# Patient Record
Sex: FEMALE | Race: WHITE | NOT HISPANIC OR LATINO | Employment: OTHER | URBAN - METROPOLITAN AREA
[De-identification: names, ages, dates, MRNs, and addresses within clinical notes are randomized per-mention and may not be internally consistent; named-entity substitution may affect disease eponyms.]

---

## 2017-02-13 ENCOUNTER — HOSPITAL ENCOUNTER (OUTPATIENT)
Dept: LAB | Facility: HOSPITAL | Age: 57
Setting detail: SPECIMEN
Discharge: HOME | End: 2017-02-13
Attending: INTERNAL MEDICINE | Admitting: INTERNAL MEDICINE

## 2017-02-20 ENCOUNTER — HOSPITAL ENCOUNTER (OUTPATIENT)
Dept: LAB | Facility: HOSPITAL | Age: 57
Setting detail: SPECIMEN
Discharge: HOME | End: 2017-02-20
Attending: INTERNAL MEDICINE | Admitting: INTERNAL MEDICINE

## 2017-02-20 LAB
ALBUMIN SERPL-MCNC: 3.6 G/DL (ref 3.5–4.8)
ALBUMIN/GLOB SERPL: 1.2 {RATIO} (ref 1–1.7)
ALP SERPL-CCNC: 126 IU/L (ref 32–91)
ALT SERPL-CCNC: 16 IU/L (ref 14–54)
ANION GAP SERPL CALC-SCNC: 15.2 MMOL/L (ref 10–20)
AST SERPL-CCNC: 21 IU/L (ref 15–41)
BASOPHILS # BLD AUTO: 0.1 10*3/UL (ref 0–0.2)
BASOPHILS NFR BLD AUTO: 1 % (ref 0–2)
BILIRUB SERPL-MCNC: 0.3 MG/DL (ref 0.3–1.2)
BUN SERPL-MCNC: 12 MG/DL (ref 8–20)
BUN/CREAT SERPL: 13.3 (ref 5.4–26.2)
CALCIUM SERPL-MCNC: 9.3 MG/DL (ref 8.9–10.3)
CHLORIDE SERPL-SCNC: 105 MMOL/L (ref 101–111)
CONV CO2: 24 MMOL/L (ref 22–32)
CONV TOTAL PROTEIN: 6.6 G/DL (ref 6.1–7.9)
CREAT UR-MCNC: 0.9 MG/DL (ref 0.4–1)
DIFFERENTIAL METHOD BLD: (no result)
EOSINOPHIL # BLD AUTO: 0.1 10*3/UL (ref 0–0.3)
EOSINOPHIL # BLD AUTO: 1 % (ref 0–3)
ERYTHROCYTE [DISTWIDTH] IN BLOOD BY AUTOMATED COUNT: 16.2 % (ref 11.5–14.5)
GLOBULIN UR ELPH-MCNC: 3 G/DL (ref 2.5–3.8)
GLUCOSE SERPL-MCNC: 78 MG/DL (ref 65–99)
HCT VFR BLD AUTO: 43.5 % (ref 35–49)
HGB BLD-MCNC: 14.6 G/DL (ref 12–15)
LYMPHOCYTES # BLD AUTO: 3.4 10*3/UL (ref 0.8–4.8)
LYMPHOCYTES NFR BLD AUTO: 31 % (ref 18–42)
MCH RBC QN AUTO: 28 PG (ref 26–32)
MCHC RBC AUTO-ENTMCNC: 33.6 G/DL (ref 32–36)
MCV RBC AUTO: 83.3 FL (ref 80–94)
MONOCYTES # BLD AUTO: 0.6 10*3/UL (ref 0.1–1.3)
MONOCYTES NFR BLD AUTO: 6 % (ref 2–11)
NEUTROPHILS # BLD AUTO: 6.9 10*3/UL (ref 2.3–8.6)
NEUTROPHILS NFR BLD AUTO: 61 % (ref 50–75)
NRBC BLD AUTO-RTO: 0 /100{WBCS}
NRBC/RBC NFR BLD MANUAL: 0 10*3/UL
PLATELET # BLD AUTO: 273 10*3/UL (ref 150–450)
PMV BLD AUTO: 7.7 FL (ref 7.4–10.4)
POTASSIUM SERPL-SCNC: 4.2 MMOL/L (ref 3.6–5.1)
RBC # BLD AUTO: 5.22 10*6/UL (ref 4–5.4)
SODIUM SERPL-SCNC: 140 MMOL/L (ref 136–144)
T4 FREE SERPL-MCNC: 0.78 NG/DL (ref 0.58–1.64)
TSH SERPL-ACNC: 0.05 UIU/ML (ref 0.34–5.6)
WBC # BLD AUTO: 11 10*3/UL (ref 4.5–11.5)

## 2017-04-18 ENCOUNTER — HOSPITAL ENCOUNTER (OUTPATIENT)
Dept: LAB | Facility: HOSPITAL | Age: 57
Discharge: HOME | End: 2017-04-18
Attending: INTERNAL MEDICINE | Admitting: INTERNAL MEDICINE

## 2017-04-18 LAB
ANION GAP SERPL CALC-SCNC: 15.5 MMOL/L (ref 10–20)
BUN SERPL-MCNC: 19 MG/DL (ref 8–20)
BUN/CREAT SERPL: 19 (ref 5.4–26.2)
CALCIUM SERPL-MCNC: 9.7 MG/DL (ref 8.9–10.3)
CHLORIDE SERPL-SCNC: 102 MMOL/L (ref 101–111)
CONV CO2: 25 MMOL/L (ref 22–32)
CREAT UR-MCNC: 1 MG/DL (ref 0.4–1)
GLUCOSE SERPL-MCNC: 219 MG/DL (ref 65–99)
MAGNESIUM SERPL-MCNC: 1.5 MG/DL (ref 1.8–2.5)
POTASSIUM SERPL-SCNC: 3.5 MMOL/L (ref 3.6–5.1)
SODIUM SERPL-SCNC: 139 MMOL/L (ref 136–144)

## 2017-10-24 ENCOUNTER — HOSPITAL ENCOUNTER (OUTPATIENT)
Dept: URGENT CARE | Facility: CLINIC | Age: 57
Setting detail: SPECIMEN
Discharge: HOME | End: 2017-10-24
Attending: FAMILY MEDICINE | Admitting: FAMILY MEDICINE

## 2017-10-24 LAB
AMPICILLIN SUSC ISLT: NORMAL
AZTREONAM SUSC ISLT: NORMAL
BACTERIA ISLT: NORMAL
BACTERIA SPEC AEROBE CULT: NORMAL
CEFAZOLIN SUSC ISLT: NORMAL
CEFEPIME SUSC ISLT: NORMAL
CEFTRIAXONE SUSC ISLT: NORMAL
CIPROFLOXACIN SUSC ISLT: NORMAL
COLONY COUNT: NORMAL
ERTAPENEM SUSC ISLT: NORMAL
LEVOFLOXACIN SUSC ISLT: NORMAL
Lab: NORMAL
MEROPENEM SUSC ISLT: NORMAL
MICRO REPORT STATUS: NORMAL
NITROFURANTOIN SUSC ISLT: NORMAL
PIP+TAZO SUSC ISLT: NORMAL
SPECIMEN SOURCE: NORMAL
SUSC METH SPEC: NORMAL
TETRACYCLINE SUSC ISLT: NORMAL
TOBRAMYCIN SUSC ISLT: NORMAL
TRIMETHOPRIM/SULFA: NORMAL

## 2019-01-10 ENCOUNTER — HOSPITAL ENCOUNTER (OUTPATIENT)
Dept: ORTHOPEDIC SURGERY | Facility: CLINIC | Age: 59
Discharge: HOME | End: 2019-01-10
Attending: PODIATRIST | Admitting: PODIATRIST

## 2019-01-22 ENCOUNTER — HOSPITAL ENCOUNTER (OUTPATIENT)
Dept: PAIN MEDICINE | Facility: HOSPITAL | Age: 59
Discharge: HOME | End: 2019-01-22
Attending: ANESTHESIOLOGY | Admitting: ANESTHESIOLOGY

## 2019-02-25 ENCOUNTER — HOSPITAL ENCOUNTER (OUTPATIENT)
Dept: PAIN MEDICINE | Facility: HOSPITAL | Age: 59
Discharge: HOME | End: 2019-02-25
Attending: ANESTHESIOLOGY | Admitting: ANESTHESIOLOGY

## 2019-03-25 ENCOUNTER — HOSPITAL ENCOUNTER (OUTPATIENT)
Dept: CARDIOLOGY | Facility: HOSPITAL | Age: 59
Discharge: HOME | End: 2019-03-25
Attending: INTERNAL MEDICINE | Admitting: INTERNAL MEDICINE

## 2019-04-24 ENCOUNTER — HOSPITAL ENCOUNTER (OUTPATIENT)
Dept: PREADMISSION TESTING | Facility: HOSPITAL | Age: 59
Discharge: HOME | End: 2019-04-24
Attending: ORTHOPAEDIC SURGERY | Admitting: ORTHOPAEDIC SURGERY

## 2019-04-24 LAB
ABO + RH BLD: NORMAL
ANION GAP SERPL CALC-SCNC: 15.1 MMOL/L (ref 10–20)
APTT BLD: 24.2 SEC (ref 24–31)
ARMBAND: NORMAL
BACTERIA SPEC AEROBE CULT: NORMAL
BASOPHILS # BLD AUTO: 0.1 10*3/UL (ref 0–0.2)
BASOPHILS NFR BLD AUTO: 1 % (ref 0–2)
BILIRUB UR QL STRIP: NEGATIVE MG/DL
BLD COMPONENT TYPE: NORMAL
BLD GP AB SCN SERPL QL: NEGATIVE
BUN SERPL-MCNC: 18 MG/DL (ref 8–20)
BUN/CREAT SERPL: 18 (ref 5.4–26.2)
CALCIUM SERPL-MCNC: 10.1 MG/DL (ref 8.9–10.3)
CASTS URNS QL MICRO: NORMAL /[LPF]
CHLORIDE SERPL-SCNC: 109 MMOL/L (ref 101–111)
COLOR UR: YELLOW
CONV BACTERIA IN URINE MICRO: NEGATIVE
CONV CLARITY OF URINE: CLEAR
CONV CO2: 19 MMOL/L (ref 22–32)
CONV HYALINE CASTS IN URINE MICRO: 0 /[LPF] (ref 0–5)
CONV PROTEIN IN URINE BY AUTOMATED TEST STRIP: NEGATIVE MG/DL
CONV SMALL ROUND CELLS: NORMAL /[HPF]
CONV UROBILINOGEN IN URINE BY AUTOMATED TEST STRIP: 0.2 MG/DL
CREAT UR-MCNC: 1 MG/DL (ref 0.4–1)
CROSSMATCH EXPIRATION: NORMAL
CULTURE INDICATED?: NORMAL
DIFFERENTIAL METHOD BLD: (no result)
EOSINOPHIL # BLD AUTO: 0.2 10*3/UL (ref 0–0.3)
EOSINOPHIL # BLD AUTO: 2 % (ref 0–3)
ERYTHROCYTE [DISTWIDTH] IN BLOOD BY AUTOMATED COUNT: 14.3 % (ref 11.5–14.5)
GLUCOSE SERPL-MCNC: 86 MG/DL (ref 65–99)
GLUCOSE UR QL: NEGATIVE MG/DL
HCT VFR BLD AUTO: 44.3 % (ref 35–49)
HGB BLD-MCNC: 14.4 G/DL (ref 12–15)
HGB UR QL STRIP: NEGATIVE
INR PPP: 1
KETONES UR QL STRIP: NEGATIVE MG/DL
LEUKOCYTE ESTERASE UR QL STRIP: NEGATIVE
LYMPHOCYTES # BLD AUTO: 3.7 10*3/UL (ref 0.8–4.8)
LYMPHOCYTES NFR BLD AUTO: 40 % (ref 18–42)
Lab: NORMAL
MCH RBC QN AUTO: 31.2 PG (ref 26–32)
MCHC RBC AUTO-ENTMCNC: 32.6 G/DL (ref 32–36)
MCV RBC AUTO: 95.5 FL (ref 80–94)
MICRO REPORT STATUS: NORMAL
MONOCYTES # BLD AUTO: 0.7 10*3/UL (ref 0.1–1.3)
MONOCYTES NFR BLD AUTO: 8 % (ref 2–11)
NEUTROPHILS # BLD AUTO: 4.6 10*3/UL (ref 2.3–8.6)
NEUTROPHILS NFR BLD AUTO: 49 % (ref 50–75)
NITRITE UR QL STRIP: NEGATIVE
NRBC BLD AUTO-RTO: 0 /100{WBCS}
NRBC/RBC NFR BLD MANUAL: 0 10*3/UL
PH UR STRIP.AUTO: 6 [PH] (ref 4.5–8)
PLATELET # BLD AUTO: 294 10*3/UL (ref 150–450)
PMV BLD AUTO: 7.7 FL (ref 7.4–10.4)
POTASSIUM SERPL-SCNC: 4.1 MMOL/L (ref 3.6–5.1)
PROTHROMBIN TIME: 10.1 SEC (ref 9.6–11.7)
RBC # BLD AUTO: 4.64 10*6/UL (ref 4–5.4)
RBC #/AREA URNS HPF: 1 /[HPF] (ref 0–3)
SODIUM SERPL-SCNC: 139 MMOL/L (ref 136–144)
SP GR UR: 1.01 (ref 1–1.03)
SPECIMEN SOURCE: NORMAL
SPERM URNS QL MICRO: NORMAL /[HPF]
SQUAMOUS SPT QL MICRO: 0 /[HPF] (ref 0–5)
UNIDENT CRYS URNS QL MICRO: NORMAL /[HPF]
WBC # BLD AUTO: 9.2 10*3/UL (ref 4.5–11.5)
WBC #/AREA URNS HPF: 0 /[HPF] (ref 0–5)
YEAST SPEC QL WET PREP: NORMAL /[HPF]

## 2019-09-19 ENCOUNTER — OFFICE VISIT (OUTPATIENT)
Dept: PODIATRY | Facility: CLINIC | Age: 59
End: 2019-09-19

## 2019-09-19 VITALS
HEART RATE: 64 BPM | WEIGHT: 157 LBS | DIASTOLIC BLOOD PRESSURE: 77 MMHG | BODY MASS INDEX: 31.65 KG/M2 | SYSTOLIC BLOOD PRESSURE: 161 MMHG | HEIGHT: 59 IN

## 2019-09-19 DIAGNOSIS — M19.071 ARTHRITIS OF RIGHT FOOT: ICD-10-CM

## 2019-09-19 DIAGNOSIS — S92.351A CLOSED DISPLACED FRACTURE OF FIFTH METATARSAL BONE OF RIGHT FOOT, INITIAL ENCOUNTER: Primary | ICD-10-CM

## 2019-09-19 PROBLEM — M87.00 AVASCULAR NECROSIS OF BONE (HCC): Status: ACTIVE | Noted: 2019-01-10

## 2019-09-19 PROBLEM — M19.079 ARTHRITIS OF FOOT: Status: ACTIVE | Noted: 2019-01-10

## 2019-09-19 PROCEDURE — 99213 OFFICE O/P EST LOW 20 MIN: CPT | Performed by: PODIATRIST

## 2019-09-19 RX ORDER — IBUPROFEN 800 MG/1
TABLET ORAL EVERY 6 HOURS
COMMUNITY
Start: 2019-01-22 | End: 2020-03-28 | Stop reason: HOSPADM

## 2019-09-19 RX ORDER — NAPROXEN SODIUM 220 MG
TABLET ORAL EVERY 12 HOURS
COMMUNITY
Start: 2019-01-22 | End: 2020-03-28 | Stop reason: HOSPADM

## 2019-09-19 RX ORDER — AMOXICILLIN 500 MG
1200 CAPSULE ORAL
COMMUNITY
Start: 2016-10-21

## 2019-09-19 RX ORDER — ASPIRIN 325 MG
TABLET ORAL
Status: ON HOLD | COMMUNITY
Start: 2017-03-24 | End: 2020-03-27

## 2019-09-19 NOTE — PROGRESS NOTES
"09/19/2019  Foot and Ankle Surgery - Established Patient/Follow-up  Provider: Dr. Alistair Fry, ZORAIDA  Location: [facility]    Subjective:  Abi Rivera is a 58 y.o. female.     Chief Complaint   Patient presents with   • Right Foot - Pain       HPI: Patient returns with recent increase in pain to the right foot.  She states that she was walking and felt a pop.  She complains of prominent discomfort affecting the lateral aspect of the foot.  She continues to have discomfort to the dorsal aspect of the midfoot which is unchanged from previous evaluation.  She denies any injury.    No Known Allergies    Current Outpatient Medications on File Prior to Visit   Medication Sig Dispense Refill   • aspirin 325 MG tablet ASPIRIN 325 MG TABS     • Calcium Citrate-Vitamin D 200-125 MG-UNIT tablet CALCIUM + D TABS     • ibuprofen (ADVIL,MOTRIN) 800 MG tablet Every 6 (Six) Hours.     • Magnesium Oxide 400 (240 Mg) MG tablet Daily.     • Multiple Vitamin (MULTI-VITAMIN) tablet MULTI-VITAMIN TABS     • naproxen sodium (ALEVE) 220 MG tablet Every 12 (Twelve) Hours.     • Omega-3 Fatty Acids (FISH OIL) 1000 MG capsule capsule FISH OIL 1000 MG CAPS       No current facility-administered medications on file prior to visit.        Objective   /77   Pulse 64   Ht 149.9 cm (59\")   Wt 71.2 kg (157 lb)   BMI 31.71 kg/m²     Podiatry Exam       General Appearance:  well developed, well nourished, no acute distress  Mental Status Exam        Judgement and Insight:  Intact       Orientation:  AAO x3       Mood and Affect:  depressed mood  Cardiovascular (Bilateral)       Dorsalis Pedis Pulse (BL):  2/4       Posterior Tibialis Pulse (BL):  2/4       Capillary Filling Time (BL):  1-3 Seconds       Edema (BL):   mild right foot edema  Dermatological Exam       Temperature:  warm to warm       Skin Elasticity:  normal skin elasticity  Neurological Exam (Bilateral)       Paresthesia (Bl):  negative       Achilles DTRs " (Bl):  symmetric       Tinel over Tarsal Tunnel (Bl):  negative        MusculoSkeletal Exam (Bilateral)       Gait and Stance (Bl):   antalgic.       ROM (Bl):   midfoot range of motion is limited and tender.  Mild crepitus.  Other joints are unremarkable       Muscle Strength (Bl):  symmetrical 5/5; mild guarding with passing       Subluxed Digits (Bl):  no subluxation or laxity of joints       Dislocated Joints (Bl):  no dislocation of joints       Medial Turbicle Calc (Bl):  no pain       Gastroc soleus equinus (Bl):  Inability to dorsiflex past neutral position both straight legged and bent knee       Post tibial tendon (Bl):  no soreness noted       Peroneal Tendon (Bl):  no soreness noted  Additional Musculoskelatal Findings    Continue discomfort affecting the right midfoot.  Prominent discomfort to the lateral column of the foot.  No gross deformity.    Assessment/Plan   Abi was seen today for pain.    Diagnoses and all orders for this visit:    Closed displaced fracture of fifth metatarsal bone of right foot, initial encounter    Arthritis of right foot  -     XR Foot 3+ View Right      Patient complains of increased pain to the lateral column of the foot.  Imaging was performed today showing a minimally displaced fifth metatarsal base fracture.  I did discuss the finding and treatment options with her in office.  Given that she has had persistent discomfort involving the dorsal aspect of the midfoot and we have previously discussed loose body removal, I do feel that the most appropriate option is to proceed with fifth metatarsal open reduction internal fixation and loose body excision from the talonavicular joint region.  We did discuss the procedures, risks, goals, and recovery at length.  She does understand that she will require nonweightbearing activity after surgery.  I have asked that she return to her cam boot at this time with decreased weightbearing.  We will plan for surgery in the near  future.    Orders Placed This Encounter   Procedures   • XR Foot 3+ View Right     Weight Bearing     Order Specific Question:   Reason for Exam:     Answer:   right foot pain sicne sunday when she heard a loud pop when walking pain is on the top of the foot down the side into the 5th metatarsal RM 15 WB          Note is dictated utilizing voice recognition software. Unfortunately this leads to occasional typographical errors. I apologize in advance if the situation occurs. If questions occur please do not hesitate to call our office.

## 2019-09-23 ENCOUNTER — HOSPITAL ENCOUNTER (OUTPATIENT)
Dept: GENERAL RADIOLOGY | Facility: HOSPITAL | Age: 59
Discharge: HOME | End: 2019-09-23
Admitting: PODIATRIST

## 2019-09-23 ENCOUNTER — APPOINTMENT (OUTPATIENT)
Dept: PREADMISSION TESTING | Facility: HOSPITAL | Age: 59
End: 2019-09-23

## 2019-09-23 VITALS
WEIGHT: 158 LBS | HEIGHT: 59 IN | BODY MASS INDEX: 31.85 KG/M2 | HEART RATE: 60 BPM | DIASTOLIC BLOOD PRESSURE: 87 MMHG | OXYGEN SATURATION: 98 % | SYSTOLIC BLOOD PRESSURE: 139 MMHG

## 2019-09-23 DIAGNOSIS — M19.071 ARTHRITIS OF RIGHT FOOT: ICD-10-CM

## 2019-09-23 DIAGNOSIS — S92.351A CLOSED DISPLACED FRACTURE OF FIFTH METATARSAL BONE OF RIGHT FOOT, INITIAL ENCOUNTER: ICD-10-CM

## 2019-09-23 LAB
ANION GAP SERPL CALCULATED.3IONS-SCNC: 11 MMOL/L (ref 5–15)
BUN BLD-MCNC: 17 MG/DL (ref 8–20)
BUN/CREAT SERPL: 18.9 (ref 5.4–26.2)
CALCIUM SPEC-SCNC: 9.3 MG/DL (ref 8.9–10.3)
CHLORIDE SERPL-SCNC: 111 MMOL/L (ref 101–111)
CO2 SERPL-SCNC: 23 MMOL/L (ref 22–32)
CREAT BLD-MCNC: 0.9 MG/DL (ref 0.4–1)
DEPRECATED RDW RBC AUTO: 45.5 FL (ref 37–54)
ERYTHROCYTE [DISTWIDTH] IN BLOOD BY AUTOMATED COUNT: 13.7 % (ref 12.3–15.4)
GFR SERPL CREATININE-BSD FRML MDRD: 65 ML/MIN/1.73
GLUCOSE BLD-MCNC: 80 MG/DL (ref 65–99)
HCT VFR BLD AUTO: 40.4 % (ref 34–46.6)
HGB BLD-MCNC: 13.7 G/DL (ref 12–15.9)
MCH RBC QN AUTO: 32.1 PG (ref 26.6–33)
MCHC RBC AUTO-ENTMCNC: 34 G/DL (ref 31.5–35.7)
MCV RBC AUTO: 94.4 FL (ref 79–97)
PLATELET # BLD AUTO: 296 10*3/MM3 (ref 140–450)
PMV BLD AUTO: 7 FL (ref 6–12)
POTASSIUM BLD-SCNC: 4 MMOL/L (ref 3.6–5.1)
RBC # BLD AUTO: 4.28 10*6/MM3 (ref 3.77–5.28)
SODIUM BLD-SCNC: 141 MMOL/L (ref 136–144)
WBC NRBC COR # BLD: 7.8 10*3/MM3 (ref 3.4–10.8)

## 2019-09-23 PROCEDURE — 71046 X-RAY EXAM CHEST 2 VIEWS: CPT

## 2019-09-23 PROCEDURE — 80048 BASIC METABOLIC PNL TOTAL CA: CPT | Performed by: PODIATRIST

## 2019-09-23 PROCEDURE — 36415 COLL VENOUS BLD VENIPUNCTURE: CPT

## 2019-09-23 PROCEDURE — 87081 CULTURE SCREEN ONLY: CPT | Performed by: PODIATRIST

## 2019-09-23 PROCEDURE — 93005 ELECTROCARDIOGRAM TRACING: CPT

## 2019-09-23 PROCEDURE — 93010 ELECTROCARDIOGRAM REPORT: CPT | Performed by: INTERNAL MEDICINE

## 2019-09-23 PROCEDURE — 85027 COMPLETE CBC AUTOMATED: CPT | Performed by: PODIATRIST

## 2019-09-23 RX ORDER — ACETAMINOPHEN 500 MG
500 TABLET ORAL EVERY 6 HOURS PRN
COMMUNITY

## 2019-09-23 RX ORDER — UBIDECARENONE 30 MG
1 CAPSULE ORAL DAILY
COMMUNITY

## 2019-09-24 LAB — MRSA SPEC QL CULT: NORMAL

## 2019-09-25 ENCOUNTER — TELEPHONE (OUTPATIENT)
Dept: PODIATRY | Facility: CLINIC | Age: 59
End: 2019-09-25

## 2019-09-25 NOTE — TELEPHONE ENCOUNTER
Pt called asking if she could or had to stop taking all of her NSAIDS and if she did could she have pain meds until surgery.  I went back and spoke to DR farmer and he was ok with her taking Ibuprofen or naproxen not both she needed to pick one. Pt was ok with that and would only take one of them.

## 2019-09-27 ENCOUNTER — ANESTHESIA EVENT (OUTPATIENT)
Dept: PERIOP | Facility: HOSPITAL | Age: 59
End: 2019-09-27

## 2019-09-30 ENCOUNTER — APPOINTMENT (OUTPATIENT)
Dept: GENERAL RADIOLOGY | Facility: HOSPITAL | Age: 59
End: 2019-09-30

## 2019-09-30 ENCOUNTER — HOSPITAL ENCOUNTER (OUTPATIENT)
Facility: HOSPITAL | Age: 59
Setting detail: HOSPITAL OUTPATIENT SURGERY
Discharge: HOME | End: 2019-09-30
Attending: PODIATRIST | Admitting: PODIATRIST

## 2019-09-30 ENCOUNTER — ANESTHESIA (OUTPATIENT)
Dept: PERIOP | Facility: HOSPITAL | Age: 59
End: 2019-09-30

## 2019-09-30 VITALS
SYSTOLIC BLOOD PRESSURE: 127 MMHG | TEMPERATURE: 97.5 F | OXYGEN SATURATION: 98 % | HEART RATE: 76 BPM | DIASTOLIC BLOOD PRESSURE: 81 MMHG | BODY MASS INDEX: 31.73 KG/M2 | RESPIRATION RATE: 15 BRPM | HEIGHT: 59 IN | WEIGHT: 157.41 LBS

## 2019-09-30 DIAGNOSIS — M19.071 ARTHRITIS OF RIGHT FOOT: ICD-10-CM

## 2019-09-30 DIAGNOSIS — S92.351A CLOSED DISPLACED FRACTURE OF FIFTH METATARSAL BONE OF RIGHT FOOT, INITIAL ENCOUNTER: ICD-10-CM

## 2019-09-30 PROCEDURE — 25010000002 FENTANYL CITRATE (PF) 100 MCG/2ML SOLUTION: Performed by: ANESTHESIOLOGY

## 2019-09-30 PROCEDURE — 28485 OPTX METATARSAL FX EACH: CPT | Performed by: PODIATRIST

## 2019-09-30 PROCEDURE — 76000 FLUOROSCOPY <1 HR PHYS/QHP: CPT

## 2019-09-30 PROCEDURE — 73620 X-RAY EXAM OF FOOT: CPT

## 2019-09-30 PROCEDURE — 25010000002 PROPOFOL 10 MG/ML EMULSION: Performed by: ANESTHESIOLOGY

## 2019-09-30 PROCEDURE — C1713 ANCHOR/SCREW BN/BN,TIS/BN: HCPCS | Performed by: PODIATRIST

## 2019-09-30 PROCEDURE — 27620 EXPLORE/TREAT ANKLE JOINT: CPT | Performed by: PODIATRIST

## 2019-09-30 PROCEDURE — 25010000002 DEXAMETHASONE PER 1 MG: Performed by: ANESTHESIOLOGY

## 2019-09-30 PROCEDURE — 25010000002 MIDAZOLAM PER 1 MG: Performed by: ANESTHESIOLOGY

## 2019-09-30 PROCEDURE — 25010000002 ONDANSETRON PER 1 MG: Performed by: ANESTHESIOLOGY

## 2019-09-30 DEVICE — IMPLANTABLE DEVICE
Type: IMPLANTABLE DEVICE | Status: FUNCTIONAL
Brand: ORTHOLOC

## 2019-09-30 DEVICE — IMPLANTABLE DEVICE
Type: IMPLANTABLE DEVICE | Status: FUNCTIONAL
Brand: ORTHOLOC 3DI

## 2019-09-30 RX ORDER — ONDANSETRON 2 MG/ML
INJECTION INTRAMUSCULAR; INTRAVENOUS AS NEEDED
Status: DISCONTINUED | OUTPATIENT
Start: 2019-09-30 | End: 2019-09-30 | Stop reason: SURG

## 2019-09-30 RX ORDER — SODIUM CHLORIDE 9 MG/ML
9 INJECTION, SOLUTION INTRAVENOUS CONTINUOUS PRN
Status: DISCONTINUED | OUTPATIENT
Start: 2019-09-30 | End: 2019-09-30 | Stop reason: HOSPADM

## 2019-09-30 RX ORDER — PHENYLEPHRINE HCL IN 0.9% NACL 0.5 MG/5ML
.5-3 SYRINGE (ML) INTRAVENOUS
Status: DISCONTINUED | OUTPATIENT
Start: 2019-09-30 | End: 2019-09-30 | Stop reason: HOSPADM

## 2019-09-30 RX ORDER — MIDAZOLAM HYDROCHLORIDE 1 MG/ML
INJECTION INTRAMUSCULAR; INTRAVENOUS AS NEEDED
Status: DISCONTINUED | OUTPATIENT
Start: 2019-09-30 | End: 2019-09-30 | Stop reason: SURG

## 2019-09-30 RX ORDER — PROPOFOL 10 MG/ML
VIAL (ML) INTRAVENOUS AS NEEDED
Status: DISCONTINUED | OUTPATIENT
Start: 2019-09-30 | End: 2019-09-30 | Stop reason: SURG

## 2019-09-30 RX ORDER — HYDROCODONE BITARTRATE AND ACETAMINOPHEN 7.5; 325 MG/1; MG/1
1 TABLET ORAL EVERY 6 HOURS PRN
Status: DISCONTINUED | OUTPATIENT
Start: 2019-09-30 | End: 2019-09-30 | Stop reason: HOSPADM

## 2019-09-30 RX ORDER — NALOXONE HCL 0.4 MG/ML
0.4 VIAL (ML) INJECTION AS NEEDED
Status: DISCONTINUED | OUTPATIENT
Start: 2019-09-30 | End: 2019-09-30 | Stop reason: HOSPADM

## 2019-09-30 RX ORDER — ACETAMINOPHEN 325 MG/1
650 TABLET ORAL ONCE AS NEEDED
Status: DISCONTINUED | OUTPATIENT
Start: 2019-09-30 | End: 2019-09-30 | Stop reason: HOSPADM

## 2019-09-30 RX ORDER — DEXAMETHASONE SODIUM PHOSPHATE 4 MG/ML
INJECTION, SOLUTION INTRA-ARTICULAR; INTRALESIONAL; INTRAMUSCULAR; INTRAVENOUS; SOFT TISSUE AS NEEDED
Status: DISCONTINUED | OUTPATIENT
Start: 2019-09-30 | End: 2019-09-30 | Stop reason: SURG

## 2019-09-30 RX ORDER — PROMETHAZINE HYDROCHLORIDE 25 MG/1
25 SUPPOSITORY RECTAL ONCE AS NEEDED
Status: DISCONTINUED | OUTPATIENT
Start: 2019-09-30 | End: 2019-09-30 | Stop reason: HOSPADM

## 2019-09-30 RX ORDER — PROMETHAZINE HYDROCHLORIDE 25 MG/1
25 TABLET ORAL ONCE AS NEEDED
Status: DISCONTINUED | OUTPATIENT
Start: 2019-09-30 | End: 2019-09-30 | Stop reason: HOSPADM

## 2019-09-30 RX ORDER — BUPIVACAINE HYDROCHLORIDE 5 MG/ML
INJECTION, SOLUTION PERINEURAL AS NEEDED
Status: DISCONTINUED | OUTPATIENT
Start: 2019-09-30 | End: 2019-09-30 | Stop reason: HOSPADM

## 2019-09-30 RX ORDER — PROMETHAZINE HYDROCHLORIDE 25 MG/ML
6.25 INJECTION, SOLUTION INTRAMUSCULAR; INTRAVENOUS ONCE AS NEEDED
Status: DISCONTINUED | OUTPATIENT
Start: 2019-09-30 | End: 2019-09-30 | Stop reason: HOSPADM

## 2019-09-30 RX ORDER — ONDANSETRON 2 MG/ML
4 INJECTION INTRAMUSCULAR; INTRAVENOUS ONCE AS NEEDED
Status: DISCONTINUED | OUTPATIENT
Start: 2019-09-30 | End: 2019-09-30 | Stop reason: HOSPADM

## 2019-09-30 RX ORDER — SODIUM CHLORIDE 0.9 % (FLUSH) 0.9 %
3 SYRINGE (ML) INJECTION EVERY 12 HOURS SCHEDULED
Status: DISCONTINUED | OUTPATIENT
Start: 2019-09-30 | End: 2019-09-30 | Stop reason: HOSPADM

## 2019-09-30 RX ORDER — HYDROCODONE BITARTRATE AND ACETAMINOPHEN 7.5; 325 MG/1; MG/1
1-2 TABLET ORAL EVERY 6 HOURS PRN
Qty: 28 TABLET | Refills: 0 | Status: SHIPPED | OUTPATIENT
Start: 2019-09-30 | End: 2019-10-17

## 2019-09-30 RX ORDER — MEPERIDINE HYDROCHLORIDE 25 MG/ML
12.5 INJECTION INTRAMUSCULAR; INTRAVENOUS; SUBCUTANEOUS
Status: COMPLETED | OUTPATIENT
Start: 2019-09-30 | End: 2019-09-30

## 2019-09-30 RX ORDER — FENTANYL CITRATE 50 UG/ML
INJECTION, SOLUTION INTRAMUSCULAR; INTRAVENOUS AS NEEDED
Status: DISCONTINUED | OUTPATIENT
Start: 2019-09-30 | End: 2019-09-30 | Stop reason: SURG

## 2019-09-30 RX ORDER — HYDRALAZINE HYDROCHLORIDE 20 MG/ML
5 INJECTION INTRAMUSCULAR; INTRAVENOUS
Status: DISCONTINUED | OUTPATIENT
Start: 2019-09-30 | End: 2019-09-30 | Stop reason: HOSPADM

## 2019-09-30 RX ORDER — ACETAMINOPHEN 650 MG/1
650 SUPPOSITORY RECTAL ONCE AS NEEDED
Status: DISCONTINUED | OUTPATIENT
Start: 2019-09-30 | End: 2019-09-30 | Stop reason: HOSPADM

## 2019-09-30 RX ORDER — FLUMAZENIL 0.1 MG/ML
0.2 INJECTION INTRAVENOUS AS NEEDED
Status: DISCONTINUED | OUTPATIENT
Start: 2019-09-30 | End: 2019-09-30 | Stop reason: HOSPADM

## 2019-09-30 RX ORDER — LIDOCAINE HYDROCHLORIDE 10 MG/ML
INJECTION, SOLUTION EPIDURAL; INFILTRATION; INTRACAUDAL; PERINEURAL AS NEEDED
Status: DISCONTINUED | OUTPATIENT
Start: 2019-09-30 | End: 2019-09-30 | Stop reason: SURG

## 2019-09-30 RX ORDER — LABETALOL HYDROCHLORIDE 5 MG/ML
5 INJECTION, SOLUTION INTRAVENOUS
Status: DISCONTINUED | OUTPATIENT
Start: 2019-09-30 | End: 2019-09-30 | Stop reason: HOSPADM

## 2019-09-30 RX ORDER — SODIUM CHLORIDE 0.9 % (FLUSH) 0.9 %
3-10 SYRINGE (ML) INJECTION AS NEEDED
Status: DISCONTINUED | OUTPATIENT
Start: 2019-09-30 | End: 2019-09-30 | Stop reason: HOSPADM

## 2019-09-30 RX ADMIN — FENTANYL CITRATE 50 MCG: 50 INJECTION, SOLUTION INTRAMUSCULAR; INTRAVENOUS at 14:09

## 2019-09-30 RX ADMIN — FENTANYL CITRATE 50 MCG: 50 INJECTION, SOLUTION INTRAMUSCULAR; INTRAVENOUS at 14:00

## 2019-09-30 RX ADMIN — MEPERIDINE HYDROCHLORIDE 12.5 MG: 25 INJECTION INTRAMUSCULAR; INTRAVENOUS; SUBCUTANEOUS at 15:02

## 2019-09-30 RX ADMIN — ONDANSETRON 4 MG: 2 INJECTION INTRAMUSCULAR; INTRAVENOUS at 14:03

## 2019-09-30 RX ADMIN — MIDAZOLAM 2 MG: 1 INJECTION INTRAMUSCULAR; INTRAVENOUS at 13:11

## 2019-09-30 RX ADMIN — PROPOFOL 200 MG: 10 INJECTION, EMULSION INTRAVENOUS at 13:11

## 2019-09-30 RX ADMIN — MEPERIDINE HYDROCHLORIDE 12.5 MG: 25 INJECTION INTRAMUSCULAR; INTRAVENOUS; SUBCUTANEOUS at 15:11

## 2019-09-30 RX ADMIN — CEFAZOLIN SODIUM 2 G: 1 INJECTION, POWDER, FOR SOLUTION INTRAMUSCULAR; INTRAVENOUS at 13:14

## 2019-09-30 RX ADMIN — FENTANYL CITRATE 100 MCG: 50 INJECTION, SOLUTION INTRAMUSCULAR; INTRAVENOUS at 13:11

## 2019-09-30 RX ADMIN — HYDROCODONE BITARTRATE AND ACETAMINOPHEN 1 TABLET: 7.5; 325 TABLET ORAL at 15:58

## 2019-09-30 RX ADMIN — LIDOCAINE HYDROCHLORIDE 50 MG: 10 INJECTION, SOLUTION EPIDURAL; INFILTRATION; INTRACAUDAL; PERINEURAL at 13:11

## 2019-09-30 RX ADMIN — DEXAMETHASONE SODIUM PHOSPHATE 8 MG: 4 INJECTION, SOLUTION INTRAMUSCULAR; INTRAVENOUS at 13:11

## 2019-09-30 RX ADMIN — SODIUM CHLORIDE 9 ML/HR: 900 INJECTION, SOLUTION INTRAVENOUS at 11:12

## 2019-09-30 NOTE — ANESTHESIA POSTPROCEDURE EVALUATION
Patient: Abi Rivera    Procedure Summary     Date:  09/30/19 Room / Location:  [facility] OR 08 / [facility] MAIN OR    Anesthesia Start:  1308 Anesthesia Stop:  1436    Procedure:  OPEN REDUCTION INTERNAL FIXATION OF FIFTH METATARSAL FRACTURE, LOOSE BODY EXCISION/REMOVAL FROM THE TALONAVICULAR JOINT OF THE RIGHT FOOT (Right Foot) Diagnosis:       Closed displaced fracture of fifth metatarsal bone of right foot, initial encounter      Arthritis of right foot      (Closed displaced fracture of fifth metatarsal bone of right foot, initial encounter [S92.351A])      (Arthritis of right foot [M19.071])    Surgeon:  JOSE Fry DPM Provider:  Ayala Velasco MD    Anesthesia Type:  general with block ASA Status:  2          Anesthesia Type: general with block  Last vitals  BP   133/66 (09/30/19 1436)   Temp   97.2 °F (36.2 °C) (09/30/19 1436)   Pulse   71 (09/30/19 1436)   Resp   13 (09/30/19 1436)     SpO2   100 % (09/30/19 1436)     Post Anesthesia Care and Evaluation    Patient location during evaluation: PACU  Patient participation: complete - patient participated  Level of consciousness: awake  Pain management: adequate  Airway patency: patent  Anesthetic complications: No anesthetic complications  PONV Status: controlled  Cardiovascular status: acceptable  Respiratory status: acceptable  Hydration status: acceptable  Post Neuraxial Block status: Motor and sensory function returned to baseline

## 2019-09-30 NOTE — ANESTHESIA PROCEDURE NOTES
Airway  Urgency: elective    Date/Time: 9/30/2019 1:12 PM  Airway not difficult    General Information and Staff    Patient location during procedure: OR  Anesthesiologist: Ayala Velasco MD    Indications and Patient Condition  Indications for airway management: airway protection    Preoxygenated: yes  MILS maintained throughout  Mask difficulty assessment: 1 - vent by mask    Final Airway Details  Final airway type: supraglottic airway      Successful airway: Size 4    Number of attempts at approach: 1  Assessment: lips, teeth, and gum same as pre-op

## 2019-09-30 NOTE — ANESTHESIA PREPROCEDURE EVALUATION
Anesthesia Evaluation     Patient summary reviewed and Nursing notes reviewed   no history of anesthetic complications:  NPO Solid Status: > 8 hours  NPO Liquid Status: > 2 hours           Airway   Mallampati: II  TM distance: >3 FB  Neck ROM: full  No difficulty expected  Dental - normal exam     Pulmonary - negative pulmonary ROS and normal exam   Cardiovascular - normal exam    (+) hypertension,       Neuro/Psych- negative ROS  GI/Hepatic/Renal/Endo - negative ROS     Musculoskeletal     Abdominal  - normal exam   Substance History - negative use     OB/GYN negative ob/gyn ROS         Other   (+) arthritis                     Anesthesia Plan    ASA 2     general with block     intravenous induction   Anesthetic plan, all risks, benefits, and alternatives have been provided, discussed and informed consent has been obtained with: patient.  Use of blood products discussed with patient .

## 2019-10-17 ENCOUNTER — OFFICE VISIT (OUTPATIENT)
Dept: PODIATRY | Facility: CLINIC | Age: 59
End: 2019-10-17

## 2019-10-17 VITALS
HEART RATE: 74 BPM | SYSTOLIC BLOOD PRESSURE: 140 MMHG | DIASTOLIC BLOOD PRESSURE: 76 MMHG | BODY MASS INDEX: 31.65 KG/M2 | WEIGHT: 157 LBS | HEIGHT: 59 IN

## 2019-10-17 DIAGNOSIS — S92.351A CLOSED DISPLACED FRACTURE OF FIFTH METATARSAL BONE OF RIGHT FOOT, INITIAL ENCOUNTER: Primary | ICD-10-CM

## 2019-10-17 DIAGNOSIS — M19.071 ARTHRITIS OF RIGHT FOOT: ICD-10-CM

## 2019-10-17 PROCEDURE — 99024 POSTOP FOLLOW-UP VISIT: CPT | Performed by: PODIATRIST

## 2019-10-17 NOTE — PROGRESS NOTES
"10/17/2019  Foot and Ankle Surgery - Established Patient/Follow-up  Provider: Dr. Alistair Fry, ZORAIDA  Location: [facility]    Subjective:  Abi Rivera is a 58 y.o. female.     Chief Complaint   Patient presents with   • Right Foot - Follow-up   • Post-op     9/30/19       HPI: She returns approximately 2 weeks after surgery.  She states that she has remained off weightbearing however her splint is nearly broke.  She is unaware of any significant pain or limitation.    No Known Allergies    Current Outpatient Medications on File Prior to Visit   Medication Sig Dispense Refill   • acetaminophen (TYLENOL) 500 MG tablet Take 500 mg by mouth Every 6 (Six) Hours As Needed for Mild Pain .     • aspirin 325 MG tablet ASPIRIN 325 MG TABS     • calcium carb-cholecalciferol (CALCIUM 600+D) 600-800 MG-UNIT tablet Take 1 tablet by mouth Daily.     • Glucosamine-Chondroit-Vit C-Mn (GLUCOSAMINE 1500 COMPLEX PO) Take 1 tablet by mouth Daily.     • ibuprofen (ADVIL,MOTRIN) 800 MG tablet Every 6 (Six) Hours.     • MAGNESIUM OXIDE PO Take 250 mg by mouth Daily.     • Multiple Vitamin (MULTI-VITAMIN) tablet MULTI-VITAMIN gummies     • naproxen sodium (ALEVE) 220 MG tablet Every 12 (Twelve) Hours.     • Omega-3 Fatty Acids (FISH OIL) 1200 MG capsule capsule 1 tablet daily     • Potassium Gluconate 550 MG tablet Take 1 tablet by mouth Daily.     • [DISCONTINUED] HYDROcodone-acetaminophen (NORCO) 7.5-325 MG per tablet Take 1-2 tablets by mouth Every 6 (Six) Hours As Needed for Moderate Pain  (Pain). 28 tablet 0     No current facility-administered medications on file prior to visit.        Objective   /76   Pulse 74   Ht 149.9 cm (59\")   Wt 71.2 kg (157 lb)   BMI 31.71 kg/m²        Podiatry Exam       General Appearance:  well developed, well nourished, no acute distress  Mental Status Exam        Judgement and Insight:  Intact       Orientation:  AAO x3       Mood and Affect:  depressed mood  Cardiovascular " (Bilateral)       Dorsalis Pedis Pulse (BL):  2/4       Posterior Tibialis Pulse (BL):  2/4       Capillary Filling Time (BL):  1-3 Seconds       Edema (BL):   mild right foot edema  Dermatological Exam       Temperature:  warm to warm       Skin Elasticity:  normal skin elasticity  Incisions are dry and stable with intact sutures.  Small area of dehiscence noted to the lateral incision with superficial wound.  No active drainage or signs of infection.  Neurological Exam (Bilateral)       Paresthesia (Bl):  negative       Achilles DTRs (Bl):  symmetric       Tinel over Tarsal Tunnel (Bl):  negative        MusculoSkeletal Exam (Bilateral)       Gait and Stance (Bl):   antalgic.       ROM (Bl):   midfoot range of motion is limited and tender.  Mild crepitus.  Other joints are unremarkable       Muscle Strength (Bl):  symmetrical 5/5; mild guarding with passing       Subluxed Digits (Bl):  no subluxation or laxity of joints       Dislocated Joints (Bl):  no dislocation of joints       Medial Turbicle Calc (Bl):  no pain       Gastroc soleus equinus (Bl):  Inability to dorsiflex past neutral position both straight legged and bent knee       Post tibial tendon (Bl):  no soreness noted       Peroneal Tendon (Bl):  no soreness noted  Additional Musculoskelatal Findings  Mild discomfort involving the lateral column.  No progressive deformity    Assessment/Plan   Abi was seen today for post-op and follow-up.    Diagnoses and all orders for this visit:    Closed displaced fracture of fifth metatarsal bone of right foot, initial encounter    Arthritis of right foot      Patient presents 2 weeks after surgery.  Sutures were removed today without complication.  She does have a small area of dehiscence which is superficial to the lateral incision site.  I have asked that she perform daily Betadine application.  I would like her to remain off weightbearing.  I have asked that she start gentle range of motion exercises and call  with any additional questions or concerns.  I will see her in 2 weeks for reevaluation and imaging.  No orders of the defined types were placed in this encounter.         Note is dictated utilizing voice recognition software. Unfortunately this leads to occasional typographical errors. I apologize in advance if the situation occurs. If questions occur please do not hesitate to call our office.

## 2019-10-31 ENCOUNTER — OFFICE VISIT (OUTPATIENT)
Dept: PODIATRY | Facility: CLINIC | Age: 59
End: 2019-10-31

## 2019-10-31 VITALS
DIASTOLIC BLOOD PRESSURE: 89 MMHG | HEART RATE: 82 BPM | BODY MASS INDEX: 31.65 KG/M2 | SYSTOLIC BLOOD PRESSURE: 128 MMHG | WEIGHT: 157 LBS | HEIGHT: 59 IN

## 2019-10-31 DIAGNOSIS — S92.351A CLOSED DISPLACED FRACTURE OF FIFTH METATARSAL BONE OF RIGHT FOOT, INITIAL ENCOUNTER: Primary | ICD-10-CM

## 2019-10-31 DIAGNOSIS — M19.071 ARTHRITIS OF RIGHT FOOT: ICD-10-CM

## 2019-10-31 PROCEDURE — 99024 POSTOP FOLLOW-UP VISIT: CPT | Performed by: PODIATRIST

## 2019-10-31 NOTE — PROGRESS NOTES
"10/31/2019  Foot and Ankle Surgery - Established Patient/Follow-up  Provider: Dr. Alistair Fry, ZORAIDA  Location: [facility]    Subjective:  Abi Rivera is a 58 y.o. female.     Chief Complaint   Patient presents with   • Right Foot - Follow-up, Post-op       HPI: Patient returns approximately 4 weeks after surgery.  She states that she is doing well.  She has been ambulating the cam boot intermittently.  No significant pain or limitation.  She feels that the incision site is healing well.    No Known Allergies    Current Outpatient Medications on File Prior to Visit   Medication Sig Dispense Refill   • acetaminophen (TYLENOL) 500 MG tablet Take 500 mg by mouth Every 6 (Six) Hours As Needed for Mild Pain .     • aspirin 325 MG tablet ASPIRIN 325 MG TABS     • calcium carb-cholecalciferol (CALCIUM 600+D) 600-800 MG-UNIT tablet Take 1 tablet by mouth Daily.     • Glucosamine-Chondroit-Vit C-Mn (GLUCOSAMINE 1500 COMPLEX PO) Take 1 tablet by mouth Daily.     • ibuprofen (ADVIL,MOTRIN) 800 MG tablet Every 6 (Six) Hours.     • MAGNESIUM OXIDE PO Take 250 mg by mouth Daily.     • Multiple Vitamin (MULTI-VITAMIN) tablet MULTI-VITAMIN gummies     • naproxen sodium (ALEVE) 220 MG tablet Every 12 (Twelve) Hours.     • Omega-3 Fatty Acids (FISH OIL) 1200 MG capsule capsule 1 tablet daily     • Potassium Gluconate 550 MG tablet Take 1 tablet by mouth Daily.       No current facility-administered medications on file prior to visit.        Objective   /89   Pulse 82   Ht 149.9 cm (59\")   Wt 71.2 kg (157 lb)   BMI 31.71 kg/m²     Podiatry Exam       General Appearance:  well developed, well nourished, no acute distress  Mental Status Exam        Judgement and Insight:  Intact       Orientation:  AAO x3       Mood and Affect:  depressed mood  Cardiovascular (Bilateral)       Dorsalis Pedis Pulse (BL):  2/4       Posterior Tibialis Pulse (BL):  2/4       Capillary Filling Time (BL):  1-3 Seconds       Edema " (BL):   mild right foot edema  Dermatological Exam       Temperature:  warm to warm       Skin Elasticity:  normal skin elasticity  Incision site is well-healed at this time.  No other areas of concern  Neurological Exam (Bilateral)       Paresthesia (Bl):  negative       Achilles DTRs (Bl):  symmetric       Tinel over Tarsal Tunnel (Bl):  negative        MusculoSkeletal Exam (Bilateral)       Gait and Stance (Bl):   antalgic.       ROM (Bl):   midfoot range of motion is limited and tender.  Mild crepitus.  Other joints are unremarkable       Muscle Strength (Bl):  symmetrical 5/5; mild guarding with passing       Subluxed Digits (Bl):  no subluxation or laxity of joints       Dislocated Joints (Bl):  no dislocation of joints       Medial Turbicle Calc (Bl):  no pain       Gastroc soleus equinus (Bl):  Inability to dorsiflex past neutral position both straight legged and bent knee       Post tibial tendon (Bl):  no soreness noted       Peroneal Tendon (Bl):  no soreness noted  Additional Musculoskelatal Findings  Mild discomfort involving the lateral column.  No progressive deformity    Assessment/Plan   Abi was seen today for follow-up and post-op.    Diagnoses and all orders for this visit:    Closed displaced fracture of fifth metatarsal bone of right foot, initial encounter  -     XR Foot 3+ View Right    Arthritis of right foot  -     XR Foot 3+ View Right      Patient continues to have mild discomfort to the operative sites.  She is doing well.  Imaging was reviewed today showing stable fixation to the fifth metatarsal.  No progressive changes are present.  I have recommended that she proceed with weightbearing activities as tolerated in the cam boot.  She is to continue at home range of motion and stretching exercises.  She is to avoid high-impact and excessive activity.  I would like to see her in 4 weeks for reevaluation and imaging.    Orders Placed This Encounter   Procedures   • XR Foot 3+ View Right      Order Specific Question:   Reason for Exam:     Answer:   Right foot post op from about 4 weeks ago RM 15 NWB          Note is dictated utilizing voice recognition software. Unfortunately this leads to occasional typographical errors. I apologize in advance if the situation occurs. If questions occur please do not hesitate to call our office.

## 2019-12-05 ENCOUNTER — OFFICE VISIT (OUTPATIENT)
Dept: PODIATRY | Facility: CLINIC | Age: 59
End: 2019-12-05

## 2019-12-05 VITALS
WEIGHT: 157 LBS | DIASTOLIC BLOOD PRESSURE: 96 MMHG | HEART RATE: 87 BPM | HEIGHT: 59 IN | SYSTOLIC BLOOD PRESSURE: 164 MMHG | BODY MASS INDEX: 31.65 KG/M2

## 2019-12-05 DIAGNOSIS — S92.351A CLOSED DISPLACED FRACTURE OF FIFTH METATARSAL BONE OF RIGHT FOOT, INITIAL ENCOUNTER: Primary | ICD-10-CM

## 2019-12-05 PROCEDURE — 99024 POSTOP FOLLOW-UP VISIT: CPT | Performed by: PODIATRIST

## 2019-12-05 NOTE — PROGRESS NOTES
"12/05/2019  Foot and Ankle Surgery - Established Patient/Follow-up  Provider: Dr. Alistair Fry, ZORAIDA  Location: [facility]    Subjective:  Abi Rivera is a 58 y.o. female.     Chief Complaint   Patient presents with   • Right Foot - Follow-up, Post-op       HPI: Patient returns approximately 8 weeks after surgery to the right foot.  She states that she is doing much better.  She has been ambulating in the cam boot.  She denies any profound swelling or discomfort.    No Known Allergies    Current Outpatient Medications on File Prior to Visit   Medication Sig Dispense Refill   • acetaminophen (TYLENOL) 500 MG tablet Take 500 mg by mouth Every 6 (Six) Hours As Needed for Mild Pain .     • aspirin 325 MG tablet ASPIRIN 325 MG TABS     • calcium carb-cholecalciferol (CALCIUM 600+D) 600-800 MG-UNIT tablet Take 1 tablet by mouth Daily.     • Glucosamine-Chondroit-Vit C-Mn (GLUCOSAMINE 1500 COMPLEX PO) Take 1 tablet by mouth Daily.     • ibuprofen (ADVIL,MOTRIN) 800 MG tablet Every 6 (Six) Hours.     • MAGNESIUM OXIDE PO Take 250 mg by mouth Daily.     • Multiple Vitamin (MULTI-VITAMIN) tablet MULTI-VITAMIN gummies     • naproxen sodium (ALEVE) 220 MG tablet Every 12 (Twelve) Hours.     • Omega-3 Fatty Acids (FISH OIL) 1200 MG capsule capsule 1 tablet daily     • Potassium Gluconate 550 MG tablet Take 1 tablet by mouth Daily.       No current facility-administered medications on file prior to visit.        Objective   /96   Pulse 87   Ht 149.9 cm (59\")   Wt 71.2 kg (157 lb)   BMI 31.71 kg/m²     Podiatry Exam       General Appearance:  well developed, well nourished, no acute distress  Mental Status Exam        Judgement and Insight:  Intact       Orientation:  AAO x3       Mood and Affect:  depressed mood  Cardiovascular (Bilateral)       Dorsalis Pedis Pulse (BL):  2/4       Posterior Tibialis Pulse (BL):  2/4       Capillary Filling Time (BL):  1-3 Seconds       Edema (BL): Edema has " improved  Dermatological Exam       Temperature:  warm to warm       Skin Elasticity:  normal skin elasticity  Incision site is well-healed at this time.  No other areas of concern  Neurological Exam (Bilateral)       Paresthesia (Bl):  negative       Achilles DTRs (Bl):  symmetric       Tinel over Tarsal Tunnel (Bl):  negative        MusculoSkeletal Exam (Bilateral)       Gait and Stance (Bl):   antalgic.       ROM (Bl):   midfoot range of motion is limited and tender.  Mild crepitus.  Other joints are unremarkable       Muscle Strength (Bl):  symmetrical 5/5; mild guarding with passing       Subluxed Digits (Bl):  no subluxation or laxity of joints       Dislocated Joints (Bl):  no dislocation of joints       Medial Turbicle Calc (Bl):  no pain       Gastroc soleus equinus (Bl):  Inability to dorsiflex past neutral position both straight legged and bent knee       Post tibial tendon (Bl):  no soreness noted       Peroneal Tendon (Bl):  no soreness noted  Additional Musculoskelatal Findings  No significant pain or instability to the right foot.  No deformity present    Assessment/Plan   Abi was seen today for follow-up and post-op.    Diagnoses and all orders for this visit:    Closed displaced fracture of fifth metatarsal bone of right foot, initial encounter  -     XR Foot 3+ View Right      Patient states that she is doing much better.  Imaging was performed today showing interval healing across the fracture site and stable internal fixation.  She continues to have significant degenerative changes affecting the midfoot.  She does feel that she is doing well overall though.  I have recommended that she discontinue the cam boot and gradually return to baseline activity as tolerated.  She is to avoid high-impact and excessive activity until comfortable.  I would like her to continue at home range of motion and stretching exercises.  We did discuss appropriate shoes and inserts.  I would like to see her in 3  months for reevaluation.    Orders Placed This Encounter   Procedures   • XR Foot 3+ View Right     Weight Bearing     Order Specific Question:   Reason for Exam:     Answer:   Right foot post op F/U Rm 13 WB          Note is dictated utilizing voice recognition software. Unfortunately this leads to occasional typographical errors. I apologize in advance if the situation occurs. If questions occur please do not hesitate to call our office.

## 2020-03-05 ENCOUNTER — OFFICE VISIT (OUTPATIENT)
Dept: PODIATRY | Facility: CLINIC | Age: 60
End: 2020-03-05

## 2020-03-05 VITALS
DIASTOLIC BLOOD PRESSURE: 84 MMHG | BODY MASS INDEX: 31.85 KG/M2 | SYSTOLIC BLOOD PRESSURE: 136 MMHG | WEIGHT: 158 LBS | HEIGHT: 59 IN | HEART RATE: 89 BPM

## 2020-03-05 DIAGNOSIS — M85.861 OSTEOPENIA OF RIGHT LOWER LEG: ICD-10-CM

## 2020-03-05 DIAGNOSIS — M19.079 ARTHRITIS OF FOOT: ICD-10-CM

## 2020-03-05 DIAGNOSIS — S92.351K CLOSED DISPLACED FRACTURE OF FIFTH METATARSAL BONE OF RIGHT FOOT WITH NONUNION: Primary | ICD-10-CM

## 2020-03-05 DIAGNOSIS — M21.961 FOOT DEFORMITY, ACQUIRED, RIGHT: ICD-10-CM

## 2020-03-05 PROCEDURE — 97760 ORTHOTIC MGMT&TRAING 1ST ENC: CPT | Performed by: PODIATRIST

## 2020-03-05 PROCEDURE — 99213 OFFICE O/P EST LOW 20 MIN: CPT | Performed by: PODIATRIST

## 2020-03-06 DIAGNOSIS — S92.351K CLOSED DISPLACED FRACTURE OF FIFTH METATARSAL BONE OF RIGHT FOOT WITH NONUNION: Primary | ICD-10-CM

## 2020-03-06 PROBLEM — G89.29 CHRONIC PAIN: Status: ACTIVE | Noted: 2019-01-22

## 2020-03-06 PROBLEM — I49.5 SICK SINUS SYNDROME (HCC): Status: ACTIVE | Noted: 2017-01-25

## 2020-03-06 PROBLEM — M25.50 POLYARTHRALGIA: Status: ACTIVE | Noted: 2019-01-22

## 2020-03-06 PROBLEM — M47.817 OSTEOARTHRITIS OF LUMBOSACRAL SPINE WITHOUT MYELOPATHY: Status: ACTIVE | Noted: 2019-01-22

## 2020-03-06 PROBLEM — M79.673 PAIN OF FOOT: Status: ACTIVE | Noted: 2019-01-10

## 2020-03-06 PROBLEM — M54.16 LUMBAR RADICULITIS: Status: ACTIVE | Noted: 2019-01-22

## 2020-03-06 NOTE — PROGRESS NOTES
"03/05/2020  Foot and Ankle Surgery - Established Patient/Follow-up  Provider: Dr. Alistair Fry DPM  Location: [facility]    Subjective:  bAi Rivera is a 59 y.o. female.     Chief Complaint   Patient presents with   • Right Foot - Follow-up       HPI: Patient returns for right foot check.  She has not noticed any significant changes but continues to have discomfort to her foot.  She localizes majority of the pain to the plantar lateral aspect of the foot.  She has noticed mild improvement to the dorsal foot pain but continues to remain limited and limps.  Symptoms are worse with increased activity and typically improved with rest.    No Known Allergies    Current Outpatient Medications on File Prior to Visit   Medication Sig Dispense Refill   • acetaminophen (TYLENOL) 500 MG tablet Take 500 mg by mouth Every 6 (Six) Hours As Needed for Mild Pain .     • aspirin 325 MG tablet ASPIRIN 325 MG TABS     • calcium carb-cholecalciferol (CALCIUM 600+D) 600-800 MG-UNIT tablet Take 1 tablet by mouth Daily.     • Glucosamine-Chondroit-Vit C-Mn (GLUCOSAMINE 1500 COMPLEX PO) Take 1 tablet by mouth Daily.     • ibuprofen (ADVIL,MOTRIN) 800 MG tablet Every 6 (Six) Hours.     • MAGNESIUM OXIDE PO Take 250 mg by mouth Daily.     • Multiple Vitamin (MULTI-VITAMIN) tablet MULTI-VITAMIN gummies     • naproxen sodium (ALEVE) 220 MG tablet Every 12 (Twelve) Hours.     • Omega-3 Fatty Acids (FISH OIL) 1200 MG capsule capsule 1 tablet daily     • Potassium Gluconate 550 MG tablet Take 1 tablet by mouth Daily.       No current facility-administered medications on file prior to visit.        Objective   /84   Pulse 89   Ht 149.9 cm (59\")   Wt 71.7 kg (158 lb)   BMI 31.91 kg/m²     Podiatry Exam       General Appearance:  well developed, well nourished, no acute distress  Mental Status Exam        Judgement and Insight:  Intact       Orientation:  AAO x3       Mood and Affect:  depressed mood  Cardiovascular " (Bilateral)       Dorsalis Pedis Pulse (BL):  2/4       Posterior Tibialis Pulse (BL):  2/4       Capillary Filling Time (BL):  1-3 Seconds       Edema (BL): Edema has improved  Dermatological Exam       Temperature:  warm to warm       Skin Elasticity:  normal skin elasticity  Incision site is well-healed at this time.  No other areas of concern  Neurological Exam (Bilateral)       Paresthesia (Bl):  negative       Achilles DTRs (Bl):  symmetric       Tinel over Tarsal Tunnel (Bl):  negative        MusculoSkeletal Exam (Bilateral)       Gait and Stance (Bl):   antalgic.       ROM (Bl): Range of motion to the midfoot remains limited and tender with attempted range of motion.       Muscle Strength (Bl):  symmetrical 5/5; mild guarding with passing       Subluxed Digits (Bl):  no subluxation or laxity of joints       Dislocated Joints (Bl):  no dislocation of joints       Medial Turbicle Calc (Bl):  no pain       Gastroc soleus equinus (Bl):  Inability to dorsiflex past neutral position both straight legged and bent knee       Post tibial tendon (Bl):  no soreness noted       Peroneal Tendon (Bl):  no soreness noted  Additional Musculoskelatal Findings  No gross rocker-bottom deformity.  Subtle prominence noted to the plantar lateral aspect of the midfoot.  Discomfort with palpation to this region.  Mild discomfort to the dorsal midfoot.    Assessment/Plan   Abi was seen today for follow-up.    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  Her symptoms are mostly isolated to the plantar lateral aspect of the foot.  Imaging was reviewed today showing gross diastasis of the fracture fragment with pull-through of the hardware consistent with nonunion.  No significant changes  noted to the midfoot.  Images also show significant osteopenia which could be secondary to disuse and/or osteoporosis.  I did discuss the findings and further treatment options.  I do feel that her pain is secondary to the nonunion as well as progressive collapse of the lateral column with weightbearing.  I have recommended that we add support.  I did review the possibility of returning to her cam boot for symptom management.  Patient is unsure if she wants to proceed with the boot.  I have dispensed a lace up ankle brace for added support.  Greater than 15 minutes was spent reviewing the proper use and effects.  I have also ordered a bone stimulator to expedite fracture consolidation.  We did discuss proper use and effects.  I have referred her to the osteoporosis clinic for evaluation given fairly significant osteopenia to the lower extremity.  Patient is to follow-up with me in 6 to 8 weeks for reevaluation.    Orders Placed This Encounter   Procedures   • XR Foot 3+ View Right     Weight Bearing     Scheduling Instructions:      Room 13 time 2:35 patient is a surgery 6 months ago. Increased pain     Order Specific Question:   Reason for Exam:     Answer:   right foot pain          Note is dictated utilizing voice recognition software. Unfortunately this leads to occasional typographical errors. I apologize in advance if the situation occurs. If questions occur please do not hesitate to call our office.

## 2020-03-09 ENCOUNTER — OFFICE VISIT (OUTPATIENT)
Dept: ORTHOPEDIC SURGERY | Facility: CLINIC | Age: 60
End: 2020-03-09

## 2020-03-09 VITALS
HEIGHT: 59 IN | SYSTOLIC BLOOD PRESSURE: 159 MMHG | HEART RATE: 91 BPM | BODY MASS INDEX: 31.25 KG/M2 | DIASTOLIC BLOOD PRESSURE: 82 MMHG | WEIGHT: 155 LBS

## 2020-03-09 DIAGNOSIS — S92.351K CLOSED DISPLACED FRACTURE OF FIFTH METATARSAL BONE OF RIGHT FOOT WITH NONUNION: ICD-10-CM

## 2020-03-09 DIAGNOSIS — I49.5 SICK SINUS SYNDROME (HCC): ICD-10-CM

## 2020-03-09 DIAGNOSIS — I27.20 PULMONARY HYPERTENSION (HCC): ICD-10-CM

## 2020-03-09 DIAGNOSIS — I48.91 ATRIAL FIBRILLATION, UNSPECIFIED TYPE (HCC): ICD-10-CM

## 2020-03-09 DIAGNOSIS — Z13.820 ENCOUNTER FOR OSTEOPOROSIS SCREENING IN ASYMPTOMATIC POSTMENOPAUSAL PATIENT: Primary | ICD-10-CM

## 2020-03-09 DIAGNOSIS — Z78.0 ENCOUNTER FOR OSTEOPOROSIS SCREENING IN ASYMPTOMATIC POSTMENOPAUSAL PATIENT: Primary | ICD-10-CM

## 2020-03-09 DIAGNOSIS — E05.90 HYPERTHYROIDISM: ICD-10-CM

## 2020-03-09 PROCEDURE — 99214 OFFICE O/P EST MOD 30 MIN: CPT | Performed by: PHYSICIAN ASSISTANT

## 2020-03-09 NOTE — PROGRESS NOTES
ORTHOPEDIC VISIT    Referring Provider: Dr. Fry  Primary Care Provider: Shala Lopez APRN         Subjective:  Chief Complaint:  Chief Complaint   Patient presents with   • Osteoporosis       HPI:  Abi Rivera is a 59 y.o. female who presents for initial visit for bone health evaluation.  The patient complains of Complains of: back pain and Joint pain.  And denies Denies: generalized bone pain and loss of height.  Their risk factors include risk factors: Low sun exposure, Personal history of fracture, Active tobacco use, Elevated falls risk, Hypogonadism and Hyperthyroidism.  The patient has the following associated illnesses: Associated illnesses: Atrial fibrillation, Chronic kidney disease and Sick sinus syndrome, pulmonary hypertension, avascular necrosis, mood disorder, growth hormone deficiency..  Treatment to date has included Treatment to date: Calcium supplementation and Vitamin D supplementation.  She has never taken any osteoporosis medications.  She does take calcium and vitamin D, 1200 mg of calcium daily and an unknown amount of vitamin D.  In June 2018 she fell from a tree and fractured her left tib-fib.  In September 2019 she did sustain a fragility fracture of the right fifth metatarsal requiring surgery that has developed into a nonunion with hardware failure.  She reports avascular necrosis of the hip and foot.  She has a history of hyperthyroidism status post radioactive iodine in 2004, diagnosed with hypothyroidism in 2007.  She denies any family history of osteoporosis.  She reports menopause around the age of 47.  She does currently smoke approximately 10 cigarettes a day.  She does not drink alcohol.  She does not currently get any physical activity.  She denies any history of cancer or kidney stones.  She has a high fall risk scoring 4 out of 14 on the STEADI risk for falling assessment.  She has been advised to use a cane or walker to get around safely, she has lost some  feeling in her feet, and she often feels sad or depressed.  She denies any vision issues.  She denies any diarrhea.  She rarely has heartburn or reflux.  She does see a dentist regularly.  She has never had a DEXA scan.  Recent and previous labs were reviewed.  Referred for consultation by Dr. farmer.          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 9/19/2019    Added automatically from request for surgery [id number]   • Hypertension 6/16/2016   • Mood disorder (CMS/HCC) 6/16/2016   • Pulmonary hypertension (CMS/HCC) 6/16/2016   • Sick sinus syndrome (CMS/HCC) 1/25/2017   • Tricuspid valve insufficiency 6/16/2016       Past Surgical History:   Procedure Laterality Date   • ANKLE OPEN REDUCTION INTERNAL FIXATION     • APPENDECTOMY     • HIP SURGERY      Left total hip   • ORIF FOOT FRACTURE Right 9/30/2019    Procedure: OPEN REDUCTION INTERNAL FIXATION OF FIFTH METATARSAL FRACTURE, LOOSE BODY EXCISION/REMOVAL FROM THE TALONAVICULAR JOINT OF THE RIGHT FOOT;  Surgeon: JOSE Farmer DPM;  Location: [facility] OR;  Service: Podiatry       Family History   Problem Relation Age of Onset   • Hypertension Mother    • Diabetes Father    • Heart disease Father    • Hypertension Father        Social History     Occupational History   • Not on file   Tobacco Use   • Smoking status: Current Every Day Smoker     Packs/day: 0.50     Types: Cigarettes   • Smokeless tobacco: Never Used   Substance and Sexual Activity   • Alcohol use: Yes     Comment: jayson   • Drug use: No   • Sexual activity: Defer        Medications:    Current Outpatient Medications:   •  acetaminophen (TYLENOL) 500 MG tablet, Take 500 mg by mouth Every 6 (Six) Hours As Needed for Mild Pain  "., Disp: , Rfl:   •  aspirin 325 MG tablet, ASPIRIN 325 MG TABS, Disp: , Rfl:   •  calcium carb-cholecalciferol (CALCIUM 600+D) 600-800 MG-UNIT tablet, Take 1 tablet by mouth Daily., Disp: , Rfl:   •  Glucosamine-Chondroit-Vit C-Mn (GLUCOSAMINE 1500 COMPLEX PO), Take 1 tablet by mouth Daily., Disp: , Rfl:   •  ibuprofen (ADVIL,MOTRIN) 800 MG tablet, Every 6 (Six) Hours., Disp: , Rfl:   •  MAGNESIUM OXIDE PO, Take 250 mg by mouth Daily., Disp: , Rfl:   •  Multiple Vitamin (MULTI-VITAMIN) tablet, MULTI-VITAMIN gummies, Disp: , Rfl:   •  naproxen sodium (ALEVE) 220 MG tablet, Every 12 (Twelve) Hours., Disp: , Rfl:   •  Omega-3 Fatty Acids (FISH OIL) 1200 MG capsule capsule, 1 tablet daily, Disp: , Rfl:   •  Potassium Gluconate 550 MG tablet, Take 1 tablet by mouth Daily., Disp: , Rfl:     Allergies:  No Known Allergies      Review of Systems:  Gen -no fever, chills , sweats, headache   Eyes - no irritation or discharge   ENT -  no ear pain , runny nose , sore throat , difficulty swallowing   Resp - no cough , congestion , excessive expectoration   CVS - no chest pain , palpitations.   Abd - no pain , nausea , vomiting , diarrhea   Skin - no rash , lesions.   Neuro - no dizziness    Please see HPI for any other pertinent positives.  All other systems were reviewed and are negative.       Objective   Objective:    /82 (BP Location: Left arm, Patient Position: Sitting, Cuff Size: Adult)   Pulse 91   Ht 149.9 cm (59\")   Wt 70.3 kg (155 lb)   BMI 31.31 kg/m²     Physical Examination:  Obese individual in no acute distress, patient is alert and cooperative with the exam, appears to have normal mood and affect with a normal attention span and concentration, ambulating unassisted  normocephalic, atraumatic, extraocular movements intact, conjunctiva and sclera are clear without nystagmus, normal canals with grossly normal hearing, no nasal deformity, discharge, inflammation, or lesions, no mouth deformity or " lesions  no lymphadenopathy or thyromegaly  normal respiratory effort  abdomen is nontender, without guarding or rebound and nondistended  no gross joint abnormalities  pulses normal in all 4 extremities, no clubbing, cyanosis, or edema noted  cranial nerves II-XII grossly intact with no focal defects  skin intact without lesions or rashes visible             Imaging:  no diagnostic testing performed this visit            Assessment:  1. Encounter for osteoporosis screening in asymptomatic postmenopausal patient    2. Atrial fibrillation, unspecified type (CMS/HCC)    3. Pulmonary hypertension (CMS/HCC)    4. Sick sinus syndrome (CMS/HCC)    5. Hyperthyroidism    6. 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  I recommend 1200 mg calcium citrate and 2000 international units of D3 per day.  She will be given a bone health folder today containing everything discussed, including weightbearing exercises and fall prevention.  I will plan to see her back after the above is been completed, further recommendations will be pending.             AFSANEH Carrera  03/09/20  4:08 PM

## 2020-03-24 ENCOUNTER — HOSPITAL ENCOUNTER (OUTPATIENT)
Dept: CARDIOLOGY | Facility: HOSPITAL | Age: 60
Discharge: HOME | End: 2020-03-24
Admitting: INTERNAL MEDICINE

## 2020-03-24 ENCOUNTER — OFFICE VISIT (OUTPATIENT)
Dept: CARDIOLOGY | Facility: CLINIC | Age: 60
End: 2020-03-24

## 2020-03-24 VITALS
OXYGEN SATURATION: 99 % | SYSTOLIC BLOOD PRESSURE: 132 MMHG | WEIGHT: 150 LBS | DIASTOLIC BLOOD PRESSURE: 85 MMHG | HEART RATE: 67 BPM | BODY MASS INDEX: 30.3 KG/M2

## 2020-03-24 DIAGNOSIS — R55 SYNCOPE AND COLLAPSE: Primary | ICD-10-CM

## 2020-03-24 DIAGNOSIS — I49.5 SICK SINUS SYNDROME (HCC): ICD-10-CM

## 2020-03-24 DIAGNOSIS — E05.90 HYPERTHYROIDISM: ICD-10-CM

## 2020-03-24 DIAGNOSIS — I48.91 ATRIAL FIBRILLATION, UNSPECIFIED TYPE (HCC): ICD-10-CM

## 2020-03-24 DIAGNOSIS — R55 SYNCOPE AND COLLAPSE: ICD-10-CM

## 2020-03-24 PROCEDURE — 93270 REMOTE 30 DAY ECG REV/REPORT: CPT

## 2020-03-24 PROCEDURE — 93000 ELECTROCARDIOGRAM COMPLETE: CPT | Performed by: INTERNAL MEDICINE

## 2020-03-24 PROCEDURE — 99214 OFFICE O/P EST MOD 30 MIN: CPT | Performed by: INTERNAL MEDICINE

## 2020-03-24 NOTE — PROGRESS NOTES
Cardiology Office Visit      Encounter Date:  03/24/2020    Patient ID:   Abi Rivera is a 59 y.o. female.    Reason For Followup:  Recurrent syncope  History of sick sinus syndrome    Brief Clinical History:  Dear Shala Moon APRN    I had the pleasure of seeing Abi Rivera today. As you are well aware, this is a 59 y.o. female with a past medical history that is significant for history of hypothyroidism and hyperthyroidism also history of atrial fibrillation in the setting of hyperthyroidism and also sick sinus syndrome symptomatic was advised to have a permanent pacemaker placed in the past presented here for follow-up in the office      Interval History:  Patient denies any symptoms of chest pain shortness of breath  Complaining of multiple episodes of syncope over the weekend  No ER visit  Denies any significant injuries  Does not want to get admitted to the hospital  Denies any chest pain    Assessment & Plan    Impressions:  A-fib,  now Sick sinus syndrome with tachycardia /bradycardia  Hypothyroidism/hyperthyroidism   Recurrent syncope likely secondary to bradycardia and pauses based on prior history but no prove  Advised patient hospitalization but she refused  Patient is advised to get some labs recently by the podiatry service and we will follow-up on those  Prior stress test with no inducible ischemia  Prior echocardiogram with normal LV systolic function    Recommendations:  Patient refused pacemaker placement in the past but now willing to consider if she needs permanent pacemaker placement  Prior labs and work-up reviewed  EKG done in the office today with normal sinus rhythm normal heart rate  No evidence of any orthostasis with the orthostatic vitals  Plan to schedule patient for event monitor  Placed patient to return to the emergency room for any recurrent or worsening symptoms  Risk benefits and alternatives discussed with the patient  Further recommendations based on results of  the event monitor    Objective:    Vitals:  Vitals:    03/24/20 1407   BP: 132/85   Pulse: 67   SpO2: 99%   Weight: 68 kg (150 lb)       Physical Exam:    General: Alert, cooperative, no distress, appears stated age  Head:  Normocephalic, atraumatic, mucous membranes moist  Eyes:  Conjunctiva/corneas clear, EOM's intact     Neck:  Supple,  no adenopathy;      Lungs: Clear to auscultation bilaterally, no wheezes rhonchi rales are noted  Chest wall: No tenderness  Heart::  Regular rate and rhythm, S1 and S2 normal, no murmur, rub or gallop  Abdomen: Soft, non-tender, nondistended bowel sounds active  Extremities: No cyanosis, clubbing, or edema  Pulses: 2+ and symmetric all extremities  Skin:  No rashes or lesions  Neuro/psych: A&O x3. CN II through XII are grossly intact with appropriate affect      Allergies:  No Known Allergies    Medication Review:     Current Outpatient Medications:   •  acetaminophen (TYLENOL) 500 MG tablet, Take 500 mg by mouth Every 6 (Six) Hours As Needed for Mild Pain ., Disp: , Rfl:   •  aspirin 325 MG tablet, ASPIRIN 325 MG TABS, Disp: , Rfl:   •  calcium carb-cholecalciferol (CALCIUM 600+D) 600-800 MG-UNIT tablet, Take 1 tablet by mouth Daily., Disp: , Rfl:   •  Glucosamine-Chondroit-Vit C-Mn (GLUCOSAMINE 1500 COMPLEX PO), Take 1 tablet by mouth Daily., Disp: , Rfl:   •  ibuprofen (ADVIL,MOTRIN) 800 MG tablet, Every 6 (Six) Hours., Disp: , Rfl:   •  MAGNESIUM OXIDE PO, Take 250 mg by mouth Daily., Disp: , Rfl:   •  Multiple Vitamin (MULTI-VITAMIN) tablet, MULTI-VITAMIN gummies, Disp: , Rfl:   •  naproxen sodium (ALEVE) 220 MG tablet, Every 12 (Twelve) Hours., Disp: , Rfl:   •  Omega-3 Fatty Acids (FISH OIL) 1200 MG capsule capsule, 1 tablet daily, Disp: , Rfl:   •  Potassium Gluconate 550 MG tablet, Take 1 tablet by mouth Daily., Disp: , Rfl:     Family History:  Family History   Problem Relation Age of Onset   • Hypertension Mother    • Diabetes Father    • Heart disease Father    •  Hypertension Father        Past Medical History:  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 9/19/2019    Added automatically from request for surgery [id number]   • Hypertension 6/16/2016   • Mood disorder (CMS/HCC) 6/16/2016   • Pulmonary hypertension (CMS/HCC) 6/16/2016   • Sick sinus syndrome (CMS/HCC) 1/25/2017   • Tricuspid valve insufficiency 6/16/2016       Past surgical History:  Past Surgical History:   Procedure Laterality Date   • ANKLE OPEN REDUCTION INTERNAL FIXATION     • APPENDECTOMY     • HIP SURGERY      Left total hip   • ORIF FOOT FRACTURE Right 9/30/2019    Procedure: OPEN REDUCTION INTERNAL FIXATION OF FIFTH METATARSAL FRACTURE, LOOSE BODY EXCISION/REMOVAL FROM THE TALONAVICULAR JOINT OF THE RIGHT FOOT;  Surgeon: JOSE Fry DPM;  Location: [facility] OR;  Service: Podiatry       Social History:  Social History     Socioeconomic History   • Marital status:      Spouse name: Not on file   • Number of children: Not on file   • Years of education: Not on file   • Highest education level: Not on file   Tobacco Use   • Smoking status: Current Every Day Smoker     Packs/day: 0.50     Types: Cigarettes   • Smokeless tobacco: Never Used   Substance and Sexual Activity   • Alcohol use: Yes     Comment: jayson   • Drug use: No   • Sexual activity: Defer       Review of Systems:  The following systems were reviewed as they relate to the cardiovascular system: Constitutional, Eyes, ENT, Cardiovascular, Respiratory, Gastrointestinal, Integumentary, Neurological, Psychiatric, Hematologic, Endocrine, Musculoskeletal, and Genitourinary. The pertinent cardiovascular findings are reported above with all other cardiovascular points  within those systems being negative.    Diagnostic Study Review:     Current Electrocardiogram:    ECG 12 Lead  Date/Time: 3/24/2020 3:33 PM  Performed by: Laine Pearl MD  Authorized by: Laine Pearl MD   Comparison: compared with previous ECG   Similar to previous ECG  Rhythm: sinus rhythm  Rate: normal  BPM: 67  Conduction: conduction normal  QRS axis: normal    Clinical impression: normal ECG              NOTE: The following portions of the patient's history were reviewed and updated this visit as appropriate: allergies, current medications, past family history, past medical history, past social history, past surgical history and problem list.

## 2020-03-26 ENCOUNTER — TELEPHONE (OUTPATIENT)
Dept: CARDIOLOGY | Facility: CLINIC | Age: 60
End: 2020-03-26

## 2020-03-26 ENCOUNTER — DOCUMENTATION (OUTPATIENT)
Dept: CARDIOLOGY | Facility: CLINIC | Age: 60
End: 2020-03-26

## 2020-03-26 ENCOUNTER — OFFICE VISIT (OUTPATIENT)
Dept: CARDIOLOGY | Facility: CLINIC | Age: 60
End: 2020-03-26

## 2020-03-26 DIAGNOSIS — R55 SYNCOPE AND COLLAPSE: Primary | ICD-10-CM

## 2020-03-26 DIAGNOSIS — I48.91 ATRIAL FIBRILLATION, UNSPECIFIED TYPE (HCC): ICD-10-CM

## 2020-03-26 DIAGNOSIS — I49.5 SICK SINUS SYNDROME (HCC): ICD-10-CM

## 2020-03-26 DIAGNOSIS — R00.2 PALPITATIONS: ICD-10-CM

## 2020-03-26 PROCEDURE — 99214 OFFICE O/P EST MOD 30 MIN: CPT | Performed by: INTERNAL MEDICINE

## 2020-03-26 NOTE — TELEPHONE ENCOUNTER
L/M for Pt to return call. Per Dr. Denny Pt needs to be admitted to [facility] to have a pacemaker.

## 2020-03-26 NOTE — PROGRESS NOTES
CC--syncope with sick sinus syndrome    Sub--59-year-old very pleasant patient had multiple episodes of spontaneous syncope few days ago and she was seen in our cardiology office and an event recorder was placed with multiple episodes of rapid atrial fibrillation associated with long sinus pauses--patient was immediately contacted and patient came for evaluation  She complains of increasing symptoms of palpitations with rapid atrial fibrillation  She has a prior history of hyperthyroidism which was treated with radioiodine and currently she is euthyroid according to the patient  She denies any TIA or stroke and denies any diabetes, high blood pressure, hepatic or renal problems  She had a prior echocardiogram for showing normal LV ejection fraction  She is actively employed as a         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 9/19/2019    Added automatically from request for surgery [id number]   • Hypertension 6/16/2016   • Mood disorder (CMS/HCC) 6/16/2016   • Pulmonary hypertension (CMS/HCC) 6/16/2016   • Sick sinus syndrome (CMS/HCC) 1/25/2017   • Tricuspid valve insufficiency 6/16/2016     Past Surgical History:   Procedure Laterality Date   • ANKLE OPEN REDUCTION INTERNAL FIXATION     • APPENDECTOMY     • HIP SURGERY      Left total hip   • ORIF FOOT FRACTURE Right 9/30/2019    Procedure: OPEN REDUCTION INTERNAL FIXATION OF FIFTH METATARSAL FRACTURE, LOOSE BODY EXCISION/REMOVAL FROM THE TALONAVICULAR JOINT OF THE RIGHT FOOT;  Surgeon: JOSE Fry DPM;  Location: [facility] OR;  Service: Podiatry     Family History   Problem Relation Age of Onset   • Hypertension Mother    • Diabetes Father    • Heart disease Father    •  Hypertension Father      Social History     Tobacco Use   • Smoking status: Current Every Day Smoker     Packs/day: 0.50     Types: Cigarettes   • Smokeless tobacco: Never Used   Substance Use Topics   • Alcohol use: Yes     Comment: jayson   • Drug use: No       (Not in a hospital admission)  Allergies:  Patient has no known allergies.    Review of Systems  General: Negative  for fatigue and tiredness  Eyes: No redness  Cardiovascular: No chest pain, positive for palpitations  Respiratory:   No  shortness of breath  Gastrointestinal: No nausea or vomiting, bleeding  Genitourinary: no hematuria or dysuria  Musculoskeletal: No arthralgia or myalgia  Skin: No rash  Neurologic: No numbness, tingling, positive for syncope   Hematologic/Lymphatic: No abnormal bleeding      Physical Exam--pulse rate is 70 patient is afebrile respirations 12 times a minute and blood pressure is 130/80      General:      well developed, well nourished, in no acute distress.    Head:      normocephalic and atraumatic.    Eyes:      PERRL/EOM intact, conjunctiva and sclera clear with out nystagmus.    Neck:      no masses, thyromegaly, trachea central with normal respiratory effort  Lungs:      clear bilaterally to auscultation.    Heart:      non-displaced PMI, chest non-tender; regular rate and rhythm, S1, S2 without murmurs, rubs, or gallops  Skin:      intact without lesions or rashes.    Psych:      alert and cooperative; normal mood and affect; normal attention span and concentration.      Neurological no focal deficits and alert oriented x3      Assessment and plan  Rapid atrial fibrillation associated with a long sinus pauses consistent with sick sinus syndrome with multiple episodes of spontaneous syncope  History of hyperthyroidism status post radioiodine treatment currently euthyroid as per patient  History of palpitations  Patient educated about different therapeutic options for atrial arrhythmia including atrial fibrillation  ablation, pacemaker implantation for sick sinus syndrome and different types of pacemakers like leadless pacemakers and transvenous pacemaker is educated to the patient  Patient has expressed interest in leadless pacemaker even in 2017 and after discussing options was considering strongly insertion of a leadless pacemaker--a video clip of a leadless pacemaker insertion was shown to the patient in the office  Patient to call information and wants to discuss with the family for further options  Patient strongly advised against driving      Electronically signed by Александр Sheht MD, 03/26/20, 2:18 PM.

## 2020-03-26 NOTE — PROGRESS NOTES
Tried to contact patient multiple times both by me and the office staff but unable to reach the patient several messages were left on the mobile phone  Patient is advised to call back or come to the hospital for hospitalization for the sick sinus syndrome

## 2020-03-27 ENCOUNTER — HOSPITAL ENCOUNTER (INPATIENT)
Facility: HOSPITAL | Age: 60
LOS: 1 days | Discharge: HOME | End: 2020-03-28
Attending: INTERNAL MEDICINE | Admitting: INTERNAL MEDICINE

## 2020-03-27 ENCOUNTER — ANESTHESIA EVENT (OUTPATIENT)
Dept: CARDIOLOGY | Facility: HOSPITAL | Age: 60
End: 2020-03-27

## 2020-03-27 ENCOUNTER — APPOINTMENT (OUTPATIENT)
Dept: GENERAL RADIOLOGY | Facility: HOSPITAL | Age: 60
End: 2020-03-27

## 2020-03-27 ENCOUNTER — LAB (OUTPATIENT)
Dept: LAB | Facility: HOSPITAL | Age: 60
End: 2020-03-27

## 2020-03-27 ENCOUNTER — PREP FOR SURGERY (OUTPATIENT)
Dept: OTHER | Facility: HOSPITAL | Age: 60
End: 2020-03-27

## 2020-03-27 ENCOUNTER — ANESTHESIA (OUTPATIENT)
Dept: CARDIOLOGY | Facility: HOSPITAL | Age: 60
End: 2020-03-27

## 2020-03-27 DIAGNOSIS — I49.5 SICK SINUS SYNDROME (HCC): ICD-10-CM

## 2020-03-27 DIAGNOSIS — R55 SYNCOPE AND COLLAPSE: ICD-10-CM

## 2020-03-27 DIAGNOSIS — R55 SYNCOPE AND COLLAPSE: Primary | ICD-10-CM

## 2020-03-27 DIAGNOSIS — I27.20 PULMONARY HYPERTENSION (HCC): ICD-10-CM

## 2020-03-27 DIAGNOSIS — I49.5 SICK SINUS SYNDROME (HCC): Primary | ICD-10-CM

## 2020-03-27 PROBLEM — M19.071 ARTHRITIS OF RIGHT FOOT: Chronic | Status: ACTIVE | Noted: 2019-09-19

## 2020-03-27 PROBLEM — G89.29 CHRONIC PAIN: Chronic | Status: ACTIVE | Noted: 2019-01-22

## 2020-03-27 LAB
ALBUMIN SERPL-MCNC: 3.9 G/DL (ref 3.5–5.2)
ALBUMIN/GLOB SERPL: 1.3 G/DL
ALP SERPL-CCNC: 87 U/L (ref 39–117)
ALT SERPL W P-5'-P-CCNC: 12 U/L (ref 1–33)
ANION GAP SERPL CALCULATED.3IONS-SCNC: 13 MMOL/L (ref 5–15)
AST SERPL-CCNC: 21 U/L (ref 1–32)
BASOPHILS # BLD AUTO: 0 10*3/MM3 (ref 0–0.2)
BASOPHILS NFR BLD AUTO: 0.6 % (ref 0–1.5)
BILIRUB SERPL-MCNC: 0.2 MG/DL (ref 0.2–1.2)
BUN BLD-MCNC: 23 MG/DL (ref 6–20)
BUN/CREAT SERPL: 19.2 (ref 7–25)
CALCIUM SPEC-SCNC: 10.4 MG/DL (ref 8.6–10.5)
CHLORIDE SERPL-SCNC: 102 MMOL/L (ref 98–107)
CO2 SERPL-SCNC: 26 MMOL/L (ref 22–29)
CREAT BLD-MCNC: 1.2 MG/DL (ref 0.57–1)
DEPRECATED RDW RBC AUTO: 38.5 FL (ref 37–54)
EOSINOPHIL # BLD AUTO: 0.5 10*3/MM3 (ref 0–0.4)
EOSINOPHIL NFR BLD AUTO: 6.3 % (ref 0.3–6.2)
ERYTHROCYTE [DISTWIDTH] IN BLOOD BY AUTOMATED COUNT: 12.7 % (ref 12.3–15.4)
GFR SERPL CREATININE-BSD FRML MDRD: 46 ML/MIN/1.73
GLOBULIN UR ELPH-MCNC: 3.1 GM/DL
GLUCOSE BLD-MCNC: 94 MG/DL (ref 65–99)
HCT VFR BLD AUTO: 42 % (ref 34–46.6)
HGB BLD-MCNC: 14.7 G/DL (ref 12–15.9)
LYMPHOCYTES # BLD AUTO: 2.2 10*3/MM3 (ref 0.7–3.1)
LYMPHOCYTES NFR BLD AUTO: 27.4 % (ref 19.6–45.3)
MAGNESIUM SERPL-MCNC: 1.4 MG/DL (ref 1.6–2.6)
MCH RBC QN AUTO: 29.9 PG (ref 26.6–33)
MCHC RBC AUTO-ENTMCNC: 34.9 G/DL (ref 31.5–35.7)
MCV RBC AUTO: 85.6 FL (ref 79–97)
MONOCYTES # BLD AUTO: 0.7 10*3/MM3 (ref 0.1–0.9)
MONOCYTES NFR BLD AUTO: 8.5 % (ref 5–12)
NEUTROPHILS # BLD AUTO: 4.7 10*3/MM3 (ref 1.7–7)
NEUTROPHILS NFR BLD AUTO: 57.2 % (ref 42.7–76)
NRBC BLD AUTO-RTO: 0 /100 WBC (ref 0–0.2)
PLATELET # BLD AUTO: 372 10*3/MM3 (ref 140–450)
PMV BLD AUTO: 7.1 FL (ref 6–12)
POTASSIUM BLD-SCNC: 4.4 MMOL/L (ref 3.5–5.2)
PROT SERPL-MCNC: 7 G/DL (ref 6–8.5)
RBC # BLD AUTO: 4.91 10*6/MM3 (ref 3.77–5.28)
SODIUM BLD-SCNC: 141 MMOL/L (ref 136–145)
T4 FREE SERPL-MCNC: 3.85 NG/DL (ref 0.93–1.7)
TSH SERPL DL<=0.05 MIU/L-ACNC: <0.005 UIU/ML (ref 0.27–4.2)
WBC NRBC COR # BLD: 8.2 10*3/MM3 (ref 3.4–10.8)

## 2020-03-27 PROCEDURE — 85025 COMPLETE CBC W/AUTO DIFF WBC: CPT

## 2020-03-27 PROCEDURE — 84443 ASSAY THYROID STIM HORMONE: CPT

## 2020-03-27 PROCEDURE — 99223 1ST HOSP IP/OBS HIGH 75: CPT | Performed by: INTERNAL MEDICINE

## 2020-03-27 PROCEDURE — 86376 MICROSOMAL ANTIBODY EACH: CPT | Performed by: INTERNAL MEDICINE

## 2020-03-27 PROCEDURE — 25010000002 MAGNESIUM SULFATE 2 GM/50ML SOLUTION: Performed by: INTERNAL MEDICINE

## 2020-03-27 PROCEDURE — 71045 X-RAY EXAM CHEST 1 VIEW: CPT

## 2020-03-27 PROCEDURE — 99254 IP/OBS CNSLTJ NEW/EST MOD 60: CPT | Performed by: INTERNAL MEDICINE

## 2020-03-27 PROCEDURE — 83735 ASSAY OF MAGNESIUM: CPT

## 2020-03-27 PROCEDURE — 93005 ELECTROCARDIOGRAM TRACING: CPT | Performed by: INTERNAL MEDICINE

## 2020-03-27 PROCEDURE — 84481 FREE ASSAY (FT-3): CPT | Performed by: INTERNAL MEDICINE

## 2020-03-27 PROCEDURE — 84445 ASSAY OF TSI GLOBULIN: CPT | Performed by: INTERNAL MEDICINE

## 2020-03-27 PROCEDURE — 80053 COMPREHEN METABOLIC PANEL: CPT

## 2020-03-27 PROCEDURE — 36415 COLL VENOUS BLD VENIPUNCTURE: CPT

## 2020-03-27 PROCEDURE — 84439 ASSAY OF FREE THYROXINE: CPT | Performed by: INTERNAL MEDICINE

## 2020-03-27 RX ORDER — SODIUM CHLORIDE 9 MG/ML
150 INJECTION, SOLUTION INTRAVENOUS CONTINUOUS
Status: DISCONTINUED | OUTPATIENT
Start: 2020-03-27 | End: 2020-03-28

## 2020-03-27 RX ORDER — ACETAMINOPHEN 325 MG/1
650 TABLET ORAL EVERY 4 HOURS PRN
Status: DISCONTINUED | OUTPATIENT
Start: 2020-03-27 | End: 2020-03-28 | Stop reason: HOSPADM

## 2020-03-27 RX ORDER — SODIUM CHLORIDE 0.9 % (FLUSH) 0.9 %
10 SYRINGE (ML) INJECTION AS NEEDED
Status: DISCONTINUED | OUTPATIENT
Start: 2020-03-27 | End: 2020-03-28 | Stop reason: HOSPADM

## 2020-03-27 RX ORDER — NITROGLYCERIN 0.4 MG/1
0.4 TABLET SUBLINGUAL
Status: DISCONTINUED | OUTPATIENT
Start: 2020-03-27 | End: 2020-03-28 | Stop reason: HOSPADM

## 2020-03-27 RX ORDER — CHOLECALCIFEROL (VITAMIN D3) 125 MCG
5 CAPSULE ORAL NIGHTLY PRN
Status: DISCONTINUED | OUTPATIENT
Start: 2020-03-27 | End: 2020-03-28 | Stop reason: HOSPADM

## 2020-03-27 RX ORDER — MAGNESIUM SULFATE HEPTAHYDRATE 40 MG/ML
2 INJECTION, SOLUTION INTRAVENOUS ONCE
Status: COMPLETED | OUTPATIENT
Start: 2020-03-27 | End: 2020-03-27

## 2020-03-27 RX ORDER — ACETAMINOPHEN 160 MG/5ML
650 SOLUTION ORAL EVERY 4 HOURS PRN
Status: DISCONTINUED | OUTPATIENT
Start: 2020-03-27 | End: 2020-03-28 | Stop reason: HOSPADM

## 2020-03-27 RX ORDER — LORAZEPAM 0.5 MG/1
0.5 TABLET ORAL 2 TIMES DAILY PRN
Status: DISCONTINUED | OUTPATIENT
Start: 2020-03-27 | End: 2020-03-28 | Stop reason: HOSPADM

## 2020-03-27 RX ORDER — ONDANSETRON 4 MG/1
4 TABLET, FILM COATED ORAL EVERY 6 HOURS PRN
Status: DISCONTINUED | OUTPATIENT
Start: 2020-03-27 | End: 2020-03-28 | Stop reason: HOSPADM

## 2020-03-27 RX ORDER — POTASSIUM CHLORIDE 20 MEQ/1
40 TABLET, EXTENDED RELEASE ORAL AS NEEDED
Status: DISCONTINUED | OUTPATIENT
Start: 2020-03-27 | End: 2020-03-28 | Stop reason: HOSPADM

## 2020-03-27 RX ORDER — ACETAMINOPHEN 650 MG/1
650 SUPPOSITORY RECTAL EVERY 4 HOURS PRN
Status: DISCONTINUED | OUTPATIENT
Start: 2020-03-27 | End: 2020-03-28 | Stop reason: HOSPADM

## 2020-03-27 RX ORDER — SODIUM CHLORIDE 0.9 % (FLUSH) 0.9 %
10 SYRINGE (ML) INJECTION EVERY 12 HOURS SCHEDULED
Status: DISCONTINUED | OUTPATIENT
Start: 2020-03-27 | End: 2020-03-28 | Stop reason: HOSPADM

## 2020-03-27 RX ORDER — BISACODYL 10 MG
10 SUPPOSITORY, RECTAL RECTAL DAILY PRN
Status: DISCONTINUED | OUTPATIENT
Start: 2020-03-27 | End: 2020-03-28 | Stop reason: HOSPADM

## 2020-03-27 RX ORDER — ONDANSETRON 2 MG/ML
4 INJECTION INTRAMUSCULAR; INTRAVENOUS EVERY 6 HOURS PRN
Status: DISCONTINUED | OUTPATIENT
Start: 2020-03-27 | End: 2020-03-28 | Stop reason: HOSPADM

## 2020-03-27 RX ORDER — SODIUM CHLORIDE 0.9 % (FLUSH) 0.9 %
3 SYRINGE (ML) INJECTION EVERY 12 HOURS SCHEDULED
Status: DISCONTINUED | OUTPATIENT
Start: 2020-03-27 | End: 2020-03-28 | Stop reason: HOSPADM

## 2020-03-27 RX ORDER — ACETAMINOPHEN 500 MG
500 TABLET ORAL EVERY 6 HOURS PRN
Status: DISCONTINUED | OUTPATIENT
Start: 2020-03-27 | End: 2020-03-28 | Stop reason: HOSPADM

## 2020-03-27 RX ORDER — MAGNESIUM SULFATE 1 G/100ML
1 INJECTION INTRAVENOUS AS NEEDED
Status: DISCONTINUED | OUTPATIENT
Start: 2020-03-27 | End: 2020-03-28 | Stop reason: HOSPADM

## 2020-03-27 RX ORDER — MAGNESIUM SULFATE HEPTAHYDRATE 40 MG/ML
2 INJECTION, SOLUTION INTRAVENOUS AS NEEDED
Status: DISCONTINUED | OUTPATIENT
Start: 2020-03-27 | End: 2020-03-28 | Stop reason: HOSPADM

## 2020-03-27 RX ORDER — SODIUM CHLORIDE 0.9 % (FLUSH) 0.9 %
10 SYRINGE (ML) INJECTION AS NEEDED
Status: CANCELLED | OUTPATIENT
Start: 2020-03-27

## 2020-03-27 RX ORDER — SODIUM CHLORIDE 0.9 % (FLUSH) 0.9 %
3 SYRINGE (ML) INJECTION EVERY 12 HOURS SCHEDULED
Status: CANCELLED | OUTPATIENT
Start: 2020-03-27

## 2020-03-27 RX ORDER — METHIMAZOLE 5 MG/1
10 TABLET ORAL EVERY 8 HOURS SCHEDULED
Status: DISCONTINUED | OUTPATIENT
Start: 2020-03-27 | End: 2020-03-28 | Stop reason: HOSPADM

## 2020-03-27 RX ORDER — ALUMINA, MAGNESIA, AND SIMETHICONE 2400; 2400; 240 MG/30ML; MG/30ML; MG/30ML
15 SUSPENSION ORAL EVERY 6 HOURS PRN
Status: DISCONTINUED | OUTPATIENT
Start: 2020-03-27 | End: 2020-03-28 | Stop reason: HOSPADM

## 2020-03-27 RX ADMIN — METHIMAZOLE 10 MG: 5 TABLET ORAL at 15:46

## 2020-03-27 RX ADMIN — METOPROLOL TARTRATE 25 MG: 25 TABLET, FILM COATED ORAL at 15:47

## 2020-03-27 RX ADMIN — Medication 3 ML: at 14:21

## 2020-03-27 RX ADMIN — SODIUM CHLORIDE 150 ML/HR: 900 INJECTION, SOLUTION INTRAVENOUS at 13:00

## 2020-03-27 RX ADMIN — LORAZEPAM 0.5 MG: 0.5 TABLET ORAL at 21:01

## 2020-03-27 RX ADMIN — MAGNESIUM SULFATE HEPTAHYDRATE 2 G: 40 INJECTION, SOLUTION INTRAVENOUS at 13:01

## 2020-03-27 RX ADMIN — APIXABAN 5 MG: 5 TABLET, FILM COATED ORAL at 21:01

## 2020-03-27 RX ADMIN — METOPROLOL TARTRATE 25 MG: 25 TABLET, FILM COATED ORAL at 21:12

## 2020-03-27 RX ADMIN — METHIMAZOLE 10 MG: 5 TABLET ORAL at 21:01

## 2020-03-27 RX ADMIN — Medication 3 ML: at 21:01

## 2020-03-27 RX ADMIN — ACETAMINOPHEN 650 MG: 325 TABLET, FILM COATED ORAL at 21:12

## 2020-03-27 RX ADMIN — Medication 10 ML: at 21:02

## 2020-03-27 NOTE — ANESTHESIA PREPROCEDURE EVALUATION
Anesthesia Evaluation     Patient summary reviewed and Nursing notes reviewed   no history of anesthetic complications:  NPO Solid Status: > 8 hours  NPO Liquid Status: > 2 hours           Airway   Mallampati: II  TM distance: >3 FB  Neck ROM: full  No difficulty expected  Dental - normal exam     Pulmonary - negative pulmonary ROS and normal exam   Cardiovascular - normal exam    (+) hypertension, dysrhythmias Atrial Fib,       Neuro/Psych  (+) syncope, numbness, psychiatric history Anxiety,     GI/Hepatic/Renal/Endo - negative ROS     Musculoskeletal     (+) back pain,   Abdominal  - normal exam   Substance History - negative use     OB/GYN negative ob/gyn ROS         Other   arthritis,                        Anesthesia Plan    ASA 3     MAC     intravenous induction     Anesthetic plan, all risks, benefits, and alternatives have been provided, discussed and informed consent has been obtained with: patient.  Use of blood products discussed with patient .   Plan discussed with [facility].

## 2020-03-28 VITALS
DIASTOLIC BLOOD PRESSURE: 70 MMHG | OXYGEN SATURATION: 98 % | TEMPERATURE: 98.5 F | HEIGHT: 60 IN | SYSTOLIC BLOOD PRESSURE: 130 MMHG | WEIGHT: 143.52 LBS | RESPIRATION RATE: 14 BRPM | BODY MASS INDEX: 28.18 KG/M2 | HEART RATE: 73 BPM

## 2020-03-28 LAB
ALBUMIN SERPL-MCNC: 3 G/DL (ref 3.5–5.2)
ALBUMIN/GLOB SERPL: 1.2 G/DL
ALP SERPL-CCNC: 66 U/L (ref 39–117)
ALT SERPL W P-5'-P-CCNC: 10 U/L (ref 1–33)
ANION GAP SERPL CALCULATED.3IONS-SCNC: 7 MMOL/L (ref 5–15)
AST SERPL-CCNC: 14 U/L (ref 1–32)
BASOPHILS # BLD AUTO: 0 10*3/MM3 (ref 0–0.2)
BASOPHILS NFR BLD AUTO: 0.7 % (ref 0–1.5)
BILIRUB SERPL-MCNC: 0.2 MG/DL (ref 0.2–1.2)
BUN BLD-MCNC: 25 MG/DL (ref 6–20)
BUN/CREAT SERPL: 17 (ref 7–25)
CALCIUM SPEC-SCNC: 9.3 MG/DL (ref 8.6–10.5)
CHLORIDE SERPL-SCNC: 106 MMOL/L (ref 98–107)
CO2 SERPL-SCNC: 26 MMOL/L (ref 22–29)
CREAT BLD-MCNC: 1.47 MG/DL (ref 0.57–1)
DEPRECATED RDW RBC AUTO: 39.8 FL (ref 37–54)
EOSINOPHIL # BLD AUTO: 0.5 10*3/MM3 (ref 0–0.4)
EOSINOPHIL NFR BLD AUTO: 7.5 % (ref 0.3–6.2)
ERYTHROCYTE [DISTWIDTH] IN BLOOD BY AUTOMATED COUNT: 12.9 % (ref 12.3–15.4)
GFR SERPL CREATININE-BSD FRML MDRD: 37 ML/MIN/1.73
GLOBULIN UR ELPH-MCNC: 2.5 GM/DL
GLUCOSE BLD-MCNC: 114 MG/DL (ref 65–99)
HCT VFR BLD AUTO: 34.9 % (ref 34–46.6)
HGB BLD-MCNC: 11.6 G/DL (ref 12–15.9)
LYMPHOCYTES # BLD AUTO: 3 10*3/MM3 (ref 0.7–3.1)
LYMPHOCYTES NFR BLD AUTO: 44 % (ref 19.6–45.3)
MAGNESIUM SERPL-MCNC: 2 MG/DL (ref 1.6–2.6)
MCH RBC QN AUTO: 29 PG (ref 26.6–33)
MCHC RBC AUTO-ENTMCNC: 33.4 G/DL (ref 31.5–35.7)
MCV RBC AUTO: 87 FL (ref 79–97)
MONOCYTES # BLD AUTO: 0.6 10*3/MM3 (ref 0.1–0.9)
MONOCYTES NFR BLD AUTO: 9.3 % (ref 5–12)
NEUTROPHILS # BLD AUTO: 2.6 10*3/MM3 (ref 1.7–7)
NEUTROPHILS NFR BLD AUTO: 38.5 % (ref 42.7–76)
NRBC BLD AUTO-RTO: 0.1 /100 WBC (ref 0–0.2)
PLATELET # BLD AUTO: 275 10*3/MM3 (ref 140–450)
PMV BLD AUTO: 6.4 FL (ref 6–12)
POTASSIUM BLD-SCNC: 4.7 MMOL/L (ref 3.5–5.2)
PROT SERPL-MCNC: 5.5 G/DL (ref 6–8.5)
RBC # BLD AUTO: 4.01 10*6/MM3 (ref 3.77–5.28)
SODIUM BLD-SCNC: 139 MMOL/L (ref 136–145)
T3FREE SERPL-MCNC: 6.71 PG/ML (ref 2–4.4)
WBC NRBC COR # BLD: 6.8 10*3/MM3 (ref 3.4–10.8)

## 2020-03-28 PROCEDURE — 99232 SBSQ HOSP IP/OBS MODERATE 35: CPT | Performed by: INTERNAL MEDICINE

## 2020-03-28 PROCEDURE — 80053 COMPREHEN METABOLIC PANEL: CPT | Performed by: PHYSICIAN ASSISTANT

## 2020-03-28 PROCEDURE — 99239 HOSP IP/OBS DSCHRG MGMT >30: CPT | Performed by: INTERNAL MEDICINE

## 2020-03-28 PROCEDURE — 83735 ASSAY OF MAGNESIUM: CPT | Performed by: PHYSICIAN ASSISTANT

## 2020-03-28 PROCEDURE — 85025 COMPLETE CBC W/AUTO DIFF WBC: CPT | Performed by: PHYSICIAN ASSISTANT

## 2020-03-28 RX ORDER — METHIMAZOLE 10 MG/1
10 TABLET ORAL 2 TIMES DAILY
Start: 2020-03-28 | End: 2020-05-01 | Stop reason: SDUPTHER

## 2020-03-28 RX ORDER — SODIUM CHLORIDE 9 MG/ML
75 INJECTION, SOLUTION INTRAVENOUS CONTINUOUS
Status: DISCONTINUED | OUTPATIENT
Start: 2020-03-28 | End: 2020-03-28 | Stop reason: HOSPADM

## 2020-03-28 RX ADMIN — Medication: at 08:38

## 2020-03-28 RX ADMIN — METHIMAZOLE 10 MG: 5 TABLET ORAL at 14:32

## 2020-03-28 RX ADMIN — METOPROLOL TARTRATE 25 MG: 25 TABLET, FILM COATED ORAL at 08:38

## 2020-03-28 RX ADMIN — LORAZEPAM 0.5 MG: 0.5 TABLET ORAL at 14:32

## 2020-03-28 RX ADMIN — METOPROLOL TARTRATE 25 MG: 25 TABLET, FILM COATED ORAL at 20:41

## 2020-03-28 RX ADMIN — APIXABAN 5 MG: 5 TABLET, FILM COATED ORAL at 08:38

## 2020-03-28 RX ADMIN — APIXABAN 5 MG: 5 TABLET, FILM COATED ORAL at 20:41

## 2020-03-28 RX ADMIN — METHIMAZOLE 10 MG: 5 TABLET ORAL at 20:43

## 2020-03-28 RX ADMIN — METHIMAZOLE 10 MG: 5 TABLET ORAL at 06:27

## 2020-03-28 RX ADMIN — Medication 10 ML: at 09:02

## 2020-03-28 RX ADMIN — SODIUM CHLORIDE 150 ML/HR: 900 INJECTION, SOLUTION INTRAVENOUS at 06:29

## 2020-03-29 LAB — THYROPEROXIDASE AB SERPL-ACNC: <9 IU/ML (ref 0–34)

## 2020-03-30 LAB — TSI SER-MCNC: 0.15 IU/L (ref 0–0.55)

## 2020-04-09 PROCEDURE — 93010 ELECTROCARDIOGRAM REPORT: CPT | Performed by: INTERNAL MEDICINE

## 2020-04-30 ENCOUNTER — TELEPHONE (OUTPATIENT)
Dept: CARDIOLOGY | Facility: CLINIC | Age: 60
End: 2020-04-30

## 2020-04-30 NOTE — TELEPHONE ENCOUNTER
Attempted to call the Pt to check on her and set up a telephone or video appt, but the Pt didn't answer. L/M for her to return my call.

## 2020-04-30 NOTE — TELEPHONE ENCOUNTER
----- Message from Laine Pearl MD sent at 4/30/2020  9:21 AM EDT -----  Can you check with this patient and see how she is doing now and also if she is willing to do a telephone or video visit

## 2020-05-01 ENCOUNTER — OFFICE VISIT (OUTPATIENT)
Dept: CARDIOLOGY | Facility: CLINIC | Age: 60
End: 2020-05-01

## 2020-05-01 VITALS — BODY MASS INDEX: 28.07 KG/M2 | WEIGHT: 143 LBS | HEIGHT: 60 IN

## 2020-05-01 DIAGNOSIS — I49.5 SICK SINUS SYNDROME (HCC): ICD-10-CM

## 2020-05-01 DIAGNOSIS — I10 ESSENTIAL HYPERTENSION: Chronic | ICD-10-CM

## 2020-05-01 DIAGNOSIS — I48.0 PAROXYSMAL ATRIAL FIBRILLATION (HCC): Primary | ICD-10-CM

## 2020-05-01 DIAGNOSIS — R55 SYNCOPE AND COLLAPSE: ICD-10-CM

## 2020-05-01 PROCEDURE — 99443 PR PHYS/QHP TELEPHONE EVALUATION 21-30 MIN: CPT | Performed by: INTERNAL MEDICINE

## 2020-05-01 RX ORDER — METHIMAZOLE 10 MG/1
10 TABLET ORAL 3 TIMES DAILY
Qty: 90 TABLET | Refills: 0
Start: 2020-05-01 | End: 2020-11-12 | Stop reason: SDUPTHER

## 2020-05-01 NOTE — PROGRESS NOTES
Cardiology Telephone Visit    Encounter Date:  05/01/2020    Patient ID:   Abi Rivera is a 59 y.o. female.      Reason For Followup:  Recurrent syncope  History of sick sinus syndrome  Paroxysmal atrial fibrillation  Uncontrolled hyperthyroidism    Brief Clinical History:  Dear Shala Moon APRN    I had the pleasure of seeing Abi Rivera today. As you are well aware, this is a 59 y.o. female with a past medical history that is significant for history of hypothyroidism and hyperthyroidism also history of atrial fibrillation in the setting of hyperthyroidism and also sick sinus syndrome symptomatic was advised to have a permanent pacemaker placed in the past presented here for follow-up in the office      Interval History:  Patient was hospitalized diagnosed with uncontrolled severe hypothyroidism currently being on medical management for the last 3 weeks  Symptomatic episodes of atrial fibrillation and pauses have decreased once patient started on medical therapy  She says she had only one episode of atrial fibrillation and one episode of dizziness in the last 3 weeks  She has not followed up with endocrinology after the hospitalization    Assessment & Plan    Impressions:  A-fib,  now Sick sinus syndrome with tachycardia /bradycardia  Hypothyroidism/hyperthyroidism   Recurrent syncope likely secondary to bradycardia and pauses based on prior history but no prove  Advised patient hospitalization but she refused  Patient is advised to get some labs recently by the podiatry service and we will follow-up on those  Prior stress test with no inducible ischemia  Prior echocardiogram with normal LV systolic function  Severe uncontrolled hypothyroidism    Recommendations:  Patient wishes to be conservatively managed from cardiac standpoint at this time  She does not want any pacemaker at this time for sick sinus syndrome  Advised patient need to follow-up with endocrinology and get hypothyroidism  "controlled  Advised patient to have another Holter monitor for a week to 10 days to further evaluate the incidence of atrial fibrillation and also pulses  Continue current medical therapy including anticoagulation therapy and also beta-blockers  If she has no further symptoms and no further episodes of significant pauses okay to manage medically at this time  Need for close monitoring and follow-up discussed with the patient  Need for follow-up with endocrinology discussed with the patient  Follow-up in office in 2 months or sooner if there are any new symptoms    You have chosen to receive care through a telephone visit. Do you consent to use a telephone visit for your medical care today? Yes    Vitals:  Vitals:    05/01/20 1415   Weight: 64.9 kg (143 lb)   Height: 152.4 cm (60\")       Allergies:  No Known Allergies    Medication Review:     Current Outpatient Medications:   •  acetaminophen (TYLENOL) 500 MG tablet, Take 500 mg by mouth Every 6 (Six) Hours As Needed for Mild Pain ., Disp: , Rfl:   •  apixaban (ELIQUIS) 5 MG tablet tablet, Take 1 tablet by mouth Every 12 (Twelve) Hours., Disp: 180 tablet, Rfl: 3  •  calcium carb-cholecalciferol (CALCIUM 600+D) 600-800 MG-UNIT tablet, Take 1 tablet by mouth Daily., Disp: , Rfl:   •  Glucosamine-Chondroit-Vit C-Mn (GLUCOSAMINE 1500 COMPLEX PO), Take 1 tablet by mouth Daily., Disp: , Rfl:   •  MAGNESIUM OXIDE PO, Take 250 mg by mouth Daily., Disp: , Rfl:   •  methIMAzole (TAPAZOLE) 10 MG tablet, Take 1 tablet by mouth 3 (Three) Times a Day., Disp: 90 tablet, Rfl: 0  •  metoprolol tartrate (LOPRESSOR) 25 MG tablet, Take 1 tablet by mouth 2 (Two) Times a Day. 1/2 tablet po BID, Disp: 180 tablet, Rfl: 3  •  Multiple Vitamin (MULTI-VITAMIN) tablet, MULTI-VITAMIN gummies, Disp: , Rfl:   •  Omega-3 Fatty Acids (FISH OIL) 1200 MG capsule capsule, 1 tablet daily, Disp: , Rfl:   •  Potassium Gluconate 550 MG tablet, Take 1 tablet by mouth Daily., Disp: , Rfl:     Family " History:  Family History   Problem Relation Age of Onset   • Hypertension Mother    • Diabetes Father    • Heart disease Father    • Hypertension Father        Past Medical History:  Past Medical History:   Diagnosis Date   • Adenomatous polyp of colon 6/20/2016   • Anxiety 6/16/2016   • Arthritis of foot 1/10/2019   • Atrial fibrillation (CMS/HCC) 6/16/2016   • Avascular necrosis of bone (CMS/HCC) 1/10/2019   • Chronic pain 1/22/2019   • Closed displaced fracture 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 9/19/2019    Added automatically from request for surgery [id number]   • Hypertension 6/16/2016   • Hyperthyroidism 11/2/2016   • Mood disorder (CMS/HCC) 6/16/2016   • Pulmonary hypertension (CMS/HCC) 6/16/2016   • Sick sinus syndrome (CMS/HCC) 1/25/2017   • Tricuspid valve insufficiency 6/16/2016       Past surgical History:  Past Surgical History:   Procedure Laterality Date   • ANKLE OPEN REDUCTION INTERNAL FIXATION     • APPENDECTOMY     • HIP SURGERY      Left total hip   • ORIF FOOT FRACTURE Right 9/30/2019    Procedure: OPEN REDUCTION INTERNAL FIXATION OF FIFTH METATARSAL FRACTURE, LOOSE BODY EXCISION/REMOVAL FROM THE TALONAVICULAR JOINT OF THE RIGHT FOOT;  Surgeon: JOSE Fry DPM;  Location: [facility] OR;  Service: Podiatry       Social History:  Social History     Socioeconomic History   • Marital status:      Spouse name: Not on file   • Number of children: Not on file   • Years of education: Not on file   • Highest education level: Not on file   Tobacco Use   • Smoking status: Current Every Day Smoker     Packs/day: 0.50     Types: Cigarettes   • Smokeless tobacco: Never Used   Substance and Sexual Activity   • Alcohol use: Yes     Comment: jayson   • Drug use: No   • Sexual activity: Defer       Review of Systems:  The following systems were reviewed as they relate to the cardiovascular system: Constitutional, Eyes, ENT, Cardiovascular, Respiratory, Gastrointestinal,  Integumentary, Neurological, Psychiatric, Hematologic, Endocrine, Musculoskeletal, and Genitourinary. The pertinent cardiovascular findings are reported above with all other cardiovascular points within those systems being negative.    Diagnostic Study Review:     Current Electrocardiogram:  Procedures      NOTE: The following portions of the patient's history were reviewed and updated this visit as appropriate: allergies, current medications, past family history, past medical history, past social history, past surgical history and problem list.    A total of 25 minutes of medical discussion occurred during this encounter.      Novel Coronavirus (COVID-19) Disclaimer:     A proclamation declaring a national emergency concerning the Novel Coronavirus Disease (COVID-19) Outbreak was issued on March 13, 2020 at the direction of the .    This virtual visit was performed with the patient's consent in lieu of the patient's regularly scheduled appointment in order to provide the patient with the opportunity to maintain contact with their healthcare provider while also adhering to social distancing guidelines set forth by the CDC to reduce exposure to the Novel Coronavirus (COVID-19).  Any vital signs obtained during this visit were provided by the patient.

## 2020-05-05 ENCOUNTER — HOSPITAL ENCOUNTER (OUTPATIENT)
Dept: CARDIOLOGY | Facility: HOSPITAL | Age: 60
Discharge: HOME | End: 2020-05-05
Admitting: INTERNAL MEDICINE

## 2020-05-05 DIAGNOSIS — I48.0 PAROXYSMAL ATRIAL FIBRILLATION (HCC): ICD-10-CM

## 2020-05-05 PROCEDURE — 0296T HC EXT ECG > 48HR TO 21 DAY RCRD W/CONECT INTL RCRD: CPT

## 2020-05-07 ENCOUNTER — OFFICE VISIT (OUTPATIENT)
Dept: PODIATRY | Facility: CLINIC | Age: 60
End: 2020-05-07

## 2020-05-07 VITALS
HEART RATE: 83 BPM | TEMPERATURE: 98 F | DIASTOLIC BLOOD PRESSURE: 83 MMHG | SYSTOLIC BLOOD PRESSURE: 132 MMHG | HEIGHT: 60 IN | WEIGHT: 143 LBS | BODY MASS INDEX: 28.07 KG/M2

## 2020-05-07 DIAGNOSIS — M21.961 FOOT DEFORMITY, ACQUIRED, RIGHT: ICD-10-CM

## 2020-05-07 DIAGNOSIS — S92.351K CLOSED DISPLACED FRACTURE OF FIFTH METATARSAL BONE OF RIGHT FOOT WITH NONUNION: Primary | ICD-10-CM

## 2020-05-07 DIAGNOSIS — M19.079 ARTHRITIS OF FOOT: ICD-10-CM

## 2020-05-07 PROCEDURE — 99213 OFFICE O/P EST LOW 20 MIN: CPT | Performed by: PODIATRIST

## 2020-05-07 PROCEDURE — 20606 DRAIN/INJ JOINT/BURSA W/US: CPT | Performed by: PODIATRIST

## 2020-05-07 RX ORDER — TRIAMCINOLONE ACETONIDE 40 MG/ML
40 INJECTION, SUSPENSION INTRA-ARTICULAR; INTRAMUSCULAR ONCE
Status: DISCONTINUED | OUTPATIENT
Start: 2020-05-07 | End: 2021-03-19

## 2020-05-07 NOTE — PROGRESS NOTES
"05/07/2020  Foot and Ankle Surgery - Established Patient/Follow-up  Provider: Dr. Alistair Fry DPM  Location: [facility]    Subjective:  Abi Rivera is a 59 y.o. female.     Chief Complaint   Patient presents with   • Right Foot - Follow-up       HPI: Patient returns for follow-up regarding her right foot.  She continues to have fairly significant discomfort with daily activity.  She has been using the bone stimulator on a daily basis.  She localizes the majority of the pain to the midfoot.  No significant discomfort involving the lateral column of the foot.  She is concerned because the pain does not appear to be improving and she is worried that the symptoms will progress causing further limitation.    No Known Allergies    Current Outpatient Medications on File Prior to Visit   Medication Sig Dispense Refill   • acetaminophen (TYLENOL) 500 MG tablet Take 500 mg by mouth Every 6 (Six) Hours As Needed for Mild Pain .     • apixaban (ELIQUIS) 5 MG tablet tablet Take 1 tablet by mouth Every 12 (Twelve) Hours. 180 tablet 3   • calcium carb-cholecalciferol (CALCIUM 600+D) 600-800 MG-UNIT tablet Take 1 tablet by mouth Daily.     • Glucosamine-Chondroit-Vit C-Mn (GLUCOSAMINE 1500 COMPLEX PO) Take 1 tablet by mouth Daily.     • MAGNESIUM OXIDE PO Take 250 mg by mouth Daily.     • methIMAzole (TAPAZOLE) 10 MG tablet Take 1 tablet by mouth 3 (Three) Times a Day. 90 tablet 0   • metoprolol tartrate (LOPRESSOR) 25 MG tablet Take 1 tablet by mouth 2 (Two) Times a Day. 1/2 tablet po  tablet 3   • Multiple Vitamin (MULTI-VITAMIN) tablet MULTI-VITAMIN gummies     • Omega-3 Fatty Acids (FISH OIL) 1200 MG capsule capsule 1 tablet daily     • Potassium Gluconate 550 MG tablet Take 1 tablet by mouth Daily.       No current facility-administered medications on file prior to visit.        Objective   /83   Pulse 83   Temp 98 °F (36.7 °C)   Ht 152.4 cm (60\")   Wt 64.9 kg (143 lb)   BMI 27.93 kg/m² "     Podiatry Exam       General Appearance:  well developed, well nourished, no acute distress  Mental Status Exam        Judgement and Insight:  Intact       Orientation:  AAO x3       Mood and Affect:  depressed mood  Cardiovascular (Bilateral)       Dorsalis Pedis Pulse (BL):  2/4       Posterior Tibialis Pulse (BL):  2/4       Capillary Filling Time (BL):  1-3 Seconds       Edema (BL): Edema has improved  Dermatological Exam       Temperature:  warm to warm       Skin Elasticity:  normal skin elasticity  Incision site is well-healed at this time.  No other areas of concern  Neurological Exam (Bilateral)       Paresthesia (Bl):  negative       Achilles DTRs (Bl):  symmetric       Tinel over Tarsal Tunnel (Bl):  negative        MusculoSkeletal Exam (Bilateral)       Gait and Stance (Bl):   antalgic.       ROM (Bl): Range of motion to the midfoot remains limited and tender with attempted range of motion.       Muscle Strength (Bl):  symmetrical 5/5; mild guarding with passing       Subluxed Digits (Bl):  no subluxation or laxity of joints       Dislocated Joints (Bl):  no dislocation of joints       Medial Turbicle Calc (Bl):  no pain       Gastroc soleus equinus (Bl):  Inability to dorsiflex past neutral position both straight legged and bent knee       Post tibial tendon (Bl):  no soreness noted       Peroneal Tendon (Bl):  no soreness noted  Additional Musculoskelatal Findings  Minimal rocker-bottom deformity.  Prominent discomfort with palpation to the dorsal midfoot.  No significant pain to the lateral column.    Assessment/Plan   Abi was seen today for follow-up.    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  She  does state that the lateral aspect of the foot is doing better.  She has no significant discomfort to this location.  She has been using the bone stimulator on a routine basis.  Imaging was reviewed showing relatively stable findings without any proximal migration of the fifth metatarsal fragment.  I have asked that she continue to use the bone stimulator on a routine basis.  A majority of her symptoms today are located to the midfoot.  I explained that her symptoms continue to appear consistent with avascular necrosis of the navicular.  I do feel that she is having progressive deformity and arthropathy.  We have reviewed further treatment options including work-up with advanced imaging.  We did briefly discuss surgical options.  Patient is unwilling to proceed with surgery at this time.  She would like to consider a repeat steroid injection.  Given her level discomfort, I do feel that this is an appropriate option.  Procedure and risks were reviewed and discussed.  Procedure was performed under ultrasound guidance without complication.  I have asked that she monitor her discomfort and call with any additional issues or concerns.  We did discuss the importance of continued support and proper shoes.     Midtarsal Steroid Injection: Right foot    Informed consent was obtained before proceeding with injection.  The skin about the talonavicular joint of the right foot was cleansed with alcohol and anesthetized with approximately 1mL of 1% lidocaine plain.  A Betadine prep was then performed to the area in question.  Ultrasound guidance was used to evaluate and isolate the joint space.  Using an aseptic technique, a 1.5 mL solution containing 0.5 mL of 0.5% Marcaine plain, 0.5mL of 1% lidocaine plain and 0.5 mL of Kenalog was injected into the joint. After the injection, compression was applied followed by a sterile bandage.  The patient noted relief from pain and tolerated the injection well without  complication.      Orders Placed This Encounter   Procedures   • XR Foot 3+ View Right     rm 8 315 increased pain WB     Order Specific Question:   Reason for Exam:     Answer:   right foot pain     Order Specific Question:   Does this patient have a diabetic monitoring/medication delivering device on?     Answer:   No          Note is dictated utilizing voice recognition software. Unfortunately this leads to occasional typographical errors. I apologize in advance if the situation occurs. If questions occur please do not hesitate to call our office.

## 2020-05-26 ENCOUNTER — HOSPITAL ENCOUNTER (OUTPATIENT)
Dept: BONE DENSITY | Facility: HOSPITAL | Age: 60
Discharge: HOME | End: 2020-05-26
Admitting: PHYSICIAN ASSISTANT

## 2020-05-26 ENCOUNTER — LAB (OUTPATIENT)
Dept: LAB | Facility: HOSPITAL | Age: 60
End: 2020-05-26

## 2020-05-26 DIAGNOSIS — S92.351K CLOSED DISPLACED FRACTURE OF FIFTH METATARSAL BONE OF RIGHT FOOT WITH NONUNION: ICD-10-CM

## 2020-05-26 DIAGNOSIS — I49.5 SICK SINUS SYNDROME (HCC): ICD-10-CM

## 2020-05-26 DIAGNOSIS — E05.90 HYPERTHYROIDISM: ICD-10-CM

## 2020-05-26 DIAGNOSIS — I27.20 PULMONARY HYPERTENSION (HCC): ICD-10-CM

## 2020-05-26 DIAGNOSIS — I48.91 ATRIAL FIBRILLATION, UNSPECIFIED TYPE (HCC): ICD-10-CM

## 2020-05-26 DIAGNOSIS — Z78.0 ENCOUNTER FOR OSTEOPOROSIS SCREENING IN ASYMPTOMATIC POSTMENOPAUSAL PATIENT: ICD-10-CM

## 2020-05-26 DIAGNOSIS — Z13.820 ENCOUNTER FOR OSTEOPOROSIS SCREENING IN ASYMPTOMATIC POSTMENOPAUSAL PATIENT: ICD-10-CM

## 2020-05-26 LAB
25(OH)D3 SERPL-MCNC: 39.2 NG/ML (ref 30–100)
PHOSPHATE SERPL-MCNC: 3.3 MG/DL (ref 2.5–4.5)
PTH-INTACT SERPL-MCNC: 67.8 PG/ML (ref 15–65)

## 2020-05-26 PROCEDURE — 82306 VITAMIN D 25 HYDROXY: CPT

## 2020-05-26 PROCEDURE — 77080 DXA BONE DENSITY AXIAL: CPT

## 2020-05-26 PROCEDURE — 36415 COLL VENOUS BLD VENIPUNCTURE: CPT

## 2020-05-26 PROCEDURE — 83970 ASSAY OF PARATHORMONE: CPT

## 2020-05-26 PROCEDURE — 84100 ASSAY OF PHOSPHORUS: CPT

## 2020-05-26 RX ORDER — METHIMAZOLE 10 MG/1
TABLET ORAL
Qty: 90 TABLET | Refills: 0 | OUTPATIENT
Start: 2020-05-26

## 2020-05-27 RX ORDER — METHIMAZOLE 10 MG/1
TABLET ORAL
Qty: 90 TABLET | Refills: 0 | OUTPATIENT
Start: 2020-05-27

## 2020-06-01 ENCOUNTER — TELEPHONE (OUTPATIENT)
Dept: ORTHOPEDIC SURGERY | Facility: CLINIC | Age: 60
End: 2020-06-01

## 2020-06-01 NOTE — TELEPHONE ENCOUNTER
Call placed to patient, left message on machine to call back as per Danya to make follow up to discuss labs, DEXA, and treatment plan. Advised to speak with schedulers to make appt.

## 2020-06-04 ENCOUNTER — OFFICE VISIT (OUTPATIENT)
Dept: ORTHOPEDIC SURGERY | Facility: CLINIC | Age: 60
End: 2020-06-04

## 2020-06-04 VITALS
SYSTOLIC BLOOD PRESSURE: 117 MMHG | HEIGHT: 60 IN | DIASTOLIC BLOOD PRESSURE: 77 MMHG | HEART RATE: 78 BPM | WEIGHT: 143 LBS | BODY MASS INDEX: 28.07 KG/M2

## 2020-06-04 DIAGNOSIS — F39 MOOD DISORDER (HCC): Chronic | ICD-10-CM

## 2020-06-04 DIAGNOSIS — Z87.311 HX OF PATHOLOGICAL FX: ICD-10-CM

## 2020-06-04 DIAGNOSIS — I27.20 PULMONARY HYPERTENSION (HCC): Chronic | ICD-10-CM

## 2020-06-04 DIAGNOSIS — M81.0 OSTEOPOROSIS WITHOUT CURRENT PATHOLOGICAL FRACTURE, UNSPECIFIED OSTEOPOROSIS TYPE: Primary | ICD-10-CM

## 2020-06-04 DIAGNOSIS — I49.5 SICK SINUS SYNDROME (HCC): ICD-10-CM

## 2020-06-04 DIAGNOSIS — E05.90 HYPERTHYROIDISM: Chronic | ICD-10-CM

## 2020-06-04 DIAGNOSIS — I48.91 ATRIAL FIBRILLATION, UNSPECIFIED TYPE (HCC): Chronic | ICD-10-CM

## 2020-06-04 PROCEDURE — 0298T HOLTER MONITOR - 72 HOUR UP TO 21 DAY: CPT | Performed by: INTERNAL MEDICINE

## 2020-06-04 PROCEDURE — 99214 OFFICE O/P EST MOD 30 MIN: CPT | Performed by: PHYSICIAN ASSISTANT

## 2020-06-04 NOTE — PATIENT INSTRUCTIONS
Preventing Health Risks of Being Overweight  Maintaining a healthy body weight is an important part of your overall health. Your healthy body weight depends on your age, gender, and height. Being overweight puts you at risk for many health problems, including:  · Heart disease.  · Diabetes.  · Problems sleeping.  · Joint problems.  You can make changes to your diet and lifestyle to prevent these risks. Consider working with a health care provider or a dietitian to make these changes.  What nutrition changes can be made?    · Eat only as much as your body needs. In most cases, this is about 2,000 calories a day, but the amount varies depending on your height, gender, and activity level. Ask your health care provider how many calories you should have each day. Eating more than your body needs on a regular basis can cause you to become overweight or obese.  · Eat slowly, and stop eating when you feel full.  · Choose healthy foods, including:  ? Fruits and vegetables.  ? Lean meats.  ? Low-fat dairy products.  ? High-fiber foods, such as whole grains and beans.  ? Healthy snacks like vegetable sticks, a piece of fruit, or a small amount of yogurt or cheese.  · Avoid foods and drinks that are high in sugar, salt (sodium), saturated fat, or trans fat. This includes:  ? Many desserts such as candy, cookies, and ice cream.  ? Soda.  ? Fried foods.  ? Processed meats such as hot dogs or lunch meats.  ? Prepackaged snack foods.  What lifestyle changes can be made?    · Exercise for at least 150 minutes a week to prevent weight gain, or as often as recommended by your health care provider. Do moderate-intensity exercise, such as brisk walking.  ? Spread it out by exercising for 30 minutes 5 days a week, or in short 10-minute bursts several times a day.  · Find other ways to stay active and burn calories, such as yard work or a hobby that involves physical activity.  · Get at least 8 hours of sleep each night. When you are  well-rested, you are more likely to be active and make healthy choices during the day. To sleep better:  ? Try to go to bed and wake up at about the same time every day.  ? Keep your bedroom dark, quiet, and cool.  ? Make sure that your bed is comfortable.  ? Avoid stimulating activities, such as watching television or exercising, for at least one hour before bedtime.  Why are these changes important?  Eating healthy and being active helps you lose weight and prevent health problems caused by being overweight. Making these changes can also help you manage stress, feel better mentally, and connect with friends and family.  What can happen if changes are not made?  Being overweight can affect you for your entire life. You may develop joint or bone problems that make it painful or difficult for you to play sports or do activities you enjoy. Being overweight puts stress on your heart and lungs and can lead to medical problems like diabetes, heart disease, and sleeping problems.  Where to find support  You can get support for preventing health risks of being overweight from:  · Your health care provider or a dietitian. They can provide guidance about healthy eating and healthy lifestyle choices.  · Weight loss support groups, online or in-person.  Where to find more information  · MyPlate: www.choosemyplate.gov  ? This an online tool that provides personalized recommendations about foods to eat each day.  · The Centers for Disease Control and Prevention: www.cdc.gov/healthyweight  ? This resource gives tips for managing weight and having an active lifestyle.  Summary  · To prevent unhealthy weight gain, it is important to maintain a healthy diet high in vegetables and whole grains, exercise regularly, and get at least 8 hours of sleep each night.  · Making these changes helps prevent many long-term (chronic) health conditions that can shorten your life, such as diabetes, heart disease, and stroke.  This information is  not intended to replace advice given to you by your health care provider. Make sure you discuss any questions you have with your health care provider.  Document Released: 11/14/2018 Document Revised: 12/21/2018 Document Reviewed: 11/14/2018  Elsevier Patient Education © 2020 [employer]

## 2020-06-04 NOTE — PROGRESS NOTES
ORTHO FOLLOW UP       Subjective:    HPI:   Abi Rivera is a 59 y.o. female who presents in follow-up for her osteoporosis.  She had a DEXA scan on 5/26/2020 at [facility], which revealed a T score in the one third radius of -3.7, with FRAX scores 23% and 7.4%.  She also had recent labs that were discussed today.  She did have a recent hospitalization in which she had an acute kidney injury and those labs have not been rechecked.  This could explain her slightly elevated parathyroid hormone at 67.8.      Past Medical History:   Diagnosis Date   • Adenomatous polyp of colon 6/20/2016   • Anxiety 6/16/2016   • Arthritis of foot 1/10/2019   • Atrial fibrillation (CMS/HCC) 6/16/2016   • Avascular necrosis of bone (CMS/HCC) 1/10/2019   • Chronic pain 1/22/2019   • Closed displaced fracture 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 9/19/2019    Added automatically from request for surgery [id number]   • Hypertension 6/16/2016   • Hyperthyroidism 11/2/2016   • Mood disorder (CMS/HCC) 6/16/2016   • Osteoporosis without current pathological fracture 6/4/2020   • Pulmonary hypertension (CMS/HCC) 6/16/2016   • Sick sinus syndrome (CMS/[facility]) 1/25/2017   • Tricuspid valve insufficiency 6/16/2016       Past Surgical History:   Procedure Laterality Date   • ANKLE OPEN REDUCTION INTERNAL FIXATION     • APPENDECTOMY     • HIP SURGERY      Left total hip   • ORIF FOOT FRACTURE Right 9/30/2019    Procedure: OPEN REDUCTION INTERNAL FIXATION OF FIFTH METATARSAL FRACTURE, LOOSE BODY EXCISION/REMOVAL FROM THE TALONAVICULAR JOINT OF THE RIGHT FOOT;  Surgeon: JOSE Fry DPM;  Location: [facility];  Service: Podiatry       Social History     Occupational History   • Not on file   Tobacco Use   • Smoking status: Current Every Day Smoker     Packs/day: 0.50     Types: Cigarettes   • Smokeless tobacco: Never Used   Substance and Sexual Activity   • Alcohol use: Yes     Comment: jayson   • Drug use: No   •  Sexual activity: Defer      The following portions of the patient's history were reviewed and updated as appropriate: allergies, current medications, past family history, past medical history, past social history, past surgical history and problem list.    Medications:    Current Outpatient Medications:   •  acetaminophen (TYLENOL) 500 MG tablet, Take 500 mg by mouth Every 6 (Six) Hours As Needed for Mild Pain ., Disp: , Rfl:   •  apixaban (ELIQUIS) 5 MG tablet tablet, Take 1 tablet by mouth Every 12 (Twelve) Hours., Disp: 180 tablet, Rfl: 3  •  calcium carb-cholecalciferol (CALCIUM 600+D) 600-800 MG-UNIT tablet, Take 1 tablet by mouth Daily., Disp: , Rfl:   •  Glucosamine-Chondroit-Vit C-Mn (GLUCOSAMINE 1500 COMPLEX PO), Take 1 tablet by mouth Daily., Disp: , Rfl:   •  MAGNESIUM OXIDE PO, Take 250 mg by mouth Daily., Disp: , Rfl:   •  methIMAzole (TAPAZOLE) 10 MG tablet, Take 1 tablet by mouth 3 (Three) Times a Day., Disp: 90 tablet, Rfl: 0  •  metoprolol tartrate (LOPRESSOR) 25 MG tablet, Take 1 tablet by mouth 2 (Two) Times a Day. 1/2 tablet po BID, Disp: 180 tablet, Rfl: 3  •  Multiple Vitamin (MULTI-VITAMIN) tablet, MULTI-VITAMIN gummies, Disp: , Rfl:   •  Omega-3 Fatty Acids (FISH OIL) 1200 MG capsule capsule, 1 tablet daily, Disp: , Rfl:   •  Potassium Gluconate 550 MG tablet, Take 1 tablet by mouth Daily., Disp: , Rfl:     Current Facility-Administered Medications:   •  triamcinolone acetonide (KENALOG-40) injection 40 mg, 40 mg, Intramuscular, Once, JOSE Fry, ZORAIDA    Allergies:  No Known Allergies    Review of Systems:  Gen -no fever, chills , sweats, headache   Eyes - no irritation or discharge   ENT -  no ear pain , runny nose , sore throat , difficulty swallowing   Resp - no cough , congestion , excessive expectoration   CVS - no chest pain , palpitations.   Abd - no pain , nausea , vomiting , diarrhea   Skin - no rash , lesions.   Neuro - no dizziness    Please see HPI for any other pertinent  "positives.  All other systems were reviewed and are negative.       Objective   Objective:    /77 (BP Location: Right arm, Patient Position: Sitting, Cuff Size: Large Adult)   Pulse 78   Ht 152.4 cm (60\")   Wt 64.9 kg (143 lb)   BMI 27.93 kg/m²     Physical Examination:  Obese individual in no acute distress, patient is alert and cooperative with the exam, appears to have normal mood and affect with a normal attention span and concentration, ambulating unassisted  normocephalic, atraumatic, extraocular movements intact, conjunctiva and sclera are clear without nystagmus, normal canals with grossly normal hearing, no nasal deformity, discharge, inflammation, or lesions  no lymphadenopathy or thyromegaly  normal respiratory effort  abdomen is nontender, without guarding or rebound and nondistended  no gross joint abnormalities  pulses normal in all 4 extremities, no clubbing, cyanosis, or edema noted  cranial nerves II-XII grossly intact with no focal defects  skin intact without lesions or rashes visible         Imaging:  no diagnostic testing performed this visit            Assessment:  1. Osteoporosis without current pathological fracture, unspecified osteoporosis type    2. Hx of pathological fx    3. Atrial fibrillation, unspecified type (CMS/HCC)    4. Pulmonary hypertension (CMS/HCC)    5. Sick sinus syndrome (CMS/HCC)    6. Hyperthyroidism    7. Mood disorder (CMS/HCC)                 Plan:  With her recent acute kidney injury, I would like to recheck her labs in 6 weeks.  Her treatment plan will be based on those labs, however we did discuss multiple medications today, including Forteo, Tymlos, Evenity, and Prolia.  She had multiple questions today concerning risk factors and how she got to this point.  All of her questions were answered to the best my abilities.  She was given a new osteoporosis folder containing everything discussed, including weightbearing exercise and fall prevention.  I will " call her in 6 weeks after she is completed her labs for final treatment plan.  She should call with any questions or concerns.  30 minutes was spent with this patient face-to-face with at least 50% of that time spent on counseling and education.  PATIENT EDUCATION:    Education was provided to: patient  Patient response: receptive and needs additional review  Topics discussed: medical condition, workup results, treatment options, therapeutic risks and benefits, medication use, health promotion, diet and nutrition, support systems available, drug interactions, compliance with medication, osteoporosis risks  Informed how: verbally, demonstration, literature  Minutes spent on education: 20             AFSANEH Carrera  06/04/20  15:25

## 2020-06-29 RX ORDER — METHIMAZOLE 10 MG/1
TABLET ORAL
Qty: 90 TABLET | Refills: 0 | OUTPATIENT
Start: 2020-06-29

## 2020-07-14 ENCOUNTER — LAB (OUTPATIENT)
Dept: LAB | Facility: HOSPITAL | Age: 60
End: 2020-07-14

## 2020-07-14 DIAGNOSIS — M81.0 OSTEOPOROSIS WITHOUT CURRENT PATHOLOGICAL FRACTURE, UNSPECIFIED OSTEOPOROSIS TYPE: ICD-10-CM

## 2020-07-14 DIAGNOSIS — I48.91 ATRIAL FIBRILLATION, UNSPECIFIED TYPE (HCC): Chronic | ICD-10-CM

## 2020-07-14 DIAGNOSIS — E05.90 HYPERTHYROIDISM: Chronic | ICD-10-CM

## 2020-07-14 DIAGNOSIS — Z87.311 HX OF PATHOLOGICAL FX: ICD-10-CM

## 2020-07-14 PROCEDURE — 36415 COLL VENOUS BLD VENIPUNCTURE: CPT

## 2020-07-14 PROCEDURE — 80053 COMPREHEN METABOLIC PANEL: CPT

## 2020-07-14 PROCEDURE — 82330 ASSAY OF CALCIUM: CPT

## 2020-07-14 PROCEDURE — 83970 ASSAY OF PARATHORMONE: CPT

## 2020-07-14 PROCEDURE — 83735 ASSAY OF MAGNESIUM: CPT

## 2020-07-14 PROCEDURE — 84443 ASSAY THYROID STIM HORMONE: CPT

## 2020-07-14 PROCEDURE — 84100 ASSAY OF PHOSPHORUS: CPT

## 2020-07-14 PROCEDURE — 85025 COMPLETE CBC W/AUTO DIFF WBC: CPT

## 2020-07-15 ENCOUNTER — TELEPHONE (OUTPATIENT)
Dept: ORTHOPEDIC SURGERY | Facility: CLINIC | Age: 60
End: 2020-07-15

## 2020-07-15 DIAGNOSIS — E23.0 GHD (GROWTH HORMONE DEFICIENCY) (HCC): ICD-10-CM

## 2020-07-15 DIAGNOSIS — E05.90 HYPERTHYROIDISM: Primary | ICD-10-CM

## 2020-07-15 DIAGNOSIS — M81.8 SECONDARY OSTEOPOROSIS: ICD-10-CM

## 2020-07-15 LAB
ALBUMIN SERPL-MCNC: 4.4 G/DL (ref 3.5–5.2)
ALBUMIN/GLOB SERPL: 1.8 G/DL
ALP SERPL-CCNC: 71 U/L (ref 39–117)
ALT SERPL W P-5'-P-CCNC: 9 U/L (ref 1–33)
ANION GAP SERPL CALCULATED.3IONS-SCNC: 10.5 MMOL/L (ref 5–15)
AST SERPL-CCNC: 15 U/L (ref 1–32)
BASOPHILS # BLD AUTO: 0.05 10*3/MM3 (ref 0–0.2)
BASOPHILS NFR BLD AUTO: 0.5 % (ref 0–1.5)
BILIRUB SERPL-MCNC: 0.3 MG/DL (ref 0–1.2)
BUN SERPL-MCNC: 18 MG/DL (ref 6–20)
BUN/CREAT SERPL: 17.3 (ref 7–25)
CA-I BLD-MCNC: 5.5 MG/DL (ref 4.6–5.4)
CA-I SERPL ISE-MCNC: 1.37 MMOL/L (ref 1.15–1.35)
CALCIUM SPEC-SCNC: 9.9 MG/DL (ref 8.6–10.5)
CHLORIDE SERPL-SCNC: 104 MMOL/L (ref 98–107)
CO2 SERPL-SCNC: 25.5 MMOL/L (ref 22–29)
CREAT SERPL-MCNC: 1.04 MG/DL (ref 0.57–1)
DEPRECATED RDW RBC AUTO: 43.9 FL (ref 37–54)
EOSINOPHIL # BLD AUTO: 0.17 10*3/MM3 (ref 0–0.4)
EOSINOPHIL NFR BLD AUTO: 1.8 % (ref 0.3–6.2)
ERYTHROCYTE [DISTWIDTH] IN BLOOD BY AUTOMATED COUNT: 13.4 % (ref 12.3–15.4)
GFR SERPL CREATININE-BSD FRML MDRD: 55 ML/MIN/1.73
GLOBULIN UR ELPH-MCNC: 2.5 GM/DL
GLUCOSE SERPL-MCNC: 115 MG/DL (ref 65–99)
HCT VFR BLD AUTO: 42.5 % (ref 34–46.6)
HGB BLD-MCNC: 14.4 G/DL (ref 12–15.9)
IMM GRANULOCYTES # BLD AUTO: 0.03 10*3/MM3 (ref 0–0.05)
IMM GRANULOCYTES NFR BLD AUTO: 0.3 % (ref 0–0.5)
LYMPHOCYTES # BLD AUTO: 2.89 10*3/MM3 (ref 0.7–3.1)
LYMPHOCYTES NFR BLD AUTO: 31.2 % (ref 19.6–45.3)
MAGNESIUM SERPL-MCNC: 1.9 MG/DL (ref 1.6–2.6)
MCH RBC QN AUTO: 30.5 PG (ref 26.6–33)
MCHC RBC AUTO-ENTMCNC: 33.9 G/DL (ref 31.5–35.7)
MCV RBC AUTO: 90 FL (ref 79–97)
MONOCYTES # BLD AUTO: 0.64 10*3/MM3 (ref 0.1–0.9)
MONOCYTES NFR BLD AUTO: 6.9 % (ref 5–12)
NEUTROPHILS NFR BLD AUTO: 5.49 10*3/MM3 (ref 1.7–7)
NEUTROPHILS NFR BLD AUTO: 59.3 % (ref 42.7–76)
NRBC BLD AUTO-RTO: 0 /100 WBC (ref 0–0.2)
PHOSPHATE SERPL-MCNC: 3.7 MG/DL (ref 2.5–4.5)
PLATELET # BLD AUTO: 283 10*3/MM3 (ref 140–450)
PMV BLD AUTO: 10 FL (ref 6–12)
POTASSIUM SERPL-SCNC: 4.3 MMOL/L (ref 3.5–5.2)
PROT SERPL-MCNC: 6.9 G/DL (ref 6–8.5)
PTH-INTACT SERPL-MCNC: 43.7 PG/ML (ref 15–65)
RBC # BLD AUTO: 4.72 10*6/MM3 (ref 3.77–5.28)
SODIUM SERPL-SCNC: 140 MMOL/L (ref 136–145)
TSH SERPL DL<=0.05 MIU/L-ACNC: <0.005 UIU/ML (ref 0.27–4.2)
WBC # BLD AUTO: 9.27 10*3/MM3 (ref 3.4–10.8)

## 2020-07-15 NOTE — TELEPHONE ENCOUNTER
Voicemail from patient returning call to Danya.     Call back to patient, advised as per Danya. Patient would like to have referral back to Dr. Mack. Order placed for referral and advised patient if she did not hear from them to let me know. Patient expressed understanding and wanted to cancel appt for tomorrow since already got information she would have gotten at appt. Cancelled per pt request.

## 2020-07-27 RX ORDER — METHIMAZOLE 10 MG/1
TABLET ORAL
Qty: 90 TABLET | Refills: 0 | OUTPATIENT
Start: 2020-07-27

## 2020-08-04 ENCOUNTER — TELEPHONE (OUTPATIENT)
Dept: ENDOCRINOLOGY | Facility: CLINIC | Age: 60
End: 2020-08-04

## 2020-08-04 ENCOUNTER — TELEPHONE (OUTPATIENT)
Dept: ORTHOPEDIC SURGERY | Facility: CLINIC | Age: 60
End: 2020-08-04

## 2020-08-04 NOTE — TELEPHONE ENCOUNTER
Voicemail from patient stating that she had spoken with someone about coming to see Dr. Mack but she had not heard anything. States wanted to be sure that the order was even entered.    Call back to patient, left message on machine apologizing that she had not heard back from Dr. Mack's office but advised that the order was placed at the time she had called last month. Advised of their phone number to reach them to schedule appt.

## 2020-08-05 ENCOUNTER — TELEPHONE (OUTPATIENT)
Dept: ENDOCRINOLOGY | Facility: CLINIC | Age: 60
End: 2020-08-05

## 2020-08-06 ENCOUNTER — OFFICE VISIT (OUTPATIENT)
Dept: PODIATRY | Facility: CLINIC | Age: 60
End: 2020-08-06

## 2020-08-06 VITALS
DIASTOLIC BLOOD PRESSURE: 94 MMHG | BODY MASS INDEX: 32.95 KG/M2 | SYSTOLIC BLOOD PRESSURE: 158 MMHG | HEART RATE: 67 BPM | WEIGHT: 157 LBS | HEIGHT: 58 IN

## 2020-08-06 DIAGNOSIS — M85.861 OSTEOPENIA OF RIGHT LOWER LEG: ICD-10-CM

## 2020-08-06 DIAGNOSIS — M21.961 FOOT DEFORMITY, ACQUIRED, RIGHT: ICD-10-CM

## 2020-08-06 DIAGNOSIS — M19.079 ARTHRITIS OF FOOT: ICD-10-CM

## 2020-08-06 DIAGNOSIS — M87.076 AVASCULAR NECROSIS OF BONE OF FOOT (HCC): Primary | ICD-10-CM

## 2020-08-06 PROCEDURE — 99213 OFFICE O/P EST LOW 20 MIN: CPT | Performed by: PODIATRIST

## 2020-08-07 NOTE — PROGRESS NOTES
08/06/2020  Foot and Ankle Surgery - Established Patient/Follow-up  Provider: Dr. Alistair Fry DPM  Location: [facility]    Subjective:  Abi Rivera is a 59 y.o. female.     Chief Complaint   Patient presents with   • Right Foot - Follow-up       HPI: Patient returns for follow-up regarding right foot pain.  She continues to have prominent discomfort.  She did notice fairly significant improvement after the steroid injection but states that symptoms were temporary.  He has been wearing a regular shoe and performing normal activity as well as possible.  She feels that the symptoms are gradually progressing and are causing significant limitation.  She would like to discuss long-term treatment options.  She did follow-up with Danya Taylor and is awaiting an appointment with her endocrinologist.    No Known Allergies    Current Outpatient Medications on File Prior to Visit   Medication Sig Dispense Refill   • acetaminophen (TYLENOL) 500 MG tablet Take 500 mg by mouth Every 6 (Six) Hours As Needed for Mild Pain .     • apixaban (ELIQUIS) 5 MG tablet tablet Take 1 tablet by mouth Every 12 (Twelve) Hours. 180 tablet 3   • calcium carb-cholecalciferol (CALCIUM 600+D) 600-800 MG-UNIT tablet Take 1 tablet by mouth Daily.     • Glucosamine-Chondroit-Vit C-Mn (GLUCOSAMINE 1500 COMPLEX PO) Take 1 tablet by mouth Daily.     • MAGNESIUM OXIDE PO Take 250 mg by mouth Daily.     • methIMAzole (TAPAZOLE) 10 MG tablet Take 1 tablet by mouth 3 (Three) Times a Day. 90 tablet 0   • metoprolol tartrate (LOPRESSOR) 25 MG tablet Take 1 tablet by mouth 2 (Two) Times a Day. 1/2 tablet po  tablet 3   • Multiple Vitamin (MULTI-VITAMIN) tablet MULTI-VITAMIN gummies     • Omega-3 Fatty Acids (FISH OIL) 1200 MG capsule capsule 1 tablet daily     • Potassium Gluconate 550 MG tablet Take 1 tablet by mouth Daily.       Current Facility-Administered Medications on File Prior to Visit   Medication Dose Route Frequency Provider  "Last Rate Last Dose   • triamcinolone acetonide (KENALOG-40) injection 40 mg  40 mg Intramuscular Once JOSE Fry DPM           Objective   /94   Pulse 67   Ht 147.3 cm (58\")   Wt 71.2 kg (157 lb)   BMI 32.81 kg/m²     Podiatry Exam       General Appearance:  well developed, well nourished, no acute distress  Mental Status Exam        Judgement and Insight:  Intact       Orientation:  AAO x3       Mood and Affect:  depressed mood  Cardiovascular (Bilateral)       Dorsalis Pedis Pulse (BL):  2/4       Posterior Tibialis Pulse (BL):  2/4       Capillary Filling Time (BL):  1-3 Seconds       Edema (BL): Edema has improved  Dermatological Exam       Temperature:  warm to warm       Skin Elasticity:  normal skin elasticity  Incision site is well-healed at this time.  No other areas of concern  Neurological Exam (Bilateral)       Paresthesia (Bl):  negative       Achilles DTRs (Bl):  symmetric       Tinel over Tarsal Tunnel (Bl):  negative        MusculoSkeletal Exam (Bilateral)       Gait and Stance (Bl):   antalgic.       ROM (Bl): Range of motion to the midfoot remains limited and tender with attempted range of motion.       Muscle Strength (Bl):  symmetrical 5/5; mild guarding with passing       Subluxed Digits (Bl):  no subluxation or laxity of joints       Dislocated Joints (Bl):  no dislocation of joints       Medial Turbicle Calc (Bl):  no pain       Gastroc soleus equinus (Bl):  Inability to dorsiflex past neutral position both straight legged and bent knee       Post tibial tendon (Bl):  no soreness noted       Peroneal Tendon (Bl):  no soreness noted  Additional Musculoskelatal Findings  Minimal rocker-bottom deformity.  Prominent discomfort with palpation to the dorsal midfoot.  No significant pain to the lateral column.    Assessment/Plan   Abi was seen today for follow-up.    Diagnoses and all orders for this visit:    Avascular necrosis of bone of foot (CMS/HCC)    Arthritis of " foot    Foot deformity, acquired, right    Osteopenia of right lower leg      Patient returns for follow-up regarding her right foot.  She continues to have significant pain and limitation.  Steroid injection offered some relief.  She has been diagnosed with osteoporosis and is awaiting follow-up with her endocrinologist.  At this time options are limited.  She is not an operative candidate at this time given the significant osteopenia.  We also reviewed the extensive recovery required for this case.  In her situation, I do feel that  steroid injections as much as possible is appropriate.  Patient understands and agrees.  We did discuss AFO as a potential option for improvement.  I do feel that a properly fitting AFO will allow offloading and symptom improvement with activity.  Patient understands and agrees.  I have referred her to Rena for the device.  I have asked that she call with any additional issues or concerns.  I would like to see her in 3 months for reevaluation.    No orders of the defined types were placed in this encounter.         Note is dictated utilizing voice recognition software. Unfortunately this leads to occasional typographical errors. I apologize in advance if the situation occurs. If questions occur please do not hesitate to call our office.

## 2020-08-31 RX ORDER — METHIMAZOLE 10 MG/1
TABLET ORAL
Qty: 90 TABLET | Refills: 0 | OUTPATIENT
Start: 2020-08-31

## 2020-11-05 ENCOUNTER — OFFICE VISIT (OUTPATIENT)
Dept: PODIATRY | Facility: CLINIC | Age: 60
End: 2020-11-05

## 2020-11-05 VITALS
HEIGHT: 58 IN | HEART RATE: 69 BPM | SYSTOLIC BLOOD PRESSURE: 147 MMHG | DIASTOLIC BLOOD PRESSURE: 88 MMHG | BODY MASS INDEX: 33.8 KG/M2 | WEIGHT: 161 LBS

## 2020-11-05 DIAGNOSIS — M21.961 FOOT DEFORMITY, ACQUIRED, RIGHT: ICD-10-CM

## 2020-11-05 DIAGNOSIS — M19.079 ARTHRITIS OF FOOT: ICD-10-CM

## 2020-11-05 DIAGNOSIS — M87.076 AVASCULAR NECROSIS OF BONE OF FOOT (HCC): Primary | ICD-10-CM

## 2020-11-05 PROCEDURE — 99213 OFFICE O/P EST LOW 20 MIN: CPT | Performed by: PODIATRIST

## 2020-11-12 ENCOUNTER — OFFICE VISIT (OUTPATIENT)
Dept: ENDOCRINOLOGY | Facility: CLINIC | Age: 60
End: 2020-11-12

## 2020-11-12 VITALS
SYSTOLIC BLOOD PRESSURE: 135 MMHG | TEMPERATURE: 98 F | DIASTOLIC BLOOD PRESSURE: 78 MMHG | HEART RATE: 65 BPM | BODY MASS INDEX: 33.8 KG/M2 | WEIGHT: 161 LBS | HEIGHT: 58 IN | OXYGEN SATURATION: 99 %

## 2020-11-12 DIAGNOSIS — M81.0 AGE-RELATED OSTEOPOROSIS WITHOUT CURRENT PATHOLOGICAL FRACTURE: ICD-10-CM

## 2020-11-12 DIAGNOSIS — E05.90 HYPERTHYROIDISM: Primary | Chronic | ICD-10-CM

## 2020-11-12 PROCEDURE — 90471 IMMUNIZATION ADMIN: CPT | Performed by: INTERNAL MEDICINE

## 2020-11-12 PROCEDURE — 99213 OFFICE O/P EST LOW 20 MIN: CPT | Performed by: INTERNAL MEDICINE

## 2020-11-12 PROCEDURE — 90686 IIV4 VACC NO PRSV 0.5 ML IM: CPT | Performed by: INTERNAL MEDICINE

## 2020-11-12 RX ORDER — METHIMAZOLE 10 MG/1
10 TABLET ORAL 2 TIMES DAILY
Qty: 60 TABLET | Refills: 4
Start: 2020-11-12 | End: 2021-01-19 | Stop reason: SDUPTHER

## 2020-11-12 NOTE — PROGRESS NOTES
Endocrine Progress Note Outpatient     Patient Care Team:  Provider, No Known as PCP - General    Chief Complaint: Follow-up hyperthyroidism    HPI: 59-year-old female with history of hypothyroidism, she has been treated in the past with radioactive iodine somewhere around 2007 however she did not develop hypothyroidism.  She was admitted in March 2020 with cardiac arrhythmias and was consulted by us for a low TSH and high free T4 free T3.  She was restarted on methimazole 10 mg 3 times a day, she is currently taking methimazole 10 mg twice a day.  She tells me that she has been taking it on regular basis.    She was recently diagnosed with osteoporosis and seen by Susy Taylor.  Who found that hypothyroidism and the remote history of growth hormone deficiency could be playing a role and she is asked to be seen by us.    Patient is asking us to take over her osteoporosis management as well.    She denies tremors, sweating, palpitations, heat intolerance.  Does have insomnia and excessive fatigue.  No weight change.    Past Medical History:   Diagnosis Date   • Adenomatous polyp of colon 6/20/2016   • Anxiety 6/16/2016   • Arthritis of foot 1/10/2019   • Atrial fibrillation (CMS/HCC) 6/16/2016   • Avascular necrosis of bone (CMS/HCC) 1/10/2019   • Chronic pain 1/22/2019   • Closed displaced fracture 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 9/19/2019    Added automatically from request for surgery [id number]   • Hypertension 6/16/2016   • Hyperthyroidism 11/2/2016   • Mood disorder (CMS/HCC) 6/16/2016   • Osteoporosis without current pathological fracture 6/4/2020   • Pulmonary hypertension (CMS/HCC) 6/16/2016   • Sick sinus syndrome (CMS/HCC) 1/25/2017   • Tricuspid valve insufficiency 6/16/2016       Social History     Socioeconomic History   • Marital status:      Spouse name: Not on file   • Number of children: Not on file   • Years of education: Not on file   • Highest education level: Not on file    Tobacco Use   • Smoking status: Current Every Day Smoker     Packs/day: 0.50     Types: Cigarettes   • Smokeless tobacco: Never Used   Substance and Sexual Activity   • Alcohol use: Yes     Comment: jayson   • Drug use: No   • Sexual activity: Defer       Family History   Problem Relation Age of Onset   • Hypertension Mother    • Diabetes Father    • Heart disease Father    • Hypertension Father        No Known Allergies    ROS:   Constitutional:  Admit fatigue, tiredness.    Eyes:  Denies change in visual acuity   HENT:  Denies nasal congestion or sore throat   Respiratory: denies cough, shortness of breath.   Cardiovascular:  denies chest pain, edema   GI:  Denies abdominal pain, nausea, vomiting.   Musculoskeletal:  Denies back pain or joint pain   Integument:  Denies dry skin and rash   Neurologic:  Denies headache, focal weakness or sensory changes   Endocrine:  Denies polyuria or polydipsia   Psychiatric:  Denies depression or anxiety      Vitals:    11/12/20 1515   BP: 135/78   Pulse: 65   Temp: 98 °F (36.7 °C)   SpO2: 99%       Physical Exam:  GEN: NAD, conversant  EYES: EOMI, PERRL, no conjunctival erythema  NECK: no thyromegaly, full ROM   CV: RRR, no murmurs/rubs/gallops, no peripheral edema  LUNG: CTAB, no wheezes/rales/ronchi  SKIN: no rashes, no acanthosis  MSK: no deformities, full ROM of all extremities  NEURO: no tremors, DTR normal  PSYCH: AOX3, appropriate mood, affect normal      Results Review:     I reviewed the patient's new clinical results.      Lab Results   Component Value Date    GLUCOSE 115 (H) 07/14/2020    BUN 18 07/14/2020    CREATININE 1.04 (H) 07/14/2020    EGFRIFNONA 55 (L) 07/14/2020    BCR 17.3 07/14/2020    K 4.3 07/14/2020    CO2 25.5 07/14/2020    CALCIUM 9.9 07/14/2020    ALBUMIN 4.40 07/14/2020    LABIL2 1.2 02/20/2017    AST 15 07/14/2020    ALT 9 07/14/2020     Lab Results   Component Value Date    TSH <0.005 (L) 07/14/2020    FREET4 3.85 (H) 03/27/2020    THYROIDAB <9  03/27/2020         Medication Review: Reviewed.       Current Outpatient Medications:   •  acetaminophen (TYLENOL) 500 MG tablet, Take 500 mg by mouth Every 6 (Six) Hours As Needed for Mild Pain ., Disp: , Rfl:   •  apixaban (ELIQUIS) 5 MG tablet tablet, Take 1 tablet by mouth Every 12 (Twelve) Hours., Disp: 180 tablet, Rfl: 3  •  calcium carb-cholecalciferol (CALCIUM 600+D) 600-800 MG-UNIT tablet, Take 1 tablet by mouth Daily., Disp: , Rfl:   •  Glucosamine-Chondroit-Vit C-Mn (GLUCOSAMINE 1500 COMPLEX PO), Take 1 tablet by mouth Daily., Disp: , Rfl:   •  MAGNESIUM OXIDE PO, Take 250 mg by mouth Daily., Disp: , Rfl:   •  methIMAzole (TAPAZOLE) 10 MG tablet, Take 1 tablet by mouth 3 (Three) Times a Day., Disp: 90 tablet, Rfl: 0  •  metoprolol tartrate (LOPRESSOR) 25 MG tablet, Take 1 tablet by mouth 2 (Two) Times a Day. 1/2 tablet po BID (Patient taking differently: Take  by mouth 2 (Two) Times a Day. 1/2 tablet po BID ), Disp: 180 tablet, Rfl: 3  •  Multiple Vitamin (MULTI-VITAMIN) tablet, MULTI-VITAMIN gummies, Disp: , Rfl:   •  Omega-3 Fatty Acids (FISH OIL) 1200 MG capsule capsule, 1 tablet daily, Disp: , Rfl:   •  Potassium Gluconate 550 MG tablet, Take 1 tablet by mouth Daily., Disp: , Rfl:     Current Facility-Administered Medications:   •  triamcinolone acetonide (KENALOG-40) injection 40 mg, 40 mg, Intramuscular, Once, JOSE Fry DPM      Assessment/Plan   Hyperthyroidism etiology unknown at this time.  Last TSH was undetectable in July 2020.  I will recheck TSH with free T4 and free T3.  She is currently on methimazole 10 mg twice a day which we will continue.  I have explained this to her, though her goal would be that we would like to normalize the TSH with free T4 free T3 and then consider doing thyroid scan and uptake.    Osteoporosis: She has seen Susy Taylor, I will check IGF-I as she has remote history of growth hormone deficiency, medications for osteoporosis including Prolia, Reclast, Forteo,  Tymlos was discussed with her.  We will get our work-up first before making a final decision.  She is currently on calcium and vitamin D supplementation.            Karen Mack MD FACE.

## 2020-11-16 ENCOUNTER — LAB (OUTPATIENT)
Dept: LAB | Facility: HOSPITAL | Age: 60
End: 2020-11-16

## 2020-11-16 DIAGNOSIS — E05.90 HYPERTHYROIDISM: Chronic | ICD-10-CM

## 2020-11-16 DIAGNOSIS — M81.0 AGE-RELATED OSTEOPOROSIS WITHOUT CURRENT PATHOLOGICAL FRACTURE: ICD-10-CM

## 2020-11-16 LAB
ALBUMIN SERPL-MCNC: 4.4 G/DL (ref 3.5–5.2)
ALBUMIN/GLOB SERPL: 1.6 G/DL
ALP SERPL-CCNC: 87 U/L (ref 39–117)
ALT SERPL W P-5'-P-CCNC: 14 U/L (ref 1–33)
ANION GAP SERPL CALCULATED.3IONS-SCNC: 10.3 MMOL/L (ref 5–15)
AST SERPL-CCNC: 30 U/L (ref 1–32)
BASOPHILS # BLD AUTO: 0.07 10*3/MM3 (ref 0–0.2)
BASOPHILS NFR BLD AUTO: 0.9 % (ref 0–1.5)
BILIRUB SERPL-MCNC: 0.2 MG/DL (ref 0–1.2)
BUN SERPL-MCNC: 20 MG/DL (ref 6–20)
BUN/CREAT SERPL: 23.3 (ref 7–25)
CALCIUM SPEC-SCNC: 9.6 MG/DL (ref 8.6–10.5)
CHLORIDE SERPL-SCNC: 105 MMOL/L (ref 98–107)
CO2 SERPL-SCNC: 22.7 MMOL/L (ref 22–29)
CREAT SERPL-MCNC: 0.86 MG/DL (ref 0.57–1)
DEPRECATED RDW RBC AUTO: 39.9 FL (ref 37–54)
EOSINOPHIL # BLD AUTO: 0.2 10*3/MM3 (ref 0–0.4)
EOSINOPHIL NFR BLD AUTO: 2.4 % (ref 0.3–6.2)
ERYTHROCYTE [DISTWIDTH] IN BLOOD BY AUTOMATED COUNT: 12.5 % (ref 12.3–15.4)
GFR SERPL CREATININE-BSD FRML MDRD: 68 ML/MIN/1.73
GLOBULIN UR ELPH-MCNC: 2.7 GM/DL
GLUCOSE SERPL-MCNC: 70 MG/DL (ref 65–99)
HCT VFR BLD AUTO: 37.6 % (ref 34–46.6)
HGB BLD-MCNC: 12.2 G/DL (ref 12–15.9)
IMM GRANULOCYTES # BLD AUTO: 0.02 10*3/MM3 (ref 0–0.05)
IMM GRANULOCYTES NFR BLD AUTO: 0.2 % (ref 0–0.5)
LYMPHOCYTES # BLD AUTO: 2.44 10*3/MM3 (ref 0.7–3.1)
LYMPHOCYTES NFR BLD AUTO: 29.8 % (ref 19.6–45.3)
MCH RBC QN AUTO: 28.8 PG (ref 26.6–33)
MCHC RBC AUTO-ENTMCNC: 32.4 G/DL (ref 31.5–35.7)
MCV RBC AUTO: 88.9 FL (ref 79–97)
MONOCYTES # BLD AUTO: 0.52 10*3/MM3 (ref 0.1–0.9)
MONOCYTES NFR BLD AUTO: 6.4 % (ref 5–12)
NEUTROPHILS NFR BLD AUTO: 4.93 10*3/MM3 (ref 1.7–7)
NEUTROPHILS NFR BLD AUTO: 60.3 % (ref 42.7–76)
NRBC BLD AUTO-RTO: 0 /100 WBC (ref 0–0.2)
PLATELET # BLD AUTO: 322 10*3/MM3 (ref 140–450)
PMV BLD AUTO: 9.6 FL (ref 6–12)
POTASSIUM SERPL-SCNC: 4.8 MMOL/L (ref 3.5–5.2)
PROT SERPL-MCNC: 7.1 G/DL (ref 6–8.5)
RBC # BLD AUTO: 4.23 10*6/MM3 (ref 3.77–5.28)
SODIUM SERPL-SCNC: 138 MMOL/L (ref 136–145)
T3FREE SERPL-MCNC: 1.46 PG/ML (ref 2–4.4)
T4 FREE SERPL-MCNC: 1.01 NG/DL (ref 0.93–1.7)
TSH SERPL DL<=0.05 MIU/L-ACNC: 0.01 UIU/ML (ref 0.27–4.2)
WBC # BLD AUTO: 8.18 10*3/MM3 (ref 3.4–10.8)

## 2020-11-16 PROCEDURE — 84439 ASSAY OF FREE THYROXINE: CPT

## 2020-11-16 PROCEDURE — 84481 FREE ASSAY (FT-3): CPT

## 2020-11-16 PROCEDURE — 80053 COMPREHEN METABOLIC PANEL: CPT

## 2020-11-16 PROCEDURE — 36415 COLL VENOUS BLD VENIPUNCTURE: CPT

## 2020-11-16 PROCEDURE — 84305 ASSAY OF SOMATOMEDIN: CPT

## 2020-11-16 PROCEDURE — 85025 COMPLETE CBC W/AUTO DIFF WBC: CPT

## 2020-11-16 PROCEDURE — 84443 ASSAY THYROID STIM HORMONE: CPT

## 2020-11-18 LAB — IGF-I SERPL-MCNC: 96 NG/ML (ref 60–207)

## 2021-01-14 ENCOUNTER — OFFICE VISIT (OUTPATIENT)
Dept: ENDOCRINOLOGY | Facility: CLINIC | Age: 61
End: 2021-01-14

## 2021-01-14 VITALS
SYSTOLIC BLOOD PRESSURE: 130 MMHG | HEIGHT: 58 IN | BODY MASS INDEX: 33.37 KG/M2 | OXYGEN SATURATION: 98 % | TEMPERATURE: 97.1 F | DIASTOLIC BLOOD PRESSURE: 75 MMHG | HEART RATE: 80 BPM | WEIGHT: 159 LBS

## 2021-01-14 DIAGNOSIS — E05.90 HYPERTHYROIDISM: Primary | Chronic | ICD-10-CM

## 2021-01-14 DIAGNOSIS — M81.0 AGE-RELATED OSTEOPOROSIS WITHOUT CURRENT PATHOLOGICAL FRACTURE: ICD-10-CM

## 2021-01-14 PROCEDURE — 99214 OFFICE O/P EST MOD 30 MIN: CPT | Performed by: INTERNAL MEDICINE

## 2021-01-14 RX ORDER — HYDROCODONE BITARTRATE AND ACETAMINOPHEN 5; 325 MG/1; MG/1
TABLET ORAL
COMMUNITY
Start: 2020-12-31 | End: 2021-01-14

## 2021-01-14 RX ORDER — ABALOPARATIDE 2000 UG/ML
80 INJECTION, SOLUTION SUBCUTANEOUS DAILY
Qty: 1.56 ML | Refills: 6 | Status: SHIPPED | OUTPATIENT
Start: 2021-01-14 | End: 2022-01-13

## 2021-01-14 RX ORDER — AMOXICILLIN 500 MG/1
500 CAPSULE ORAL 3 TIMES DAILY
COMMUNITY
Start: 2020-12-31 | End: 2021-01-14

## 2021-01-14 NOTE — PATIENT INSTRUCTIONS
Please stop smoking  Check all labs in next few days  Start Tymlos 80 mcg subcu daily  Continue calcium and vitamin D supplementation.

## 2021-01-14 NOTE — PROGRESS NOTES
Endocrine Progress Note Outpatient     Patient Care Team:  Shala Lopez APRN as PCP - General (Nurse Practitioner)    Chief Complaint: Follow-up hyperthyroidism    HPI: 60-year-old female with history of hyperthyroidism, she has been treated in the past with radioactive iodine somewhere around 2007 however she did not develop hypothyroidism.  She was admitted in March 2020 with cardiac arrhythmias and was consulted by us for a low TSH and high free T4 free T3.  She was restarted on methimazole 10 mg 3 times a day, she is currently taking methimazole 10 mg twice a day.  She tells me that she has been taking it on regular basis.    Osteoporosis: Not on meds at this time.     Patient is asking us to take over her osteoporosis management as well.    She denies tremors, sweating, palpitations, heat intolerance.  Does have insomnia and excessive fatigue.  No weight change.    Past Medical History:   Diagnosis Date   • Adenomatous polyp of colon 6/20/2016   • Anxiety 6/16/2016   • Arthritis of foot 1/10/2019   • Atrial fibrillation (CMS/HCC) 6/16/2016   • Avascular necrosis of bone (CMS/HCC) 1/10/2019   • Chronic pain 1/22/2019   • Closed displaced fracture 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 9/19/2019    Added automatically from request for surgery [id number]   • Hypertension 6/16/2016   • Hyperthyroidism 11/2/2016   • Mood disorder (CMS/HCC) 6/16/2016   • Osteoporosis without current pathological fracture 6/4/2020   • Pulmonary hypertension (CMS/HCC) 6/16/2016   • Sick sinus syndrome (CMS/HCC) 1/25/2017   • Tricuspid valve insufficiency 6/16/2016       Social History     Socioeconomic History   • Marital status:      Spouse name: Not on file   • Number of children: Not on file   • Years of education: Not on file   • Highest education level: Not on file   Tobacco Use   • Smoking status: Current Every Day Smoker     Packs/day: 0.50     Types: Cigarettes   • Smokeless tobacco: Never Used   Substance  and Sexual Activity   • Alcohol use: Yes     Comment: jayson   • Drug use: No   • Sexual activity: Defer       Family History   Problem Relation Age of Onset   • Hypertension Mother    • Diabetes Father    • Heart disease Father    • Hypertension Father        No Known Allergies    ROS:   Constitutional:  Admit fatigue, tiredness.    Eyes:  Denies change in visual acuity   HENT:  Denies nasal congestion or sore throat   Respiratory: denies cough, shortness of breath.   Cardiovascular:  denies chest pain, edema   GI:  Denies abdominal pain, nausea, vomiting.   Musculoskeletal:  Denies back pain or joint pain   Integument:  Denies dry skin and rash   Neurologic:  Denies headache, focal weakness or sensory changes   Endocrine:  Denies polyuria or polydipsia   Psychiatric:  Denies depression or anxiety      Vitals:    01/14/21 1441   BP: 130/75   Pulse: 80   Temp: 97.1 °F (36.2 °C)   SpO2: 98%       Physical Exam:  GEN: NAD, conversant  EYES: EOMI, PERRL, no conjunctival erythema  NECK: no thyromegaly, full ROM   CV: RRR, no murmurs/rubs/gallops, no peripheral edema  LUNG: CTAB, no wheezes/rales/ronchi  SKIN: no rashes, no acanthosis  MSK: no deformities, full ROM of all extremities  NEURO: no tremors, DTR normal  PSYCH: AOX3, appropriate mood, affect normal      Results Review:     I reviewed the patient's new clinical results.      Lab Results   Component Value Date    GLUCOSE 70 11/16/2020    BUN 20 11/16/2020    CREATININE 0.86 11/16/2020    EGFRIFNONA 68 11/16/2020    BCR 23.3 11/16/2020    K 4.8 11/16/2020    CO2 22.7 11/16/2020    CALCIUM 9.6 11/16/2020    ALBUMIN 4.40 11/16/2020    LABIL2 1.2 02/20/2017    AST 30 11/16/2020    ALT 14 11/16/2020     Lab Results   Component Value Date    TSH 0.006 (L) 11/16/2020    FREET4 1.01 11/16/2020    THYROIDAB <9 03/27/2020     Labs from November 2020 showed a TSH of 1.006, free T4 1.01, free T3 1.46, IGF-I 96, sodium 138, potassium 4.8, chloride 105, CO2 22, glucose 70,  BUN 20, creatinine 0.8, AST 14, ALT 30, hemoglobin 12.2, hematocrit 37.6, platelet count 322 with white count of 8.1.    DEXA Bone Density Axial (05/26/2020 15:02)      Medication Review: Reviewed.       Current Outpatient Medications:   •  acetaminophen (TYLENOL) 500 MG tablet, Take 500 mg by mouth Every 6 (Six) Hours As Needed for Mild Pain ., Disp: , Rfl:   •  apixaban (ELIQUIS) 5 MG tablet tablet, Take 1 tablet by mouth Every 12 (Twelve) Hours., Disp: 180 tablet, Rfl: 3  •  calcium carb-cholecalciferol (CALCIUM 600+D) 600-800 MG-UNIT tablet, Take 1 tablet by mouth Daily., Disp: , Rfl:   •  Glucosamine-Chondroit-Vit C-Mn (GLUCOSAMINE 1500 COMPLEX PO), Take 1 tablet by mouth Daily., Disp: , Rfl:   •  MAGNESIUM OXIDE PO, Take 250 mg by mouth Daily., Disp: , Rfl:   •  methIMAzole (TAPAZOLE) 10 MG tablet, Take 1 tablet by mouth 2 (two) times a day., Disp: 60 tablet, Rfl: 4  •  metoprolol tartrate (LOPRESSOR) 25 MG tablet, Take 1 tablet by mouth 2 (Two) Times a Day. 1/2 tablet po BID (Patient taking differently: Take  by mouth. 1/2 tablet po BID), Disp: 180 tablet, Rfl: 3  •  Multiple Vitamin (MULTI-VITAMIN) tablet, MULTI-VITAMIN gummies, Disp: , Rfl:   •  Omega-3 Fatty Acids (FISH OIL) 1200 MG capsule capsule, 1 tablet daily, Disp: , Rfl:   •  Potassium Gluconate 550 MG tablet, Take 1 tablet by mouth Daily., Disp: , Rfl:     Current Facility-Administered Medications:   •  triamcinolone acetonide (KENALOG-40) injection 40 mg, 40 mg, Intramuscular, Once, JOSE Fry DPM      Assessment/Plan   Hyperthyroidism etiology unknown at this time.  Clinically doing better, I will check TSH and free T4 and then make further recommendations.      Osteoporosis: Uncontrolled. She will benefit from therapy, I will add Tymlos 80 mcg subcu daily.  She is advised to continue calcium and vitamin D supplementation.            Karen Mack MD FACE.                x

## 2021-01-18 ENCOUNTER — LAB (OUTPATIENT)
Dept: LAB | Facility: HOSPITAL | Age: 61
End: 2021-01-18

## 2021-01-18 DIAGNOSIS — M81.0 AGE-RELATED OSTEOPOROSIS WITHOUT CURRENT PATHOLOGICAL FRACTURE: ICD-10-CM

## 2021-01-18 DIAGNOSIS — E05.90 HYPERTHYROIDISM: Chronic | ICD-10-CM

## 2021-01-18 LAB
ALBUMIN SERPL-MCNC: 4.1 G/DL (ref 3.5–5.2)
ALBUMIN/GLOB SERPL: 1.8 G/DL
ALP SERPL-CCNC: 66 U/L (ref 39–117)
ALT SERPL W P-5'-P-CCNC: 11 U/L (ref 1–33)
ANION GAP SERPL CALCULATED.3IONS-SCNC: 11.7 MMOL/L (ref 5–15)
AST SERPL-CCNC: 17 U/L (ref 1–32)
BILIRUB SERPL-MCNC: <0.2 MG/DL (ref 0–1.2)
BUN SERPL-MCNC: 22 MG/DL (ref 6–20)
BUN/CREAT SERPL: 20.8 (ref 7–25)
CALCIUM SPEC-SCNC: 9.7 MG/DL (ref 8.6–10.5)
CHLORIDE SERPL-SCNC: 107 MMOL/L (ref 98–107)
CO2 SERPL-SCNC: 20.3 MMOL/L (ref 22–29)
CREAT SERPL-MCNC: 1.06 MG/DL (ref 0.57–1)
GFR SERPL CREATININE-BSD FRML MDRD: 53 ML/MIN/1.73
GLOBULIN UR ELPH-MCNC: 2.3 GM/DL
GLUCOSE SERPL-MCNC: 115 MG/DL (ref 65–99)
POTASSIUM SERPL-SCNC: 4.2 MMOL/L (ref 3.5–5.2)
PROT SERPL-MCNC: 6.4 G/DL (ref 6–8.5)
SODIUM SERPL-SCNC: 139 MMOL/L (ref 136–145)
T4 FREE SERPL-MCNC: 1.26 NG/DL (ref 0.93–1.7)
TSH SERPL DL<=0.05 MIU/L-ACNC: <0.005 UIU/ML (ref 0.27–4.2)

## 2021-01-18 PROCEDURE — 36415 COLL VENOUS BLD VENIPUNCTURE: CPT

## 2021-01-18 PROCEDURE — 80053 COMPREHEN METABOLIC PANEL: CPT

## 2021-01-18 PROCEDURE — 84443 ASSAY THYROID STIM HORMONE: CPT

## 2021-01-18 PROCEDURE — 84439 ASSAY OF FREE THYROXINE: CPT

## 2021-01-19 DIAGNOSIS — I10 ESSENTIAL HYPERTENSION: Chronic | ICD-10-CM

## 2021-01-19 DIAGNOSIS — E05.90 HYPERTHYROIDISM: Primary | ICD-10-CM

## 2021-01-19 RX ORDER — METHIMAZOLE 10 MG/1
10 TABLET ORAL 2 TIMES DAILY
Qty: 90 TABLET | Refills: 6
Start: 2021-01-19 | End: 2021-09-23 | Stop reason: SDUPTHER

## 2021-03-19 ENCOUNTER — HOSPITAL ENCOUNTER (INPATIENT)
Facility: HOSPITAL | Age: 61
LOS: 6 days | Discharge: HOME | End: 2021-03-25
Attending: INTERNAL MEDICINE | Admitting: INTERNAL MEDICINE

## 2021-03-19 ENCOUNTER — ANESTHESIA EVENT (OUTPATIENT)
Dept: PERIOP | Facility: HOSPITAL | Age: 61
End: 2021-03-19

## 2021-03-19 ENCOUNTER — APPOINTMENT (OUTPATIENT)
Dept: CT IMAGING | Facility: HOSPITAL | Age: 61
End: 2021-03-19

## 2021-03-19 ENCOUNTER — APPOINTMENT (OUTPATIENT)
Dept: GENERAL RADIOLOGY | Facility: HOSPITAL | Age: 61
End: 2021-03-19

## 2021-03-19 ENCOUNTER — ANESTHESIA (OUTPATIENT)
Dept: PERIOP | Facility: HOSPITAL | Age: 61
End: 2021-03-19

## 2021-03-19 DIAGNOSIS — K25.1: ICD-10-CM

## 2021-03-19 DIAGNOSIS — K28.6: ICD-10-CM

## 2021-03-19 DIAGNOSIS — K63.1: ICD-10-CM

## 2021-03-19 DIAGNOSIS — K25.1 ACUTE GASTRIC ULCER WITH PERFORATION (HCC): Primary | ICD-10-CM

## 2021-03-19 DIAGNOSIS — R10.84 GENERALIZED ABDOMINAL PAIN: ICD-10-CM

## 2021-03-19 DIAGNOSIS — R07.9 CHEST PAIN, UNSPECIFIED TYPE: ICD-10-CM

## 2021-03-19 LAB
ABO GROUP BLD: NORMAL
ACANTHOCYTES BLD QL SMEAR: ABNORMAL
ALBUMIN SERPL-MCNC: 3.5 G/DL (ref 3.5–5.2)
ALBUMIN SERPL-MCNC: 3.9 G/DL (ref 3.5–5.2)
ALBUMIN/GLOB SERPL: 1.6 G/DL
ALBUMIN/GLOB SERPL: 1.8 G/DL
ALP SERPL-CCNC: 44 U/L (ref 39–117)
ALP SERPL-CCNC: 48 U/L (ref 39–117)
ALT SERPL W P-5'-P-CCNC: 12 U/L (ref 1–33)
ALT SERPL W P-5'-P-CCNC: 33 U/L (ref 1–33)
ANION GAP SERPL CALCULATED.3IONS-SCNC: 11 MMOL/L (ref 5–15)
ANION GAP SERPL CALCULATED.3IONS-SCNC: 16 MMOL/L (ref 5–15)
AST SERPL-CCNC: 12 U/L (ref 1–32)
AST SERPL-CCNC: 29 U/L (ref 1–32)
BASOPHILS # BLD AUTO: 0 10*3/MM3 (ref 0–0.2)
BASOPHILS NFR BLD AUTO: 0.1 % (ref 0–1.5)
BILIRUB SERPL-MCNC: 0.3 MG/DL (ref 0–1.2)
BILIRUB SERPL-MCNC: 0.3 MG/DL (ref 0–1.2)
BLD GP AB SCN SERPL QL: NEGATIVE
BUN SERPL-MCNC: 23 MG/DL (ref 6–20)
BUN SERPL-MCNC: 32 MG/DL (ref 6–20)
BUN/CREAT SERPL: 29.5 (ref 7–25)
BUN/CREAT SERPL: 39.5 (ref 7–25)
CALCIUM SPEC-SCNC: 8.2 MG/DL (ref 8.6–10.5)
CALCIUM SPEC-SCNC: 9.5 MG/DL (ref 8.6–10.5)
CHLORIDE SERPL-SCNC: 103 MMOL/L (ref 98–107)
CHLORIDE SERPL-SCNC: 97 MMOL/L (ref 98–107)
CO2 SERPL-SCNC: 21 MMOL/L (ref 22–29)
CO2 SERPL-SCNC: 24 MMOL/L (ref 22–29)
CREAT BLDA-MCNC: 0.8 MG/DL (ref 0.6–1.3)
CREAT SERPL-MCNC: 0.78 MG/DL (ref 0.57–1)
CREAT SERPL-MCNC: 0.81 MG/DL (ref 0.57–1)
DEPRECATED RDW RBC AUTO: 46.8 FL (ref 37–54)
DEPRECATED RDW RBC AUTO: 49 FL (ref 37–54)
EOSINOPHIL # BLD AUTO: 0 10*3/MM3 (ref 0–0.4)
EOSINOPHIL NFR BLD AUTO: 0 % (ref 0.3–6.2)
ERYTHROCYTE [DISTWIDTH] IN BLOOD BY AUTOMATED COUNT: 15.7 % (ref 12.3–15.4)
ERYTHROCYTE [DISTWIDTH] IN BLOOD BY AUTOMATED COUNT: 16 % (ref 12.3–15.4)
GFR SERPL CREATININE-BSD FRML MDRD: 73 ML/MIN/1.73
GFR SERPL CREATININE-BSD FRML MDRD: 76 ML/MIN/1.73
GLOBULIN UR ELPH-MCNC: 1.9 GM/DL
GLOBULIN UR ELPH-MCNC: 2.5 GM/DL
GLUCOSE SERPL-MCNC: 125 MG/DL (ref 65–99)
GLUCOSE SERPL-MCNC: 125 MG/DL (ref 65–99)
HCT VFR BLD AUTO: 37.8 % (ref 34–46.6)
HCT VFR BLD AUTO: 37.9 % (ref 34–46.6)
HGB BLD-MCNC: 12.1 G/DL (ref 12–15.9)
HGB BLD-MCNC: 12.8 G/DL (ref 12–15.9)
HOLD SPECIMEN: NORMAL
HOLD SPECIMEN: NORMAL
LIPASE SERPL-CCNC: 19 U/L (ref 13–60)
LYMPHOCYTES # BLD AUTO: 0.7 10*3/MM3 (ref 0.7–3.1)
LYMPHOCYTES # BLD MANUAL: 1.68 10*3/MM3 (ref 0.7–3.1)
LYMPHOCYTES NFR BLD AUTO: 2.9 % (ref 19.6–45.3)
LYMPHOCYTES NFR BLD MANUAL: 2 % (ref 5–12)
LYMPHOCYTES NFR BLD MANUAL: 6 % (ref 19.6–45.3)
MCH RBC QN AUTO: 28.1 PG (ref 26.6–33)
MCH RBC QN AUTO: 29.1 PG (ref 26.6–33)
MCHC RBC AUTO-ENTMCNC: 32 G/DL (ref 31.5–35.7)
MCHC RBC AUTO-ENTMCNC: 33.7 G/DL (ref 31.5–35.7)
MCV RBC AUTO: 86.1 FL (ref 79–97)
MCV RBC AUTO: 88 FL (ref 79–97)
MONOCYTES # BLD AUTO: 0.56 10*3/MM3 (ref 0.1–0.9)
MONOCYTES # BLD AUTO: 0.9 10*3/MM3 (ref 0.1–0.9)
MONOCYTES NFR BLD AUTO: 3.9 % (ref 5–12)
NEUTROPHILS # BLD AUTO: 25.48 10*3/MM3 (ref 1.7–7)
NEUTROPHILS NFR BLD AUTO: 22.6 10*3/MM3 (ref 1.7–7)
NEUTROPHILS NFR BLD AUTO: 93.1 % (ref 42.7–76)
NEUTROPHILS NFR BLD MANUAL: 90 % (ref 42.7–76)
NEUTS BAND NFR BLD MANUAL: 1 % (ref 0–5)
NEUTS HYPERSEG # BLD: ABNORMAL 10*3/UL
NRBC BLD AUTO-RTO: 0.1 /100 WBC (ref 0–0.2)
NT-PROBNP SERPL-MCNC: 335.6 PG/ML (ref 0–900)
PLAT MORPH BLD: NORMAL
PLATELET # BLD AUTO: 274 10*3/MM3 (ref 140–450)
PLATELET # BLD AUTO: 303 10*3/MM3 (ref 140–450)
PMV BLD AUTO: 7.1 FL (ref 6–12)
PMV BLD AUTO: 7.3 FL (ref 6–12)
POTASSIUM SERPL-SCNC: 4.2 MMOL/L (ref 3.5–5.2)
POTASSIUM SERPL-SCNC: 4.4 MMOL/L (ref 3.5–5.2)
PROT SERPL-MCNC: 5.4 G/DL (ref 6–8.5)
PROT SERPL-MCNC: 6.4 G/DL (ref 6–8.5)
RBC # BLD AUTO: 4.31 10*6/MM3 (ref 3.77–5.28)
RBC # BLD AUTO: 4.39 10*6/MM3 (ref 3.77–5.28)
RH BLD: NEGATIVE
SARS-COV-2 RNA PNL SPEC NAA+PROBE: NOT DETECTED
SCAN SLIDE: NORMAL
SODIUM SERPL-SCNC: 134 MMOL/L (ref 136–145)
SODIUM SERPL-SCNC: 138 MMOL/L (ref 136–145)
T&S EXPIRATION DATE: NORMAL
TOXIC GRANULATION: ABNORMAL
TROPONIN T SERPL-MCNC: <0.01 NG/ML (ref 0–0.03)
VARIANT LYMPHS NFR BLD MANUAL: 1 % (ref 0–5)
WBC # BLD AUTO: 24.3 10*3/MM3 (ref 3.4–10.8)
WBC # BLD AUTO: 28 10*3/MM3 (ref 3.4–10.8)
WHOLE BLOOD HOLD SPECIMEN: NORMAL
WHOLE BLOOD HOLD SPECIMEN: NORMAL

## 2021-03-19 PROCEDURE — 0DU707Z SUPPLEMENT STOMACH, PYLORUS WITH AUTOLOGOUS TISSUE SUBSTITUTE, OPEN APPROACH: ICD-10-PCS | Performed by: SURGERY

## 2021-03-19 PROCEDURE — 87040 BLOOD CULTURE FOR BACTERIA: CPT | Performed by: PHYSICIAN ASSISTANT

## 2021-03-19 PROCEDURE — 0 IOPAMIDOL PER 1 ML: Performed by: PHYSICIAN ASSISTANT

## 2021-03-19 PROCEDURE — 80053 COMPREHEN METABOLIC PANEL: CPT | Performed by: SURGERY

## 2021-03-19 PROCEDURE — 25010000002 MORPHINE PER 10 MG: Performed by: PHYSICIAN ASSISTANT

## 2021-03-19 PROCEDURE — 25010000003 AMPICILLIN-SULBACTAM PER 1.5 G: Performed by: PHYSICIAN ASSISTANT

## 2021-03-19 PROCEDURE — 87070 CULTURE OTHR SPECIMN AEROBIC: CPT | Performed by: SURGERY

## 2021-03-19 PROCEDURE — 83690 ASSAY OF LIPASE: CPT | Performed by: PHYSICIAN ASSISTANT

## 2021-03-19 PROCEDURE — 25010000002 ONDANSETRON PER 1 MG: Performed by: ANESTHESIOLOGY

## 2021-03-19 PROCEDURE — 25010000002 HYDROMORPHONE PER 4 MG: Performed by: ANESTHESIOLOGY

## 2021-03-19 PROCEDURE — 86901 BLOOD TYPING SEROLOGIC RH(D): CPT

## 2021-03-19 PROCEDURE — 99284 EMERGENCY DEPT VISIT MOD MDM: CPT | Performed by: SURGERY

## 2021-03-19 PROCEDURE — 88305 TISSUE EXAM BY PATHOLOGIST: CPT | Performed by: SURGERY

## 2021-03-19 PROCEDURE — 85025 COMPLETE CBC W/AUTO DIFF WBC: CPT | Performed by: INTERNAL MEDICINE

## 2021-03-19 PROCEDURE — 25010000002 DEXAMETHASONE PER 1 MG: Performed by: ANESTHESIOLOGY

## 2021-03-19 PROCEDURE — 87106 FUNGI IDENTIFICATION YEAST: CPT | Performed by: SURGERY

## 2021-03-19 PROCEDURE — 25010000002 PROPOFOL 10 MG/ML EMULSION: Performed by: ANESTHESIOLOGY

## 2021-03-19 PROCEDURE — U0003 INFECTIOUS AGENT DETECTION BY NUCLEIC ACID (DNA OR RNA); SEVERE ACUTE RESPIRATORY SYNDROME CORONAVIRUS 2 (SARS-COV-2) (CORONAVIRUS DISEASE [COVID-19]), AMPLIFIED PROBE TECHNIQUE, MAKING USE OF HIGH THROUGHPUT TECHNOLOGIES AS DESCRIBED BY CMS-2020-01-R: HCPCS | Performed by: PHYSICIAN ASSISTANT

## 2021-03-19 PROCEDURE — 84484 ASSAY OF TROPONIN QUANT: CPT | Performed by: PHYSICIAN ASSISTANT

## 2021-03-19 PROCEDURE — 0D9670Z DRAINAGE OF STOMACH WITH DRAINAGE DEVICE, VIA NATURAL OR ARTIFICIAL OPENING: ICD-10-PCS | Performed by: SURGERY

## 2021-03-19 PROCEDURE — 74177 CT ABD & PELVIS W/CONTRAST: CPT

## 2021-03-19 PROCEDURE — 25010000002 HYDROMORPHONE PER 4 MG: Performed by: PHYSICIAN ASSISTANT

## 2021-03-19 PROCEDURE — 0DB60ZX EXCISION OF STOMACH, OPEN APPROACH, DIAGNOSTIC: ICD-10-PCS | Performed by: SURGERY

## 2021-03-19 PROCEDURE — 85007 BL SMEAR W/DIFF WBC COUNT: CPT | Performed by: PHYSICIAN ASSISTANT

## 2021-03-19 PROCEDURE — 86900 BLOOD TYPING SEROLOGIC ABO: CPT

## 2021-03-19 PROCEDURE — 85025 COMPLETE CBC W/AUTO DIFF WBC: CPT | Performed by: PHYSICIAN ASSISTANT

## 2021-03-19 PROCEDURE — 36415 COLL VENOUS BLD VENIPUNCTURE: CPT

## 2021-03-19 PROCEDURE — 86900 BLOOD TYPING SEROLOGIC ABO: CPT | Performed by: INTERNAL MEDICINE

## 2021-03-19 PROCEDURE — 86901 BLOOD TYPING SEROLOGIC RH(D): CPT | Performed by: INTERNAL MEDICINE

## 2021-03-19 PROCEDURE — 87186 SC STD MICRODIL/AGAR DIL: CPT | Performed by: SURGERY

## 2021-03-19 PROCEDURE — 82565 ASSAY OF CREATININE: CPT

## 2021-03-19 PROCEDURE — 71045 X-RAY EXAM CHEST 1 VIEW: CPT

## 2021-03-19 PROCEDURE — 43840 GSTRRPHY SUTR DUOL/GSTR ULCR: CPT | Performed by: SURGERY

## 2021-03-19 PROCEDURE — 87205 SMEAR GRAM STAIN: CPT | Performed by: SURGERY

## 2021-03-19 PROCEDURE — 86850 RBC ANTIBODY SCREEN: CPT | Performed by: INTERNAL MEDICINE

## 2021-03-19 PROCEDURE — 71275 CT ANGIOGRAPHY CHEST: CPT

## 2021-03-19 PROCEDURE — 93005 ELECTROCARDIOGRAM TRACING: CPT | Performed by: PHYSICIAN ASSISTANT

## 2021-03-19 PROCEDURE — 99284 EMERGENCY DEPT VISIT MOD MDM: CPT

## 2021-03-19 PROCEDURE — 87075 CULTR BACTERIA EXCEPT BLOOD: CPT | Performed by: SURGERY

## 2021-03-19 PROCEDURE — 80053 COMPREHEN METABOLIC PANEL: CPT | Performed by: PHYSICIAN ASSISTANT

## 2021-03-19 PROCEDURE — 83880 ASSAY OF NATRIURETIC PEPTIDE: CPT | Performed by: PHYSICIAN ASSISTANT

## 2021-03-19 PROCEDURE — 25010000002 FENTANYL CITRATE (PF) 100 MCG/2ML SOLUTION: Performed by: ANESTHESIOLOGY

## 2021-03-19 PROCEDURE — 25010000002 THIAMINE PER 100 MG: Performed by: PHYSICIAN ASSISTANT

## 2021-03-19 PROCEDURE — 25010000002 MORPHINE PER 10 MG: Performed by: SURGERY

## 2021-03-19 PROCEDURE — 94640 AIRWAY INHALATION TREATMENT: CPT

## 2021-03-19 PROCEDURE — 0 AMPICILLIN-SULBACTAM PER 1.5 G: Performed by: PHYSICIAN ASSISTANT

## 2021-03-19 PROCEDURE — 25010000002 ONDANSETRON PER 1 MG: Performed by: PHYSICIAN ASSISTANT

## 2021-03-19 RX ORDER — PROPOFOL 10 MG/ML
VIAL (ML) INTRAVENOUS AS NEEDED
Status: DISCONTINUED | OUTPATIENT
Start: 2021-03-19 | End: 2021-03-19 | Stop reason: SURG

## 2021-03-19 RX ORDER — HYDROMORPHONE HCL 110MG/55ML
0.5 PATIENT CONTROLLED ANALGESIA SYRINGE INTRAVENOUS
Status: DISCONTINUED | OUTPATIENT
Start: 2021-03-19 | End: 2021-03-24

## 2021-03-19 RX ORDER — ROCURONIUM BROMIDE 10 MG/ML
INJECTION, SOLUTION INTRAVENOUS AS NEEDED
Status: DISCONTINUED | OUTPATIENT
Start: 2021-03-19 | End: 2021-03-19 | Stop reason: SURG

## 2021-03-19 RX ORDER — NALOXONE HCL 0.4 MG/ML
0.4 VIAL (ML) INJECTION
Status: DISCONTINUED | OUTPATIENT
Start: 2021-03-19 | End: 2021-03-24

## 2021-03-19 RX ORDER — ARFORMOTEROL TARTRATE 15 UG/2ML
15 SOLUTION RESPIRATORY (INHALATION)
Status: DISCONTINUED | OUTPATIENT
Start: 2021-03-19 | End: 2021-03-25 | Stop reason: HOSPADM

## 2021-03-19 RX ORDER — FENTANYL CITRATE 50 UG/ML
INJECTION, SOLUTION INTRAMUSCULAR; INTRAVENOUS AS NEEDED
Status: DISCONTINUED | OUTPATIENT
Start: 2021-03-19 | End: 2021-03-19 | Stop reason: SURG

## 2021-03-19 RX ORDER — HYDROMORPHONE HCL 110MG/55ML
1 PATIENT CONTROLLED ANALGESIA SYRINGE INTRAVENOUS ONCE
Status: COMPLETED | OUTPATIENT
Start: 2021-03-19 | End: 2021-03-19

## 2021-03-19 RX ORDER — ONDANSETRON 2 MG/ML
INJECTION INTRAMUSCULAR; INTRAVENOUS AS NEEDED
Status: DISCONTINUED | OUTPATIENT
Start: 2021-03-19 | End: 2021-03-19 | Stop reason: SURG

## 2021-03-19 RX ORDER — BUDESONIDE 0.5 MG/2ML
0.5 INHALANT ORAL
Status: DISCONTINUED | OUTPATIENT
Start: 2021-03-19 | End: 2021-03-25 | Stop reason: HOSPADM

## 2021-03-19 RX ORDER — DEXAMETHASONE SODIUM PHOSPHATE 4 MG/ML
INJECTION, SOLUTION INTRA-ARTICULAR; INTRALESIONAL; INTRAMUSCULAR; INTRAVENOUS; SOFT TISSUE AS NEEDED
Status: DISCONTINUED | OUTPATIENT
Start: 2021-03-19 | End: 2021-03-19 | Stop reason: SURG

## 2021-03-19 RX ORDER — PHENYLEPHRINE HCL IN 0.9% NACL 1 MG/10 ML
SYRINGE (ML) INTRAVENOUS AS NEEDED
Status: DISCONTINUED | OUTPATIENT
Start: 2021-03-19 | End: 2021-03-19 | Stop reason: SURG

## 2021-03-19 RX ORDER — PROMETHAZINE HYDROCHLORIDE 25 MG/1
25 SUPPOSITORY RECTAL ONCE AS NEEDED
Status: DISCONTINUED | OUTPATIENT
Start: 2021-03-19 | End: 2021-03-19 | Stop reason: HOSPADM

## 2021-03-19 RX ORDER — ONDANSETRON 2 MG/ML
4 INJECTION INTRAMUSCULAR; INTRAVENOUS ONCE AS NEEDED
Status: DISCONTINUED | OUTPATIENT
Start: 2021-03-19 | End: 2021-03-19 | Stop reason: HOSPADM

## 2021-03-19 RX ORDER — PANTOPRAZOLE SODIUM 40 MG/10ML
40 INJECTION, POWDER, LYOPHILIZED, FOR SOLUTION INTRAVENOUS EVERY 12 HOURS SCHEDULED
Status: DISCONTINUED | OUTPATIENT
Start: 2021-03-19 | End: 2021-03-19

## 2021-03-19 RX ORDER — PANTOPRAZOLE SODIUM 40 MG/10ML
40 INJECTION, POWDER, LYOPHILIZED, FOR SOLUTION INTRAVENOUS EVERY 12 HOURS SCHEDULED
Status: COMPLETED | OUTPATIENT
Start: 2021-03-19 | End: 2021-03-22

## 2021-03-19 RX ORDER — NEOSTIGMINE METHYLSULFATE 5 MG/5 ML
SYRINGE (ML) INTRAVENOUS AS NEEDED
Status: DISCONTINUED | OUTPATIENT
Start: 2021-03-19 | End: 2021-03-19 | Stop reason: SURG

## 2021-03-19 RX ORDER — PROMETHAZINE HYDROCHLORIDE 25 MG/1
25 TABLET ORAL ONCE AS NEEDED
Status: DISCONTINUED | OUTPATIENT
Start: 2021-03-19 | End: 2021-03-19 | Stop reason: HOSPADM

## 2021-03-19 RX ORDER — ONDANSETRON 2 MG/ML
4 INJECTION INTRAMUSCULAR; INTRAVENOUS ONCE
Status: COMPLETED | OUTPATIENT
Start: 2021-03-19 | End: 2021-03-19

## 2021-03-19 RX ORDER — NALOXONE HCL 0.4 MG/ML
0.1 VIAL (ML) INJECTION
Status: DISCONTINUED | OUTPATIENT
Start: 2021-03-19 | End: 2021-03-24

## 2021-03-19 RX ORDER — GLYCOPYRROLATE 1 MG/5 ML
SYRINGE (ML) INTRAVENOUS AS NEEDED
Status: DISCONTINUED | OUTPATIENT
Start: 2021-03-19 | End: 2021-03-19 | Stop reason: SURG

## 2021-03-19 RX ORDER — MEPERIDINE HYDROCHLORIDE 25 MG/ML
12.5 INJECTION INTRAMUSCULAR; INTRAVENOUS; SUBCUTANEOUS
Status: DISCONTINUED | OUTPATIENT
Start: 2021-03-19 | End: 2021-03-19 | Stop reason: HOSPADM

## 2021-03-19 RX ORDER — IPRATROPIUM BROMIDE AND ALBUTEROL SULFATE 2.5; .5 MG/3ML; MG/3ML
3 SOLUTION RESPIRATORY (INHALATION)
Status: DISCONTINUED | OUTPATIENT
Start: 2021-03-19 | End: 2021-03-25 | Stop reason: HOSPADM

## 2021-03-19 RX ORDER — HYDROMORPHONE HCL 110MG/55ML
0.5 PATIENT CONTROLLED ANALGESIA SYRINGE INTRAVENOUS ONCE
Status: DISCONTINUED | OUTPATIENT
Start: 2021-03-19 | End: 2021-03-19

## 2021-03-19 RX ORDER — SODIUM CHLORIDE 0.9 % (FLUSH) 0.9 %
10 SYRINGE (ML) INJECTION AS NEEDED
Status: DISCONTINUED | OUTPATIENT
Start: 2021-03-19 | End: 2021-03-25 | Stop reason: HOSPADM

## 2021-03-19 RX ORDER — ONDANSETRON 4 MG/1
4 TABLET, FILM COATED ORAL EVERY 6 HOURS PRN
Status: DISCONTINUED | OUTPATIENT
Start: 2021-03-19 | End: 2021-03-25 | Stop reason: HOSPADM

## 2021-03-19 RX ORDER — HYDROMORPHONE HCL 110MG/55ML
0.5 PATIENT CONTROLLED ANALGESIA SYRINGE INTRAVENOUS
Status: DISCONTINUED | OUTPATIENT
Start: 2021-03-19 | End: 2021-03-19 | Stop reason: HOSPADM

## 2021-03-19 RX ORDER — SODIUM CHLORIDE 9 MG/ML
INJECTION, SOLUTION INTRAVENOUS CONTINUOUS PRN
Status: DISCONTINUED | OUTPATIENT
Start: 2021-03-19 | End: 2021-03-19 | Stop reason: SURG

## 2021-03-19 RX ORDER — MORPHINE SULFATE 4 MG/ML
2 INJECTION, SOLUTION INTRAMUSCULAR; INTRAVENOUS
Status: DISCONTINUED | OUTPATIENT
Start: 2021-03-19 | End: 2021-03-19 | Stop reason: HOSPADM

## 2021-03-19 RX ORDER — ONDANSETRON 2 MG/ML
4 INJECTION INTRAMUSCULAR; INTRAVENOUS EVERY 6 HOURS PRN
Status: DISCONTINUED | OUTPATIENT
Start: 2021-03-19 | End: 2021-03-25 | Stop reason: HOSPADM

## 2021-03-19 RX ORDER — MORPHINE SULFATE 4 MG/ML
4 INJECTION, SOLUTION INTRAMUSCULAR; INTRAVENOUS
Status: DISCONTINUED | OUTPATIENT
Start: 2021-03-19 | End: 2021-03-24

## 2021-03-19 RX ORDER — LEVOFLOXACIN 5 MG/ML
750 INJECTION, SOLUTION INTRAVENOUS ONCE
Status: DISCONTINUED | OUTPATIENT
Start: 2021-03-19 | End: 2021-03-19

## 2021-03-19 RX ORDER — EPHEDRINE SULFATE 50 MG/ML
INJECTION INTRAVENOUS AS NEEDED
Status: DISCONTINUED | OUTPATIENT
Start: 2021-03-19 | End: 2021-03-19 | Stop reason: SURG

## 2021-03-19 RX ORDER — MORPHINE SULFATE 4 MG/ML
4 INJECTION, SOLUTION INTRAMUSCULAR; INTRAVENOUS ONCE
Status: COMPLETED | OUTPATIENT
Start: 2021-03-19 | End: 2021-03-19

## 2021-03-19 RX ADMIN — PANTOPRAZOLE SODIUM 40 MG: 40 INJECTION, POWDER, FOR SOLUTION INTRAVENOUS at 22:22

## 2021-03-19 RX ADMIN — ROCURONIUM BROMIDE 20 MG: 10 INJECTION INTRAVENOUS at 19:49

## 2021-03-19 RX ADMIN — EPHEDRINE SULFATE 10 MG: 50 INJECTION INTRAVENOUS at 19:32

## 2021-03-19 RX ADMIN — HYDROMORPHONE HYDROCHLORIDE 0.5 MG: 2 INJECTION, SOLUTION INTRAMUSCULAR; INTRAVENOUS; SUBCUTANEOUS at 21:15

## 2021-03-19 RX ADMIN — MORPHINE SULFATE 4 MG: 4 INJECTION INTRAVENOUS at 16:10

## 2021-03-19 RX ADMIN — Medication 200 MCG: at 19:08

## 2021-03-19 RX ADMIN — IPRATROPIUM BROMIDE AND ALBUTEROL SULFATE 3 ML: 2.5; .5 SOLUTION RESPIRATORY (INHALATION) at 23:55

## 2021-03-19 RX ADMIN — ONDANSETRON 4 MG: 2 INJECTION INTRAMUSCULAR; INTRAVENOUS at 16:10

## 2021-03-19 RX ADMIN — SODIUM CHLORIDE: 0.9 INJECTION, SOLUTION INTRAVENOUS at 19:11

## 2021-03-19 RX ADMIN — SODIUM CHLORIDE: 0.9 INJECTION, SOLUTION INTRAVENOUS at 18:47

## 2021-03-19 RX ADMIN — Medication 200 MCG: at 19:16

## 2021-03-19 RX ADMIN — Medication 200 MCG: at 19:26

## 2021-03-19 RX ADMIN — ROCURONIUM BROMIDE 50 MG: 10 INJECTION INTRAVENOUS at 18:50

## 2021-03-19 RX ADMIN — THIAMINE HYDROCHLORIDE 100 MG: 100 INJECTION, SOLUTION INTRAMUSCULAR; INTRAVENOUS at 17:53

## 2021-03-19 RX ADMIN — Medication 0.6 MG: at 20:19

## 2021-03-19 RX ADMIN — EPHEDRINE SULFATE 10 MG: 50 INJECTION INTRAVENOUS at 19:01

## 2021-03-19 RX ADMIN — Medication 100 MCG: at 18:55

## 2021-03-19 RX ADMIN — ONDANSETRON 4 MG: 2 INJECTION INTRAMUSCULAR; INTRAVENOUS at 19:50

## 2021-03-19 RX ADMIN — HYDROMORPHONE HYDROCHLORIDE 0.5 MG: 2 INJECTION, SOLUTION INTRAMUSCULAR; INTRAVENOUS; SUBCUTANEOUS at 21:02

## 2021-03-19 RX ADMIN — DEXAMETHASONE SODIUM PHOSPHATE 4 MG: 4 INJECTION, SOLUTION INTRAMUSCULAR; INTRAVENOUS at 19:32

## 2021-03-19 RX ADMIN — SODIUM CHLORIDE 1000 ML: 9 INJECTION, SOLUTION INTRAVENOUS at 16:58

## 2021-03-19 RX ADMIN — HYDROMORPHONE HYDROCHLORIDE 1 MG: 2 INJECTION, SOLUTION INTRAMUSCULAR; INTRAVENOUS; SUBCUTANEOUS at 17:05

## 2021-03-19 RX ADMIN — IOPAMIDOL 100 ML: 755 INJECTION, SOLUTION INTRAVENOUS at 16:33

## 2021-03-19 RX ADMIN — FENTANYL CITRATE 100 MCG: 50 INJECTION, SOLUTION INTRAMUSCULAR; INTRAVENOUS at 18:49

## 2021-03-19 RX ADMIN — PROPOFOL 200 MG: 10 INJECTION, EMULSION INTRAVENOUS at 18:50

## 2021-03-19 RX ADMIN — MORPHINE SULFATE 4 MG: 4 INJECTION INTRAVENOUS at 22:22

## 2021-03-19 RX ADMIN — Medication 4 MG: at 20:19

## 2021-03-19 RX ADMIN — SODIUM CHLORIDE 3 G: 900 INJECTION INTRAVENOUS at 17:10
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
## 2021-03-19 NOTE — H&P
PULMONARY CRITICAL CARE CONSULT NOTE      PATIENT IDENTIFICATION:  Name: Abi Rivera  MRN: [id number]  :  1960     Age: 60 y.o.  Sex: female        DATE OF CONSULTATION:  3/19/2021  PRIMARY CARE PHYSICIAN    Provider, No Known                  CHIEF COMPLAINT: Abdominal pain    History of Present Illness:   Abi Rivera is a 60 y.o. female smoker, history of multiple foot injuries and she reported to me that she is addicted to NSAID because of the pain, presented with few days history of worsening abdominal pain in the epigastric area increased with activity and movement, and today at 9:00 started having severe sharp abdominal pain presented to the emergency room CT abdomen showed Free intraperitoneal air in the abdomen indicating perforated viscus and suspicious for perforated gastric ulcer surgery also consulted and plan to proceed with surgery and she is going to transfer to ICU after that,  Patient reports that she has chronic diarrhea denies any blood in the stool or black stool    Patient has history of atrial fibrillation and she is not taking her Eliquis for few days, CT chest with with contrast to rule out PE was done it was negative for abnormality      Review of Systems:   Constitutional:  As above   Eyes: negative   ENT/oropharynx: negative   Cardiovascular: negative   Respiratory: negative   Gastrointestinal:  As above   Genitourinary: negative   Neurological: negative   Musculoskeletal: negative   Integument/breast: negative   Endocrine: negative   Allergic/Immunologic: negative     Past Medical History:  Past Medical History:   Diagnosis Date   • Adenomatous polyp of colon 2016   • Anxiety 2016   • Arthritis of foot 1/10/2019   • Atrial fibrillation (CMS/HCC) 2016   • Avascular necrosis of bone (CMS/HCC) 1/10/2019   • Chronic pain 2019   • Closed displaced fracture 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 2019    Added automatically from request  "for surgery [id number]   • Hypertension 2016   • Hyperthyroidism 2016   • Mood disorder (CMS/HCC) 2016   • Osteoporosis without current pathological fracture 2020   • Pulmonary hypertension (CMS/HCC) 2016   • Sick sinus syndrome (CMS/HCC) 2017   • Tricuspid valve insufficiency 2016       Past Surgical History:  Past Surgical History:   Procedure Laterality Date   • ANKLE OPEN REDUCTION INTERNAL FIXATION     • APPENDECTOMY     • HIP SURGERY      Left total hip   • ORIF FOOT FRACTURE Right 2019    Procedure: OPEN REDUCTION INTERNAL FIXATION OF FIFTH METATARSAL FRACTURE, LOOSE BODY EXCISION/REMOVAL FROM THE TALONAVICULAR JOINT OF THE RIGHT FOOT;  Surgeon: JOSE Fry DPM;  Location: [facility] MAIN OR;  Service: Podiatry        Family History:  Family History   Problem Relation Age of Onset   • Hypertension Mother    • Diabetes Father    • Heart disease Father    • Hypertension Father         Social History:   Social History     Tobacco Use   • Smoking status: Current Every Day Smoker     Packs/day: 0.50     Types: Cigarettes   • Smokeless tobacco: Never Used   Substance Use Topics   • Alcohol use: Yes     Comment: jayson        Allergies:  No Known Allergies    Home Meds:  (Not in a hospital admission)      Objective:  tMax 24 hrs: Temp (24hrs), Av.1 °F (36.7 °C), Min:98.1 °F (36.7 °C), Max:98.1 °F (36.7 °C)      Vitals Ranges:   Temp:  [98.1 °F (36.7 °C)] 98.1 °F (36.7 °C)  Heart Rate:  [64-77] 75  Resp:  [13-19] 14  BP: (114-145)/() 127/64    Intake and Output Last 3 Shifts:   No intake/output data recorded.    Exam:  /64   Pulse 75   Temp 98.1 °F (36.7 °C) (Oral)   Resp 14   Ht 149.9 cm (59\")   Wt 72.6 kg (160 lb)   SpO2 97%   Breastfeeding No   BMI 32.32 kg/m²       21  1504   Weight: 72.6 kg (160 lb)     General Appearance:      HEENT:  Normocephalic, without obvious abnormality, atraumaticConjunctiva/corneas clear,.  Normal external ear " canals, Nares normal, no drainage  Neck:  Supple, symmetrical, trachea midline. No JVD.  Lungs /Chest wall:   Bilateral basal rhonchi, respirations unlabored symmetrical wall movement.     Heart:  Regular rate and rhythm, systolic murmur PMI left sternal border  Abdomen: Epigastric tenderness, slightly distended abdomen and guarding no masses, no organomegaly.    Extremities: Trace edema no clubbing or Cyanosis        Data Review:  All labs (24hrs):   Recent Results (from the past 24 hour(s))   Light Blue Top    Collection Time: 03/19/21  3:37 PM   Result Value Ref Range    Extra Tube hold for add-on    Green Top (Gel)    Collection Time: 03/19/21  3:37 PM   Result Value Ref Range    Extra Tube Hold for add-ons.    Lavender Top    Collection Time: 03/19/21  3:37 PM   Result Value Ref Range    Extra Tube hold for add-on    Gold Top - SST    Collection Time: 03/19/21  3:37 PM   Result Value Ref Range    Extra Tube Hold for add-ons.    Comprehensive Metabolic Panel    Collection Time: 03/19/21  3:37 PM    Specimen: Blood   Result Value Ref Range    Glucose 125 (H) 65 - 99 mg/dL    BUN 32 (H) 6 - 20 mg/dL    Creatinine 0.81 0.57 - 1.00 mg/dL    Sodium 134 (L) 136 - 145 mmol/L    Potassium 4.2 3.5 - 5.2 mmol/L    Chloride 97 (L) 98 - 107 mmol/L    CO2 21.0 (L) 22.0 - 29.0 mmol/L    Calcium 9.5 8.6 - 10.5 mg/dL    Total Protein 6.4 6.0 - 8.5 g/dL    Albumin 3.90 3.50 - 5.20 g/dL    ALT (SGPT) 12 1 - 33 U/L    AST (SGOT) 12 1 - 32 U/L    Alkaline Phosphatase 48 39 - 117 U/L    Total Bilirubin 0.3 0.0 - 1.2 mg/dL    eGFR Non African Amer 73 >60 mL/min/1.73    Globulin 2.5 gm/dL    A/G Ratio 1.6 g/dL    BUN/Creatinine Ratio 39.5 (H) 7.0 - 25.0    Anion Gap 16.0 (H) 5.0 - 15.0 mmol/L   Lipase    Collection Time: 03/19/21  3:37 PM    Specimen: Blood   Result Value Ref Range    Lipase 19 13 - 60 U/L   Troponin    Collection Time: 03/19/21  3:37 PM    Specimen: Blood   Result Value Ref Range    Troponin T <0.010 0.000 - 0.030  ng/mL   BNP    Collection Time: 03/19/21  3:37 PM    Specimen: Blood   Result Value Ref Range    proBNP 335.6 0.0 - 900.0 pg/mL   CBC Auto Differential    Collection Time: 03/19/21  3:37 PM    Specimen: Blood   Result Value Ref Range    WBC 28.00 (H) 3.40 - 10.80 10*3/mm3    RBC 4.39 3.77 - 5.28 10*6/mm3    Hemoglobin 12.8 12.0 - 15.9 g/dL    Hematocrit 37.8 34.0 - 46.6 %    MCV 86.1 79.0 - 97.0 fL    MCH 29.1 26.6 - 33.0 pg    MCHC 33.7 31.5 - 35.7 g/dL    RDW 15.7 (H) 12.3 - 15.4 %    RDW-SD 46.8 37.0 - 54.0 fl    MPV 7.1 6.0 - 12.0 fL    Platelets 303 140 - 450 10*3/mm3   Scan Slide    Collection Time: 03/19/21  3:37 PM    Specimen: Blood   Result Value Ref Range    Scan Slide     Manual Differential    Collection Time: 03/19/21  3:37 PM    Specimen: Blood   Result Value Ref Range    Neutrophil % 90.0 (H) 42.7 - 76.0 %    Lymphocyte % 6.0 (L) 19.6 - 45.3 %    Monocyte % 2.0 (L) 5.0 - 12.0 %    Bands %  1.0 0.0 - 5.0 %    Atypical Lymphocyte % 1.0 0.0 - 5.0 %    Neutrophils Absolute 25.48 (H) 1.70 - 7.00 10*3/mm3    Lymphocytes Absolute 1.68 0.70 - 3.10 10*3/mm3    Monocytes Absolute 0.56 0.10 - 0.90 10*3/mm3    Acanthocytes Slight/1+ None Seen    Hypersegmented Neutrophils Slight/1+ None Seen    Toxic Granulation Slight/1+ None Seen    Platelet Morphology Normal Normal   ECG 12 Lead    Collection Time: 03/19/21  3:45 PM   Result Value Ref Range    QT Interval 387 ms   POC Creatinine    Collection Time: 03/19/21  4:17 PM    Specimen: Venous Blood   Result Value Ref Range    Creatinine 0.80 0.60 - 1.30 mg/dL    GFR MDRD       GFR MDRD Non      COVID-19,CEPHEID,COR/PAOLA/PAD IN-HOUSE(OR EMERGENT/ADD-ON),NP SWAB IN TRANSPORT MEDIA 3-4 HR TAT, RT-PCR - Swab, Nasopharynx    Collection Time: 03/19/21  5:29 PM    Specimen: Nasopharynx; Swab   Result Value Ref Range    COVID19 Not Detected Not Detected - Ref. Range   Type & Screen    Collection Time: 03/19/21  5:44 PM    Specimen: Blood    Result Value Ref Range    ABO Type A     RH type Negative     Antibody Screen Negative     T&S Expiration Date 3/22/2021 11:59:59 PM         Imaging:  [unfilled]    ASSESSMENT:    Acute gastric ulcer with perforation (CMS/HCC)  Peritonitis  COPD  Atrial fibrillation  Osteoarthritis  Hypothyroidism  Hypertension       PLAN:  IV fluid  Hemodynamic support  Broad-spectrum antibiotics to cover for perforated bowel  Patient is going for surgery and will follow-up after that  We will watch her heart rate  We will continue her medication after surgery  Bronchodilator  Inhaled corticosteroids  Electrolytes/ glycemic control  DVT and GI prophylaxis.  Total Critical care time in direct medical management (   ) minutes       Jose Cleaning MD. D, ABSM.  3/19/2021  18:32 EDT

## 2021-03-19 NOTE — ED PROVIDER NOTES
Subjective   Patient is a 60-year-old female who presents with complains of intermittent chest pain and generalized abdominal pain for the past few days.  Patient states her abdominal pain more severe today scribes as a constant sharp type pain that radiates across her abdomen anterior back to the recent 8/10 severity.  She denies any nausea, vomiting, urinary symptoms, or change in bowel habits.  She reports chronic diarrhea without any significant change no hematochezia or melena.  Patient states she tried taking Norco today with minimal relief of her symptoms.  She states her chest pain has been intermittent since yesterday worse with deep breaths sick she has been out of her Eliquis for 2 weeks due to insurance issues.  Denies any history of blood clots in the past.  Nuys any lower extremity edema.  Patient is a current everyday smoker.  Has any recent travel or known sick contacts.          Review of Systems   Constitutional: Negative.    HENT: Negative.    Eyes: Negative for photophobia and visual disturbance.   Respiratory: Positive for shortness of breath. Negative for apnea, cough, choking, chest tightness, wheezing and stridor.    Cardiovascular: Positive for chest pain. Negative for palpitations and leg swelling.   Gastrointestinal: Positive for abdominal pain. Negative for abdominal distention, constipation, diarrhea, nausea and vomiting.   Genitourinary: Negative.    Musculoskeletal: Positive for back pain. Negative for arthralgias, neck pain and neck stiffness.   Skin: Negative.    Neurological: Negative.        Past Medical History:   Diagnosis Date   • Adenomatous polyp of colon 6/20/2016   • Anxiety 6/16/2016   • Arthritis of foot 1/10/2019   • Atrial fibrillation (CMS/HCC) 6/16/2016   • Avascular necrosis of bone (CMS/HCC) 1/10/2019   • Chronic pain 1/22/2019   • Closed displaced fracture 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 9/19/2019    Added automatically from request for surgery  [id number]   • Hypertension 6/16/2016   • Hyperthyroidism 11/2/2016   • Mood disorder (CMS/[facility]) 6/16/2016   • Osteoporosis without current pathological fracture 6/4/2020   • Pulmonary hypertension (CMS/HCC) 6/16/2016   • Sick sinus syndrome (CMS/[facility]) 1/25/2017   • Tricuspid valve insufficiency 6/16/2016       No Known Allergies    Past Surgical History:   Procedure Laterality Date   • ANKLE OPEN REDUCTION INTERNAL FIXATION     • APPENDECTOMY     • HIP SURGERY      Left total hip   • ORIF FOOT FRACTURE Right 9/30/2019    Procedure: OPEN REDUCTION INTERNAL FIXATION OF FIFTH METATARSAL FRACTURE, LOOSE BODY EXCISION/REMOVAL FROM THE TALONAVICULAR JOINT OF THE RIGHT FOOT;  Surgeon: JOSE Fry DPM;  Location: [facility] MAIN OR;  Service: Podiatry       Family History   Problem Relation Age of Onset   • Hypertension Mother    • Diabetes Father    • Heart disease Father    • Hypertension Father        Social History     Socioeconomic History   • Marital status:      Spouse name: Not on file   • Number of children: Not on file   • Years of education: Not on file   • Highest education level: Not on file   Tobacco Use   • Smoking status: Current Every Day Smoker     Packs/day: 0.50     Types: Cigarettes   • Smokeless tobacco: Never Used   Substance and Sexual Activity   • Alcohol use: Yes     Comment: jayson   • Drug use: No   • Sexual activity: Defer           Objective   Physical Exam  Vitals and nursing note reviewed.   Constitutional:       General: She is not in acute distress.     Appearance: She is well-developed. She is not ill-appearing, toxic-appearing or diaphoretic.   HENT:      Head: Normocephalic and atraumatic.      Mouth/Throat:      Mouth: Mucous membranes are moist.      Pharynx: Oropharynx is clear.   Eyes:      General: No scleral icterus.     Extraocular Movements: Extraocular movements intact.      Pupils: Pupils are equal, round, and reactive to light.   Cardiovascular:      Rate and Rhythm:  "Normal rate and regular rhythm.      Heart sounds: No murmur heard.   No friction rub. No gallop.    Pulmonary:      Effort: Pulmonary effort is normal. No respiratory distress.      Breath sounds: No stridor. Examination of the right-lower field reveals rhonchi. Examination of the left-lower field reveals rhonchi. Rhonchi present. No wheezing or rales.   Chest:      Chest wall: No tenderness.   Abdominal:      General: Bowel sounds are normal. There is distension. There are no signs of injury.      Palpations: Abdomen is soft. There is no fluid wave, hepatomegaly, splenomegaly or mass.      Tenderness: There is generalized abdominal tenderness. There is right CVA tenderness, left CVA tenderness and guarding. There is no rebound.      Hernia: No hernia is present.   Skin:     General: Skin is warm.      Capillary Refill: Capillary refill takes less than 2 seconds.      Coloration: Skin is not cyanotic, jaundiced or pale.      Findings: No rash.   Neurological:      General: No focal deficit present.      Mental Status: She is alert and oriented to person, place, and time.   Psychiatric:         Mood and Affect: Mood normal.         Behavior: Behavior normal.         Procedures           ED Course  ED Course as of Mar 19 1728   Fri Mar 19, 2021   1621 Chest x-ray was significant for free air in the abdomen described in detail and report this was discussed with radiologist patient was given pain medicine and sent to CT currently awaiting solids to consult surgery.  Unasyn ordered    [AA]   1656 CT reviewed call out to on call surgeon     [AA]   1727 Thiamine was ordered as attending Dr. Pearce requested.    [AA]      ED Course User Index  [AA] Buffy Quick PA      BP (!) 138/108   Pulse 74   Temp 98.1 °F (36.7 °C) (Oral)   Resp 14   Ht 149.9 cm (59\")   Wt 72.6 kg (160 lb)   SpO2 97%   Breastfeeding No   BMI 32.32 kg/m²   Medications   sodium chloride 0.9 % flush 10 mL (has no administration in time " range)   sodium chloride 0.9 % flush 10 mL (has no administration in time range)   sodium chloride 0.9 % bolus 1,000 mL (1,000 mL Intravenous New Bag 3/19/21 1658)   thiamine (B-1) 100 mg in sodium chloride 0.9 % 100 mL IVPB (has no administration in time range)   Morphine sulfate (PF) injection 4 mg (4 mg Intravenous Given 3/19/21 1610)   ondansetron (ZOFRAN) injection 4 mg (4 mg Intravenous Given 3/19/21 1610)   ampicillin-sulbactam (UNASYN) 3 g in sodium chloride 0.9 % 100 mL IVPB-MBP (3 g Intravenous New Bag 3/19/21 1710)   iopamidol (ISOVUE-370) 76 % injection 100 mL (100 mL Intravenous Given 3/19/21 1633)   HYDROmorphone (DILAUDID) injection 1 mg (1 mg Intravenous Given 3/19/21 1705)     Labs Reviewed   COMPREHENSIVE METABOLIC PANEL - Abnormal; Notable for the following components:       Result Value    Glucose 125 (*)     BUN 32 (*)     Sodium 134 (*)     Chloride 97 (*)     CO2 21.0 (*)     BUN/Creatinine Ratio 39.5 (*)     Anion Gap 16.0 (*)     All other components within normal limits    Narrative:     GFR Normal >60  Chronic Kidney Disease <60  Kidney Failure <15     CBC WITH AUTO DIFFERENTIAL - Abnormal; Notable for the following components:    WBC 28.00 (*)     RDW 15.7 (*)     All other components within normal limits   MANUAL DIFFERENTIAL - Abnormal; Notable for the following components:    Neutrophil % 90.0 (*)     Lymphocyte % 6.0 (*)     Monocyte % 2.0 (*)     Neutrophils Absolute 25.48 (*)     All other components within normal limits   LIPASE - Normal   TROPONIN (IN-HOUSE) - Normal    Narrative:     Troponin T Reference Range:  <= 0.03 ng/mL-   Negative for AMI  >0.03 ng/mL-     Abnormal for myocardial necrosis.  Clinicians would have to utilize clinical acumen, EKG, Troponin and serial changes to determine if it is an Acute Myocardial Infarction or myocardial injury due to an underlying chronic condition.       Results may be falsely decreased if patient taking Biotin.     BNP (IN-HOUSE) -  Normal    Narrative:     Among patients with dyspnea, NT-proBNP is highly sensitive for the detection of acute congestive heart failure. In addition NT-proBNP of <300 pg/ml effectively rules out acute congestive heart failure with 99% negative predictive value.    Results may be falsely decreased if patient taking Biotin.     BLOOD CULTURE   BLOOD CULTURE   COVID PRE-OP / PRE-PROCEDURE SCREENING ORDER (NO ISOLATION)    Narrative:     The following orders were created for panel order COVID PRE-OP / PRE-PROCEDURE SCREENING ORDER (NO ISOLATION) - Swab, Nasopharynx.  Procedure                               Abnormality         Status                     ---------                               -----------         ------                     COVID-19,CEPHEID,COR/PAOLA...[[id number]]                                                   Please view results for these tests on the individual orders.   COVID-19,CEPHEID,COR/PAOLA/PAD IN-HOUSE(OR EMERGENT/ADD-ON),NP SWAB IN TRANSPORT MEDIA 3-4 HR TAT, RT-PCR   RAINBOW DRAW    Narrative:     The following orders were created for panel order [place] Draw.  Procedure                               Abnormality         Status                     ---------                               -----------         ------                     Light Blue Top[[id number]]                                   Final result               Green Top (Gel)[[id number]]                                  Final result               Lavender Top[[id number]]                                     Final result               Gold Top - SST[[id number]]                                   Final result                 Please view results for these tests on the individual orders.   SCAN SLIDE   URINALYSIS W/ CULTURE IF INDICATED   POCT CREATININE   LIGHT BLUE TOP   GREEN TOP   LAVENDER TOP   GOLD TOP - SST   CBC AND DIFFERENTIAL    Narrative:     The following orders were created for panel order CBC & Differential.  Procedure                                Abnormality         Status                     ---------                               -----------         ------                     Scan Slide[[id number]]                                       Final result               CBC Auto Differential[[id number]]        Abnormal            Final result                 Please view results for these tests on the individual orders.     CT Abdomen Pelvis With Contrast    Result Date: 3/19/2021   1. Free intraperitoneal air in the abdomen indicating perforated viscus. 2. Abnormal finding of thickening/inflammation distal gastric body wall, greatest anteriorly. FINDINGS may reflect perforated gastric ulcer. Consider endoscopic correlation.    Electronically Signed By-Mahnaz Ortiz MD On:3/19/2021 4:40 PM This report was finalized on [id number] by  Mahnaz Ortiz MD.    XR Chest 1 View    Result Date: 3/19/2021  Free air underneath the right hemidiaphragm. FINDINGS worrisome for perforated viscus. Dedicated CT abdomen and pelvis is recommended for further evaluation. I spoke with the clinician in the emergency room at the time of this dictation.  Electronically Signed By-Mahnaz Ortiz MD On:3/19/2021 4:00 PM This report was finalized on [id number] by  Mahnaz Ortiz MD.    CT Chest Pulmonary Embolism    Result Date: 3/19/2021   1. No evidence of pulmonary embolus. 2. No acute cardiac pulmonary process. 3. Ancillary findings as described above.     Electronically Signed By-Daniella Go MD On:3/19/2021 4:38 PM This report was finalized on [id number] by  Daniella Go MD.                                         MDM  Number of Diagnoses or Management Options  Acute gastric ulcer with perforation (CMS/HCC)  Chest pain, unspecified type  Generalized abdominal pain  Diagnosis management comments: As above chart Review:  Comorbidity: As per past medical history  ECG: EKG interpreted by myself and  shows sinus rhythm rate 63 left atrial enlargement  anterior septal infarct age indeterminate previous EKG was reviewed from 3/27/2020   Labs: CBC shows WBC 28 hemoglobin 12.8 hematocrit 37.8 platelets 303. Troponin BNP lipase all within normal limits blood cultures pending CMP shows glucose 127 BUN 32 creatinine 0.81 sodium 134 potassium 4.2  Imaging: Was interpreted by physician and reviewed by myself:  CT Abdomen Pelvis With Contrast  Result Date: 3/19/2021   1. Free intraperitoneal air in the abdomen indicating perforated viscus. 2. Abnormal finding of thickening/inflammation distal gastric body wall, greatest anteriorly. FINDINGS may reflect perforated gastric ulcer. Consider endoscopic correlation.    Electronically Signed By-Mahnaz Ortiz MD On:3/19/2021 4:40 PM This report was finalized on [id number] by  Mahnaz Ortiz MD.    XR Chest 1 View  Result Date: 3/19/2021  Free air underneath the right hemidiaphragm. FINDINGS worrisome for perforated viscus. Dedicated CT abdomen and pelvis is recommended for further evaluation. I spoke with the clinician in the emergency room at the time of this dictation.  Electronically Signed By-Mahnaz Ortiz MD On:3/19/2021 4:00 PM This report was finalized on [id number] by  Mahnaz Ortiz MD.    CT Chest Pulmonary Embolism  Result Date: 3/19/2021   1. No evidence of pulmonary embolus. 2. No acute cardiac pulmonary process. 3. Ancillary findings as described above.     Electronically Signed By-Daniella Go MD On:3/19/2021 4:38 PM This report was finalized on [id number] by  Daniella Go MD.    Disposition/Treatment:  Appropriate PPE was worn during exam and throughout all encounters with the patient.  When the ED IV was placed and labs were obtained. Patient was given morphine Zofran additional Dilaudid for pain she was also given fluids. Results were significant for an elevated WBC at 28 chest x-ray and CT of abdomen pelvis were significant for gastric ulcer perforation as above patient was given Unasyn while in  the ED and surgery was consulted.  Spoke to Dr. Velasco who states he will come to the ER to evaluate and take the patient to the OR.  Findings were discussed with the patient voiced understanding of admission and was in agreement with plan.  In regards to patient's chest CT was negative for acute PE troponin was within normal limits along with BNP.  Spoke to intensivist Dr Cleaning as the patient will be placed in ICU after surgery.       Amount and/or Complexity of Data Reviewed  Clinical lab tests: reviewed  Tests in the radiology section of CPT®: reviewed  Tests in the medicine section of CPT®: reviewed        Final diagnoses:   Acute gastric ulcer with perforation (CMS/HCC)   Generalized abdominal pain   Chest pain, unspecified type       ED Disposition  ED Disposition     ED Disposition Condition Comment    Send to OR            No follow-up provider specified.       Medication List      Changed    Tymlos 3120 MCG/1.56ML solution pen-injector  Generic drug: Abaloparatide  Inject 0.04 mL under the skin into the appropriate area as directed Daily.  What changed: additional instructions             Buffy Quick PA  03/19/21 9186       Buffy Quick PA  03/19/21 4888

## 2021-03-19 NOTE — ANESTHESIA PROCEDURE NOTES
Airway  Date/Time: 3/19/2021 6:52 PM  Difficult airway    General Information and Staff    Patient location during procedure: OR  Anesthesiologist: Tucker Baig MD    Indications and Patient Condition  Indications for airway management: CNS depression and airway protection    Preoxygenated: yes  MILS maintained throughout  Mask difficulty assessment: 0 - not attempted    Final Airway Details  Final airway type: endotracheal airway      Successful airway: ETT  Cuffed: yes   Successful intubation technique: video laryngoscopy  Endotracheal tube insertion site: oral  Blade: Glidescope  Blade size: 3  ETT size (mm): 7.0  Cormack-Lehane Classification: grade I - full view of glottis  Placement verified by: capnometry   Measured from: teeth  ETT/EBT  to teeth (cm): 21  Number of attempts at approach: 1  Assessment: lips, teeth, and gum same as pre-op and atraumatic intubation

## 2021-03-19 NOTE — ANESTHESIA PREPROCEDURE EVALUATION
Anesthesia Evaluation     Patient summary reviewed and Nursing notes reviewed   NPO Solid Status: > 4 hours  NPO Liquid Status: > 2 hours           Airway   Mallampati: III  TM distance: >3 FB  Neck ROM: full  Possible difficult intubation  Dental      Pulmonary    (+) a smoker Current Smoked day of surgery, shortness of breath,   Cardiovascular   Exercise tolerance: good (4-7 METS)    (+) hypertension, dysrhythmias Paroxysmal Atrial Fib, Tachycardia, PVC, PAC,       Neuro/Psych  (+) syncope, numbness, psychiatric history Anxiety,     GI/Hepatic/Renal/Endo    (+)  PUD,      Musculoskeletal     Abdominal    Substance History      OB/GYN          Other   arthritis,                      Anesthesia Plan    ASA 3 - emergent     general     intravenous induction     Anesthetic plan, all risks, benefits, and alternatives have been provided, discussed and informed consent has been obtained with: patient.

## 2021-03-20 ENCOUNTER — APPOINTMENT (OUTPATIENT)
Dept: GENERAL RADIOLOGY | Facility: HOSPITAL | Age: 61
End: 2021-03-20

## 2021-03-20 LAB
ALBUMIN SERPL-MCNC: 3.2 G/DL (ref 3.5–5.2)
ALBUMIN SERPL-MCNC: 3.4 G/DL (ref 3.5–5.2)
ALBUMIN/GLOB SERPL: 1.2 G/DL
ALBUMIN/GLOB SERPL: 1.3 G/DL
ALP SERPL-CCNC: 44 U/L (ref 39–117)
ALP SERPL-CCNC: 49 U/L (ref 39–117)
ALT SERPL W P-5'-P-CCNC: 21 U/L (ref 1–33)
ALT SERPL W P-5'-P-CCNC: 25 U/L (ref 1–33)
ANION GAP SERPL CALCULATED.3IONS-SCNC: 11 MMOL/L (ref 5–15)
ANION GAP SERPL CALCULATED.3IONS-SCNC: 9 MMOL/L (ref 5–15)
AST SERPL-CCNC: 16 U/L (ref 1–32)
AST SERPL-CCNC: 22 U/L (ref 1–32)
BASOPHILS # BLD AUTO: 0 10*3/MM3 (ref 0–0.2)
BASOPHILS NFR BLD AUTO: 0.1 % (ref 0–1.5)
BILIRUB SERPL-MCNC: 0.4 MG/DL (ref 0–1.2)
BILIRUB SERPL-MCNC: 0.4 MG/DL (ref 0–1.2)
BUN SERPL-MCNC: 13 MG/DL (ref 6–20)
BUN SERPL-MCNC: 18 MG/DL (ref 6–20)
BUN/CREAT SERPL: 20.3 (ref 7–25)
BUN/CREAT SERPL: 26.1 (ref 7–25)
CALCIUM SPEC-SCNC: 8 MG/DL (ref 8.6–10.5)
CALCIUM SPEC-SCNC: 8.9 MG/DL (ref 8.6–10.5)
CHLORIDE SERPL-SCNC: 106 MMOL/L (ref 98–107)
CHLORIDE SERPL-SCNC: 109 MMOL/L (ref 98–107)
CO2 SERPL-SCNC: 22 MMOL/L (ref 22–29)
CO2 SERPL-SCNC: 22 MMOL/L (ref 22–29)
CREAT SERPL-MCNC: 0.64 MG/DL (ref 0.57–1)
CREAT SERPL-MCNC: 0.69 MG/DL (ref 0.57–1)
D-LACTATE SERPL-SCNC: 1.1 MMOL/L (ref 0.5–2)
DEPRECATED RDW RBC AUTO: 47.7 FL (ref 37–54)
EOSINOPHIL # BLD AUTO: 0 10*3/MM3 (ref 0–0.4)
EOSINOPHIL NFR BLD AUTO: 0 % (ref 0.3–6.2)
ERYTHROCYTE [DISTWIDTH] IN BLOOD BY AUTOMATED COUNT: 15.6 % (ref 12.3–15.4)
GFR SERPL CREATININE-BSD FRML MDRD: 87 ML/MIN/1.73
GFR SERPL CREATININE-BSD FRML MDRD: 95 ML/MIN/1.73
GLOBULIN UR ELPH-MCNC: 2.4 GM/DL
GLOBULIN UR ELPH-MCNC: 2.9 GM/DL
GLUCOSE BLDC GLUCOMTR-MCNC: 105 MG/DL (ref 70–105)
GLUCOSE SERPL-MCNC: 103 MG/DL (ref 65–99)
GLUCOSE SERPL-MCNC: 109 MG/DL (ref 65–99)
HCT VFR BLD AUTO: 35.5 % (ref 34–46.6)
HGB BLD-MCNC: 11.4 G/DL (ref 12–15.9)
LYMPHOCYTES # BLD AUTO: 1.4 10*3/MM3 (ref 0.7–3.1)
LYMPHOCYTES NFR BLD AUTO: 5.7 % (ref 19.6–45.3)
MCH RBC QN AUTO: 28 PG (ref 26.6–33)
MCHC RBC AUTO-ENTMCNC: 32.1 G/DL (ref 31.5–35.7)
MCV RBC AUTO: 87.4 FL (ref 79–97)
MONOCYTES # BLD AUTO: 1 10*3/MM3 (ref 0.1–0.9)
MONOCYTES NFR BLD AUTO: 4 % (ref 5–12)
NEUTROPHILS NFR BLD AUTO: 22.8 10*3/MM3 (ref 1.7–7)
NEUTROPHILS NFR BLD AUTO: 90.2 % (ref 42.7–76)
NRBC BLD AUTO-RTO: 0 /100 WBC (ref 0–0.2)
PLATELET # BLD AUTO: 293 10*3/MM3 (ref 140–450)
PMV BLD AUTO: 7.2 FL (ref 6–12)
POTASSIUM SERPL-SCNC: 3.7 MMOL/L (ref 3.5–5.2)
POTASSIUM SERPL-SCNC: 4.7 MMOL/L (ref 3.5–5.2)
PROT SERPL-MCNC: 5.6 G/DL (ref 6–8.5)
PROT SERPL-MCNC: 6.3 G/DL (ref 6–8.5)
RBC # BLD AUTO: 4.06 10*6/MM3 (ref 3.77–5.28)
SODIUM SERPL-SCNC: 139 MMOL/L (ref 136–145)
SODIUM SERPL-SCNC: 140 MMOL/L (ref 136–145)
TSH SERPL DL<=0.05 MIU/L-ACNC: <0.005 UIU/ML (ref 0.27–4.2)
WBC # BLD AUTO: 25.3 10*3/MM3 (ref 3.4–10.8)
WHOLE BLOOD HOLD SPECIMEN: NORMAL
WHOLE BLOOD HOLD SPECIMEN: NORMAL

## 2021-03-20 PROCEDURE — 80053 COMPREHEN METABOLIC PANEL: CPT | Performed by: HOSPITALIST

## 2021-03-20 PROCEDURE — 84443 ASSAY THYROID STIM HORMONE: CPT | Performed by: NURSE PRACTITIONER

## 2021-03-20 PROCEDURE — 25010000002 MORPHINE PER 10 MG: Performed by: SURGERY

## 2021-03-20 PROCEDURE — 71045 X-RAY EXAM CHEST 1 VIEW: CPT

## 2021-03-20 PROCEDURE — 93010 ELECTROCARDIOGRAM REPORT: CPT | Performed by: INTERNAL MEDICINE

## 2021-03-20 PROCEDURE — 99253 IP/OBS CNSLTJ NEW/EST LOW 45: CPT | Performed by: HOSPITALIST

## 2021-03-20 PROCEDURE — 74018 RADEX ABDOMEN 1 VIEW: CPT

## 2021-03-20 PROCEDURE — 99291 CRITICAL CARE FIRST HOUR: CPT | Performed by: INTERNAL MEDICINE

## 2021-03-20 PROCEDURE — 85025 COMPLETE CBC W/AUTO DIFF WBC: CPT | Performed by: SURGERY

## 2021-03-20 PROCEDURE — 25010000002 HYDROMORPHONE PER 4 MG: Performed by: SURGERY

## 2021-03-20 PROCEDURE — 25010000002 DIGOXIN PER 500 MCG: Performed by: INTERNAL MEDICINE

## 2021-03-20 PROCEDURE — 25010000002 PIPERACILLIN SOD-TAZOBACTAM PER 1 G: Performed by: HOSPITALIST

## 2021-03-20 PROCEDURE — 94799 UNLISTED PULMONARY SVC/PX: CPT

## 2021-03-20 PROCEDURE — 80053 COMPREHEN METABOLIC PANEL: CPT | Performed by: SURGERY

## 2021-03-20 PROCEDURE — 82962 GLUCOSE BLOOD TEST: CPT

## 2021-03-20 PROCEDURE — 93005 ELECTROCARDIOGRAM TRACING: CPT | Performed by: INTERNAL MEDICINE

## 2021-03-20 PROCEDURE — 83605 ASSAY OF LACTIC ACID: CPT | Performed by: HOSPITALIST

## 2021-03-20 PROCEDURE — 25010000002 MIDAZOLAM PER 1 MG

## 2021-03-20 RX ORDER — METOPROLOL TARTRATE 5 MG/5ML
5 INJECTION INTRAVENOUS ONCE
Status: COMPLETED | OUTPATIENT
Start: 2021-03-20 | End: 2021-03-20

## 2021-03-20 RX ORDER — SODIUM CHLORIDE 9 MG/ML
100 INJECTION, SOLUTION INTRAVENOUS CONTINUOUS
Status: DISCONTINUED | OUTPATIENT
Start: 2021-03-20 | End: 2021-03-23

## 2021-03-20 RX ORDER — METOPROLOL TARTRATE 5 MG/5ML
2.5 INJECTION INTRAVENOUS EVERY 8 HOURS
Status: DISCONTINUED | OUTPATIENT
Start: 2021-03-20 | End: 2021-03-20

## 2021-03-20 RX ORDER — DILTIAZEM HYDROCHLORIDE 5 MG/ML
10 INJECTION INTRAVENOUS ONCE
Status: COMPLETED | OUTPATIENT
Start: 2021-03-20 | End: 2021-03-20

## 2021-03-20 RX ORDER — METOPROLOL TARTRATE 5 MG/5ML
5 INJECTION INTRAVENOUS EVERY 8 HOURS
Status: DISCONTINUED | OUTPATIENT
Start: 2021-03-20 | End: 2021-03-24

## 2021-03-20 RX ORDER — AMIODARONE HCL/D5W 450 MG/250
1 PLASTIC BAG, INJECTION (ML) INTRAVENOUS CONTINUOUS
Status: DISCONTINUED | OUTPATIENT
Start: 2021-03-20 | End: 2021-03-21

## 2021-03-20 RX ORDER — DIGOXIN 0.25 MG/ML
500 INJECTION INTRAMUSCULAR; INTRAVENOUS ONCE
Status: COMPLETED | OUTPATIENT
Start: 2021-03-20 | End: 2021-03-20

## 2021-03-20 RX ORDER — MIDAZOLAM HYDROCHLORIDE 1 MG/ML
INJECTION INTRAMUSCULAR; INTRAVENOUS
Status: COMPLETED
Start: 2021-03-20 | End: 2021-03-20

## 2021-03-20 RX ORDER — NICOTINE 21 MG/24HR
1 PATCH, TRANSDERMAL 24 HOURS TRANSDERMAL
Status: DISCONTINUED | OUTPATIENT
Start: 2021-03-20 | End: 2021-03-25 | Stop reason: HOSPADM

## 2021-03-20 RX ORDER — AMIODARONE HYDROCHLORIDE 50 MG/ML
150 INJECTION, SOLUTION INTRAVENOUS ONCE
Status: DISCONTINUED | OUTPATIENT
Start: 2021-03-20 | End: 2021-03-20

## 2021-03-20 RX ORDER — DILTIAZEM HYDROCHLORIDE 5 MG/ML
10 INJECTION INTRAVENOUS ONCE
Status: DISCONTINUED | OUTPATIENT
Start: 2021-03-20 | End: 2021-03-20

## 2021-03-20 RX ADMIN — PANTOPRAZOLE SODIUM 40 MG: 40 INJECTION, POWDER, FOR SOLUTION INTRAVENOUS at 09:00

## 2021-03-20 RX ADMIN — Medication 10 ML: at 21:25

## 2021-03-20 RX ADMIN — HYDROMORPHONE HYDROCHLORIDE 0.5 MG: 2 INJECTION, SOLUTION INTRAMUSCULAR; INTRAVENOUS; SUBCUTANEOUS at 14:55

## 2021-03-20 RX ADMIN — METOPROLOL TARTRATE 2.5 MG: 5 INJECTION INTRAVENOUS at 03:37

## 2021-03-20 RX ADMIN — METOPROLOL TARTRATE 2.5 MG: 5 INJECTION INTRAVENOUS at 11:02

## 2021-03-20 RX ADMIN — DILTIAZEM HYDROCHLORIDE 10 MG: 5 INJECTION INTRAVENOUS at 17:31

## 2021-03-20 RX ADMIN — Medication 10 ML: at 21:48

## 2021-03-20 RX ADMIN — PIPERACILLIN AND TAZOBACTAM 3.38 G: 3; .375 INJECTION, POWDER, LYOPHILIZED, FOR SOLUTION INTRAVENOUS at 23:42

## 2021-03-20 RX ADMIN — IPRATROPIUM BROMIDE AND ALBUTEROL SULFATE 3 ML: 2.5; .5 SOLUTION RESPIRATORY (INHALATION) at 03:42

## 2021-03-20 RX ADMIN — SODIUM CHLORIDE 1000 ML: 0.9 INJECTION, SOLUTION INTRAVENOUS at 19:09

## 2021-03-20 RX ADMIN — MIDAZOLAM 2 MG: 1 INJECTION INTRAMUSCULAR; INTRAVENOUS at 18:21

## 2021-03-20 RX ADMIN — BUDESONIDE 0.5 MG: 0.5 INHALANT RESPIRATORY (INHALATION) at 07:27

## 2021-03-20 RX ADMIN — SODIUM CHLORIDE 125 ML/HR: 9 INJECTION, SOLUTION INTRAVENOUS at 19:42

## 2021-03-20 RX ADMIN — Medication 10 ML: at 23:43

## 2021-03-20 RX ADMIN — Medication 1 PATCH: at 08:59

## 2021-03-20 RX ADMIN — MORPHINE SULFATE 4 MG: 4 INJECTION INTRAVENOUS at 09:05

## 2021-03-20 RX ADMIN — HYDROMORPHONE HYDROCHLORIDE 0.5 MG: 2 INJECTION, SOLUTION INTRAMUSCULAR; INTRAVENOUS; SUBCUTANEOUS at 06:07

## 2021-03-20 RX ADMIN — IPRATROPIUM BROMIDE AND ALBUTEROL SULFATE 3 ML: 2.5; .5 SOLUTION RESPIRATORY (INHALATION) at 15:42

## 2021-03-20 RX ADMIN — ARFORMOTEROL TARTRATE 15 MCG: 15 SOLUTION RESPIRATORY (INHALATION) at 07:27

## 2021-03-20 RX ADMIN — IPRATROPIUM BROMIDE AND ALBUTEROL SULFATE 3 ML: 2.5; .5 SOLUTION RESPIRATORY (INHALATION) at 11:41

## 2021-03-20 RX ADMIN — MORPHINE SULFATE 4 MG: 4 INJECTION INTRAVENOUS at 03:37

## 2021-03-20 RX ADMIN — HYDROMORPHONE HYDROCHLORIDE 0.5 MG: 2 INJECTION, SOLUTION INTRAMUSCULAR; INTRAVENOUS; SUBCUTANEOUS at 21:24

## 2021-03-20 RX ADMIN — METOPROLOL TARTRATE 5 MG: 5 INJECTION INTRAVENOUS at 18:19

## 2021-03-20 RX ADMIN — PANTOPRAZOLE SODIUM 40 MG: 40 INJECTION, POWDER, FOR SOLUTION INTRAVENOUS at 21:28

## 2021-03-20 RX ADMIN — DIGOXIN 500 MCG: 250 INJECTION, SOLUTION INTRAMUSCULAR; INTRAVENOUS; PARENTERAL at 21:45

## 2021-03-20 RX ADMIN — IPRATROPIUM BROMIDE AND ALBUTEROL SULFATE 3 ML: 2.5; .5 SOLUTION RESPIRATORY (INHALATION) at 07:27

## 2021-03-20 RX ADMIN — DILTIAZEM HYDROCHLORIDE 10 MG: 5 INJECTION INTRAVENOUS at 17:38

## 2021-03-20 NOTE — PLAN OF CARE
Goal Outcome Evaluation:         Patient's NG maintained and connected to low wall suction with no output at this time. Per MD patient can have very small amount of Ice chips and a popsicle, tolerated well. Will continue to monitor patient.

## 2021-03-20 NOTE — PLAN OF CARE
Goal Outcome Evaluation:  Patient admitted after surgery to 4121, NG to AMI, pain meds given as ordered, patient kept strict NPO, richardson cath to be removed this morning

## 2021-03-20 NOTE — PROGRESS NOTES
LOS: 1 day   Patient Care Team:  Provider, No Known as PCP - General    Reason for follow-up: Postop    Subjective   Patient seen and examined. No specific complaint    Objective   Abdomen is soft. Dressing is clean dry and intact. Drain tubes with appropriate color and output. NG with very little out.    Vital Signs  Vitals:    03/20/21 0739 03/20/21 1141 03/20/21 1144 03/20/21 1247   BP: 114/65   129/80   BP Location: Right arm   Right arm   Patient Position: Lying   Lying   Pulse: 91 90 90 93   Resp: 18 18 18 18   Temp: 99.3 °F (37.4 °C)   99.7 °F (37.6 °C)   TempSrc: Oral   Oral   SpO2: 97% 96%  97%   Weight:       Height:             Results Review:       Lab Results (last 24 hours)     Procedure Component Value Units Date/Time    Body Fluid Culture - Peritoneal Fluid, Peritoneum [[id number]] Collected: 03/19/21 1916    Specimen: Peritoneal Fluid from Peritoneum Updated: 03/20/21 0718     Body Fluid Culture No growth     Gram Stain Many (4+) WBCs per low power field      No organisms seen    TSH [[id number]]  (Abnormal) Collected: 03/20/21 0355    Specimen: Blood Updated: 03/20/21 0533     TSH <0.005 uIU/mL     Comprehensive Metabolic Panel [[id number]]  (Abnormal) Collected: 03/20/21 0355    Specimen: Blood Updated: 03/20/21 0516     Glucose 103 mg/dL      BUN 18 mg/dL      Creatinine 0.69 mg/dL      Sodium 139 mmol/L      Potassium 4.7 mmol/L      Chloride 106 mmol/L      CO2 22.0 mmol/L      Calcium 8.0 mg/dL      Total Protein 5.6 g/dL      Albumin 3.20 g/dL      ALT (SGPT) 25 U/L      AST (SGOT) 22 U/L      Alkaline Phosphatase 44 U/L      Total Bilirubin 0.4 mg/dL      eGFR Non African Amer 87 mL/min/1.73      Globulin 2.4 gm/dL      A/G Ratio 1.3 g/dL      BUN/Creatinine Ratio 26.1     Anion Gap 11.0 mmol/L     Narrative:      GFR Normal >60  Chronic Kidney Disease <60  Kidney Failure <15      CBC & Differential [[id number]]  (Abnormal) Collected: 03/20/21 0355    Specimen: Blood Updated: 03/20/21 0450     Narrative:      The following orders were created for panel order CBC & Differential.  Procedure                               Abnormality         Status                     ---------                               -----------         ------                     CBC Auto Differential[[id number]]        Abnormal            Final result                 Please view results for these tests on the individual orders.    CBC Auto Differential [[id number]]  (Abnormal) Collected: 03/20/21 0355    Specimen: Blood Updated: 03/20/21 0454     WBC 25.30 10*3/mm3      RBC 4.06 10*6/mm3      Hemoglobin 11.4 g/dL      Hematocrit 35.5 %      MCV 87.4 fL      MCH 28.0 pg      MCHC 32.1 g/dL      RDW 15.6 %      RDW-SD 47.7 fl      MPV 7.2 fL      Platelets 293 10*3/mm3      Neutrophil % 90.2 %      Lymphocyte % 5.7 %      Monocyte % 4.0 %      Eosinophil % 0.0 %      Basophil % 0.1 %      Neutrophils, Absolute 22.80 10*3/mm3      Lymphocytes, Absolute 1.40 10*3/mm3      Monocytes, Absolute 1.00 10*3/mm3      Eosinophils, Absolute 0.00 10*3/mm3      Basophils, Absolute 0.00 10*3/mm3      nRBC 0.0 /100 WBC     Comprehensive Metabolic Panel [[id number]]  (Abnormal) Collected: 03/19/21 2206    Specimen: Blood Updated: 03/19/21 2255     Glucose 125 mg/dL      BUN 23 mg/dL      Creatinine 0.78 mg/dL      Sodium 138 mmol/L      Potassium 4.4 mmol/L      Chloride 103 mmol/L      CO2 24.0 mmol/L      Calcium 8.2 mg/dL      Total Protein 5.4 g/dL      Albumin 3.50 g/dL      ALT (SGPT) 33 U/L      AST (SGOT) 29 U/L      Alkaline Phosphatase 44 U/L      Total Bilirubin 0.3 mg/dL      eGFR Non African Amer 76 mL/min/1.73      Globulin 1.9 gm/dL      A/G Ratio 1.8 g/dL      BUN/Creatinine Ratio 29.5     Anion Gap 11.0 mmol/L     Narrative:      GFR Normal >60  Chronic Kidney Disease <60  Kidney Failure <15      CBC & Differential [[id number]]  (Abnormal) Collected: 03/19/21 2206    Specimen: Blood Updated: 03/19/21 2238    Narrative:      The following  orders were created for panel order CBC & Differential.  Procedure                               Abnormality         Status                     ---------                               -----------         ------                     CBC Auto Differential[[id number]]        Abnormal            Final result                 Please view results for these tests on the individual orders.    CBC Auto Differential [[id number]]  (Abnormal) Collected: 03/19/21 2206    Specimen: Blood Updated: 03/19/21 2238     WBC 24.30 10*3/mm3      RBC 4.31 10*6/mm3      Hemoglobin 12.1 g/dL      Hematocrit 37.9 %      MCV 88.0 fL      MCH 28.1 pg      MCHC 32.0 g/dL      RDW 16.0 %      RDW-SD 49.0 fl      MPV 7.3 fL      Platelets 274 10*3/mm3      Neutrophil % 93.1 %      Lymphocyte % 2.9 %      Monocyte % 3.9 %      Eosinophil % 0.0 %      Basophil % 0.1 %      Neutrophils, Absolute 22.60 10*3/mm3      Lymphocytes, Absolute 0.70 10*3/mm3      Monocytes, Absolute 0.90 10*3/mm3      Eosinophils, Absolute 0.00 10*3/mm3      Basophils, Absolute 0.00 10*3/mm3      nRBC 0.1 /100 WBC     Anaerobic Culture - Peritoneal Fluid, Peritoneum [[id number]] Collected: 03/19/21 1916    Specimen: Peritoneal Fluid from Peritoneum Updated: 03/19/21 1927    COVID PRE-OP / PRE-PROCEDURE SCREENING ORDER (NO ISOLATION) - Swab, Nasopharynx [[id number]]  (Normal) Collected: 03/19/21 1729    Specimen: Swab from Nasopharynx Updated: 03/19/21 1822    Narrative:      The following orders were created for panel order COVID PRE-OP / PRE-PROCEDURE SCREENING ORDER (NO ISOLATION) - Swab, Nasopharynx.  Procedure                               Abnormality         Status                     ---------                               -----------         ------                     COVID-19,CEPHEID,COR/PAOLA...[[id number]]  Normal              Final result                 Please view results for these tests on the individual orders.    COVID-19,CEPHEID,COR/PAOLA/PAD IN-HOUSE(OR  EMERGENT/ADD-ON),NP SWAB IN TRANSPORT MEDIA 3-4 HR TAT, RT-PCR - Swab, Nasopharynx [[id number]]  (Normal) Collected: 03/19/21 1729    Specimen: Swab from Nasopharynx Updated: 03/19/21 1822     COVID19 Not Detected    Narrative:      Fact sheet for providers: https://www.fda.gov/media/096420/download     Fact sheet for patients: https://www.fda.gov/media/812455/download           Imaging Results (Last 24 Hours)     Procedure Component Value Units Date/Time    XR Chest 1 View [[id number]] Collected: 03/20/21 0835     Updated: 03/20/21 0838    Narrative:      XR CHEST 1 VW-     Date of Exam: 3/20/2021 6:31 AM     Indication: Shortness of breath; K25.1-Acute gastric ulcer with  perforation; R10.84-Generalized abdominal pain; R07.9-Chest pain,  unspecified; K28.6-Chronic or unspecified gastrojejunal ulcer with both  hemorrhage and perforation; K25.1-Acute gastric ulcer with perforation;  K63.1-Perforation of intestine (nontraumatic).     Comparison: 03/19/2021     Technique: A single view of the chest was obtained.     FINDINGS:      There is been interval placement of nasogastric tube with the tip  below the diaphragm.  There is mild cardiomegaly.  Pulmonary vessels are  within normal limits.  There is minimal left basilar airspace disease  likely due to atelectasis.  Right lung is clear.  There are new surgical  clips over the upper abdomen at the midline.             Impression:            1.  Interval placement of nasogastric tube the tip below the diaphragm.  2.  Stable cardiomegaly.  3.  New minimal left basilar airspace disease likely due to atelectasis.        Electronically Signed By-Amauri Mtz MD On:3/20/2021 8:36 AM  This report was finalized on [id number] by  Amauri Mtz MD.          Medication Review:     Assessment/Plan         Acute gastric ulcer with perforation (CMS/HCC)    Avascular necrosis of bone (CMS/HCC)    Hypertension    Atrial fibrillation (CMS/HCC)    Hyperthyroidism    Pulmonary  hypertension (CMS/HCC)    Sick sinus syndrome (CMS/HCC)    Impression: Postop 12 hours exploratory laparotomy for perforated gastric ulcer.    Plan: Continue NG decompression. Can have hard candy to suck on can chew gum can have a few ice chips. Encourage spirometry use and ambulation          Luisito Velasco DO  03/20/21  14:17 EDT

## 2021-03-20 NOTE — OP NOTE
LAPAROTOMY EXPLORATORY  Procedure Report    Patient Name:  Abi Rivera  YOB: 1960    Date of Surgery:  3/19/2021     Indications: Perforated viscus    Pre-op Diagnosis:   Same       Post-Op Diagnosis Codes:  Perforated gastric ulcer/antrum    Procedure/CPT® Codes:      Procedure(s):  LAPAROTOMY EXPLORATORY with repair of perforated gastric ulcer, oversew of perforated gastric ulcer, placement of kristie patch, biopsy of stomach, copious lavage with placement of sump drain x2    Staff:  Surgeon(s):  Luisito Velasco DO         Anesthesia: General    Anesthetist: Dr. Baig    Estimated Blood Loss: minimal    Implants:    Nothing was implanted during the procedure    Specimen:                  Findings: Perforated gastric ulcer, anterior surface of the antrum    Complications: None    Description of Procedure: This is a pleasant 60-year-old female with 2-day history of severe abdominal pain that worsened today.  She states she has had some gnawing aching sensation in her stomach for the past month or so.  No prior history of similar complaint.  No nausea or vomiting.  No change in her bladder or bowel habit.  She has chronic loose stool.  She presented to the ER where work-up showed her to have a perforated viscus likely a perforated gastric ulcer.  She had no antecedent history of weight loss without trying so even though gastric malignancy is in the differential is unlikely.  We recommended she be taken to the operating room tonight.  We discussed the procedure of repair and biopsy.  She wished to proceed after discussion of the procedure and risks.  She is a  so does understand what were proposing.    Patient was taken the operating room placed in the supine position.  General was done by Dr. Proctor.  Timeout done identity verified abdomen prepped and draped after 3-minute dry time.  Midline incision was made from the xiphoid to the umbilicus.  This carried through the skin with  a scalpel and carried through deeper layers with cautery on into the abdominal cavity.  Once in the abdominal cavity we encountered a fair amount of gastric contents in the abdominal cavity.  This was cultured for aerobic and anaerobic organisms and it was suctioned free.  We gently palpated the stomach and found at the antrum a perforated ulcer on the anterior surface.  The hole was about 6 to 7 mm in size.  We placed the stomach on downward traction and excised portion of the ulcer for pathologic review.  We then closed the ulcer with interrupted 2-0 silk sutures.  We then mobilized the omentum and fashioned a 3 inch wide Smith patch it was then placed over the repair and sutured down with six 3-0 silk sutures.  The right upper quadrant and upper abdomen were then copiously irrigated and suctioned free.  Dr. Proctor then passed an NG tube in the appropriate position and we then placed 219 Ermias drains.  They were placed up around the repair on top of the omentum in between the liver and the stomach.  Irrisept was then placed in the abdominal cavity left for 2 minutes and then removed.  The for sponge instrument needle counts were done and noted to be correct we then closed the abdominal wall with a running #1 Ethibond sutures subcu was irrigated with irrisept again and then skin approximated with skin stapler.  The drains were secured with 2-0 nylon and sterile dressing applied.  She tolerated the procedure well was awakened and transferred to recovery in satisfactory condition.  The final sponge instrument and needle counts were correct.    Luisito Velasco DO     Date: 3/19/2021  Time: 20:25 EDT

## 2021-03-20 NOTE — PROGRESS NOTES
"PULMONARY CRITICAL CARE Progress  NOTE      PATIENT IDENTIFICATION:  Name: Abi Rivera  MRN: [id number]  :  1960     Age: 60 y.o.  Sex: female    DATE OF Note:  3/20/2021   Referring Physician: Jose Cleaning MD                  Subjective:   Postop patient hemodynamically stable  No SOB no chest pain, no nausea or vomiting, no change in bowel habit, no dysuria,  no new  skin rash or itching.      Objective:  tMax 24 hrs: Temp (24hrs), Av.4 °F (36.9 °C), Min:97 °F (36.1 °C), Max:99.7 °F (37.6 °C)      Vitals Ranges:   Temp:  [97 °F (36.1 °C)-99.7 °F (37.6 °C)] 99.7 °F (37.6 °C)  Heart Rate:  [63-93] 93  Resp:  [13-19] 18  BP: (103-145)/() 129/80    Intake and Output Last 3 Shifts:   I/O last 3 completed shifts:  In:  [I.V.:]  Out: 2980 [Urine:2650; Drains:330]    Exam:  /80 (BP Location: Right arm, Patient Position: Lying)   Pulse 93   Temp 99.7 °F (37.6 °C) (Oral)   Resp 18   Ht 149.9 cm (59\")   Wt 77.6 kg (171 lb 1.2 oz)   SpO2 97%   Breastfeeding No   BMI 34.55 kg/m²     General Appearance:   Alert awake not in distress  HEENT:  Normocephalic, without obvious abnormality, Conjunctiva/corneas clear,.  Normal external ear canals, Nares normal, no drainage     Neck:  Supple, symmetrical, trachea midline. No JVD.  Lungs /Chest wall:   Bilateral basal rhonchi, respirations unlabored symmetrical wall movement.     Heart:  Regular rate and rhythm, systolic murmur PMI left sternal border  Abdomen: Soft, clean dressing, minimal drainage in the drain, tender all over, no masses, no organomegaly.    Extremities: Trace edema no clubbing or Cyanosis        Medications:    Current Facility-Administered Medications:   •  arformoterol (BROVANA) nebulizer solution 15 mcg, 15 mcg, Nebulization, BID - RT, Luisito Velasco DO, 15 mcg at 21  •  budesonide (PULMICORT) nebulizer solution 0.5 mg, 0.5 mg, Nebulization, BID - , Luisito Velasco DO, 0.5 mg at 21  •  " HYDROmorphone (DILAUDID) injection 0.5 mg, 0.5 mg, Intravenous, Q2H PRN, 0.5 mg at 03/20/21 0607 **AND** naloxone (NARCAN) injection 0.1 mg, 0.1 mg, Intravenous, Q5 Min PRN, Luisito Velasco, DO  •  ipratropium-albuterol (DUO-NEB) nebulizer solution 3 mL, 3 mL, Nebulization, Q4H - RT, Luisito Velasco, , 3 mL at 03/20/21 1141  •  metoprolol tartrate (LOPRESSOR) injection 2.5 mg, 2.5 mg, Intravenous, Q8H, Tammy Flores FNP, 2.5 mg at 03/20/21 1102  •  Morphine sulfate (PF) injection 4 mg, 4 mg, Intravenous, Q2H PRN, 4 mg at 03/20/21 0905 **AND** naloxone (NARCAN) injection 0.4 mg, 0.4 mg, Intravenous, Q5 Min PRN, Luisito Velasco, DO  •  nicotine (NICODERM CQ) 21 MG/24HR patch 1 patch, 1 patch, Transdermal, Q24H, Tammy Flores FNP, 1 patch at 03/20/21 0859  •  ondansetron (ZOFRAN) tablet 4 mg, 4 mg, Oral, Q6H PRN **OR** ondansetron (ZOFRAN) injection 4 mg, 4 mg, Intravenous, Q6H PRN, Luisito Velasco, DO  •  pantoprazole (PROTONIX) injection 40 mg, 40 mg, Intravenous, Q12H, Luisito Velasco, DO, 40 mg at 03/20/21 0900  •  piperacillin-tazobactam (ZOSYN) IVPB 3.375 g in 100 mL NS (CD), 3.375 g, Intravenous, Q8H, Luisito Velasco,   •  sodium chloride 0.9 % flush 10 mL, 10 mL, Intravenous, PRN, Luisito Velasco,   •  sodium chloride 0.9 % flush 10 mL, 10 mL, Intravenous, PRN, Luisito Velasco,   •  sodium chloride 0.9 % infusion, 125 mL/hr, Intravenous, Continuous, Flores, Tammy, FNP, Last Rate: 125 mL/hr at 03/20/21 0500, 125 mL/hr at 03/20/21 0500    Data Review:  All labs (24hrs):   Recent Results (from the past 24 hour(s))   Light Blue Top    Collection Time: 03/19/21  3:37 PM   Result Value Ref Range    Extra Tube hold for add-on    Green Top (Gel)    Collection Time: 03/19/21  3:37 PM   Result Value Ref Range    Extra Tube Hold for add-ons.    Lavender Top    Collection Time: 03/19/21  3:37 PM   Result Value Ref Range    Extra Tube hold for add-on    Gold Top - SST    Collection Time: 03/19/21  3:37 PM   Result Value  Ref Range    Extra Tube Hold for add-ons.    Comprehensive Metabolic Panel    Collection Time: 03/19/21  3:37 PM    Specimen: Blood   Result Value Ref Range    Glucose 125 (H) 65 - 99 mg/dL    BUN 32 (H) 6 - 20 mg/dL    Creatinine 0.81 0.57 - 1.00 mg/dL    Sodium 134 (L) 136 - 145 mmol/L    Potassium 4.2 3.5 - 5.2 mmol/L    Chloride 97 (L) 98 - 107 mmol/L    CO2 21.0 (L) 22.0 - 29.0 mmol/L    Calcium 9.5 8.6 - 10.5 mg/dL    Total Protein 6.4 6.0 - 8.5 g/dL    Albumin 3.90 3.50 - 5.20 g/dL    ALT (SGPT) 12 1 - 33 U/L    AST (SGOT) 12 1 - 32 U/L    Alkaline Phosphatase 48 39 - 117 U/L    Total Bilirubin 0.3 0.0 - 1.2 mg/dL    eGFR Non African Amer 73 >60 mL/min/1.73    Globulin 2.5 gm/dL    A/G Ratio 1.6 g/dL    BUN/Creatinine Ratio 39.5 (H) 7.0 - 25.0    Anion Gap 16.0 (H) 5.0 - 15.0 mmol/L   Lipase    Collection Time: 03/19/21  3:37 PM    Specimen: Blood   Result Value Ref Range    Lipase 19 13 - 60 U/L   Troponin    Collection Time: 03/19/21  3:37 PM    Specimen: Blood   Result Value Ref Range    Troponin T <0.010 0.000 - 0.030 ng/mL   BNP    Collection Time: 03/19/21  3:37 PM    Specimen: Blood   Result Value Ref Range    proBNP 335.6 0.0 - 900.0 pg/mL   CBC Auto Differential    Collection Time: 03/19/21  3:37 PM    Specimen: Blood   Result Value Ref Range    WBC 28.00 (H) 3.40 - 10.80 10*3/mm3    RBC 4.39 3.77 - 5.28 10*6/mm3    Hemoglobin 12.8 12.0 - 15.9 g/dL    Hematocrit 37.8 34.0 - 46.6 %    MCV 86.1 79.0 - 97.0 fL    MCH 29.1 26.6 - 33.0 pg    MCHC 33.7 31.5 - 35.7 g/dL    RDW 15.7 (H) 12.3 - 15.4 %    RDW-SD 46.8 37.0 - 54.0 fl    MPV 7.1 6.0 - 12.0 fL    Platelets 303 140 - 450 10*3/mm3   Scan Slide    Collection Time: 03/19/21  3:37 PM    Specimen: Blood   Result Value Ref Range    Scan Slide     Manual Differential    Collection Time: 03/19/21  3:37 PM    Specimen: Blood   Result Value Ref Range    Neutrophil % 90.0 (H) 42.7 - 76.0 %    Lymphocyte % 6.0 (L) 19.6 - 45.3 %    Monocyte % 2.0 (L) 5.0 - 12.0  %    Bands %  1.0 0.0 - 5.0 %    Atypical Lymphocyte % 1.0 0.0 - 5.0 %    Neutrophils Absolute 25.48 (H) 1.70 - 7.00 10*3/mm3    Lymphocytes Absolute 1.68 0.70 - 3.10 10*3/mm3    Monocytes Absolute 0.56 0.10 - 0.90 10*3/mm3    Acanthocytes Slight/1+ None Seen    Hypersegmented Neutrophils Slight/1+ None Seen    Toxic Granulation Slight/1+ None Seen    Platelet Morphology Normal Normal   ECG 12 Lead    Collection Time: 03/19/21  3:45 PM   Result Value Ref Range    QT Interval 387 ms   POC Creatinine    Collection Time: 03/19/21  4:17 PM    Specimen: Venous Blood   Result Value Ref Range    Creatinine 0.80 0.60 - 1.30 mg/dL    GFR MDRD       GFR MDRD Non      COVID-19,CEPHEID,COR/PAOLA/PAD IN-HOUSE(OR EMERGENT/ADD-ON),NP SWAB IN TRANSPORT MEDIA 3-4 HR TAT, RT-PCR - Swab, Nasopharynx    Collection Time: 03/19/21  5:29 PM    Specimen: Nasopharynx; Swab   Result Value Ref Range    COVID19 Not Detected Not Detected - Ref. Range   Type & Screen    Collection Time: 03/19/21  5:44 PM    Specimen: Blood   Result Value Ref Range    ABO Type A     RH type Negative     Antibody Screen Negative     T&S Expiration Date 3/22/2021 11:59:59 PM    Body Fluid Culture - Peritoneal Fluid, Peritoneum    Collection Time: 03/19/21  7:16 PM    Specimen: Peritoneum; Peritoneal Fluid   Result Value Ref Range    Body Fluid Culture No growth     Gram Stain Many (4+) WBCs per low power field     Gram Stain No organisms seen    Comprehensive Metabolic Panel    Collection Time: 03/19/21 10:06 PM    Specimen: Blood   Result Value Ref Range    Glucose 125 (H) 65 - 99 mg/dL    BUN 23 (H) 6 - 20 mg/dL    Creatinine 0.78 0.57 - 1.00 mg/dL    Sodium 138 136 - 145 mmol/L    Potassium 4.4 3.5 - 5.2 mmol/L    Chloride 103 98 - 107 mmol/L    CO2 24.0 22.0 - 29.0 mmol/L    Calcium 8.2 (L) 8.6 - 10.5 mg/dL    Total Protein 5.4 (L) 6.0 - 8.5 g/dL    Albumin 3.50 3.50 - 5.20 g/dL    ALT (SGPT) 33 1 - 33 U/L    AST (SGOT) 29 1 - 32  U/L    Alkaline Phosphatase 44 39 - 117 U/L    Total Bilirubin 0.3 0.0 - 1.2 mg/dL    eGFR Non African Amer 76 >60 mL/min/1.73    Globulin 1.9 gm/dL    A/G Ratio 1.8 g/dL    BUN/Creatinine Ratio 29.5 (H) 7.0 - 25.0    Anion Gap 11.0 5.0 - 15.0 mmol/L   CBC Auto Differential    Collection Time: 03/19/21 10:06 PM    Specimen: Blood   Result Value Ref Range    WBC 24.30 (H) 3.40 - 10.80 10*3/mm3    RBC 4.31 3.77 - 5.28 10*6/mm3    Hemoglobin 12.1 12.0 - 15.9 g/dL    Hematocrit 37.9 34.0 - 46.6 %    MCV 88.0 79.0 - 97.0 fL    MCH 28.1 26.6 - 33.0 pg    MCHC 32.0 31.5 - 35.7 g/dL    RDW 16.0 (H) 12.3 - 15.4 %    RDW-SD 49.0 37.0 - 54.0 fl    MPV 7.3 6.0 - 12.0 fL    Platelets 274 140 - 450 10*3/mm3    Neutrophil % 93.1 (H) 42.7 - 76.0 %    Lymphocyte % 2.9 (L) 19.6 - 45.3 %    Monocyte % 3.9 (L) 5.0 - 12.0 %    Eosinophil % 0.0 (L) 0.3 - 6.2 %    Basophil % 0.1 0.0 - 1.5 %    Neutrophils, Absolute 22.60 (H) 1.70 - 7.00 10*3/mm3    Lymphocytes, Absolute 0.70 0.70 - 3.10 10*3/mm3    Monocytes, Absolute 0.90 0.10 - 0.90 10*3/mm3    Eosinophils, Absolute 0.00 0.00 - 0.40 10*3/mm3    Basophils, Absolute 0.00 0.00 - 0.20 10*3/mm3    nRBC 0.1 0.0 - 0.2 /100 WBC   Comprehensive Metabolic Panel    Collection Time: 03/20/21  3:55 AM    Specimen: Blood   Result Value Ref Range    Glucose 103 (H) 65 - 99 mg/dL    BUN 18 6 - 20 mg/dL    Creatinine 0.69 0.57 - 1.00 mg/dL    Sodium 139 136 - 145 mmol/L    Potassium 4.7 3.5 - 5.2 mmol/L    Chloride 106 98 - 107 mmol/L    CO2 22.0 22.0 - 29.0 mmol/L    Calcium 8.0 (L) 8.6 - 10.5 mg/dL    Total Protein 5.6 (L) 6.0 - 8.5 g/dL    Albumin 3.20 (L) 3.50 - 5.20 g/dL    ALT (SGPT) 25 1 - 33 U/L    AST (SGOT) 22 1 - 32 U/L    Alkaline Phosphatase 44 39 - 117 U/L    Total Bilirubin 0.4 0.0 - 1.2 mg/dL    eGFR Non African Amer 87 >60 mL/min/1.73    Globulin 2.4 gm/dL    A/G Ratio 1.3 g/dL    BUN/Creatinine Ratio 26.1 (H) 7.0 - 25.0    Anion Gap 11.0 5.0 - 15.0 mmol/L   CBC Auto Differential     Collection Time: 03/20/21  3:55 AM    Specimen: Blood   Result Value Ref Range    WBC 25.30 (H) 3.40 - 10.80 10*3/mm3    RBC 4.06 3.77 - 5.28 10*6/mm3    Hemoglobin 11.4 (L) 12.0 - 15.9 g/dL    Hematocrit 35.5 34.0 - 46.6 %    MCV 87.4 79.0 - 97.0 fL    MCH 28.0 26.6 - 33.0 pg    MCHC 32.1 31.5 - 35.7 g/dL    RDW 15.6 (H) 12.3 - 15.4 %    RDW-SD 47.7 37.0 - 54.0 fl    MPV 7.2 6.0 - 12.0 fL    Platelets 293 140 - 450 10*3/mm3    Neutrophil % 90.2 (H) 42.7 - 76.0 %    Lymphocyte % 5.7 (L) 19.6 - 45.3 %    Monocyte % 4.0 (L) 5.0 - 12.0 %    Eosinophil % 0.0 (L) 0.3 - 6.2 %    Basophil % 0.1 0.0 - 1.5 %    Neutrophils, Absolute 22.80 (H) 1.70 - 7.00 10*3/mm3    Lymphocytes, Absolute 1.40 0.70 - 3.10 10*3/mm3    Monocytes, Absolute 1.00 (H) 0.10 - 0.90 10*3/mm3    Eosinophils, Absolute 0.00 0.00 - 0.40 10*3/mm3    Basophils, Absolute 0.00 0.00 - 0.20 10*3/mm3    nRBC 0.0 0.0 - 0.2 /100 WBC   TSH    Collection Time: 03/20/21  3:55 AM    Specimen: Blood   Result Value Ref Range    TSH <0.005 (L) 0.270 - 4.200 uIU/mL        Imaging:  XR Chest 1 View  Narrative: XR CHEST 1 VW-     Date of Exam: 3/20/2021 6:31 AM     Indication: Shortness of breath; K25.1-Acute gastric ulcer with  perforation; R10.84-Generalized abdominal pain; R07.9-Chest pain,  unspecified; K28.6-Chronic or unspecified gastrojejunal ulcer with both  hemorrhage and perforation; K25.1-Acute gastric ulcer with perforation;  K63.1-Perforation of intestine (nontraumatic).     Comparison: 03/19/2021     Technique: A single view of the chest was obtained.     FINDINGS:      There is been interval placement of nasogastric tube with the tip  below the diaphragm.  There is mild cardiomegaly.  Pulmonary vessels are  within normal limits.  There is minimal left basilar airspace disease  likely due to atelectasis.  Right lung is clear.  There are new surgical  clips over the upper abdomen at the midline.           Impression:       1.  Interval placement of nasogastric  tube the tip below the diaphragm.  2.  Stable cardiomegaly.  3.  New minimal left basilar airspace disease likely due to atelectasis.        Electronically Signed By-Amauri Mtz MD On:3/20/2021 8:36 AM  This report was finalized on [id number] by  Amauri Mtz MD.       ASSESSMENT:  Acute gastric ulcer with perforation (CMS/HCC)  Peritonitis  COPD  Atrial fibrillation  Osteoarthritis  Hypothyroidism  Hypertension        PLAN:  IV fluid  Hemodynamic support  Broad-spectrum antibiotics to cover for perforated bowel  Postop care with surgery  Encouraged to use I-S flutter valve  Bronchodilator  Inhaled corticosteroids  Electrolytes/ glycemic control  DVT and GI prophylaxis.    Total Critical care time in direct medical management (   ) minutes  Jose Cleaning MD. D, ABSM.     3/20/2021  14:02 EDT

## 2021-03-20 NOTE — PROGRESS NOTES
The patient was a fast call for SVT in the 200s likely atrial fibrillation/flutter.  She was given Cardizem IV push 20 mg to minimal response.  Cardiology was consulted.  She will be transferred to PCU.  CMP and CBC have been ordered.  KUB ordered.  The patient is denying chest pain, dizziness or shortness of air. The patient has hyperthyroidism and last took her oral medications in the morning of 3/19/21.

## 2021-03-20 NOTE — NURSING NOTE
Pt received from [facility] via bed together with Charge Nurse from dayshift and night shift with ongoing  Amiodarone Drip at 33.3 ml per hour.  ( it had a total of  (187 ml total that had been delivered). Pt still on rapid afib

## 2021-03-20 NOTE — CONSULTS
Cardiology consult note  Percy Stanford MD, PhD    Patient Care Team:  Provider, No Known as PCP - General    CHIEF COMPLAINT: Stat response, A. fib RVR    HISTORY OF PRESENT ILLNESS:    This is a very pleasant 60-year-old female with cardiac history of A. fib RVR, hyperthyroidism, history of sick sinus syndrome, pulmonary hypertension, mood disorder, essential hypertension, paroxysmal A. fib, is a patient of Dr. Denny  and Dr. Sheth with bleeding gastric ulcer status post surgery.  Stat response was called with heart rates in the 110s A. fib RVR.  Patient mildly hypotensive ultimately had Cardizem 20 mg IV push per primary, additional 5 mg of metoprolol for beta-blockade noting TSH was undetectable at less than 0.005 and she is on methimazole.  She is seen by endocrinology, I suppose for Graves' disease.  She has not had thyroid replacement or ablation in the past.  IV amiodarone bolus with drip was begun, 1 mg Versed pushed as well for anxiety lytic.  Ultimately she is being transferred to PCU for higher level of care.  IV fluid bolus given, blood pressure improved to 110 systolic.    Time at bedside 73 minutes in totality of care, multiple calls to staff, multiple encounters, crash cart at bedside as well, deferred elective or emergent cardioversion at this point.  Transfer to high-level care today    Review of systems negative x14 point review of systems except as mentioned above    Historical data copied forward from previous encounters any margin change    Most recent 2D echo 2019 with preserved LV and RV systolic function, normal atrial sizes no valvular heart disease significantly          Past Medical History:   Diagnosis Date   • Adenomatous polyp of colon 6/20/2016   • Anxiety 6/16/2016   • Arthritis of foot 1/10/2019   • Atrial fibrillation (CMS/HCC) 6/16/2016   • Avascular necrosis of bone (CMS/HCC) 1/10/2019   • Chronic pain 1/22/2019   • Closed displaced fracture of fifth 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 9/19/2019    Added automatically from request for surgery [id number]   • Hypertension 6/16/2016   • Hyperthyroidism 11/2/2016   • Mood disorder (CMS/HCC) 6/16/2016   • Osteoporosis without current pathological fracture 6/4/2020   • Pulmonary hypertension (CMS/HCC) 6/16/2016   • Sick sinus syndrome (CMS/HCC) 1/25/2017   • Tricuspid valve insufficiency 6/16/2016     Past Surgical History:   Procedure Laterality Date   • ANKLE OPEN REDUCTION INTERNAL FIXATION     • APPENDECTOMY     • HIP SURGERY      Left total hip   • ORIF FOOT FRACTURE Right 9/30/2019    Procedure: OPEN REDUCTION INTERNAL FIXATION OF FIFTH METATARSAL FRACTURE, LOOSE BODY EXCISION/REMOVAL FROM THE TALONAVICULAR JOINT OF THE RIGHT FOOT;  Surgeon: JOSE Fry DPM;  Location: [facility] MAIN OR;  Service: Podiatry     Family History   Problem Relation Age of Onset   • Hypertension Mother    • Diabetes Father    • Heart disease Father    • Hypertension Father      Social History     Tobacco Use   • Smoking status: Current Every Day Smoker     Packs/day: 0.50     Types: Cigarettes   • Smokeless tobacco: Never Used   Substance Use Topics   • Alcohol use: Yes     Comment: jayson   • Drug use: No     Facility-Administered Medications Prior to Admission   Medication Dose Route Frequency Provider Last Rate Last Admin   • [DISCONTINUED] triamcinolone acetonide (KENALOG-40) injection 40 mg  40 mg Intramuscular Once JOSE Fry DPM         Medications Prior to Admission   Medication Sig Dispense Refill Last Dose   • apixaban (ELIQUIS) 5 MG tablet tablet Take 1 tablet by mouth Every 12 (Twelve) Hours. 180 tablet 3 Past Month at Unknown time   • calcium carb-cholecalciferol (CALCIUM 600+D) 600-800 MG-UNIT tablet Take 1 tablet by mouth Daily.   3/19/2021 at Unknown time   • Glucosamine-Chondroit-Vit C-Mn (GLUCOSAMINE 1500 COMPLEX PO) Take 1 tablet by mouth Daily.   3/19/2021 at Unknown time   • MAGNESIUM OXIDE PO Take 250 mg by mouth  "Daily.   3/19/2021 at Unknown time   • methIMAzole (TAPAZOLE) 10 MG tablet Take 1 tablet by mouth 2 (two) times a day. Take 2 tablets in the morning, take 1 tablet in the evening. 90 tablet 6 3/19/2021 at Unknown time   • metoprolol tartrate (LOPRESSOR) 25 MG tablet Take 12.5 mg by mouth 2 (Two) Times a Day.   3/19/2021 at Unknown time   • Multiple Vitamin (MULTI-VITAMIN) tablet Take 1 tablet by mouth Daily.   3/19/2021 at Unknown time   • Omega-3 Fatty Acids (FISH OIL) 1200 MG capsule capsule 1 tablet daily   3/19/2021 at Unknown time   • Potassium Gluconate 550 MG tablet Take 1 tablet by mouth Daily.   3/19/2021 at Unknown time   • Abaloparatide (Tymlos) 3120 MCG/1.56ML solution pen-injector Inject 0.04 mL under the skin into the appropriate area as directed Daily. (Patient taking differently: Inject 80 mcg under the skin into the appropriate area as directed Daily. Not started 3/19/21) 1.56 mL 6    • acetaminophen (TYLENOL) 500 MG tablet Take 500 mg by mouth Every 6 (Six) Hours As Needed for Mild Pain .        Allergies:  Patient has no known allergies.    REVIEW OF SYSTEMS:  Please see the above history of present illness for pertinent positives and negatives.  The remainder of the patient's systems have been reviewed and are negative.     Vital Signs  Temp:  [97 °F (36.1 °C)-99.7 °F (37.6 °C)] 98 °F (36.7 °C)  Heart Rate:  [] 142  Resp:  [13-18] 15  BP: ()/(47-95) 96/64    Flowsheet Rows      First Filed Value   Admission Height  149.9 cm (59\") Documented at 03/19/2021 1504   Admission Weight  72.6 kg (160 lb) Documented at 03/19/2021 1504           Physical Exam:  Physical Exam   Constitutional: Patient appears acute distress, well developed, tachycardic   HEENT:   Head: Normocephalic and atraumatic.   Eyes:  Pupils are equal, round, and reactive to light. EOM are intact. Sclerae are anicteric and noninjected.  Mouth and Throat: Patient has moist mucous membranes. Oropharynx is clear of any " erythema or exudate.     Neck: Neck supple. No JVD present.  No carotid bruits no thyromegaly present. No lymphadenopathy present.  Cardiovascular: Tachycardic 200s, irregular, S1 normal and S2 normal.  Exam reveals no gallop and no friction rub.  No murmur heard.  Pulmonary/Chest: Lungs are clear to auscultation bilaterally. No respiratory distress. No wheezes. No rhonchi. No rales.   Abdominal: Soft. Bowel sounds mildly hypoactive no distension and no mass. There is no hepatosplenomegaly.  Diffuse tenderness. NG tube in place  Musculoskeletal: Normal muscle tone  Extremities: No edema. Pulses are palpable in all 4 extremities.  Neurological: Patient is alert and oriented to person, place, and time. Cranial nerves II-XII are grossly intact with no focal deficits.  Skin: Skin is warm. No rash noted. Nails show no clubbing.  No cyanosis or erythema.     Results Review:    I reviewed the patient's new clinical results.  Lab Results (most recent)     Procedure Component Value Units Date/Time    Extra Tubes [[id number]] Collected: 03/20/21 1804    Specimen: Blood, Venous Line Updated: 03/20/21 1824    Narrative:      The following orders were created for panel order Extra Tubes.  Procedure                               Abnormality         Status                     ---------                               -----------         ------                     Light Blue Top[[id number]]                                   In process                 Lavender Top[[id number]]                                     In process                 Gold Top - SST[[id number]]                                   Final result                 Please view results for these tests on the individual orders.    Gold Top - SST [[id number]] Collected: 03/20/21 1804    Specimen: Blood Updated: 03/20/21 1824    Light Blue Top [[id number]] Collected: 03/20/21 1804    Specimen: Blood Updated: 03/20/21 1812    Lavender Top [[id number]] Collected: 03/20/21 1804     Specimen: Blood Updated: 03/20/21 1811    Comprehensive Metabolic Panel [[id number]] Collected: 03/20/21 1804    Specimen: Blood Updated: 03/20/21 1810    Lactic Acid, Plasma [[id number]] Collected: 03/20/21 1804    Specimen: Blood Updated: 03/20/21 1810    POC Glucose Once [[id number]]  (Normal) Collected: 03/20/21 1807    Specimen: Blood Updated: 03/20/21 1808     Glucose 105 mg/dL      Comment: Serial Number: [id number]:  [id number]       Blood Culture - Blood, Arm, Right [[id number]] Collected: 03/19/21 1710    Specimen: Blood from Arm, Right Updated: 03/20/21 1715     Blood Culture No growth at 24 hours    Blood Culture - Blood, Arm, Left [[id number]] Collected: 03/19/21 1707    Specimen: Blood from Arm, Left Updated: 03/20/21 1715     Blood Culture No growth at 24 hours    Body Fluid Culture - Peritoneal Fluid, Peritoneum [[id number]] Collected: 03/19/21 1916    Specimen: Peritoneal Fluid from Peritoneum Updated: 03/20/21 0718     Body Fluid Culture No growth     Gram Stain Many (4+) WBCs per low power field      No organisms seen    TSH [[id number]]  (Abnormal) Collected: 03/20/21 0355    Specimen: Blood Updated: 03/20/21 0533     TSH <0.005 uIU/mL     Comprehensive Metabolic Panel [[id number]]  (Abnormal) Collected: 03/20/21 0355    Specimen: Blood Updated: 03/20/21 0516     Glucose 103 mg/dL      BUN 18 mg/dL      Creatinine 0.69 mg/dL      Sodium 139 mmol/L      Potassium 4.7 mmol/L      Chloride 106 mmol/L      CO2 22.0 mmol/L      Calcium 8.0 mg/dL      Total Protein 5.6 g/dL      Albumin 3.20 g/dL      ALT (SGPT) 25 U/L      AST (SGOT) 22 U/L      Alkaline Phosphatase 44 U/L      Total Bilirubin 0.4 mg/dL      eGFR Non African Amer 87 mL/min/1.73      Globulin 2.4 gm/dL      A/G Ratio 1.3 g/dL      BUN/Creatinine Ratio 26.1     Anion Gap 11.0 mmol/L     Narrative:      GFR Normal >60  Chronic Kidney Disease <60  Kidney Failure <15      CBC & Differential [[id number]]  (Abnormal) Collected: 03/20/21 0354     Specimen: Blood Updated: 03/20/21 0454    Narrative:      The following orders were created for panel order CBC & Differential.  Procedure                               Abnormality         Status                     ---------                               -----------         ------                     CBC Auto Differential[[id number]]        Abnormal            Final result                 Please view results for these tests on the individual orders.    CBC Auto Differential [[id number]]  (Abnormal) Collected: 03/20/21 0355    Specimen: Blood Updated: 03/20/21 0454     WBC 25.30 10*3/mm3      RBC 4.06 10*6/mm3      Hemoglobin 11.4 g/dL      Hematocrit 35.5 %      MCV 87.4 fL      MCH 28.0 pg      MCHC 32.1 g/dL      RDW 15.6 %      RDW-SD 47.7 fl      MPV 7.2 fL      Platelets 293 10*3/mm3      Neutrophil % 90.2 %      Lymphocyte % 5.7 %      Monocyte % 4.0 %      Eosinophil % 0.0 %      Basophil % 0.1 %      Neutrophils, Absolute 22.80 10*3/mm3      Lymphocytes, Absolute 1.40 10*3/mm3      Monocytes, Absolute 1.00 10*3/mm3      Eosinophils, Absolute 0.00 10*3/mm3      Basophils, Absolute 0.00 10*3/mm3      nRBC 0.0 /100 WBC     CBC & Differential [[id number]]  (Abnormal) Collected: 03/19/21 2206    Specimen: Blood Updated: 03/19/21 2238    Narrative:      The following orders were created for panel order CBC & Differential.  Procedure                               Abnormality         Status                     ---------                               -----------         ------                     CBC Auto Differential[[id number]]        Abnormal            Final result                 Please view results for these tests on the individual orders.    CBC Auto Differential [[id number]]  (Abnormal) Collected: 03/19/21 2206    Specimen: Blood Updated: 03/19/21 2238     WBC 24.30 10*3/mm3      RBC 4.31 10*6/mm3      Hemoglobin 12.1 g/dL      Hematocrit 37.9 %      MCV 88.0 fL      MCH 28.1 pg      MCHC 32.0 g/dL      RDW 16.0  %      RDW-SD 49.0 fl      MPV 7.3 fL      Platelets 274 10*3/mm3      Neutrophil % 93.1 %      Lymphocyte % 2.9 %      Monocyte % 3.9 %      Eosinophil % 0.0 %      Basophil % 0.1 %      Neutrophils, Absolute 22.60 10*3/mm3      Lymphocytes, Absolute 0.70 10*3/mm3      Monocytes, Absolute 0.90 10*3/mm3      Eosinophils, Absolute 0.00 10*3/mm3      Basophils, Absolute 0.00 10*3/mm3      nRBC 0.1 /100 WBC     Anaerobic Culture - Peritoneal Fluid, Peritoneum [[id number]] Collected: 03/19/21 1916    Specimen: Peritoneal Fluid from Peritoneum Updated: 03/19/21 1927    COVID PRE-OP / PRE-PROCEDURE SCREENING ORDER (NO ISOLATION) - Swab, Nasopharynx [[id number]]  (Normal) Collected: 03/19/21 1729    Specimen: Swab from Nasopharynx Updated: 03/19/21 1822    Narrative:      The following orders were created for panel order COVID PRE-OP / PRE-PROCEDURE SCREENING ORDER (NO ISOLATION) - Swab, Nasopharynx.  Procedure                               Abnormality         Status                     ---------                               -----------         ------                     COVID-19,CEPHEID,COR/PAOLA...[[id number]]  Normal              Final result                 Please view results for these tests on the individual orders.    COVID-19,CEPHEID,COR/PAOLA/PAD IN-HOUSE(OR EMERGENT/ADD-ON),NP SWAB IN TRANSPORT MEDIA 3-4 HR TAT, RT-PCR - Swab, Nasopharynx [[id number]]  (Normal) Collected: 03/19/21 1729    Specimen: Swab from Nasopharynx Updated: 03/19/21 1822     COVID19 Not Detected    Narrative:      Fact sheet for providers: https://www.fda.gov/media/852741/download     Fact sheet for patients: https://www.fda.gov/media/890842/download    Williams Draw [[id number]] Collected: 03/19/21 1537    Specimen: Blood Updated: 03/19/21 1645    Narrative:      The following orders were created for panel order Williams Draw.  Procedure                               Abnormality         Status                     ---------                                -----------         ------                     Light Blue Top[[id number]]                                   Final result               Green Top (Gel)[[id number]]                                  Final result               Lavender Top[[id number]]                                     Final result               Gold Top - SST[[id number]]                                   Final result                 Please view results for these tests on the individual orders.    Light Blue Top [[id number]] Collected: 03/19/21 1537    Specimen: Blood Updated: 03/19/21 1645     Extra Tube hold for add-on     Comment: Auto resulted       Green Top (Gel) [[id number]] Collected: 03/19/21 1537    Specimen: Blood Updated: 03/19/21 1645     Extra Tube Hold for add-ons.     Comment: Auto resulted.       Gold Top - SST [[id number]] Collected: 03/19/21 1537    Specimen: Blood Updated: 03/19/21 1645     Extra Tube Hold for add-ons.     Comment: Auto resulted.       Lavender Top [[id number]] Collected: 03/19/21 1537    Specimen: Blood Updated: 03/19/21 1645     Extra Tube hold for add-on     Comment: Auto resulted       Manual Differential [[id number]]  (Abnormal) Collected: 03/19/21 1537    Specimen: Blood Updated: 03/19/21 1635     Neutrophil % 90.0 %      Lymphocyte % 6.0 %      Monocyte % 2.0 %      Bands %  1.0 %      Atypical Lymphocyte % 1.0 %      Neutrophils Absolute 25.48 10*3/mm3      Lymphocytes Absolute 1.68 10*3/mm3      Monocytes Absolute 0.56 10*3/mm3      Acanthocytes Slight/1+     Hypersegmented Neutrophils Slight/1+     Toxic Granulation Slight/1+     Platelet Morphology Normal    Scan Slide [[id number]] Collected: 03/19/21 1537    Specimen: Blood Updated: 03/19/21 1635     Scan Slide --     Comment: See Manual Differential Results       Troponin [[id number]]  (Normal) Collected: 03/19/21 1537    Specimen: Blood Updated: 03/19/21 1623     Troponin T <0.010 ng/mL     Narrative:      Troponin T Reference Range:  <= 0.03 ng/mL-   Negative  for AMI  >0.03 ng/mL-     Abnormal for myocardial necrosis.  Clinicians would have to utilize clinical acumen, EKG, Troponin and serial changes to determine if it is an Acute Myocardial Infarction or myocardial injury due to an underlying chronic condition.       Results may be falsely decreased if patient taking Biotin.      BNP [[id number]]  (Normal) Collected: 03/19/21 1537    Specimen: Blood Updated: 03/19/21 1623     proBNP 335.6 pg/mL     Narrative:      Among patients with dyspnea, NT-proBNP is highly sensitive for the detection of acute congestive heart failure. In addition NT-proBNP of <300 pg/ml effectively rules out acute congestive heart failure with 99% negative predictive value.    Results may be falsely decreased if patient taking Biotin.      Lipase [[id number]]  (Normal) Collected: 03/19/21 1537    Specimen: Blood Updated: 03/19/21 1619     Lipase 19 U/L     POC Creatinine [[id number]] Collected: 03/19/21 1617    Specimen: Venous Blood Updated: 03/19/21 1619     Creatinine 0.80 mg/dL      Comment: Serial Number: [id number]:  [id number]        GFR MDRD  --     GFR MDRD Non  --          Imaging Results (Most Recent)     Procedure Component Value Units Date/Time    XR Chest 1 View [[id number]] Collected: 03/20/21 0835     Updated: 03/20/21 0838    Narrative:      XR CHEST 1 VW-     Date of Exam: 3/20/2021 6:31 AM     Indication: Shortness of breath; K25.1-Acute gastric ulcer with  perforation; R10.84-Generalized abdominal pain; R07.9-Chest pain,  unspecified; K28.6-Chronic or unspecified gastrojejunal ulcer with both  hemorrhage and perforation; K25.1-Acute gastric ulcer with perforation;  K63.1-Perforation of intestine (nontraumatic).     Comparison: 03/19/2021     Technique: A single view of the chest was obtained.     FINDINGS:      There is been interval placement of nasogastric tube with the tip  below the diaphragm.  There is mild cardiomegaly.  Pulmonary vessels  are  within normal limits.  There is minimal left basilar airspace disease  likely due to atelectasis.  Right lung is clear.  There are new surgical  clips over the upper abdomen at the midline.             Impression:            1.  Interval placement of nasogastric tube the tip below the diaphragm.  2.  Stable cardiomegaly.  3.  New minimal left basilar airspace disease likely due to atelectasis.        Electronically Signed By-Amauri Mtz MD On:3/20/2021 8:36 AM  This report was finalized on [id number] by  Amauri Mtz MD.    CT Abdomen Pelvis With Contrast [[id number]] Collected: 03/19/21 1636     Updated: 03/19/21 1647    Narrative:      CT ABDOMEN PELVIS W CONTRAST-     Date of Exam: 3/19/2021 4:15 PM     Indication: generalized abdominal pain.  Free air in the abdomen seen on  chest x-ray today.     Comparison: [facility] portable chest 03/19/2021. CT abdomen and pelvis  09/20/2016.     Technique: Contiguous axial CT images were obtained from the lung bases  to the pubic symphysis following uneventful administration of 100 cc  Isovue-370. intravenous and oral contrast. Sagittal and coronal  reconstructions were performed.  Automated exposure control and  iterative reconstruction methods were used.     FINDINGS:     Free intraperitoneal air is demonstrated within the abdomen, greatest  anteriorly and in the upper abdomen. There is thickening or inflammation  of the distal gastric body wall, greatest anteriorly, concerning for  perforated gastric ulcer.     No abscess or significant fluid is identified. There may be trace fluid  adjacent to the liver.     The liver, gallbladder, spleen, pancreas, adrenals, and kidneys are  within normal limits. Mild calcific atherosclerosis is seen in the aorta  and iliac arteries. Urinary bladder, uterus, and rectum are normal.     Left hip replacement changes are present. There are no acute or  suspicious osseous abnormalities. The included lung bases are clear.           Impression:         1. Free intraperitoneal air in the abdomen indicating perforated viscus.  2. Abnormal finding of thickening/inflammation distal gastric body wall,  greatest anteriorly. FINDINGS may reflect perforated gastric ulcer.  Consider endoscopic correlation.           Electronically Signed By-Mahnaz Ortiz MD On:3/19/2021 4:40 PM  This report was finalized on [id number] by  Mahnaz Ortiz MD.    CT Chest Pulmonary Embolism [[id number]] Collected: 03/19/21 1636     Updated: 03/19/21 1647    Narrative:      CT CHEST PULMONARY EMBOLISM-     Date of Exam: 3/19/2021 4:15 PM     Indication: chest pain.  Upper abdominal pain, chest pain     Comparison: Radiograph 03/19/2021     Technique: Serial and axial CT images of the chest were obtained  following the uneventful intravenous administration of 100 cc of  Isovue-370 contrast. Reconstructions in the coronal and sagittal planes  were also performed.  Automated exposure control and iterative  reconstruction methods were used.     FINDINGS:     Pulmonary Arteries: Excellent contrast opacification of the pulmonary  arteries.  No intraluminal filling defects to suggest pulmonary embolus.   The pulmonary arteries are normal in caliber.     Hilum and Mediastinum: No pathologically enlarged lymph nodes.  Normal  heart size.  No atherosclerotic calcifications are present. Unremarkable  thoracic aorta.   No pericardial effusion.     Lung Parenchyma and Pleura: No focal consolidations.  No suspicious  pulmonary nodules.  No endobronchial lesions.  No significant pleural  effusions.     Upper Abdomen: Please refer to dedicated CT of abdomen and pelvis for  detailed evaluation of the intra-abdominal contents.     Soft tissues: Unremarkable.     Osseous structures: No aggressive focal lytic or sclerotic osseous  lesions.       Impression:         1. No evidence of pulmonary embolus.  2. No acute cardiac pulmonary process.  3. Ancillary findings as described above.               Electronically Signed By-Daniella Go MD On:3/19/2021 4:38 PM  This report was finalized on [id number] by  Daniella Go MD.    XR Chest 1 View [[id number]] Collected: 03/19/21 1557     Updated: 03/19/21 1602    Narrative:      DATE OF EXAM:  3/19/2021 3:32 PM     PROCEDURE:  XR CHEST 1 VW-     INDICATIONS:  chest pain       COMPARISON:  [facility] portable chest 09/23/2019     TECHNIQUE:   Single radiographic view of the chest was obtained.     FINDINGS:  Free air is seen underneath the right hemidiaphragm.     Lungs are clear. Heart size is borderline enlarged but stable.. No acute  airspace disease, pleural effusion or pneumothorax is identified. No  acute osseous abnormalities are identified.       Impression:      Free air underneath the right hemidiaphragm. FINDINGS worrisome for  perforated viscus. Dedicated CT abdomen and pelvis is recommended for  further evaluation. I spoke with the clinician in the emergency room at  the time of this dictation.      Electronically Signed By-Mahnaz Ortiz MD On:3/19/2021 4:00 PM  This report was finalized on [id number] by  Mahnaz Ortiz MD.        reviewed    ECG/EMG Results (most recent)     Procedure Component Value Units Date/Time    ECG 12 Lead [[id number]] Collected: 03/19/21 1545     Updated: 03/19/21 1547     QT Interval 387 ms     Narrative:      HEART RATE= 63  bpm  RR Interval= 948  ms  OR Interval= 150  ms  P Horizontal Axis= -12  deg  P Front Axis= 38  deg  QRSD Interval= 97  ms  QT Interval= 387  ms  QRS Axis= 19  deg  T Wave Axis= 42  deg  - ABNORMAL ECG -  Sinus rhythm  Probable left atrial enlargement  Anteroseptal infarct, age indeterminate  Electronically Signed By:   Date and Time of Study: 2021-03-19 15:45:25    ECG 12 Lead [[id number]] Collected: 03/20/21 1814     Updated: 03/20/21 1816     QT Interval 254 ms     Narrative:      HEART RATE= 200  bpm  RR Interval= 300  ms  OR Interval=   ms  P Horizontal Axis=   deg  P Front Axis=   deg  QRSD  Interval= 89  ms  QT Interval= 254  ms  QRS Axis= -24  deg  T Wave Axis= 156  deg  - ABNORMAL ECG -  Atrial fibrillation with rapid V-rate  Probable LVH with secondary repol abnrm  ST depression, probably rate related  When compared with ECG of 19-Mar-2021 15:45:25,  Significant rate increase  Significant axis, voltage or hypertrophy change  Electronically Signed By:   Date and Time of Study: 2021-03-20 18:14:49        reviewed    Assessment/Plan     Recurrent A. fib RVR  IV metoprolol 5 every 6 hours is n.p.o.  NG tube on board  IV fluid bolus followed by 100 cc/h  IV amiodarone bolus and drip  Cannot be on anticoagulation secondary to GI bleeding status post surgery for bleeding gastric ulcer  Defer cardioversion at this point with hemodynamic stabilization with IV fluids and mildly improved heart rates to the 160s to 170s    Transfer to higher level care with PCU    We will consider esmolol drip, has history of sick sinus syndrome however    Likely has tachybradycardia syndrome, would be candidate for permanent pacemaker placement, will consult with EP on Monday who is familiar with the patient    Essential hypertension controlled    Further recommendation follow clinical response to therapy    Percy Stanford MD, PhD    Critical care time 73 [facility] with additional time and charting    I discussed the patient's findings and my recommendations with patient and staff    Percy Stanford MD  03/20/21  18:34 EDT    Patient me with new problems above

## 2021-03-20 NOTE — ANESTHESIA POSTPROCEDURE EVALUATION
Patient: Abi Rivera    Procedure Summary     Date: 03/19/21 Room / Location: [facility] OR 06 / [facility] MAIN OR    Anesthesia Start: 1843 Anesthesia Stop: 2030    Procedure: LAPAROTOMY EXPLORATORY with repair of perforated gastric ulcer, oversew of perforated gastric ulcer, placement of kristie patch, biopsy of stomach (N/A Abdomen) Diagnosis:     Surgeons: Luisito Velasco DO Provider: Tucker Baig MD    Anesthesia Type: general ASA Status: 3 - Emergent          Anesthesia Type: general    Vitals  Vitals Value Taken Time   /73 03/19/21 2102   Temp 97.2 °F (36.2 °C) 03/19/21 2030   Pulse 78 03/19/21 2101   Resp 18 03/19/21 2045   SpO2 95 % 03/19/21 2101   Vitals shown include unvalidated device data.        Post Anesthesia Care and Evaluation    Patient location during evaluation: PACU  Patient participation: complete - patient participated  Level of consciousness: awake and alert  Pain score: 3  Pain management: adequate  Airway patency: patent  Anesthetic complications: No anesthetic complications  PONV Status: none  Cardiovascular status: acceptable  Respiratory status: acceptable  Hydration status: acceptable    Comments: Patient seen and examined postoperatively; vital signs stable; SpO2 greater than or equal to 90%; cardiopulmonary status stable; nausea/vomiting adequately controlled; pain adequately controlled; no apparent anesthesia complications; patient discharged from anesthesia care when discharge criteria were met

## 2021-03-20 NOTE — PLAN OF CARE
Goal Outcome Evaluation:         Called fast team on patient D/T heart rate in the 200's, called report to PCU nurse.

## 2021-03-20 NOTE — CONSULTS
[facility] Medicine Services      Patient Name: Abi Rivera  : 1960  MRN: [id number]  Primary Care Physician: Provider, No Known  Date of admission: 3/19/2021    Patient Care Team:  Provider, No Known as PCP - General          Subjective   History Present Illness     Chief Complaint:   Chief Complaint   Patient presents with   • Abdominal Pain         Ms. Rivera is a 60 y.o.  presents to [facility] complaining of epigastric abdominal pain.         60-year-old female presents to the ER with chief complaint of severe sharp epigastric abdominal pain which has been intermittent over a period of time, but significantly increased today around 9 AM.  In the emergency room the patient was noted to have CT evidence of intraperitoneal air suspicious for perforated gastric ulcer.  The patient reports rare alcohol intake approximately twice yearly.  The patient has been on significant NSAIDs which she takes for right foot pain secondary to avascular necrosis.  The patient states she fell off a ladder in 2019 years ago with left-sided ankle fracture.  She has a history of left hip replacement.  The patient reports that she has been alternating ibuprofen, Aleve, and acetaminophen for her pain which is primarily in the right foot and admits she may have been taking the medication in excess.  She sees Dr. Fry, podiatry for her foot pain. The patient has seen pain management in the past with epidural that did not significantly reduce her pain.    Review of OP note:  The patient was taken to the OR by Dr. Velasco with anticipation of postop ICU placement.  The patient underwent repair or perforated gastric ulcer, anterior surface of the antrum with hold 6 to 7mm in size which was excised and sent for pathology review.  The area was closed and then covered with a 3 inch Smith patch.  2 Ermias drains were placed.  The patient had an NG tube placed postop the patient was doing  remarkably well and was admitted to the surgical floor with hospitalist consult.    Review of Records with summary:  The patient has history of atrial fibrillation and sick sinus syndrome with recommendation for pacemaker, however, patient declined the pacemaker.  She is on Eliquis. The patient has a history of osteoporosis and was seen by endocrinology with recommendation for Tymlos which was prescribed on 1/14/2021.  However, it does not look like the patient has started on this medication.    Eliquis            Review of Systems   Constitutional: Positive for decreased appetite. Negative for weight gain and weight loss.   Cardiovascular: Negative for chest pain.   Respiratory: Negative for shortness of breath.    Gastrointestinal: Positive for abdominal pain and nausea. Negative for constipation, diarrhea, hematochezia, melena and vomiting.   All other systems reviewed and are negative.          Personal History     Past Medical History:   Past Medical History:   Diagnosis Date   • Adenomatous polyp of colon 6/20/2016   • Anxiety 6/16/2016   • Arthritis of foot 1/10/2019   • Atrial fibrillation (CMS/HCC) 6/16/2016   • Avascular necrosis of bone (CMS/[facility], located within [facility]) 1/10/2019   • Chronic pain 1/22/2019   • Closed displaced fracture 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 9/19/2019    Added automatically from request for surgery [id number]   • Hypertension 6/16/2016   • Hyperthyroidism 11/2/2016   • Mood disorder (CMS/HCC) 6/16/2016   • Osteoporosis without current pathological fracture 6/4/2020   • Pulmonary hypertension (CMS/HCC) 6/16/2016   • Sick sinus syndrome (CMS/[facility], located within [facility]) 1/25/2017   • Tricuspid valve insufficiency 6/16/2016       Surgical History:      Past Surgical History:   Procedure Laterality Date   • ANKLE OPEN REDUCTION INTERNAL FIXATION     • APPENDECTOMY     • HIP SURGERY      Left total hip   • ORIF FOOT FRACTURE Right 9/30/2019    Procedure: OPEN REDUCTION INTERNAL FIXATION OF FIFTH METATARSAL  FRACTURE, LOOSE BODY EXCISION/REMOVAL FROM THE TALONAVICULAR JOINT OF THE RIGHT FOOT;  Surgeon: JOSE Fry DPM;  Location: [facility] MAIN OR;  Service: Podiatry           Family History: family history includes Diabetes in her father; Heart disease in her father; Hypertension in her father and mother. Otherwise pertinent FHx was reviewed and unremarkable.     Social History:  reports that she has been smoking cigarettes. She has been smoking about 0.50 packs per day. She has never used smokeless tobacco. She reports current alcohol use. She reports that she does not use drugs.      Medications:  Prior to Admission medications    Medication Sig Start Date End Date Taking? Authorizing Provider   apixaban (ELIQUIS) 5 MG tablet tablet Take 1 tablet by mouth Every 12 (Twelve) Hours. 5/1/20  Yes Laine Pearl MD   calcium carb-cholecalciferol (CALCIUM 600+D) 600-800 MG-UNIT tablet Take 1 tablet by mouth Daily.   Yes Jabari Baxter MD   Glucosamine-Chondroit-Vit C-Mn (GLUCOSAMINE 1500 COMPLEX PO) Take 1 tablet by mouth Daily.   Yes Jabari Baxter MD   MAGNESIUM OXIDE PO Take 250 mg by mouth Daily. 9/20/17  Yes Jabari Baxter MD   methIMAzole (TAPAZOLE) 10 MG tablet Take 1 tablet by mouth 2 (two) times a day. Take 2 tablets in the morning, take 1 tablet in the evening. 1/19/21  Yes Karen Mack MD   metoprolol tartrate (LOPRESSOR) 25 MG tablet Take 12.5 mg by mouth 2 (Two) Times a Day.   Yes Jabari Baxter MD   Multiple Vitamin (MULTI-VITAMIN) tablet Take 1 tablet by mouth Daily. 10/21/16  Yes Jabari Baxter MD   Omega-3 Fatty Acids (FISH OIL) 1200 MG capsule capsule 1 tablet daily 10/21/16  Yes Jabari Baxter MD   Potassium Gluconate 550 MG tablet Take 1 tablet by mouth Daily.   Yes Jabari Baxter MD   Abaloparatide (Tymlos) 3120 MCG/1.56ML solution pen-injector Inject 0.04 mL under the skin into the appropriate area as directed Daily.  Patient taking  differently: Inject 80 mcg under the skin into the appropriate area as directed Daily. Not started 3/19/21 1/14/21   Karen Mack MD   acetaminophen (TYLENOL) 500 MG tablet Take 500 mg by mouth Every 6 (Six) Hours As Needed for Mild Pain .    Provider, MD Jabari       Allergies:  No Known Allergies    Objective   Objective     Vital Signs  Temp:  [97 °F (36.1 °C)-98.1 °F (36.7 °C)] 98.1 °F (36.7 °C)  Heart Rate:  [63-77] 71  Resp:  [13-19] 16  BP: (110-145)/() 138/81  SpO2:  [90 %-99 %] 96 %  on  Flow (L/min):  [2] 2;   Device (Oxygen Therapy): nasal cannula  Body mass index is 34.55 kg/m².    Physical Exam  Vitals and nursing note reviewed.   Constitutional:       General: She is not in acute distress.     Appearance: Normal appearance. She is not ill-appearing.   HENT:      Head: Normocephalic and atraumatic.      Right Ear: External ear normal.      Left Ear: External ear normal.      Nose: Nose normal. No congestion or rhinorrhea.      Mouth/Throat:      Mouth: Mucous membranes are dry.   Eyes:      General: No scleral icterus.        Right eye: No discharge.         Left eye: No discharge.      Extraocular Movements: Extraocular movements intact.      Conjunctiva/sclera: Conjunctivae normal.      Pupils: Pupils are equal, round, and reactive to light.   Cardiovascular:      Rate and Rhythm: Normal rate and regular rhythm.      Pulses: Normal pulses.      Heart sounds: Normal heart sounds. No murmur heard.     Pulmonary:      Effort: Pulmonary effort is normal. No respiratory distress.      Breath sounds: Normal breath sounds.   Abdominal:      General: There is no distension.      Palpations: Abdomen is soft.      Tenderness: There is abdominal tenderness.      Comments: Hypoactive bowel sounds; NG tube; drain x 2    Musculoskeletal:         General: No swelling. Normal range of motion.      Cervical back: Normal range of motion and neck supple.   Skin:     General: Skin is warm and dry.       Capillary Refill: Capillary refill takes less than 2 seconds.      Coloration: Skin is pale. Skin is not jaundiced.   Neurological:      General: No focal deficit present.      Mental Status: She is alert and oriented to person, place, and time.   Psychiatric:         Mood and Affect: Mood normal.         Behavior: Behavior normal.         Thought Content: Thought content normal.         Judgment: Judgment normal.           Results Review:  I have personally reviewed most recent cardiac tracings, lab results and radiology images and interpretations and agree with findings.    Results from last 7 days   Lab Units 03/19/21  2206   WBC 10*3/mm3 24.30*   HEMOGLOBIN g/dL 12.1   HEMATOCRIT % 37.9   PLATELETS 10*3/mm3 274     Results from last 7 days   Lab Units 03/19/21  2206 03/19/21  1537   SODIUM mmol/L 138 134*   POTASSIUM mmol/L 4.4 4.2   CHLORIDE mmol/L 103 97*   CO2 mmol/L 24.0 21.0*   BUN mg/dL 23* 32*   CREATININE mg/dL 0.78 0.81   GLUCOSE mg/dL 125* 125*   CALCIUM mg/dL 8.2* 9.5   ALT (SGPT) U/L 33 12   AST (SGOT) U/L 29 12   TROPONIN T ng/mL  --  <0.010   PROBNP pg/mL  --  335.6     Estimated Creatinine Clearance: 74.8 mL/min (by C-G formula based on SCr of 0.78 mg/dL).  Brief Urine Lab Results     None          Microbiology Results (last 10 days)     Procedure Component Value - Date/Time    COVID PRE-OP / PRE-PROCEDURE SCREENING ORDER (NO ISOLATION) - Swab, Nasopharynx [[id number]]  (Normal) Collected: 03/19/21 1729    Lab Status: Final result Specimen: Swab from Nasopharynx Updated: 03/19/21 1822    Narrative:      The following orders were created for panel order COVID PRE-OP / PRE-PROCEDURE SCREENING ORDER (NO ISOLATION) - Swab, Nasopharynx.  Procedure                               Abnormality         Status                     ---------                               -----------         ------                     COVID-19,CEPHEID,COR/PAOLA...[[id number]]  Normal              Final result                 Please  view results for these tests on the individual orders.    COVID-19,CEPHEID,COR/PAOLA/PAD IN-HOUSE(OR EMERGENT/ADD-ON),NP SWAB IN TRANSPORT MEDIA 3-4 HR TAT, RT-PCR - Swab, Nasopharynx [[id number]]  (Normal) Collected: 03/19/21 1729    Lab Status: Final result Specimen: Swab from Nasopharynx Updated: 03/19/21 1822     COVID19 Not Detected    Narrative:      Fact sheet for providers: https://www.fda.gov/media/414810/download     Fact sheet for patients: https://www.fda.gov/media/643553/download        I have personally reviewed the EKG and noted sinus rhythm with rate 63 with poor R wave progression.   ECG/EMG Results (most recent)     Procedure Component Value Units Date/Time    ECG 12 Lead [[id number]] Collected: 03/19/21 1545     Updated: 03/19/21 1547     QT Interval 387 ms     Narrative:      HEART RATE= 63  bpm  RR Interval= 948  ms  WV Interval= 150  ms  P Horizontal Axis= -12  deg  P Front Axis= 38  deg  QRSD Interval= 97  ms  QT Interval= 387  ms  QRS Axis= 19  deg  T Wave Axis= 42  deg  - ABNORMAL ECG -  Sinus rhythm  Probable left atrial enlargement  Anteroseptal infarct, age indeterminate  Electronically Signed By:   Date and Time of Study: 2021-03-19 15:45:25                  CT Abdomen Pelvis With Contrast    Result Date: 3/19/2021   1. Free intraperitoneal air in the abdomen indicating perforated viscus. 2. Abnormal finding of thickening/inflammation distal gastric body wall, greatest anteriorly. FINDINGS may reflect perforated gastric ulcer. Consider endoscopic correlation.    Electronically Signed By-Mahnaz Ortiz MD On:3/19/2021 4:40 PM This report was finalized on [id number] by  Mahnaz Ortiz MD.    XR Chest 1 View    Result Date: 3/19/2021  Free air underneath the right hemidiaphragm. FINDINGS worrisome for perforated viscus. Dedicated CT abdomen and pelvis is recommended for further evaluation. I spoke with the clinician in the emergency room at the time of this dictation.  Electronically Signed  By-Mahnaz Ortiz MD On:3/19/2021 4:00 PM This report was finalized on [id number] by  Manhaz Ortiz MD.    CT Chest Pulmonary Embolism    Result Date: 3/19/2021   1. No evidence of pulmonary embolus. 2. No acute cardiac pulmonary process. 3. Ancillary findings as described above.     Electronically Signed By-Daniella Go MD On:3/19/2021 4:38 PM This report was finalized on [id number] by  Daniella Go MD.        Estimated Creatinine Clearance: 74.8 mL/min (by C-G formula based on SCr of 0.78 mg/dL).    Assessment/Plan   Assessment/Plan       Active Hospital Problems    Diagnosis  POA   • **Acute gastric ulcer with perforation (CMS/HCC) [K25.1]  Yes     Priority: High   • Avascular necrosis of bone (CMS/HCC) [M87.00]  Yes     Priority: High   • Atrial fibrillation (CMS/HCC) [I48.91]  Yes     Priority: High   • Sick sinus syndrome (CMS/HCC) [I49.5]  Yes     Priority: Medium   • Pulmonary hypertension (CMS/HCC) [I27.20]  Yes     Priority: Medium   • Hyperthyroidism [E05.90]  Yes     Priority: Low   • Hypertension [I10]  Yes     Priority: Low      Resolved Hospital Problems   No resolved problems to display.     Gastric ulcer with perforation, acute, status post surgical repair: Strict n.p.o.; followed by general surgery; NG tube; IV Protonix; continue IV Zosyn; hold NSAIDs; repeat WBC    --Ermias drains x2    --WBC 24.3    Osteoporosis, chronic: hold calcium with vitamin D hold glucosamine/chondroitin hold magnesium; hold Tymlos    Hypothyroidism, chronic: Continue Tapazole once taking p.o.-will need to be restarted ; check TSH    Atrial fibrillation, chronic: Hold oral metoprolol; add IV metoprolol; continue to hold Eliquis    --The patient has not been taking Eliquis secondary to being out of the medication    --Patient current sinus rhythm per EKG     Chronic pain secondary to avascular necrosis, right foot: Patient is currently on analgesics for acute abdominal pain but will likely need addition of pain  medication as she will no longer be able to take NSAIDs    Dietary supplementation, chronic: Hold magnesium; hold potassium      VTE Prophylaxis -   Mechanical Order History:      Ordered        03/19/21 2123  Place Sequential Compression Device  Once         03/19/21 2123  Place Sequential Compression Device  Once         03/19/21 2123  Maintain Sequential Compression Device  Continuous                 Pharmalogical Order History:     None          CODE STATUS:    Code Status and Medical Interventions:   Ordered at: 03/19/21 2123     Code Status:    CPR     Medical Interventions (Level of Support Prior to Arrest):    Full       This patient has been examined wearing appropriate Personal Protective Equipment. 03/20/21      I discussed the patient's findings and my recommendations with patient.      Signature:Electronically signed by MARLIN Johnson, 03/20/21, 2:25 AM EDT.  [facility] Hospitalist Team

## 2021-03-21 ENCOUNTER — APPOINTMENT (OUTPATIENT)
Dept: GENERAL RADIOLOGY | Facility: HOSPITAL | Age: 61
End: 2021-03-21

## 2021-03-21 LAB
ALBUMIN SERPL-MCNC: 2.9 G/DL (ref 3.5–5.2)
ALBUMIN/GLOB SERPL: 1.2 G/DL
ALP SERPL-CCNC: 51 U/L (ref 39–117)
ALT SERPL W P-5'-P-CCNC: 19 U/L (ref 1–33)
ANION GAP SERPL CALCULATED.3IONS-SCNC: 11 MMOL/L (ref 5–15)
AST SERPL-CCNC: 15 U/L (ref 1–32)
BASOPHILS # BLD AUTO: 0 10*3/MM3 (ref 0–0.2)
BASOPHILS NFR BLD AUTO: 0.1 % (ref 0–1.5)
BILIRUB SERPL-MCNC: 0.4 MG/DL (ref 0–1.2)
BUN SERPL-MCNC: 11 MG/DL (ref 6–20)
BUN/CREAT SERPL: 15.5 (ref 7–25)
CALCIUM SPEC-SCNC: 8.8 MG/DL (ref 8.6–10.5)
CHLORIDE SERPL-SCNC: 109 MMOL/L (ref 98–107)
CO2 SERPL-SCNC: 21 MMOL/L (ref 22–29)
CREAT SERPL-MCNC: 0.71 MG/DL (ref 0.57–1)
DEPRECATED RDW RBC AUTO: 49.9 FL (ref 37–54)
EOSINOPHIL # BLD AUTO: 0.1 10*3/MM3 (ref 0–0.4)
EOSINOPHIL NFR BLD AUTO: 0.4 % (ref 0.3–6.2)
ERYTHROCYTE [DISTWIDTH] IN BLOOD BY AUTOMATED COUNT: 16.3 % (ref 12.3–15.4)
ERYTHROCYTE [SEDIMENTATION RATE] IN BLOOD: 43 MM/HR (ref 0–30)
GFR SERPL CREATININE-BSD FRML MDRD: 85 ML/MIN/1.73
GLOBULIN UR ELPH-MCNC: 2.4 GM/DL
GLUCOSE BLDC GLUCOMTR-MCNC: 82 MG/DL (ref 70–105)
GLUCOSE SERPL-MCNC: 115 MG/DL (ref 65–99)
HCT VFR BLD AUTO: 36.2 % (ref 34–46.6)
HGB BLD-MCNC: 11.5 G/DL (ref 12–15.9)
LYMPHOCYTES # BLD AUTO: 1.5 10*3/MM3 (ref 0.7–3.1)
LYMPHOCYTES NFR BLD AUTO: 7.8 % (ref 19.6–45.3)
MCH RBC QN AUTO: 27.7 PG (ref 26.6–33)
MCHC RBC AUTO-ENTMCNC: 31.7 G/DL (ref 31.5–35.7)
MCV RBC AUTO: 87.4 FL (ref 79–97)
MONOCYTES # BLD AUTO: 0.6 10*3/MM3 (ref 0.1–0.9)
MONOCYTES NFR BLD AUTO: 3.2 % (ref 5–12)
NEUTROPHILS NFR BLD AUTO: 17.2 10*3/MM3 (ref 1.7–7)
NEUTROPHILS NFR BLD AUTO: 88.5 % (ref 42.7–76)
NRBC BLD AUTO-RTO: 0 /100 WBC (ref 0–0.2)
PLATELET # BLD AUTO: 259 10*3/MM3 (ref 140–450)
PMV BLD AUTO: 7.2 FL (ref 6–12)
POTASSIUM SERPL-SCNC: 3.7 MMOL/L (ref 3.5–5.2)
PROCALCITONIN SERPL-MCNC: 1.85 NG/ML (ref 0–0.25)
PROT SERPL-MCNC: 5.3 G/DL (ref 6–8.5)
QT INTERVAL: 387 MS
RBC # BLD AUTO: 4.14 10*6/MM3 (ref 3.77–5.28)
SODIUM SERPL-SCNC: 141 MMOL/L (ref 136–145)
T3FREE SERPL-MCNC: 1.7 PG/ML (ref 2–4.4)
T4 FREE SERPL-MCNC: 3.11 NG/DL (ref 0.93–1.7)
TSH SERPL DL<=0.05 MIU/L-ACNC: <0.005 UIU/ML (ref 0.27–4.2)
WBC # BLD AUTO: 19.4 10*3/MM3 (ref 3.4–10.8)

## 2021-03-21 PROCEDURE — 84443 ASSAY THYROID STIM HORMONE: CPT | Performed by: INTERNAL MEDICINE

## 2021-03-21 PROCEDURE — 25010000002 HYDROMORPHONE PER 4 MG: Performed by: HOSPITALIST

## 2021-03-21 PROCEDURE — 94799 UNLISTED PULMONARY SVC/PX: CPT

## 2021-03-21 PROCEDURE — 85025 COMPLETE CBC W/AUTO DIFF WBC: CPT | Performed by: HOSPITALIST

## 2021-03-21 PROCEDURE — 25010000002 PIPERACILLIN SOD-TAZOBACTAM PER 1 G: Performed by: HOSPITALIST

## 2021-03-21 PROCEDURE — 82962 GLUCOSE BLOOD TEST: CPT

## 2021-03-21 PROCEDURE — 84481 FREE ASSAY (FT-3): CPT | Performed by: INTERNAL MEDICINE

## 2021-03-21 PROCEDURE — 84439 ASSAY OF FREE THYROXINE: CPT | Performed by: INTERNAL MEDICINE

## 2021-03-21 PROCEDURE — 80053 COMPREHEN METABOLIC PANEL: CPT | Performed by: HOSPITALIST

## 2021-03-21 PROCEDURE — 99254 IP/OBS CNSLTJ NEW/EST MOD 60: CPT | Performed by: INTERNAL MEDICINE

## 2021-03-21 PROCEDURE — 93010 ELECTROCARDIOGRAM REPORT: CPT | Performed by: INTERNAL MEDICINE

## 2021-03-21 PROCEDURE — 99232 SBSQ HOSP IP/OBS MODERATE 35: CPT | Performed by: HOSPITALIST

## 2021-03-21 PROCEDURE — 71045 X-RAY EXAM CHEST 1 VIEW: CPT

## 2021-03-21 PROCEDURE — 84145 PROCALCITONIN (PCT): CPT | Performed by: HOSPITALIST

## 2021-03-21 PROCEDURE — 93005 ELECTROCARDIOGRAM TRACING: CPT | Performed by: INTERNAL MEDICINE

## 2021-03-21 PROCEDURE — 84445 ASSAY OF TSI GLOBULIN: CPT | Performed by: INTERNAL MEDICINE

## 2021-03-21 PROCEDURE — 25010000002 AMIODARONE PER 30 MG: Performed by: INTERNAL MEDICINE

## 2021-03-21 PROCEDURE — 86376 MICROSOMAL ANTIBODY EACH: CPT | Performed by: INTERNAL MEDICINE

## 2021-03-21 PROCEDURE — 83520 IMMUNOASSAY QUANT NOS NONAB: CPT | Performed by: INTERNAL MEDICINE

## 2021-03-21 PROCEDURE — 85652 RBC SED RATE AUTOMATED: CPT | Performed by: INTERNAL MEDICINE

## 2021-03-21 PROCEDURE — 99233 SBSQ HOSP IP/OBS HIGH 50: CPT | Performed by: INTERNAL MEDICINE

## 2021-03-21 RX ORDER — AMIODARONE HCL/D5W 450 MG/250
0.5 PLASTIC BAG, INJECTION (ML) INTRAVENOUS CONTINUOUS
Status: DISCONTINUED | OUTPATIENT
Start: 2021-03-21 | End: 2021-03-21

## 2021-03-21 RX ORDER — AMIODARONE HCL/D5W 450 MG/250
0.5 PLASTIC BAG, INJECTION (ML) INTRAVENOUS CONTINUOUS
Status: DISCONTINUED | OUTPATIENT
Start: 2021-03-21 | End: 2021-03-22

## 2021-03-21 RX ORDER — AMIODARONE HYDROCHLORIDE 200 MG/1
400 TABLET ORAL EVERY 12 HOURS SCHEDULED
Status: DISCONTINUED | OUTPATIENT
Start: 2021-03-21 | End: 2021-03-21

## 2021-03-21 RX ORDER — DIGOXIN 125 MCG
125 TABLET ORAL
Status: DISCONTINUED | OUTPATIENT
Start: 2021-03-21 | End: 2021-03-21

## 2021-03-21 RX ADMIN — SODIUM CHLORIDE 125 ML/HR: 9 INJECTION, SOLUTION INTRAVENOUS at 03:05

## 2021-03-21 RX ADMIN — Medication 10 ML: at 04:12

## 2021-03-21 RX ADMIN — SODIUM CHLORIDE 125 ML/HR: 9 INJECTION, SOLUTION INTRAVENOUS at 21:01

## 2021-03-21 RX ADMIN — HYDROMORPHONE HYDROCHLORIDE 0.5 MG: 2 INJECTION, SOLUTION INTRAMUSCULAR; INTRAVENOUS; SUBCUTANEOUS at 09:32

## 2021-03-21 RX ADMIN — METOPROLOL TARTRATE 5 MG: 5 INJECTION INTRAVENOUS at 02:01

## 2021-03-21 RX ADMIN — AMIODARONE HYDROCHLORIDE 1 MG/MIN: 50 INJECTION, SOLUTION INTRAVENOUS at 01:57

## 2021-03-21 RX ADMIN — PIPERACILLIN AND TAZOBACTAM 3.38 G: 3; .375 INJECTION, POWDER, LYOPHILIZED, FOR SOLUTION INTRAVENOUS at 23:03

## 2021-03-21 RX ADMIN — PIPERACILLIN AND TAZOBACTAM 3.38 G: 3; .375 INJECTION, POWDER, LYOPHILIZED, FOR SOLUTION INTRAVENOUS at 06:10

## 2021-03-21 RX ADMIN — HYDROMORPHONE HYDROCHLORIDE 0.5 MG: 2 INJECTION, SOLUTION INTRAMUSCULAR; INTRAVENOUS; SUBCUTANEOUS at 21:05

## 2021-03-21 RX ADMIN — IPRATROPIUM BROMIDE AND ALBUTEROL SULFATE 3 ML: 2.5; .5 SOLUTION RESPIRATORY (INHALATION) at 20:53

## 2021-03-21 RX ADMIN — HYDROMORPHONE HYDROCHLORIDE 0.5 MG: 2 INJECTION, SOLUTION INTRAMUSCULAR; INTRAVENOUS; SUBCUTANEOUS at 12:50

## 2021-03-21 RX ADMIN — AMIODARONE HYDROCHLORIDE 1 MG/MIN: 50 INJECTION, SOLUTION INTRAVENOUS at 09:05

## 2021-03-21 RX ADMIN — BUDESONIDE 0.5 MG: 0.5 INHALANT RESPIRATORY (INHALATION) at 07:59

## 2021-03-21 RX ADMIN — HYDROMORPHONE HYDROCHLORIDE 0.5 MG: 2 INJECTION, SOLUTION INTRAMUSCULAR; INTRAVENOUS; SUBCUTANEOUS at 18:24

## 2021-03-21 RX ADMIN — Medication 10 ML: at 00:30

## 2021-03-21 RX ADMIN — Medication 10 ML: at 04:08

## 2021-03-21 RX ADMIN — Medication 10 ML: at 06:10

## 2021-03-21 RX ADMIN — METOPROLOL TARTRATE 5 MG: 5 INJECTION INTRAVENOUS at 10:20

## 2021-03-21 RX ADMIN — PANTOPRAZOLE SODIUM 40 MG: 40 INJECTION, POWDER, FOR SOLUTION INTRAVENOUS at 09:04

## 2021-03-21 RX ADMIN — PIPERACILLIN AND TAZOBACTAM 3.38 G: 3; .375 INJECTION, POWDER, LYOPHILIZED, FOR SOLUTION INTRAVENOUS at 15:09

## 2021-03-21 RX ADMIN — HYDROMORPHONE HYDROCHLORIDE 0.5 MG: 2 INJECTION, SOLUTION INTRAMUSCULAR; INTRAVENOUS; SUBCUTANEOUS at 15:15

## 2021-03-21 RX ADMIN — PANTOPRAZOLE SODIUM 40 MG: 40 INJECTION, POWDER, FOR SOLUTION INTRAVENOUS at 21:00

## 2021-03-21 RX ADMIN — IPRATROPIUM BROMIDE AND ALBUTEROL SULFATE 3 ML: 2.5; .5 SOLUTION RESPIRATORY (INHALATION) at 11:50

## 2021-03-21 RX ADMIN — HYDROMORPHONE HYDROCHLORIDE 0.5 MG: 2 INJECTION, SOLUTION INTRAMUSCULAR; INTRAVENOUS; SUBCUTANEOUS at 00:29

## 2021-03-21 RX ADMIN — HYDROMORPHONE HYDROCHLORIDE 0.5 MG: 2 INJECTION, SOLUTION INTRAMUSCULAR; INTRAVENOUS; SUBCUTANEOUS at 04:08

## 2021-03-21 RX ADMIN — SODIUM CHLORIDE 125 ML/HR: 9 INJECTION, SOLUTION INTRAVENOUS at 11:16

## 2021-03-21 RX ADMIN — METOPROLOL TARTRATE 5 MG: 5 INJECTION INTRAVENOUS at 18:02

## 2021-03-21 RX ADMIN — Medication 10 ML: at 21:00

## 2021-03-21 RX ADMIN — BUDESONIDE 0.5 MG: 0.5 INHALANT RESPIRATORY (INHALATION) at 20:53

## 2021-03-21 RX ADMIN — Medication 1 PATCH: at 09:04

## 2021-03-21 RX ADMIN — IPRATROPIUM BROMIDE AND ALBUTEROL SULFATE 3 ML: 2.5; .5 SOLUTION RESPIRATORY (INHALATION) at 07:59

## 2021-03-21 NOTE — NURSING NOTE
Dressing change done to patient surgical incision site aseptic tech followed. Staples intact. No redness noted around the site.

## 2021-03-21 NOTE — PLAN OF CARE
Goal Outcome Evaluation:         Pt sat in the chair; medicated multiple times for pain. Hr stable.  Will continue to monitor.

## 2021-03-21 NOTE — PROGRESS NOTES
"   LOS: 2 days   Patient Care Team:  Provider, No Known as PCP - General    Reason for follow-up: Postop    Subjective   Patient seen and examined.  Was transferred to PCU for cardiac issues last night    Objective   Abdomen is soft.  Incision clean dry and intact without infection.  NG with appropriate color and output now.  Ermias drains with appropriate color and output    Vital Signs  Vitals:    03/21/21 1020 03/21/21 1147 03/21/21 1150 03/21/21 1200   BP:  144/74     BP Location:  Left arm     Patient Position:  Lying     Pulse: 74 67 69 69   Resp:  18 18 20   Temp:  96.8 °F (36 °C)     TempSrc:  Tympanic     SpO2:  96% 100% 100%   Weight:       Height:             Results Review:       Lab Results (last 24 hours)     Procedure Component Value Units Date/Time    Body Fluid Culture - Peritoneal Fluid, Peritoneum [[id number]] Collected: 03/19/21 1916    Specimen: Peritoneal Fluid from Peritoneum Updated: 03/21/21 0952     Body Fluid Culture No growth at 2 days     Gram Stain Many (4+) WBCs per low power field      No organisms seen    Procalcitonin [[id number]]  (Abnormal) Collected: 03/21/21 0436    Specimen: Blood Updated: 03/21/21 0523     Procalcitonin 1.85 ng/mL     Narrative:      As a Marker for Sepsis (Non-Neonates):   1. <0.5 ng/mL represents a low risk of severe sepsis and/or septic shock.  1. >2 ng/mL represents a high risk of severe sepsis and/or septic shock.    As a Marker for Lower Respiratory Tract Infections that require antibiotic therapy:  PCT on Admission     Antibiotic Therapy             6-12 Hrs later  > 0.5                Strongly Recommended            >0.25 - <0.5         Recommended  0.1 - 0.25           Discouraged                   Remeasure/reassess PCT  <0.1                 Strongly Discouraged          Remeasure/reassess PCT      As 28 day mortality risk marker: \"Change in Procalcitonin Result\" (> 80 % or <=80 %) if Day 0 (or Day 1) and Day 4 values are available. Refer to " [email or web address]-pct-[email or web address]/   Change in PCT <=80 %   A decrease of PCT levels below or equal to 80 % defines a positive change in PCT test result representing a higher risk for 28-day all-cause mortality of patients diagnosed with severe sepsis or septic shock.  Change in PCT > 80 %   A decrease of PCT levels of more than 80 % defines a negative change in PCT result representing a lower risk for 28-day all-cause mortality of patients diagnosed with severe sepsis or septic shock.                Results may be falsely decreased if patient taking Biotin.     Comprehensive Metabolic Panel [[id number]]  (Abnormal) Collected: 03/21/21 0436    Specimen: Blood Updated: 03/21/21 0521     Glucose 115 mg/dL      BUN 11 mg/dL      Creatinine 0.71 mg/dL      Sodium 141 mmol/L      Potassium 3.7 mmol/L      Chloride 109 mmol/L      CO2 21.0 mmol/L      Calcium 8.8 mg/dL      Total Protein 5.3 g/dL      Albumin 2.90 g/dL      ALT (SGPT) 19 U/L      AST (SGOT) 15 U/L      Alkaline Phosphatase 51 U/L      Total Bilirubin 0.4 mg/dL      eGFR Non African Amer 85 mL/min/1.73      Globulin 2.4 gm/dL      A/G Ratio 1.2 g/dL      BUN/Creatinine Ratio 15.5     Anion Gap 11.0 mmol/L     Narrative:      GFR Normal >60  Chronic Kidney Disease <60  Kidney Failure <15      CBC & Differential [[id number]]  (Abnormal) Collected: 03/21/21 0436    Specimen: Blood Updated: 03/21/21 0447    Narrative:      The following orders were created for panel order CBC & Differential.  Procedure                               Abnormality         Status                     ---------                               -----------         ------                     CBC Auto Differential[[id number]]        Abnormal            Final result                 Please view results for these tests on the individual orders.    CBC Auto Differential [[id number]]  (Abnormal) Collected: 03/21/21 0436    Specimen: Blood Updated: 03/21/21 0447     WBC 19.40 10*3/mm3      RBC  4.14 10*6/mm3      Hemoglobin 11.5 g/dL      Hematocrit 36.2 %      MCV 87.4 fL      MCH 27.7 pg      MCHC 31.7 g/dL      RDW 16.3 %      RDW-SD 49.9 fl      MPV 7.2 fL      Platelets 259 10*3/mm3      Neutrophil % 88.5 %      Lymphocyte % 7.8 %      Monocyte % 3.2 %      Eosinophil % 0.4 %      Basophil % 0.1 %      Neutrophils, Absolute 17.20 10*3/mm3      Lymphocytes, Absolute 1.50 10*3/mm3      Monocytes, Absolute 0.60 10*3/mm3      Eosinophils, Absolute 0.10 10*3/mm3      Basophils, Absolute 0.00 10*3/mm3      nRBC 0.0 /100 WBC     Extra Tubes [[id number]] Collected: 03/20/21 1804    Specimen: Blood, Venous Line Updated: 03/20/21 1915    Narrative:      The following orders were created for panel order Extra Tubes.  Procedure                               Abnormality         Status                     ---------                               -----------         ------                     Light Blue Top[[id number]]                                   Final result               Lavender Top[[id number]]                                     Final result               Gold Top - SST[[id number]]                                   Final result                 Please view results for these tests on the individual orders.    Light Blue Top [[id number]] Collected: 03/20/21 1804    Specimen: Blood Updated: 03/20/21 1915     Extra Tube hold for add-on     Comment: Auto resulted       Lavender Top [[id number]] Collected: 03/20/21 1804    Specimen: Blood Updated: 03/20/21 1915     Extra Tube hold for add-on     Comment: Auto resulted       Lactic Acid, Plasma [[id number]]  (Normal) Collected: 03/20/21 1804    Specimen: Blood Updated: 03/20/21 1839     Lactate 1.1 mmol/L     Comprehensive Metabolic Panel [[id number]]  (Abnormal) Collected: 03/20/21 1804    Specimen: Blood Updated: 03/20/21 1836     Glucose 109 mg/dL      BUN 13 mg/dL      Creatinine 0.64 mg/dL      Sodium 140 mmol/L      Potassium 3.7 mmol/L      Chloride 109 mmol/L       CO2 22.0 mmol/L      Calcium 8.9 mg/dL      Total Protein 6.3 g/dL      Albumin 3.40 g/dL      ALT (SGPT) 21 U/L      AST (SGOT) 16 U/L      Alkaline Phosphatase 49 U/L      Total Bilirubin 0.4 mg/dL      eGFR Non African Amer 95 mL/min/1.73      Globulin 2.9 gm/dL      A/G Ratio 1.2 g/dL      BUN/Creatinine Ratio 20.3     Anion Gap 9.0 mmol/L     Narrative:      GFR Normal >60  Chronic Kidney Disease <60  Kidney Failure <15      Gold Top - SST [[id number]] Collected: 03/20/21 1804    Specimen: Blood Updated: 03/20/21 1824    POC Glucose Once [[id number]]  (Normal) Collected: 03/20/21 1807    Specimen: Blood Updated: 03/20/21 1808     Glucose 105 mg/dL      Comment: Serial Number: [id number]:  [id number]              Imaging Results (Last 24 Hours)     Procedure Component Value Units Date/Time    XR Chest 1 View [[id number]] Collected: 03/21/21 0805     Updated: 03/21/21 0808    Narrative:      Examination: XR CHEST 1 VW-     Date of Exam: 3/21/2021 6:19 AM     Indication: Shortness of breath.     Comparison: 03/20/2021.     Technique: Single radiographic view of the chest was obtained.     Findings:  Stable gastric suction tube. Lung volumes remain low. Stable coarse  interstitial opacities in the lungs. There are increasing left basilar  airspace opacities and new right midlung airspace opacities. Stable  cardiomegaly. No acute osseous abnormalities. No pneumothorax.       Impression:      Increasing bilateral airspace disease concerning for pneumonia.     Electronically Signed By-Brian Valenzuela MD On:3/21/2021 8:06 AM  This report was finalized on [id number] by  Brian Valenzuela MD.    XR Abdomen KUB [[id number]] Collected: 03/20/21 1913     Updated: 03/20/21 1916    Narrative:      DATE OF EXAM:  3/20/2021 6:56 PM     PROCEDURE:  XR ABDOMEN KUB-     INDICATIONS:  abd pain; K25.1-Acute gastric ulcer with perforation; R10.84-Generalized  abdominal pain; R07.9-Chest pain, unspecified; K28.6-Chronic  or  unspecified gastrojejunal ulcer with both hemorrhage and perforation;  K25.1-Acute gastric ulcer with perforation; K63.1-Perforation of  intestine (nontraumatic)     COMPARISON:  No Comparisons Available     TECHNIQUE:   Single radiographic view of the abdomen was obtained.     FINDINGS:  Nasogastric tube is in place the tip projecting over the expected  location of the body the stomach.  There are surgical skin staples  overlying midline of the abdomen.  There are multiple air-filled but  nondistended loops of small bowel within the left side of the abdomen  most likely due to postoperative ileus.  There appear to be at least 2  LIZBETH drains projecting over the abdomen.  Patient status post total left  hip arthroplasty.        Impression:         1.  Nasogastric tube in place with the tip in the expected location of  the body the stomach.  2.  Air-filled but nondistended loops of small bowel within the left  side of the abdomen which may be due to mild ileus.     Electronically Signed By-Amauri Mtz MD On:3/20/2021 7:14 PM  This report was finalized on [id number] by  Amauri Mtz MD.          Medication Review:     Assessment/Plan         Acute gastric ulcer with perforation (CMS/HCC)    Avascular necrosis of bone (CMS/HCC)    Hypertension    Atrial fibrillation (CMS/HCC)    Hyperthyroidism    Pulmonary hypertension (CMS/HCC)    Sick sinus syndrome (CMS/HCC)    Impression: Postop day 2 exploratory laparotomy with repair of perforated gastric ulcer, A. fib with rapid ventricular response postop    Plan: Needs to be strict n.p.o. until Wednesday when we do Gastrografin study          Luisito Velasco,   03/21/21  13:39 EDT

## 2021-03-21 NOTE — NURSING NOTE
Spoke with Dr. Velasco and informed him that I noticed  Patient's  NGT tube has some  red colored drainage. He says to place  NGT back to LIS.

## 2021-03-21 NOTE — PROGRESS NOTES
"PULMONARY CRITICAL CARE Progress  NOTE      PATIENT IDENTIFICATION:  Name: Abi Rivera  MRN: [id number]  :  1960     Age: 60 y.o.  Sex: female    DATE OF Note:  3/21/2021   Referring Physician: Jose Cleaning MD                  Subjective:   Feeling better, No SOB reported, no chest pain, no nausea or vomiting, no change in bowel habit, no dysuria,  no new  skin rash or itching.      Objective:  tMax 24 hrs: Temp (24hrs), Av.9 °F (37.2 °C), Min:98 °F (36.7 °C), Max:100 °F (37.8 °C)      Vitals Ranges:   Temp:  [98 °F (36.7 °C)-100 °F (37.8 °C)] 98.1 °F (36.7 °C)  Heart Rate:  [] 74  Resp:  [15-24] 18  BP: ()/() 132/64    Intake and Output Last 3 Shifts:   I/O last 3 completed shifts:  In:  [I.V.:]  Out:  [Urine:4050; Emesis/NG output:310; Drains:395]    Exam:  /64   Pulse 74   Temp 98.1 °F (36.7 °C) (Oral)   Resp 18   Ht 149.9 cm (59\")   Wt 75.6 kg (166 lb 11.2 oz)   SpO2 93%   Breastfeeding No   BMI 33.67 kg/m²     General Appearance:   Alert and awake   HEENT:  Normocephalic, without obvious abnormality, Conjunctiva/corneas clear,.  Normal external ear canals, Nares normal, no drainage     Neck:  Supple, symmetrical, trachea midline. No JVD.  Lungs /Chest wall:   Bilateral basal rhonchi improving, respirations unlabored symmetrical wall movement.    Heart:  Regular rate and rhythm, systolic murmur PMI left sternal border  Abdomen: Soft, clean dressing, minimal drainage in the drain, tender all over, no masses, no organomegaly.    Extremities: Trace edema no clubbing or Cyanosis        Medications:    Current Facility-Administered Medications:   •  amiodarone 450 mg in 250 mL D5W infusion, 0.5 mg/min, Intravenous, Continuous, Dixon Singleton MD, Last Rate: 16.67 mL/hr at 21, 0.5 mg/min at 21  •  amiodarone in dextrose 5% (NEXTERONE) loading dose 150mg/100mL, 150 mg, Intravenous, Once, Percy Stanford MD  •  " arformoterol (BROVANA) nebulizer solution 15 mcg, 15 mcg, Nebulization, BID - RT, Jose-Egziabher, Dung I, DO, 15 mcg at 03/20/21 0727  •  budesonide (PULMICORT) nebulizer solution 0.5 mg, 0.5 mg, Nebulization, BID - RT, Jose-Egziabher, Dung I, DO, 0.5 mg at 03/21/21 0759  •  HYDROmorphone (DILAUDID) injection 0.5 mg, 0.5 mg, Intravenous, Q2H PRN, 0.5 mg at 03/21/21 0932 **AND** naloxone (NARCAN) injection 0.1 mg, 0.1 mg, Intravenous, Q5 Min PRN, Jose-Egziabher, Dung I, DO  •  ipratropium-albuterol (DUO-NEB) nebulizer solution 3 mL, 3 mL, Nebulization, Q4H - RT, Jose-Egziabher, Dung I, DO, 3 mL at 03/21/21 0759  •  metoprolol tartrate (LOPRESSOR) injection 5 mg, 5 mg, Intravenous, Q8H, Jose-Egziabher, Dung I, DO, 5 mg at 03/21/21 1020  •  Morphine sulfate (PF) injection 4 mg, 4 mg, Intravenous, Q2H PRN, 4 mg at 03/20/21 0905 **AND** naloxone (NARCAN) injection 0.4 mg, 0.4 mg, Intravenous, Q5 Min PRN, Jose-Egziabher, Dung I, DO  •  nicotine (NICODERM CQ) 21 MG/24HR patch 1 patch, 1 patch, Transdermal, Q24H, Jose-Egziabher, Dung I, DO, 1 patch at 03/21/21 0904  •  ondansetron (ZOFRAN) tablet 4 mg, 4 mg, Oral, Q6H PRN **OR** ondansetron (ZOFRAN) injection 4 mg, 4 mg, Intravenous, Q6H PRN, Jose-Egziabher, Dung I, DO  •  pantoprazole (PROTONIX) injection 40 mg, 40 mg, Intravenous, Q12H, Jose-Egziabher, Dung I, DO, 40 mg at 03/21/21 0904  •  piperacillin-tazobactam (ZOSYN) IVPB 3.375 g in 100 mL NS (CD), 3.375 g, Intravenous, Q8H, Jose-Egziabher, Dung I, DO, 3.375 g at 03/21/21 0610  •  sodium chloride 0.9 % bolus 1,000 mL, 1,000 mL, Intravenous, Once, Jose-Egziabher, Dung I, DO  •  sodium chloride 0.9 % flush 10 mL, 10 mL, Intravenous, PRN, Jose-Egziabher, Dung I, DO, 10 mL at 03/21/21 0030  •  sodium chloride 0.9 % flush 10 mL, 10 mL, Intravenous, PRN, Jose-Egziabher, Dung I, DO, 10 mL at 03/21/21 0610  •  sodium chloride 0.9 % infusion, 125 mL/hr, Intravenous, Continuous, Jose-Egziabher, Dung I, DO, Last  Rate: 125 mL/hr at 03/21/21 0305, 125 mL/hr at 03/21/21 0305    Data Review:  All labs (24hrs):   Recent Results (from the past 24 hour(s))   Comprehensive Metabolic Panel    Collection Time: 03/20/21  6:04 PM    Specimen: Blood   Result Value Ref Range    Glucose 109 (H) 65 - 99 mg/dL    BUN 13 6 - 20 mg/dL    Creatinine 0.64 0.57 - 1.00 mg/dL    Sodium 140 136 - 145 mmol/L    Potassium 3.7 3.5 - 5.2 mmol/L    Chloride 109 (H) 98 - 107 mmol/L    CO2 22.0 22.0 - 29.0 mmol/L    Calcium 8.9 8.6 - 10.5 mg/dL    Total Protein 6.3 6.0 - 8.5 g/dL    Albumin 3.40 (L) 3.50 - 5.20 g/dL    ALT (SGPT) 21 1 - 33 U/L    AST (SGOT) 16 1 - 32 U/L    Alkaline Phosphatase 49 39 - 117 U/L    Total Bilirubin 0.4 0.0 - 1.2 mg/dL    eGFR Non African Amer 95 >60 mL/min/1.73    Globulin 2.9 gm/dL    A/G Ratio 1.2 g/dL    BUN/Creatinine Ratio 20.3 7.0 - 25.0    Anion Gap 9.0 5.0 - 15.0 mmol/L   Lactic Acid, Plasma    Collection Time: 03/20/21  6:04 PM    Specimen: Blood   Result Value Ref Range    Lactate 1.1 0.5 - 2.0 mmol/L   Light Blue Top    Collection Time: 03/20/21  6:04 PM   Result Value Ref Range    Extra Tube hold for add-on    Lavender Top    Collection Time: 03/20/21  6:04 PM   Result Value Ref Range    Extra Tube hold for add-on    POC Glucose Once    Collection Time: 03/20/21  6:07 PM    Specimen: Blood   Result Value Ref Range    Glucose 105 70 - 105 mg/dL   ECG 12 Lead    Collection Time: 03/20/21  6:14 PM   Result Value Ref Range    QT Interval 254 ms   Comprehensive Metabolic Panel    Collection Time: 03/21/21  4:36 AM    Specimen: Blood   Result Value Ref Range    Glucose 115 (H) 65 - 99 mg/dL    BUN 11 6 - 20 mg/dL    Creatinine 0.71 0.57 - 1.00 mg/dL    Sodium 141 136 - 145 mmol/L    Potassium 3.7 3.5 - 5.2 mmol/L    Chloride 109 (H) 98 - 107 mmol/L    CO2 21.0 (L) 22.0 - 29.0 mmol/L    Calcium 8.8 8.6 - 10.5 mg/dL    Total Protein 5.3 (L) 6.0 - 8.5 g/dL    Albumin 2.90 (L) 3.50 - 5.20 g/dL    ALT (SGPT) 19 1 - 33 U/L     AST (SGOT) 15 1 - 32 U/L    Alkaline Phosphatase 51 39 - 117 U/L    Total Bilirubin 0.4 0.0 - 1.2 mg/dL    eGFR Non African Amer 85 >60 mL/min/1.73    Globulin 2.4 gm/dL    A/G Ratio 1.2 g/dL    BUN/Creatinine Ratio 15.5 7.0 - 25.0    Anion Gap 11.0 5.0 - 15.0 mmol/L   Procalcitonin    Collection Time: 03/21/21  4:36 AM    Specimen: Blood   Result Value Ref Range    Procalcitonin 1.85 (H) 0.00 - 0.25 ng/mL   CBC Auto Differential    Collection Time: 03/21/21  4:36 AM    Specimen: Blood   Result Value Ref Range    WBC 19.40 (H) 3.40 - 10.80 10*3/mm3    RBC 4.14 3.77 - 5.28 10*6/mm3    Hemoglobin 11.5 (L) 12.0 - 15.9 g/dL    Hematocrit 36.2 34.0 - 46.6 %    MCV 87.4 79.0 - 97.0 fL    MCH 27.7 26.6 - 33.0 pg    MCHC 31.7 31.5 - 35.7 g/dL    RDW 16.3 (H) 12.3 - 15.4 %    RDW-SD 49.9 37.0 - 54.0 fl    MPV 7.2 6.0 - 12.0 fL    Platelets 259 140 - 450 10*3/mm3    Neutrophil % 88.5 (H) 42.7 - 76.0 %    Lymphocyte % 7.8 (L) 19.6 - 45.3 %    Monocyte % 3.2 (L) 5.0 - 12.0 %    Eosinophil % 0.4 0.3 - 6.2 %    Basophil % 0.1 0.0 - 1.5 %    Neutrophils, Absolute 17.20 (H) 1.70 - 7.00 10*3/mm3    Lymphocytes, Absolute 1.50 0.70 - 3.10 10*3/mm3    Monocytes, Absolute 0.60 0.10 - 0.90 10*3/mm3    Eosinophils, Absolute 0.10 0.00 - 0.40 10*3/mm3    Basophils, Absolute 0.00 0.00 - 0.20 10*3/mm3    nRBC 0.0 0.0 - 0.2 /100 WBC   ECG 12 Lead    Collection Time: 03/21/21  7:09 AM   Result Value Ref Range    QT Interval 292 ms        Imaging:  XR Chest 1 View  Narrative: Examination: XR CHEST 1 VW-     Date of Exam: 3/21/2021 6:19 AM     Indication: Shortness of breath.     Comparison: 03/20/2021.     Technique: Single radiographic view of the chest was obtained.     Findings:  Stable gastric suction tube. Lung volumes remain low. Stable coarse  interstitial opacities in the lungs. There are increasing left basilar  airspace opacities and new right midlung airspace opacities. Stable  cardiomegaly. No acute osseous abnormalities. No  pneumothorax.     Impression: Increasing bilateral airspace disease concerning for pneumonia.     Electronically Signed By-Brian Valenzuela MD On:3/21/2021 8:06 AM  This report was finalized on [id number] by  Brian Valenzuela MD.       ASSESSMENT:  Acute gastric ulcer with perforation (CMS/HCC)  Peritonitis  COPD  Atrial fibrillation  Osteoarthritis  Hypothyroidism  Hypertension        PLAN:  PT/OT  Hemodynamic support  Broad-spectrum antibiotics to cover for perforated bowel  Postop care with surgery  Encouraged to use I-S flutter valve  Bronchodilator  Inhaled corticosteroids  Electrolytes/ glycemic control  DVT and GI prophylaxis.    Discussed with Dr Hermila Angel, APRN    3/21/2021  10:56 EDT     I personally have examined  and interviewed the patient. I have reviewed the history, data, problems, assessment and plan with our NP.  Critical care time in direct medical management (   ) minutes   Jose Cleaning MD, D,ABSM  3/21/2021

## 2021-03-21 NOTE — CONSULTS
Inpatient Endocrine Consult  Consultation requested by hospitalist team for hyperthyroidism  Patient Care Team:  Provider, No Known as PCP - General    Chief Complaint: Hyperthyroidism    HPI: 60-year-old female with history of hyerothyroidism, atrial fibrillation, hypertension presented with several days history of abdominal pain.  Patient was subsequently diagnosed with perforated gastric ulcer has undergone surgery.  She subsequently developed atrial fibrillation with rapid ventricular response and initially Cardizem was given however she did not respond so subsequently was put on amiodarone.  She was transferred to higher level of care and managed by cardiology.  Endocrine consultation was requested for hyperthyroidism as her TSH was undetectable.    For hyperthyroidism: Etiology unknown, she tells us that she has received radioactive iodine in the past but has been on methimazole 10 mg 3 times a day at least and tells me that she was taking it on regular basis prior to the presentation to the hospital.  Patient denies any hyperthyroid symptoms like tremors, sweating, palpitations, mood swings or weight loss.    Past Medical History:   Diagnosis Date   • Adenomatous polyp of colon 6/20/2016   • Anxiety 6/16/2016   • Arthritis of foot 1/10/2019   • Atrial fibrillation (CMS/HCC) 6/16/2016   • Avascular necrosis of bone (CMS/HCC) 1/10/2019   • Chronic pain 1/22/2019   • Closed displaced fracture 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 9/19/2019    Added automatically from request for surgery [id number]   • Hypertension 6/16/2016   • Hyperthyroidism 11/2/2016   • Mood disorder (CMS/HCC) 6/16/2016   • Osteoporosis without current pathological fracture 6/4/2020   • Pulmonary hypertension (CMS/HCC) 6/16/2016   • Sick sinus syndrome (CMS/HCC) 1/25/2017   • Tricuspid valve insufficiency 6/16/2016       Social History     Socioeconomic History   • Marital status:      Spouse name: Not on file   •  Number of children: Not on file   • Years of education: Not on file   • Highest education level: Not on file   Tobacco Use   • Smoking status: Current Every Day Smoker     Packs/day: 0.50     Types: Cigarettes   • Smokeless tobacco: Never Used   Substance and Sexual Activity   • Alcohol use: Yes     Comment: jayson   • Drug use: No   • Sexual activity: Defer       Family History   Problem Relation Age of Onset   • Hypertension Mother    • Diabetes Father    • Heart disease Father    • Hypertension Father        No Known Allergies    ROS:   Constitutional:  Denies fatigue, tiredness.    Eyes:  Denies change in visual acuity   HENT:  Denies nasal congestion or sore throat   Respiratory: denies cough, shortness of breath.   Cardiovascular:  denies chest pain, edema   GI:  Denies abdominal pain, nausea, vomiting.   :  Denies Polyuria and Polydipsia  Musculoskeletal:  Denies back pain or joint pain   Integument:  Denies dry skin, rash   Neurologic:  Denies headache, focal weakness or sensory changes   Endocrine:  Denies polyuria or polydipsia   Psychiatric:  Denies depression or anxiety      Vitals:    03/21/21 1415   BP: 159/78   Pulse: 74   Resp:    Temp:    SpO2: 97%      Body mass index is 33.67 kg/m².     Physical Exam:  GEN: NAD, conversant  EYES: EOMI, PERRL, no conjunctival erythema  NECK: no thyromegaly, full ROM   CV: RRR, no murmurs/rubs/gallops, no peripheral edema  LUNG: CTAB, no wheezes/rales/ronchi  SKIN: no rashes, no acanthosis  MSK: no deformities, full ROM of all extremities  NEURO: no tremors, DTR normal  PSYCH: AOX3, appropriate mood, affect normal      Results Review:     I reviewed the patient's new clinical results.    Lab Results   Component Value Date    GLUCOSE 115 (H) 03/21/2021    BUN 11 03/21/2021    CREATININE 0.71 03/21/2021    EGFRIFNONA 85 03/21/2021    BCR 15.5 03/21/2021    K 3.7 03/21/2021    CO2 21.0 (L) 03/21/2021    CALCIUM 8.8 03/21/2021    ALBUMIN 2.90 (L) 03/21/2021    LABIL2  1.2 02/20/2017    AST 15 03/21/2021    ALT 19 03/21/2021       No results found for: HGBA1C  Lab Results   Component Value Date    CREATININE 0.71 03/21/2021     Results from last 7 days   Lab Units 03/20/21  1807   GLUCOSE mg/dL 105       Medication Review: Reviewed.       Current Facility-Administered Medications:   •  amiodarone 450 mg in 250 mL D5W infusion, 0.5 mg/min, Intravenous, Continuous, Percy Stanford MD, Last Rate: 16.67 mL/hr at 03/21/21 1336, 0.5 mg/min at 03/21/21 1336  •  amiodarone in dextrose 5% (NEXTERONE) loading dose 150mg/100mL, 150 mg, Intravenous, Once, Percy Stanford MD  •  arformoterol (BROVANA) nebulizer solution 15 mcg, 15 mcg, Nebulization, BID - RT, Jose-Egziabher, Dung I, DO, 15 mcg at 03/20/21 0727  •  budesonide (PULMICORT) nebulizer solution 0.5 mg, 0.5 mg, Nebulization, BID - RT, Jose-Egziabher, Dung I, DO, 0.5 mg at 03/21/21 0759  •  HYDROmorphone (DILAUDID) injection 0.5 mg, 0.5 mg, Intravenous, Q2H PRN, 0.5 mg at 03/21/21 1250 **AND** naloxone (NARCAN) injection 0.1 mg, 0.1 mg, Intravenous, Q5 Min PRN, Jose-Egziabher, Dung I, DO  •  ipratropium-albuterol (DUO-NEB) nebulizer solution 3 mL, 3 mL, Nebulization, Q4H - RT, Jose-Egziabher, Dung I, DO, 3 mL at 03/21/21 1150  •  metoprolol tartrate (LOPRESSOR) injection 5 mg, 5 mg, Intravenous, Q8H, Jose-Egziabher, Dung I, DO, 5 mg at 03/21/21 1020  •  Morphine sulfate (PF) injection 4 mg, 4 mg, Intravenous, Q2H PRN, 4 mg at 03/20/21 0905 **AND** naloxone (NARCAN) injection 0.4 mg, 0.4 mg, Intravenous, Q5 Min PRN, Saira Dung I, DO  •  nicotine (NICODERM CQ) 21 MG/24HR patch 1 patch, 1 patch, Transdermal, Q24H, Saira Dung I, DO, 1 patch at 03/21/21 0904  •  ondansetron (ZOFRAN) tablet 4 mg, 4 mg, Oral, Q6H PRN **OR** ondansetron (ZOFRAN) injection 4 mg, 4 mg, Intravenous, Q6H PRN, Dung Chávez I, DO  •  pantoprazole (PROTONIX) injection 40 mg, 40 mg, Intravenous, Q12H,  Jose-Egziabher, Dung I, DO, 40 mg at 03/21/21 0904  •  piperacillin-tazobactam (ZOSYN) IVPB 3.375 g in 100 mL NS (CD), 3.375 g, Intravenous, Q8H, Jose-Egziabher, Dung I, DO, 3.375 g at 03/21/21 0610  •  sodium chloride 0.9 % bolus 1,000 mL, 1,000 mL, Intravenous, Once, Jose-Egziabher, Dung I, DO  •  sodium chloride 0.9 % flush 10 mL, 10 mL, Intravenous, PRN, Jose-Egziabher, Dung I, DO, 10 mL at 03/21/21 0030  •  sodium chloride 0.9 % flush 10 mL, 10 mL, Intravenous, PRN, Jose-Egziabher, Dung I, DO, 10 mL at 03/21/21 0610  •  sodium chloride 0.9 % infusion, 125 mL/hr, Intravenous, Continuous, Jose-Egziabher, Dung I, DO, Last Rate: 125 mL/hr at 03/21/21 1116, 125 mL/hr at 03/21/21 1116    Assessment/Plan   Hyperthyroidism: Etiology unknown at this time.  TSH was undetectable, I will recheck TSH, and check TSI, TPO antibodies, free T4 and free T3, I will also check interleukin-6 as well as ESR.  She is on amiodarone which could exacerbate hyperthyroidism.  Clinically she is doing well at this time.  Symptomatically she does not have symptoms of hyperthyroidism and vitals wise her heart rate is controlled.  She does not have fever, she is not in thyroid storm at this time.  Unfortunately due to recent surgery her surgeon has suggested n.p.o. at least until Wednesday.  If the free T4/free T3 is high, then we may have to use antithyroid medication like Tapazole or PTU before Wednesday.    Thank you very much for the consultation.             Karen Mack MD FACE.

## 2021-03-21 NOTE — PROGRESS NOTES
"      [facility] Medicine Services Daily Progress Note      Hospitalist Team  LOS 2 days      Patient Care Team:  Provider, No Known as PCP - General    Patient Location: 2115/1      Subjective   Subjective     Chief Complaint / Subjective  Chief Complaint   Patient presents with   • Abdominal Pain         Brief Synopsis of Hospital Course/HPI    The patient is an 83 y.o. female with a history of COPD, chronic hypoxemic respiratory failure on 2L/trilogy, depression, hyperlipidemia, hypertension, hypothyroidism, diabetes mellitus and chronic thoracic back pain.      The patient presented to the emergency room on 3/19/2021 for evaluation of several days of productive yellow cough and shortness of air. PCP had prescribed Z-King earlier in the week.     In the ED, ABG showed pH 7.25/98.2/11.297% on 32% FiO2. Chest x-ray showed mild pulmonary edema.     The patient was initially admitted to the ICU and was placed on BiPAP. The hospitalist service was consulted on 3/20/2021 for medical management.         Date::    3/21/21: Transferred to PCU 3/20/2021 for A. fib RVR.  Given amiodarone drip and Cardizem drip.  Seen by cardiology.  Still NPO.  No complaints.      Review of Systems   All other systems reviewed and are negative.        Objective   Objective      Vital Signs  Temp:  [96.8 °F (36 °C)-100 °F (37.8 °C)] 96.8 °F (36 °C)  Heart Rate:  [] 69  Resp:  [15-24] 18  BP: ()/() 144/74  Oxygen Therapy  SpO2: 100 %  Pulse Oximetry Type: Continuous  Device (Oxygen Therapy): room air  Flow (L/min): 2  Flowsheet Rows      First Filed Value   Admission Height  149.9 cm (59\") Documented at 03/19/2021 1504   Admission Weight  72.6 kg (160 lb) Documented at 03/19/2021 1504        Intake & Output (last 3 days)       03/18 0701 - 03/19 0700 03/19 0701 - 03/20 0700 03/20 0701 - 03/21 0700 03/21 0701 - 03/22 0700    P.O.   0     I.V. (mL/kg)  1945 (25.1)  1000 (13.2)    Total Intake(mL/kg)  1945 " (25.1) 0 (0) 1000 (13.2)    Urine (mL/kg/hr)  2650 2200 (1.2) 400 (1.1)    Emesis/NG output  0 310     Drains  330 65     Total Output  2980 2575 400    Net  -1035 -2575 +600                Lines, Drains & Airways    Active LDAs     Name:   Placement date:   Placement time:   Site:   Days:    Peripheral IV 03/19/21 1540 Right Forearm   03/19/21    1540    Forearm   1    Peripheral IV 03/19/21 1827 Left Antecubital   03/19/21 1827    Antecubital   1    Closed/Suction Drain Right Abdomen Bulb 19 Fr.   03/19/21 1958    Abdomen   1    Closed/Suction Drain Left Abdomen Bulb 19 Fr.   03/19/21 1959    Abdomen   1    NG/OG Tube Nasogastric Right nostril   03/19/21 2200    Right nostril   1    External Urinary Catheter   03/20/21 2000    --   less than 1                  Physical Exam:    Physical Exam  HENT:      Head: Normocephalic.      Nose: Nose normal.   Eyes:      General: No scleral icterus.     Extraocular Movements: Extraocular movements intact.      Pupils: Pupils are equal, round, and reactive to light.   Cardiovascular:      Rate and Rhythm: Normal rate.   Pulmonary:      Effort: Pulmonary effort is normal.   Abdominal:      General: Bowel sounds are normal.   Musculoskeletal:         General: Normal range of motion.      Cervical back: Normal range of motion.   Skin:     General: Skin is warm.   Neurological:      Mental Status: She is alert. Mental status is at baseline.   Psychiatric:         Mood and Affect: Mood normal.              Wounds (last 24 hours)      LDA Wound     Row Name 03/21/21 0845 03/21/21 0408 03/21/21 0001       Wound 03/19/21 1907 midline abdomen Incision    Wound - Properties Group Placement Date: 03/19/21  -HL Placement Time: 1907  -HL Orientation: midline  -HL Location: abdomen  -HL Primary Wound Type: Incision  -HL    Dressing Appearance  dry;intact  -RG  dry;intact  -LG  dry;intact  -LG    Closure  MARIYA  -RG  MARIYA  -LG  MARIYA  -LG    Retired Wound - Properties Group Date  first assessed: 03/19/21  - Time first assessed: 1907 - Location: abdomen  -HL Primary Wound Type: Incision  -HL    Row Name 03/20/21 1930             Wound 03/19/21 1907 midline abdomen Incision    Wound - Properties Group Placement Date: 03/19/21  - Placement Time: 1907 -HL Orientation: midline  -HL Location: abdomen  -HL Primary Wound Type: Incision  -HL    Dressing Appearance  dry;intact  -LG      Closure  MARIYA  -LG      Drainage Amount  none  -LG      Dressing Care  -- border dressing  -LG      Retired Wound - Properties Group Date first assessed: 03/19/21  - Time first assessed: 1907 - Location: abdomen  -HL Primary Wound Type: Incision  -HL      User Key  (r) = Recorded By, (t) = Taken By, (c) = Cosigned By    Initials Name Provider Type    Kate Zurita, RN Registered Nurse    Sharla Perez RN Registered Nurse    Clemencia Shepherd RN Registered Nurse          Procedures:    Procedure(s):  LAPAROTOMY EXPLORATORY with repair of perforated gastric ulcer, oversew of perforated gastric ulcer, placement of kristie patch, biopsy of stomach          Results Review:     I reviewed the patient's new clinical results.      Lab Results (last 24 hours)     Procedure Component Value Units Date/Time    Body Fluid Culture - Peritoneal Fluid, Peritoneum [[id number]] Collected: 03/19/21 1916    Specimen: Peritoneal Fluid from Peritoneum Updated: 03/21/21 0952     Body Fluid Culture No growth at 2 days     Gram Stain Many (4+) WBCs per low power field      No organisms seen    Procalcitonin [[id number]]  (Abnormal) Collected: 03/21/21 0436    Specimen: Blood Updated: 03/21/21 0523     Procalcitonin 1.85 ng/mL     Narrative:      As a Marker for Sepsis (Non-Neonates):   1. <0.5 ng/mL represents a low risk of severe sepsis and/or septic shock.  1. >2 ng/mL represents a high risk of severe sepsis and/or septic shock.    As a Marker for Lower Respiratory Tract Infections that require antibiotic therapy:  PCT on  "Admission     Antibiotic Therapy             6-12 Hrs later  > 0.5                Strongly Recommended            >0.25 - <0.5         Recommended  0.1 - 0.25           Discouraged                   Remeasure/reassess PCT  <0.1                 Strongly Discouraged          Remeasure/reassess PCT      As 28 day mortality risk marker: \"Change in Procalcitonin Result\" (> 80 % or <=80 %) if Day 0 (or Day 1) and Day 4 values are available. Refer to [email or web address]/   Change in PCT <=80 %   A decrease of PCT levels below or equal to 80 % defines a positive change in PCT test result representing a higher risk for 28-day all-cause mortality of patients diagnosed with severe sepsis or septic shock.  Change in PCT > 80 %   A decrease of PCT levels of more than 80 % defines a negative change in PCT result representing a lower risk for 28-day all-cause mortality of patients diagnosed with severe sepsis or septic shock.                Results may be falsely decreased if patient taking Biotin.     Comprehensive Metabolic Panel [[id number]]  (Abnormal) Collected: 03/21/21 0436    Specimen: Blood Updated: 03/21/21 0521     Glucose 115 mg/dL      BUN 11 mg/dL      Creatinine 0.71 mg/dL      Sodium 141 mmol/L      Potassium 3.7 mmol/L      Chloride 109 mmol/L      CO2 21.0 mmol/L      Calcium 8.8 mg/dL      Total Protein 5.3 g/dL      Albumin 2.90 g/dL      ALT (SGPT) 19 U/L      AST (SGOT) 15 U/L      Alkaline Phosphatase 51 U/L      Total Bilirubin 0.4 mg/dL      eGFR Non African Amer 85 mL/min/1.73      Globulin 2.4 gm/dL      A/G Ratio 1.2 g/dL      BUN/Creatinine Ratio 15.5     Anion Gap 11.0 mmol/L     Narrative:      GFR Normal >60  Chronic Kidney Disease <60  Kidney Failure <15      CBC & Differential [[id number]]  (Abnormal) Collected: 03/21/21 0436    Specimen: Blood Updated: 03/21/21 0447    Narrative:      The following orders were created for panel order CBC & Differential.  Procedure                   "             Abnormality         Status                     ---------                               -----------         ------                     CBC Auto Differential[[id number]]        Abnormal            Final result                 Please view results for these tests on the individual orders.    CBC Auto Differential [[id number]]  (Abnormal) Collected: 03/21/21 0436    Specimen: Blood Updated: 03/21/21 0447     WBC 19.40 10*3/mm3      RBC 4.14 10*6/mm3      Hemoglobin 11.5 g/dL      Hematocrit 36.2 %      MCV 87.4 fL      MCH 27.7 pg      MCHC 31.7 g/dL      RDW 16.3 %      RDW-SD 49.9 fl      MPV 7.2 fL      Platelets 259 10*3/mm3      Neutrophil % 88.5 %      Lymphocyte % 7.8 %      Monocyte % 3.2 %      Eosinophil % 0.4 %      Basophil % 0.1 %      Neutrophils, Absolute 17.20 10*3/mm3      Lymphocytes, Absolute 1.50 10*3/mm3      Monocytes, Absolute 0.60 10*3/mm3      Eosinophils, Absolute 0.10 10*3/mm3      Basophils, Absolute 0.00 10*3/mm3      nRBC 0.0 /100 WBC     Extra Tubes [[id number]] Collected: 03/20/21 1804    Specimen: Blood, Venous Line Updated: 03/20/21 1915    Narrative:      The following orders were created for panel order Extra Tubes.  Procedure                               Abnormality         Status                     ---------                               -----------         ------                     Light Blue Top[[id number]]                                   Final result               Lavender Top[[id number]]                                     Final result               Gold Top - SST[[id number]]                                   Final result                 Please view results for these tests on the individual orders.    Light Blue Top [[id number]] Collected: 03/20/21 1804    Specimen: Blood Updated: 03/20/21 1915     Extra Tube hold for add-on     Comment: Auto resulted       Lavender Top [[id number]] Collected: 03/20/21 1804    Specimen: Blood Updated: 03/20/21 1915     Extra Tube hold  for add-on     Comment: Auto resulted       Lactic Acid, Plasma [[id number]]  (Normal) Collected: 03/20/21 1804    Specimen: Blood Updated: 03/20/21 1839     Lactate 1.1 mmol/L     Comprehensive Metabolic Panel [[id number]]  (Abnormal) Collected: 03/20/21 1804    Specimen: Blood Updated: 03/20/21 1836     Glucose 109 mg/dL      BUN 13 mg/dL      Creatinine 0.64 mg/dL      Sodium 140 mmol/L      Potassium 3.7 mmol/L      Chloride 109 mmol/L      CO2 22.0 mmol/L      Calcium 8.9 mg/dL      Total Protein 6.3 g/dL      Albumin 3.40 g/dL      ALT (SGPT) 21 U/L      AST (SGOT) 16 U/L      Alkaline Phosphatase 49 U/L      Total Bilirubin 0.4 mg/dL      eGFR Non African Amer 95 mL/min/1.73      Globulin 2.9 gm/dL      A/G Ratio 1.2 g/dL      BUN/Creatinine Ratio 20.3     Anion Gap 9.0 mmol/L     Narrative:      GFR Normal >60  Chronic Kidney Disease <60  Kidney Failure <15      Gold Top - [facility] [[id number]] Collected: 03/20/21 1804    Specimen: Blood Updated: 03/20/21 1824    POC Glucose Once [[id number]]  (Normal) Collected: 03/20/21 1807    Specimen: Blood Updated: 03/20/21 1808     Glucose 105 mg/dL      Comment: Serial Number: [id number]:  [id number]       Blood Culture - Blood, Arm, Right [[id number]] Collected: 03/19/21 1710    Specimen: Blood from Arm, Right Updated: 03/20/21 1715     Blood Culture No growth at 24 hours    Blood Culture - Blood, Arm, Left [[id number]] Collected: 03/19/21 1707    Specimen: Blood from Arm, Left Updated: 03/20/21 1715     Blood Culture No growth at 24 hours        No results found for: HGBA1C            Lab Results   Component Value Date    LIPASE 19 03/19/2021     No results found for: CHOL, CHLPL, TRIG, HDL, LDL, LDLDIRECT    No results found for: INTRAOP, PREDX, FINALDX, COMDX    Microbiology Results (last 10 days)     Procedure Component Value - Date/Time    Body Fluid Culture - Peritoneal Fluid, Peritoneum [[id number]] Collected: 03/19/21 1916    Lab Status: Preliminary result  Specimen: Peritoneal Fluid from Peritoneum Updated: 03/21/21 0952     Body Fluid Culture No growth at 2 days     Gram Stain Many (4+) WBCs per low power field      No organisms seen    COVID PRE-OP / PRE-PROCEDURE SCREENING ORDER (NO ISOLATION) - Swab, Nasopharynx [[id number]]  (Normal) Collected: 03/19/21 1729    Lab Status: Final result Specimen: Swab from Nasopharynx Updated: 03/19/21 1822    Narrative:      The following orders were created for panel order COVID PRE-OP / PRE-PROCEDURE SCREENING ORDER (NO ISOLATION) - Swab, Nasopharynx.  Procedure                               Abnormality         Status                     ---------                               -----------         ------                     COVID-19,CEPHEID,COR/PAOLA...[[id number]]  Normal              Final result                 Please view results for these tests on the individual orders.    COVID-19,CEPHEID,COR/PAOLA/PAD IN-HOUSE(OR EMERGENT/ADD-ON),NP SWAB IN TRANSPORT MEDIA 3-4 HR TAT, RT-PCR - Swab, Nasopharynx [[id number]]  (Normal) Collected: 03/19/21 1729    Lab Status: Final result Specimen: Swab from Nasopharynx Updated: 03/19/21 1822     COVID19 Not Detected    Narrative:      Fact sheet for providers: https://www.fda.gov/media/277031/download     Fact sheet for patients: https://www.fda.gov/media/330459/download    Blood Culture - Blood, Arm, Right [[id number]] Collected: 03/19/21 1710    Lab Status: Preliminary result Specimen: Blood from Arm, Right Updated: 03/20/21 1715     Blood Culture No growth at 24 hours    Blood Culture - Blood, Arm, Left [[id number]] Collected: 03/19/21 1707    Lab Status: Preliminary result Specimen: Blood from Arm, Left Updated: 03/20/21 1715     Blood Culture No growth at 24 hours          ECG/EMG Results (most recent)     Procedure Component Value Units Date/Time    ECG 12 Lead [[id number]] Collected: 03/19/21 1545     Updated: 03/19/21 1547     QT Interval 387 ms     Narrative:      HEART RATE= 63  bpm  RR  Interval= 948  ms  VT Interval= 150  ms  P Horizontal Axis= -12  deg  P Front Axis= 38  deg  QRSD Interval= 97  ms  QT Interval= 387  ms  QRS Axis= 19  deg  T Wave Axis= 42  deg  - ABNORMAL ECG -  Sinus rhythm  Probable left atrial enlargement  Anteroseptal infarct, age indeterminate  Electronically Signed By:   Date and Time of Study: 2021-03-19 15:45:25    ECG 12 Lead [[id number]] Collected: 03/20/21 1814     Updated: 03/20/21 1816     QT Interval 254 ms     Narrative:      HEART RATE= 200  bpm  RR Interval= 300  ms  VT Interval=   ms  P Horizontal Axis=   deg  P Front Axis=   deg  QRSD Interval= 89  ms  QT Interval= 254  ms  QRS Axis= -24  deg  T Wave Axis= 156  deg  - ABNORMAL ECG -  Atrial fibrillation with rapid V-rate  Probable LVH with secondary repol abnrm  ST depression, probably rate related  When compared with ECG of 19-Mar-2021 15:45:25,  Significant rate increase  Significant axis, voltage or hypertrophy change  Electronically Signed By:   Date and Time of Study: 2021-03-20 18:14:49    ECG 12 Lead [[id number]] Collected: 03/21/21 0709     Updated: 03/21/21 0711     QT Interval 292 ms     Narrative:      HEART RATE= 69  bpm  RR Interval= 872  ms  VT Interval= 138  ms  P Horizontal Axis= -8  deg  P Front Axis= 19  deg  QRSD Interval= 80  ms  QT Interval= 292  ms  QRS Axis= -1  deg  T Wave Axis=   deg  - NORMAL ECG -  Sinus rhythm  When compared with ECG of 20-Mar-2021 18:14:49,  Significant rate decrease  Significant repolarization change  Significant axis, voltage or hypertrophy change  Electronically Signed By:   Date and Time of Study: 2021-03-21 07:09:35                  CT Abdomen Pelvis With Contrast    Result Date: 3/19/2021   1. Free intraperitoneal air in the abdomen indicating perforated viscus. 2. Abnormal finding of thickening/inflammation distal gastric body wall, greatest anteriorly. FINDINGS may reflect perforated gastric ulcer. Consider endoscopic correlation.    Electronically Signed  By-Mahnaz Ortiz MD On:3/19/2021 4:40 PM This report was finalized on [id number] by  Mahnaz Ortiz MD.    XR Chest 1 View    Result Date: 3/21/2021  Increasing bilateral airspace disease concerning for pneumonia.  Electronically Signed By-Brian Valenzuela MD On:3/21/2021 8:06 AM This report was finalized on [id number] by  Brian Valenzuela MD.    XR Chest 1 View    Result Date: 3/20/2021    1.  Interval placement of nasogastric tube the tip below the diaphragm. 2.  Stable cardiomegaly. 3.  New minimal left basilar airspace disease likely due to atelectasis.   Electronically Signed By-Amauri Mtz MD On:3/20/2021 8:36 AM This report was finalized on [id number] by  Amauri Mtz MD.    XR Chest 1 View    Result Date: 3/19/2021  Free air underneath the right hemidiaphragm. FINDINGS worrisome for perforated viscus. Dedicated CT abdomen and pelvis is recommended for further evaluation. I spoke with the clinician in the emergency room at the time of this dictation.  Electronically Signed By-Mahnaz Ortiz MD On:3/19/2021 4:00 PM This report was finalized on [id number] by  Mahnaz Ortiz MD.    CT Chest Pulmonary Embolism    Result Date: 3/19/2021   1. No evidence of pulmonary embolus. 2. No acute cardiac pulmonary process. 3. Ancillary findings as described above.     Electronically Signed By-Daniella Go MD On:3/19/2021 4:38 PM This report was finalized on [id number] by  Daniella Go MD.    XR Abdomen KUB    Result Date: 3/20/2021   1.  Nasogastric tube in place with the tip in the expected location of the body the stomach. 2.  Air-filled but nondistended loops of small bowel within the left side of the abdomen which may be due to mild ileus.  Electronically Signed By-Amauri Mtz MD On:3/20/2021 7:14 PM This report was finalized on [id number] by  Amauri Mtz MD.          Xrays, labs reviewed personally by physician.    Medication Review:   I have reviewed the patient's current medication  list      Scheduled Meds  amiodarone, 150 mg, Intravenous, Once  arformoterol, 15 mcg, Nebulization, BID - RT  budesonide, 0.5 mg, Nebulization, BID - RT  ipratropium-albuterol, 3 mL, Nebulization, Q4H - RT  metoprolol tartrate, 5 mg, Intravenous, Q8H  nicotine, 1 patch, Transdermal, Q24H  pantoprazole, 40 mg, Intravenous, Q12H  piperacillin-tazobactam, 3.375 g, Intravenous, Q8H  sodium chloride, 1,000 mL, Intravenous, Once        Meds Infusions  amiodarone, 0.5 mg/min, Last Rate: 0.5 mg/min (03/21/21 0928)  sodium chloride, 125 mL/hr, Last Rate: 125 mL/hr (03/21/21 1116)        Meds PRN  HYDROmorphone **AND** naloxone  •  Morphine **AND** naloxone  •  ondansetron **OR** ondansetron  •  sodium chloride  •  sodium chloride        Assessment/Plan   Assessment/Plan     Active Hospital Problems    Diagnosis  POA   • **Acute gastric ulcer with perforation (CMS/HCC) [K25.1]  Yes   • Avascular necrosis of bone (CMS/HCC) [M87.00]  Yes   • Sick sinus syndrome (CMS/HCC) [I49.5]  Yes   • Hyperthyroidism [E05.90]  Yes   • Hypertension [I10]  Yes   • Atrial fibrillation (CMS/HCC) [I48.91]  Yes   • Pulmonary hypertension (CMS/HCC) [I27.20]  Yes      Resolved Hospital Problems   No resolved problems to display.       MEDICAL DECISION MAKING COMPLEXITY BY PROBLEM:       Gastric ulcer with perforation (POA)  -s/p laparotomy exploratory with repair of perforated gastric ulcer, oversew of perforated gastric ulcer, placement of Smith patch and  biopsy of stomach 3/19/2021.  -Observed overnight in the ICU postoperatively  -NPO with NG tube suction    Atrial fibrillation RVR:  -Reason for transfer to PCU 3/20/2021  -Treated with Cardizem drip and amiodarone drip  -Continue IV Lopressor while n.p.o.  -A. fib RVR exacerbated because of uncontrolled hyperthyroidism  -Patient noncompliant with Eliquis at home  -TTE 3/25/19--> LVEF 55-60%    Hyperthyroidism:  -Follows with endocrinology  -On Tapazole    Chronic pain secondary to avascular  necrosis:  -Patient has been using NSAIDs for pain control which probably exacerbated gastric ulcer        VTE Prophylaxis -   Mechanical Order History:      Ordered        03/19/21 2123  Place Sequential Compression Device  Once         03/19/21 2123  Place Sequential Compression Device  Once         03/19/21 2123  Maintain Sequential Compression Device  Continuous                 Pharmalogical Order History:     None            Code Status -   Code Status and Medical Interventions:   Ordered at: 03/19/21 2123     Code Status:    CPR     Medical Interventions (Level of Support Prior to Arrest):    Full       This patient has been examined wearing appropriate Personal Protective Equipment and discussed with nursing. 03/21/21        Discharge Planning  defer        Electronically signed by Dung Chávez DO, 03/21/21, 12:00 EDT.  [facility], [place] Hospitalist Team

## 2021-03-21 NOTE — NURSING NOTE
Monitor Tech called to inform that patient has had a 3.98 second pause at 02:11:37   Patient has been asymptomatic.

## 2021-03-21 NOTE — PROGRESS NOTES
Cardiology    Patient Care Team:  Provider, No Known as PCP - General        CHIEF COMPLAINT: Shortness of breath    ADMITTING DIAGNOSES: GI bleed    SUBJECTIVE: Feels significantly better today.  Chemical conversion yesterday with IV amiodarone and AV ursula agents.  No complaints of a cardiovascular standpoint.    Review of Symptoms:  Constitutional: Patient afebrile no chills or unexpected weight changes  Respiratory: No cough, no wheezing or dyspnea  Cardiovascular: No chest pain, palpitations, dyspnea, orthopnea and no edema  Gastrointestinal: With nausea, no vomiting, constipation or diarrhea.  No melena or dark stools    All other systems reviewed and are negative     PAST MEDICAL HISTORY:   Past Medical History:   Diagnosis Date   • Adenomatous polyp of colon 6/20/2016   • Anxiety 6/16/2016   • Arthritis of foot 1/10/2019   • Atrial fibrillation (CMS/HCC) 6/16/2016   • Avascular necrosis of bone (CMS/HCC) 1/10/2019   • Chronic pain 1/22/2019   • Closed displaced fracture 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 9/19/2019    Added automatically from request for surgery [id number]   • Hypertension 6/16/2016   • Hyperthyroidism 11/2/2016   • Mood disorder (CMS/HCC) 6/16/2016   • Osteoporosis without current pathological fracture 6/4/2020   • Pulmonary hypertension (CMS/HCC) 6/16/2016   • Sick sinus syndrome (CMS/[facility]) 1/25/2017   • Tricuspid valve insufficiency 6/16/2016       ALLERGIES:   No Known Allergies      PAST SURGICAL HISTORY:   Past Surgical History:   Procedure Laterality Date   • ANKLE OPEN REDUCTION INTERNAL FIXATION     • APPENDECTOMY     • HIP SURGERY      Left total hip   • ORIF FOOT FRACTURE Right 9/30/2019    Procedure: OPEN REDUCTION INTERNAL FIXATION OF FIFTH METATARSAL FRACTURE, LOOSE BODY EXCISION/REMOVAL FROM THE TALONAVICULAR JOINT OF THE RIGHT FOOT;  Surgeon: JOSE Fry DPM;  Location: [facility] MAIN OR;  Service: Podiatry          FAMILY HISTORY:   Family History   Problem  Relation Age of Onset   • Hypertension Mother    • Diabetes Father    • Heart disease Father    • Hypertension Father          SOCIAL HISTORY:   Social History     Socioeconomic History   • Marital status:      Spouse name: Not on file   • Number of children: Not on file   • Years of education: Not on file   • Highest education level: Not on file   Tobacco Use   • Smoking status: Current Every Day Smoker     Packs/day: 0.50     Types: Cigarettes   • Smokeless tobacco: Never Used   Substance and Sexual Activity   • Alcohol use: Yes     Comment: jayson   • Drug use: No   • Sexual activity: Defer       CURRENT MEDICATIONS:     Current Facility-Administered Medications:   •  amiodarone 450 mg in 250 mL D5W infusion, 1 mg/min, Intravenous, Continuous, Percy Stanford MD, Last Rate: 33.3 mL/hr at 03/21/21 0905, 1 mg/min at 03/21/21 0905  •  amiodarone in dextrose 5% (NEXTERONE) loading dose 150mg/100mL, 150 mg, Intravenous, Once, Percy Stanford MD  •  arformoterol (BROVANA) nebulizer solution 15 mcg, 15 mcg, Nebulization, BID - RT, Jose-Egziabher, Dung I, DO, 15 mcg at 03/20/21 0727  •  budesonide (PULMICORT) nebulizer solution 0.5 mg, 0.5 mg, Nebulization, BID - RT, Jose-Egziabher, Dung I, DO, 0.5 mg at 03/21/21 0759  •  HYDROmorphone (DILAUDID) injection 0.5 mg, 0.5 mg, Intravenous, Q2H PRN, 0.5 mg at 03/21/21 0408 **AND** naloxone (NARCAN) injection 0.1 mg, 0.1 mg, Intravenous, Q5 Min PRN, Jose-Egziabher, Dung I, DO  •  ipratropium-albuterol (DUO-NEB) nebulizer solution 3 mL, 3 mL, Nebulization, Q4H - RT, Jose-Egziabher, Dung I, DO, 3 mL at 03/21/21 0759  •  metoprolol tartrate (LOPRESSOR) injection 5 mg, 5 mg, Intravenous, Q8H, Jose-Egziabher, Dung I, DO, 5 mg at 03/21/21 0201  •  Morphine sulfate (PF) injection 4 mg, 4 mg, Intravenous, Q2H PRN, 4 mg at 03/20/21 0905 **AND** naloxone (NARCAN) injection 0.4 mg, 0.4 mg, Intravenous, Q5 Min PRN, Dnug Chávez I, DO  •  nicotine  (NICODERM CQ) 21 MG/24HR patch 1 patch, 1 patch, Transdermal, Q24H, Jose-Egziabher, Dung I, DO, 1 patch at 03/21/21 0904  •  ondansetron (ZOFRAN) tablet 4 mg, 4 mg, Oral, Q6H PRN **OR** ondansetron (ZOFRAN) injection 4 mg, 4 mg, Intravenous, Q6H PRN, Jose-Egziabher, Dung I, DO  •  pantoprazole (PROTONIX) injection 40 mg, 40 mg, Intravenous, Q12H, Jose-Egziabher, Dung I, DO, 40 mg at 03/21/21 0904  •  piperacillin-tazobactam (ZOSYN) IVPB 3.375 g in 100 mL NS (CD), 3.375 g, Intravenous, Q8H, Jose-Egziabher, Dung I, DO, 3.375 g at 03/21/21 0610  •  sodium chloride 0.9 % bolus 1,000 mL, 1,000 mL, Intravenous, Once, Jose-Egziabher, Dung I, DO  •  sodium chloride 0.9 % flush 10 mL, 10 mL, Intravenous, PRN, Jose-Egziabher, Dung I, DO, 10 mL at 03/21/21 0030  •  sodium chloride 0.9 % flush 10 mL, 10 mL, Intravenous, PRN, Jose-Egziabher, Dung I, DO, 10 mL at 03/21/21 0610  •  sodium chloride 0.9 % infusion, 125 mL/hr, Intravenous, Continuous, Jose-Egziabher, Dung I, DO, Last Rate: 125 mL/hr at 03/21/21 0305, 125 mL/hr at 03/21/21 0305      DIAGNOSTIC DATA:     I reviewed the patient's new clinical results.    Lab Results (last 24 hours)     Procedure Component Value Units Date/Time    Procalcitonin [[id number]]  (Abnormal) Collected: 03/21/21 0436    Specimen: Blood Updated: 03/21/21 0523     Procalcitonin 1.85 ng/mL     Narrative:      As a Marker for Sepsis (Non-Neonates):   1. <0.5 ng/mL represents a low risk of severe sepsis and/or septic shock.  1. >2 ng/mL represents a high risk of severe sepsis and/or septic shock.    As a Marker for Lower Respiratory Tract Infections that require antibiotic therapy:  PCT on Admission     Antibiotic Therapy             6-12 Hrs later  > 0.5                Strongly Recommended            >0.25 - <0.5         Recommended  0.1 - 0.25           Discouraged                   Remeasure/reassess PCT  <0.1                 Strongly Discouraged          Remeasure/reassess PCT      As 28  "day mortality risk marker: \"Change in Procalcitonin Result\" (> 80 % or <=80 %) if Day 0 (or Day 1) and Day 4 values are available. Refer to [email or web address]/   Change in PCT <=80 %   A decrease of PCT levels below or equal to 80 % defines a positive change in PCT test result representing a higher risk for 28-day all-cause mortality of patients diagnosed with severe sepsis or septic shock.  Change in PCT > 80 %   A decrease of PCT levels of more than 80 % defines a negative change in PCT result representing a lower risk for 28-day all-cause mortality of patients diagnosed with severe sepsis or septic shock.                Results may be falsely decreased if patient taking Biotin.     Comprehensive Metabolic Panel [[id number]]  (Abnormal) Collected: 03/21/21 0436    Specimen: Blood Updated: 03/21/21 0521     Glucose 115 mg/dL      BUN 11 mg/dL      Creatinine 0.71 mg/dL      Sodium 141 mmol/L      Potassium 3.7 mmol/L      Chloride 109 mmol/L      CO2 21.0 mmol/L      Calcium 8.8 mg/dL      Total Protein 5.3 g/dL      Albumin 2.90 g/dL      ALT (SGPT) 19 U/L      AST (SGOT) 15 U/L      Alkaline Phosphatase 51 U/L      Total Bilirubin 0.4 mg/dL      eGFR Non African Amer 85 mL/min/1.73      Globulin 2.4 gm/dL      A/G Ratio 1.2 g/dL      BUN/Creatinine Ratio 15.5     Anion Gap 11.0 mmol/L     Narrative:      GFR Normal >60  Chronic Kidney Disease <60  Kidney Failure <15      CBC & Differential [[id number]]  (Abnormal) Collected: 03/21/21 0436    Specimen: Blood Updated: 03/21/21 0447    Narrative:      The following orders were created for panel order CBC & Differential.  Procedure                               Abnormality         Status                     ---------                               -----------         ------                     CBC Auto Differential[[id number]]        Abnormal            Final result                 Please view results for these tests on the individual orders.    CBC " Auto Differential [[id number]]  (Abnormal) Collected: 03/21/21 0436    Specimen: Blood Updated: 03/21/21 0447     WBC 19.40 10*3/mm3      RBC 4.14 10*6/mm3      Hemoglobin 11.5 g/dL      Hematocrit 36.2 %      MCV 87.4 fL      MCH 27.7 pg      MCHC 31.7 g/dL      RDW 16.3 %      RDW-SD 49.9 fl      MPV 7.2 fL      Platelets 259 10*3/mm3      Neutrophil % 88.5 %      Lymphocyte % 7.8 %      Monocyte % 3.2 %      Eosinophil % 0.4 %      Basophil % 0.1 %      Neutrophils, Absolute 17.20 10*3/mm3      Lymphocytes, Absolute 1.50 10*3/mm3      Monocytes, Absolute 0.60 10*3/mm3      Eosinophils, Absolute 0.10 10*3/mm3      Basophils, Absolute 0.00 10*3/mm3      nRBC 0.0 /100 WBC     Extra Tubes [[id number]] Collected: 03/20/21 1804    Specimen: Blood, Venous Line Updated: 03/20/21 1915    Narrative:      The following orders were created for panel order Extra Tubes.  Procedure                               Abnormality         Status                     ---------                               -----------         ------                     Light Blue Top[[id number]]                                   Final result               Lavender Top[[id number]]                                     Final result               Gold Top - SST[[id number]]                                   Final result                 Please view results for these tests on the individual orders.    Light Blue Top [[id number]] Collected: 03/20/21 1804    Specimen: Blood Updated: 03/20/21 1915     Extra Tube hold for add-on     Comment: Auto resulted       Lavender Top [[id number]] Collected: 03/20/21 1804    Specimen: Blood Updated: 03/20/21 1915     Extra Tube hold for add-on     Comment: Auto resulted       Lactic Acid, Plasma [[id number]]  (Normal) Collected: 03/20/21 1804    Specimen: Blood Updated: 03/20/21 1839     Lactate 1.1 mmol/L     Comprehensive Metabolic Panel [[id number]]  (Abnormal) Collected: 03/20/21 1804    Specimen: Blood Updated: 03/20/21 1836      Glucose 109 mg/dL      BUN 13 mg/dL      Creatinine 0.64 mg/dL      Sodium 140 mmol/L      Potassium 3.7 mmol/L      Chloride 109 mmol/L      CO2 22.0 mmol/L      Calcium 8.9 mg/dL      Total Protein 6.3 g/dL      Albumin 3.40 g/dL      ALT (SGPT) 21 U/L      AST (SGOT) 16 U/L      Alkaline Phosphatase 49 U/L      Total Bilirubin 0.4 mg/dL      eGFR Non African Amer 95 mL/min/1.73      Globulin 2.9 gm/dL      A/G Ratio 1.2 g/dL      BUN/Creatinine Ratio 20.3     Anion Gap 9.0 mmol/L     Narrative:      GFR Normal >60  Chronic Kidney Disease <60  Kidney Failure <15      Gold Top - SST [[id number]] Collected: 03/20/21 1804    Specimen: Blood Updated: 03/20/21 1824    POC Glucose Once [[id number]]  (Normal) Collected: 03/20/21 1807    Specimen: Blood Updated: 03/20/21 1808     Glucose 105 mg/dL      Comment: Serial Number: [id number]:  [id number]       Blood Culture - Blood, Arm, Right [[id number]] Collected: 03/19/21 1710    Specimen: Blood from Arm, Right Updated: 03/20/21 1715     Blood Culture No growth at 24 hours    Blood Culture - Blood, Arm, Left [[id number]] Collected: 03/19/21 1707    Specimen: Blood from Arm, Left Updated: 03/20/21 1715     Blood Culture No growth at 24 hours          Imaging Results (Last 24 Hours)     Procedure Component Value Units Date/Time    XR Chest 1 View [[id number]] Collected: 03/21/21 0805     Updated: 03/21/21 0808    Narrative:      Examination: XR CHEST 1 VW-     Date of Exam: 3/21/2021 6:19 AM     Indication: Shortness of breath.     Comparison: 03/20/2021.     Technique: Single radiographic view of the chest was obtained.     Findings:  Stable gastric suction tube. Lung volumes remain low. Stable coarse  interstitial opacities in the lungs. There are increasing left basilar  airspace opacities and new right midlung airspace opacities. Stable  cardiomegaly. No acute osseous abnormalities. No pneumothorax.       Impression:      Increasing bilateral airspace disease concerning  for pneumonia.     Electronically Signed By-Brian Valenzuela MD On:3/21/2021 8:06 AM  This report was finalized on [id number] by  Brian Valenzuela MD.    XR Abdomen KUB [[id number]] Collected: 03/20/21 1913     Updated: 03/20/21 1916    Narrative:      DATE OF EXAM:  3/20/2021 6:56 PM     PROCEDURE:  XR ABDOMEN KUB-     INDICATIONS:  abd pain; K25.1-Acute gastric ulcer with perforation; R10.84-Generalized  abdominal pain; R07.9-Chest pain, unspecified; K28.6-Chronic or  unspecified gastrojejunal ulcer with both hemorrhage and perforation;  K25.1-Acute gastric ulcer with perforation; K63.1-Perforation of  intestine (nontraumatic)     COMPARISON:  No Comparisons Available     TECHNIQUE:   Single radiographic view of the abdomen was obtained.     FINDINGS:  Nasogastric tube is in place the tip projecting over the expected  location of the body the stomach.  There are surgical skin staples  overlying midline of the abdomen.  There are multiple air-filled but  nondistended loops of small bowel within the left side of the abdomen  most likely due to postoperative ileus.  There appear to be at least 2  LIZBETH drains projecting over the abdomen.  Patient status post total left  hip arthroplasty.        Impression:         1.  Nasogastric tube in place with the tip in the expected location of  the body the stomach.  2.  Air-filled but nondistended loops of small bowel within the left  side of the abdomen which may be due to mild ileus.     Electronically Signed By-Amauri Mtz MD On:3/20/2021 7:14 PM  This report was finalized on [id number] by  Amauri Mtz MD.          Xray reviewed personally by physician.      ECG reviewed personally by physician  ECG/EMG Results (most recent)     Procedure Component Value Units Date/Time    ECG 12 Lead [[id number]] Collected: 03/19/21 1545     Updated: 03/19/21 1547     QT Interval 387 ms     Narrative:      HEART RATE= 63  bpm  RR Interval= 948  ms  AL Interval= 150  ms  P Horizontal  "Axis= -12  deg  P Front Axis= 38  deg  QRSD Interval= 97  ms  QT Interval= 387  ms  QRS Axis= 19  deg  T Wave Axis= 42  deg  - ABNORMAL ECG -  Sinus rhythm  Probable left atrial enlargement  Anteroseptal infarct, age indeterminate  Electronically Signed By:   Date and Time of Study: 2021-03-19 15:45:25    ECG 12 Lead [[id number]] Collected: 03/20/21 1814     Updated: 03/20/21 1816     QT Interval 254 ms     Narrative:      HEART RATE= 200  bpm  RR Interval= 300  ms  AL Interval=   ms  P Horizontal Axis=   deg  P Front Axis=   deg  QRSD Interval= 89  ms  QT Interval= 254  ms  QRS Axis= -24  deg  T Wave Axis= 156  deg  - ABNORMAL ECG -  Atrial fibrillation with rapid V-rate  Probable LVH with secondary repol abnrm  ST depression, probably rate related  When compared with ECG of 19-Mar-2021 15:45:25,  Significant rate increase  Significant axis, voltage or hypertrophy change  Electronically Signed By:   Date and Time of Study: 2021-03-20 18:14:49    ECG 12 Lead [[id number]] Collected: 03/21/21 0709     Updated: 03/21/21 0711     QT Interval 292 ms     Narrative:      HEART RATE= 69  bpm  RR Interval= 872  ms  AL Interval= 138  ms  P Horizontal Axis= -8  deg  P Front Axis= 19  deg  QRSD Interval= 80  ms  QT Interval= 292  ms  QRS Axis= -1  deg  T Wave Axis=   deg  - NORMAL ECG -  Sinus rhythm  When compared with ECG of 20-Mar-2021 18:14:49,  Significant rate decrease  Significant repolarization change  Significant axis, voltage or hypertrophy change  Electronically Signed By:   Date and Time of Study: 2021-03-21 07:09:35              VITAL SIGNS: Temp:  [98 °F (36.7 °C)-100 °F (37.8 °C)] 98.1 °F (36.7 °C)  Heart Rate:  [] 70  Resp:  [15-24] 18  BP: ()/() 134/69   Flowsheet Rows      First Filed Value   Admission Height  149.9 cm (59\") Documented at 03/19/2021 1504   Admission Weight  72.6 kg (160 lb) Documented at 03/19/2021 1504        Physical exam  Constitutional: well-nourished, and appears stated " age in no acute distress  PERRL: Conjunctiva clear, no pallor, anicteric  HENMT: normocephalic, normal dentition, no cyanosis or pallor  Neck:no bruits, or thrills and bilateral normal carotid upstroke. Normal jugular venous pressure  Cardiovascular: No parasternal heaves an non-displaced focal PMI. Normal rate and rhythm: no rub, gallop, murmur or click and normal S1 and S2; no lower or upper extremity edema.   Lungs: unlabored, no wheezing with no rales or rhonchi on auscultation.  Extremities: Warm, no clubbing, cyanosis, or edema. Full and equal peripheral pulses in extremities with no bruits appreciated.   Abdomen: soft, non-tender, non-distended  Musculoskeletal: no joint tenderness or swelling and no erythema  Skin: Warm and dry, non-erythematous   Neuro:alert and normal affect. Oriented to time, place and person.     ASSESSMENT AND PLAN:   Atrial fibrillation  Rapid ventricular response  Sick sinus syndrome  GI bleed  Pretension    Treatment plan:  Switch from 1 mg to 0.5 IV amiodarone.  Continue metoprolol every 6.  Unable to administer p.o. via NG tube  Continue to resuscitate with IV fluid  Currently sinus rhythm with chemical conversion yesterday.  Hemodynamically stable.  We will keep in the PCU at this time.        Dixon Singleton MD  03/21/21  09:22 EDT

## 2021-03-22 PROBLEM — J18.9 PNEUMONIA: Status: ACTIVE | Noted: 2021-03-22

## 2021-03-22 LAB
ALBUMIN SERPL-MCNC: 2.8 G/DL (ref 3.5–5.2)
ALBUMIN/GLOB SERPL: 1 G/DL
ALP SERPL-CCNC: 45 U/L (ref 39–117)
ALT SERPL W P-5'-P-CCNC: 15 U/L (ref 1–33)
ANION GAP SERPL CALCULATED.3IONS-SCNC: 10 MMOL/L (ref 5–15)
AST SERPL-CCNC: 13 U/L (ref 1–32)
BASOPHILS # BLD AUTO: 0 10*3/MM3 (ref 0–0.2)
BASOPHILS NFR BLD AUTO: 0.4 % (ref 0–1.5)
BILIRUB SERPL-MCNC: 0.5 MG/DL (ref 0–1.2)
BUN SERPL-MCNC: 13 MG/DL (ref 6–20)
BUN/CREAT SERPL: 16.9 (ref 7–25)
CALCIUM SPEC-SCNC: 8.6 MG/DL (ref 8.6–10.5)
CHLORIDE SERPL-SCNC: 111 MMOL/L (ref 98–107)
CO2 SERPL-SCNC: 21 MMOL/L (ref 22–29)
CREAT SERPL-MCNC: 0.77 MG/DL (ref 0.57–1)
DEPRECATED RDW RBC AUTO: 47.7 FL (ref 37–54)
EOSINOPHIL # BLD AUTO: 0.2 10*3/MM3 (ref 0–0.4)
EOSINOPHIL NFR BLD AUTO: 1.6 % (ref 0.3–6.2)
ERYTHROCYTE [DISTWIDTH] IN BLOOD BY AUTOMATED COUNT: 15.6 % (ref 12.3–15.4)
GFR SERPL CREATININE-BSD FRML MDRD: 77 ML/MIN/1.73
GLOBULIN UR ELPH-MCNC: 2.8 GM/DL
GLUCOSE SERPL-MCNC: 86 MG/DL (ref 65–99)
HCT VFR BLD AUTO: 31.5 % (ref 34–46.6)
HGB BLD-MCNC: 10.2 G/DL (ref 12–15.9)
LYMPHOCYTES # BLD AUTO: 1.2 10*3/MM3 (ref 0.7–3.1)
LYMPHOCYTES NFR BLD AUTO: 9.5 % (ref 19.6–45.3)
MCH RBC QN AUTO: 28.3 PG (ref 26.6–33)
MCHC RBC AUTO-ENTMCNC: 32.5 G/DL (ref 31.5–35.7)
MCV RBC AUTO: 87.2 FL (ref 79–97)
MONOCYTES # BLD AUTO: 0.5 10*3/MM3 (ref 0.1–0.9)
MONOCYTES NFR BLD AUTO: 4.3 % (ref 5–12)
NEUTROPHILS NFR BLD AUTO: 10.5 10*3/MM3 (ref 1.7–7)
NEUTROPHILS NFR BLD AUTO: 84.2 % (ref 42.7–76)
NRBC BLD AUTO-RTO: 0.1 /100 WBC (ref 0–0.2)
PLATELET # BLD AUTO: 251 10*3/MM3 (ref 140–450)
PMV BLD AUTO: 6.7 FL (ref 6–12)
POTASSIUM SERPL-SCNC: 3.4 MMOL/L (ref 3.5–5.2)
PROT SERPL-MCNC: 5.6 G/DL (ref 6–8.5)
RBC # BLD AUTO: 3.61 10*6/MM3 (ref 3.77–5.28)
SODIUM SERPL-SCNC: 142 MMOL/L (ref 136–145)
WBC # BLD AUTO: 12.5 10*3/MM3 (ref 3.4–10.8)

## 2021-03-22 PROCEDURE — 25010000002 PIPERACILLIN SOD-TAZOBACTAM PER 1 G: Performed by: HOSPITALIST

## 2021-03-22 PROCEDURE — 94799 UNLISTED PULMONARY SVC/PX: CPT

## 2021-03-22 PROCEDURE — 99232 SBSQ HOSP IP/OBS MODERATE 35: CPT | Performed by: INTERNAL MEDICINE

## 2021-03-22 PROCEDURE — 99231 SBSQ HOSP IP/OBS SF/LOW 25: CPT | Performed by: INTERNAL MEDICINE

## 2021-03-22 PROCEDURE — 99232 SBSQ HOSP IP/OBS MODERATE 35: CPT | Performed by: HOSPITALIST

## 2021-03-22 PROCEDURE — 25010000002 MORPHINE PER 10 MG: Performed by: HOSPITALIST

## 2021-03-22 PROCEDURE — 85025 COMPLETE CBC W/AUTO DIFF WBC: CPT | Performed by: HOSPITALIST

## 2021-03-22 PROCEDURE — 25010000002 AMIODARONE PER 30 MG: Performed by: INTERNAL MEDICINE

## 2021-03-22 PROCEDURE — 80053 COMPREHEN METABOLIC PANEL: CPT | Performed by: HOSPITALIST

## 2021-03-22 PROCEDURE — 25010000002 HYDROMORPHONE PER 4 MG: Performed by: HOSPITALIST

## 2021-03-22 RX ADMIN — SODIUM CHLORIDE 125 ML/HR: 9 INJECTION, SOLUTION INTRAVENOUS at 09:12

## 2021-03-22 RX ADMIN — METOPROLOL TARTRATE 5 MG: 5 INJECTION INTRAVENOUS at 09:09

## 2021-03-22 RX ADMIN — AMIODARONE HYDROCHLORIDE 0.5 MG/MIN: 50 INJECTION, SOLUTION INTRAVENOUS at 03:41

## 2021-03-22 RX ADMIN — MORPHINE SULFATE 4 MG: 4 INJECTION INTRAVENOUS at 03:41

## 2021-03-22 RX ADMIN — BUDESONIDE 0.5 MG: 0.5 INHALANT RESPIRATORY (INHALATION) at 06:30

## 2021-03-22 RX ADMIN — HYDROMORPHONE HYDROCHLORIDE 0.5 MG: 2 INJECTION, SOLUTION INTRAMUSCULAR; INTRAVENOUS; SUBCUTANEOUS at 13:07

## 2021-03-22 RX ADMIN — PIPERACILLIN AND TAZOBACTAM 3.38 G: 3; .375 INJECTION, POWDER, LYOPHILIZED, FOR SOLUTION INTRAVENOUS at 14:35

## 2021-03-22 RX ADMIN — HYDROMORPHONE HYDROCHLORIDE 0.5 MG: 2 INJECTION, SOLUTION INTRAMUSCULAR; INTRAVENOUS; SUBCUTANEOUS at 17:31

## 2021-03-22 RX ADMIN — PIPERACILLIN AND TAZOBACTAM 3.38 G: 3; .375 INJECTION, POWDER, LYOPHILIZED, FOR SOLUTION INTRAVENOUS at 09:09

## 2021-03-22 RX ADMIN — IPRATROPIUM BROMIDE AND ALBUTEROL SULFATE 3 ML: 2.5; .5 SOLUTION RESPIRATORY (INHALATION) at 06:30

## 2021-03-22 RX ADMIN — MORPHINE SULFATE 4 MG: 4 INJECTION INTRAVENOUS at 01:20

## 2021-03-22 RX ADMIN — Medication 10 ML: at 20:39

## 2021-03-22 RX ADMIN — METOPROLOL TARTRATE 5 MG: 5 INJECTION INTRAVENOUS at 03:41

## 2021-03-22 RX ADMIN — HYDROMORPHONE HYDROCHLORIDE 0.5 MG: 2 INJECTION, SOLUTION INTRAMUSCULAR; INTRAVENOUS; SUBCUTANEOUS at 10:34

## 2021-03-22 RX ADMIN — MORPHINE SULFATE 4 MG: 4 INJECTION INTRAVENOUS at 20:39

## 2021-03-22 RX ADMIN — DILTIAZEM HYDROCHLORIDE 5 MG/HR: 100 INJECTION, POWDER, LYOPHILIZED, FOR SOLUTION INTRAVENOUS at 14:25

## 2021-03-22 RX ADMIN — MORPHINE SULFATE 4 MG: 4 INJECTION INTRAVENOUS at 23:02

## 2021-03-22 RX ADMIN — Medication 1 PATCH: at 09:16

## 2021-03-22 RX ADMIN — MORPHINE SULFATE 4 MG: 4 INJECTION INTRAVENOUS at 07:06

## 2021-03-22 RX ADMIN — PANTOPRAZOLE SODIUM 40 MG: 40 INJECTION, POWDER, FOR SOLUTION INTRAVENOUS at 20:39

## 2021-03-22 RX ADMIN — METOPROLOL TARTRATE 5 MG: 5 INJECTION INTRAVENOUS at 17:31

## 2021-03-22 RX ADMIN — PANTOPRAZOLE SODIUM 40 MG: 40 INJECTION, POWDER, FOR SOLUTION INTRAVENOUS at 09:09

## 2021-03-22 RX ADMIN — HYDROMORPHONE HYDROCHLORIDE 0.5 MG: 2 INJECTION, SOLUTION INTRAMUSCULAR; INTRAVENOUS; SUBCUTANEOUS at 15:15

## 2021-03-22 RX ADMIN — PIPERACILLIN AND TAZOBACTAM 3.38 G: 3; .375 INJECTION, POWDER, LYOPHILIZED, FOR SOLUTION INTRAVENOUS at 23:02

## 2021-03-22 NOTE — PROGRESS NOTES
Discharge Planning Assessment   Zhang     Patient Name: Abi Rivera  MRN: [id number]  Today's Date: 3/22/2021    Admit Date: 3/19/2021    Discharge Needs Assessment     Row Name 03/22/21 1357       Living Environment    Lives With  spouse;child(isai), adult    Name(s) of Who Lives With Patient  Spouse- [place] and 3 kids.    Current Living Arrangements  home/apartment/condo    Primary Care Provided by  self    Provides Primary Care For  no one    Family Caregiver if Needed  spouse    Quality of Family Relationships  supportive    Able to Return to Prior Arrangements  yes       Resource/Environmental Concerns    Resource/Environmental Concerns  none    Transportation Concerns  car, none       Transition Planning    Patient/Family Anticipates Transition to  home with family    Patient/Family Anticipated Services at Transition  none    Transportation Anticipated  family or friend will provide;car, drives self       Discharge Needs Assessment    Readmission Within the Last 30 Days  no previous admission in last 30 days    Equipment Currently Used at Home  none    Concerns to be Addressed  financial/insurance    Anticipated Changes Related to Illness  none    Equipment Needed After Discharge  none    Provided Post Acute Provider List?  N/A        Discharge Plan     Row Name 03/22/21 7394       Plan    Plan  DC Plan: Anticipate routine home.    Patient/Family in Agreement with Plan  yes    Plan Comments  CM met with patient at bedside to discuss dc planning. She reports she used to have [employer] insurance but now has COBRA. She reports it was “retroactive” and she was not sure about her exact coverage. [employer] screened patient and confirmed self-pay. Preferred pharmacy confirmed ([facility]). MSW following for medication assistance. DC Barriers: IV Zosyn, K 3.4, IV Cardizem gtt, per surgery- NPO until Wednesday 3/24/21 for Gastrografin study.        Demographic Summary     Row Name 03/22/21 5700       General Information     Admission Type  inpatient    Reason for Consult  discharge planning    Preferred Language  English     Used During This Interaction  no       Contact Information    Permission Granted to Share Info With          Functional Status     Row Name 03/22/21 1351       Functional Status    Usual Activity Tolerance  good    Current Activity Tolerance  moderate       Functional Status, IADL    Medications  independent    Meal Preparation  independent    Housekeeping  independent    Laundry  independent    Shopping  independent       Employment/    Employment Status  employed full-time      Met with patient in room wearing PPE: mask/goggles.      Maintained distance greater than six feet and spent less than 15 minutes in the room.      Tayler Thakur RN     Office Phone: [phone]  Office Cell: [phone]

## 2021-03-22 NOTE — PROGRESS NOTES
Daily Progress Note    Acute gastric ulcer with perforation (CMS/HCC)    Anxiety    Avascular necrosis of bone (CMS/HCC)    Hypertension    Atrial fibrillation (CMS/HCC)    Hyperthyroidism    Pulmonary hypertension (CMS/HCC)    Sick sinus syndrome (CMS/HCC)    Pneumonia    Assessment    Gastric ulcer with perforation (POA)  -s/p laparotomy exploratory with repair of perforated gastric ulcer, oversew of perforated gastric ulcer, placement of Smith patch and  biopsy of stomach 3/19/2021.  Observed overnight in the ICU postoperatively    Atrial fibrillation RVR:  Treated with Cardizem drip and amiodarone drip    Pneumonia:      Plan    Encouraged to use I-S  Bronchodilator  Inhaled corticosteroids  NPO with NG tube suction  Needs to be strict n.p.o. until Wednesday  Gastrografin study  Antibiotic zosyn     LOS: 3 days       Subjective         Objective     Vital signs for last 24 hours:  Vitals:    03/22/21 0630 03/22/21 0633 03/22/21 1037 03/22/21 1209   BP:    160/69   BP Location:    Right arm   Patient Position:    Lying   Pulse: 55 56 68 70   Resp: 16 16  16   Temp:    98 °F (36.7 °C)   TempSrc:    Oral   SpO2: 97% 96% 96% 98%   Weight:       Height:           Intake/Output last 3 shifts:  I/O last 3 completed shifts:  In: 1100 [I.V.:1000; IV Piggyback:100]  Out: 3405 [Urine:2200; Emesis/NG output:1110; Drains:95]  Intake/Output this shift:  No intake/output data recorded.      Radiology  Imaging Results (Last 24 Hours)     ** No results found for the last 24 hours. **          Labs:  Results from last 7 days   Lab Units 03/22/21  0247   WBC 10*3/mm3 12.50*   HEMOGLOBIN g/dL 10.2*   HEMATOCRIT % 31.5*   PLATELETS 10*3/mm3 251     Results from last 7 days   Lab Units 03/22/21  0247   SODIUM mmol/L 142   POTASSIUM mmol/L 3.4*   CHLORIDE mmol/L 111*   CO2 mmol/L 21.0*   BUN mg/dL 13   CREATININE mg/dL 0.77   CALCIUM mg/dL 8.6   BILIRUBIN mg/dL 0.5   ALK PHOS U/L 45   ALT (SGPT) U/L 15   AST (SGOT) U/L 13   GLUCOSE  mg/dL 86         Results from last 7 days   Lab Units 03/22/21  0247 03/21/21  0436 03/20/21  1804   ALBUMIN g/dL 2.80* 2.90* 3.40*     Results from last 7 days   Lab Units 03/19/21  1537   TROPONIN T ng/mL <0.010                 Results from last 7 days   Lab Units 03/21/21  1500   TSH uIU/mL <0.005*   T3 FREE pg/mL 1.70*   FREE T4 ng/dL 3.11*           Meds:   SCHEDULE  arformoterol, 15 mcg, Nebulization, BID - RT  budesonide, 0.5 mg, Nebulization, BID - RT  ipratropium-albuterol, 3 mL, Nebulization, Q4H - RT  metoprolol tartrate, 5 mg, Intravenous, Q8H  nicotine, 1 patch, Transdermal, Q24H  pantoprazole, 40 mg, Intravenous, Q12H  piperacillin-tazobactam, 3.375 g, Intravenous, Q8H  sodium chloride, 1,000 mL, Intravenous, Once      Infusions  dilTIAZem, 5-15 mg/hr  sodium chloride, 100 mL/hr, Last Rate: 100 mL/hr (03/22/21 1207)      PRNs  HYDROmorphone **AND** naloxone  •  Morphine **AND** naloxone  •  ondansetron **OR** ondansetron  •  sodium chloride  •  sodium chloride    Physical Exam:  Physical Exam  Vitals reviewed.   Pulmonary:      Breath sounds: Normal breath sounds.   Skin:     General: Skin is warm and dry.   Neurological:      Mental Status: She is alert and oriented to person, place, and time.         ROS  Review of Systems   Constitutional: Positive for activity change, appetite change and fatigue.       I reviewed the patient's new clinical results.      Electronically signed by INDRA Ruiz, 03/22/21 at 13:56 EDT.

## 2021-03-22 NOTE — PROGRESS NOTES
Cardiology Progress Note      Admiting Physician:  Dung Chávez,*   LOS: 3 days       Reason For Followup:  Paroxysmal atrial fibrillation      Subjective:    Interval History:  Seen and examined.  Chart and labs reviewed.  Patient denies any chest pain pressure heaviness or tightness.  Reports some abdominal discomfort.  Still with NG tube.  Unable to take oral medications.  Amiodarone therapy discussed with cardiac electrophysiology.    Review of Systems:  A complete review of systems was undertaken with the pertinent cardiovascular findings listed in history of present illness and all other systems being negative.     Assessment & Plan    Impressions:  Paroxysmal atrial fibrillation  History of hyperthyroidism  Hypertension  Hypertensive cardiovascular disease  Perforated gastric ulcer status post surgical repair  Sick sinus syndrome    Recommendations:  Discontinue amiodarone  Start IV Cardizem  Continue with IV beta-blockers  Monitor rate and rhythm  Cardiac electrophysiology cautioned strongly against the use of amiodarone in this patient with hyperthyroidism.  Further recommendations as patient's course progresses.        Objective:    Medication Review:   Scheduled Meds:arformoterol, 15 mcg, Nebulization, BID - RT  budesonide, 0.5 mg, Nebulization, BID - RT  ipratropium-albuterol, 3 mL, Nebulization, Q4H - RT  metoprolol tartrate, 5 mg, Intravenous, Q8H  nicotine, 1 patch, Transdermal, Q24H  pantoprazole, 40 mg, Intravenous, Q12H  piperacillin-tazobactam, 3.375 g, Intravenous, Q8H  sodium chloride, 1,000 mL, Intravenous, Once      Continuous Infusions:dilTIAZem, 5-15 mg/hr, Last Rate: 5 mg/hr (03/22/21 1425)  sodium chloride, 100 mL/hr, Last Rate: 100 mL/hr (03/22/21 1207)      PRN Meds:.HYDROmorphone **AND** naloxone  •  Morphine **AND** naloxone  •  ondansetron **OR** ondansetron  •  sodium chloride  •  sodium chloride    Patient Active Problem List   Diagnosis   • Anxiety   • Arthritis of foot    • Avascular necrosis of bone (CMS/HCC)   • Hypertension   • Mood disorder (CMS/HCC)   • Closed displaced fracture 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   • Arthritis of right foot   • Abnormal results of function studies of other organs and systems   • Adenomatous polyp of colon   • Pain of foot   • Atrial fibrillation (CMS/HCC)   • Chronic pain   • Degeneration of intervertebral disc of lumbar region   • High alkaline phosphatase   • Hyperthyroidism   • Lumbar radiculitis   • Tricuspid valve insufficiency   • Osteoarthritis of lumbosacral spine without myelopathy   • Polyarthralgia   • Pulmonary hypertension (CMS/HCC)   • Sick sinus syndrome (CMS/HCC)   • Syncope and collapse   • Osteoporosis without current pathological fracture   • Hx of pathological fx   • Acute gastric ulcer with perforation (CMS/HCC)   • Pneumonia         Physical Exam:    General: Alert, cooperative, no distress, appears stated age  Head:  Normocephalic, atraumatic, mucous membranes moist  Eyes:  Conjunctiva/corneas clear, EOM's intact     Neck:  Supple,  no bruit  Lungs: Clear to auscultation bilaterally, no wheezes rhonchi rales are noted  Chest wall: No tenderness  Heart::  Irregularly irregular, S1 and S2 normal, 1/6 holosystolic murmur.  No rub or gallop  Abdomen: Tender bowel sounds hypoactive.  Extremities: No cyanosis, clubbing, or edema  Pulses: 2+ and symmetric all extremities  Skin:  No rashes or lesions.  Incisions dressed  Neuro/psych: A&O x3. CN II through XII are grossly intact with appropriate affect    Vital Signs:  Vitals:    03/22/21 0630 03/22/21 0633 03/22/21 1037 03/22/21 1209   BP:    160/69   BP Location:    Right arm   Patient Position:    Lying   Pulse: 55 56 68 70   Resp: 16 16  16   Temp:    98 °F (36.7 °C)   TempSrc:    Oral   SpO2: 97% 96% 96% 98%   Weight:       Height:         Wt Readings from Last 1 Encounters:   03/22/21 74.8 kg (164 lb 14.5 oz)       Intake/Output Summary (Last 24 hours) at  3/22/2021 1450  Last data filed at 3/22/2021 0547  Gross per 24 hour   Intake 100 ml   Output 835 ml   Net -735 ml         Results Review:     CBC    Results from last 7 days   Lab Units 03/22/21  0247 03/21/21 0436 03/20/21  0355 03/19/21 2206 03/19/21  1537   WBC 10*3/mm3 12.50* 19.40* 25.30* 24.30* 28.00*   HEMOGLOBIN g/dL 10.2* 11.5* 11.4* 12.1 12.8   PLATELETS 10*3/mm3 251 259 293 274 303     Cr Clearance Estimated Creatinine Clearance: 74.4 mL/min (by C-G formula based on SCr of 0.77 mg/dL).  Coag     HbA1C No results found for: HGBA1C  Blood Glucose   Glucose   Date/Time Value Ref Range Status   03/21/2021 1803 82 70 - 105 mg/dL Final     Comment:     Serial Number: [id number]:  [id number]   03/20/2021 1807 105 70 - 105 mg/dL Final     Comment:     Serial Number: [id number]:  [id number]     Infection   Results from last 7 days   Lab Units 03/21/21  0436 03/19/21  1916 03/19/21  1710 03/19/21  1707   BLOODCX   --   --  No growth at 2 days No growth at 2 days   BODYFLDCX   --  Rare Yeast isolated*  --   --    PROCALCITONIN ng/mL 1.85*  --   --   --      CMP   Results from last 7 days   Lab Units 03/22/21  0247 03/21/21  0436 03/20/21  1804 03/20/21  0355 03/19/21 2206 03/19/21  1617 03/19/21  1537   SODIUM mmol/L 142 141 140 139 138  --  134*   POTASSIUM mmol/L 3.4* 3.7 3.7 4.7 4.4  --  4.2   CHLORIDE mmol/L 111* 109* 109* 106 103  --  97*   CO2 mmol/L 21.0* 21.0* 22.0 22.0 24.0  --  21.0*   BUN mg/dL 13 11 13 18 23*  --  32*   CREATININE mg/dL 0.77 0.71 0.64 0.69 0.78 0.80 0.81   GLUCOSE mg/dL 86 115* 109* 103* 125*  --  125*   ALBUMIN g/dL 2.80* 2.90* 3.40* 3.20* 3.50  --  3.90   BILIRUBIN mg/dL 0.5 0.4 0.4 0.4 0.3  --  0.3   ALK PHOS U/L 45 51 49 44 44  --  48   AST (SGOT) U/L 13 15 16 22 29  --  12   ALT (SGPT) U/L 15 19 21 25 33  --  12   LIPASE U/L  --   --   --   --   --   --  19     ABG      UA      SOLOMON  No results found for: POCMETH, POCAMPHET, POCBARBITUR, POCBENZO, POCCOCAINE,  POCOPIATES, POCOXYCODO, POCPHENCYC, POCPROPOXY, POCTHC, POCTRICYC  Lysis Labs   Results from last 7 days   Lab Units 03/22/21  0247 03/21/21  0436 03/20/21  1804 03/20/21  0355 03/19/21  2206 03/19/21  1617 03/19/21  1537   HEMOGLOBIN g/dL 10.2* 11.5*  --  11.4* 12.1  --  12.8   PLATELETS 10*3/mm3 251 259  --  293 274  --  303   CREATININE mg/dL 0.77 0.71 0.64 0.69 0.78 0.80 0.81     Radiology(recent) XR Chest 1 View    Result Date: 3/21/2021  Increasing bilateral airspace disease concerning for pneumonia.  Electronically Signed By-Brian Valenzuela MD On:3/21/2021 8:06 AM This report was finalized on [id number] by  Brian Valenzuela MD.    XR Abdomen KUB    Result Date: 3/20/2021   1.  Nasogastric tube in place with the tip in the expected location of the body the stomach. 2.  Air-filled but nondistended loops of small bowel within the left side of the abdomen which may be due to mild ileus.  Electronically Signed By-Amauri Mtz MD On:3/20/2021 7:14 PM This report was finalized on [id number] by  Amauri Mtz MD.        Results from last 7 days   Lab Units 03/19/21  1537   TROPONIN T ng/mL <0.010       Imaging Results (Last 24 Hours)     ** No results found for the last 24 hours. **          Cardiac Studies:  Echo-   Stress Myoview-  Cath-        George Hardy DO  03/22/21  14:50 EDT

## 2021-03-22 NOTE — PROGRESS NOTES
"      [facility] Medicine Services Daily Progress Note      Hospitalist Team  LOS 3 days      Patient Care Team:  Provider, No Known as PCP - General    Patient Location: 2115/1      Subjective   Subjective     Chief Complaint / Subjective  Chief Complaint   Patient presents with   • Abdominal Pain         Brief Synopsis of Hospital Course/HPI    The patient is an 83 y.o. female with a history of COPD, chronic hypoxemic respiratory failure on 2L/trilogy, depression, hyperlipidemia, hypertension, hypothyroidism, diabetes mellitus and chronic thoracic back pain.      The patient presented to the emergency room on 3/19/2021 for evaluation of several days of productive yellow cough and shortness of air. PCP had prescribed Z-King earlier in the week.     In the ED, ABG showed pH 7.25/98.2/11.297% on 32% FiO2. Chest x-ray showed mild pulmonary edema.     The patient was initially admitted to the ICU and was placed on BiPAP. The hospitalist service was consulted on 3/20/2021 for medical management.         Date::    3/21/21: Transferred to PCU 3/20/2021 for A. fib RVR.  Given amiodarone drip and Cardizem drip.  Seen by cardiology.  Still NPO.  No complaints.  3/22/21: No complaints. + NG tube to suction.  Afebrile.  Needs to be n.p.o. until Wednesday.      Review of Systems   All other systems reviewed and are negative.        Objective   Objective      Vital Signs  Temp:  [96.8 °F (36 °C)-98.5 °F (36.9 °C)] 98.4 °F (36.9 °C)  Heart Rate:  [55-84] 68  Resp:  [16-20] 16  BP: (131-159)/(59-78) 153/67  Oxygen Therapy  SpO2: 96 %  Pulse Oximetry Type: Continuous  Device (Oxygen Therapy): room air  Flow (L/min): 2  Flowsheet Rows      First Filed Value   Admission Height  149.9 cm (59\") Documented at 03/19/2021 1504   Admission Weight  72.6 kg (160 lb) Documented at 03/19/2021 1504        Intake & Output (last 3 days)       03/19 0701 - 03/20 0700 03/20 0701 - 03/21 0700 03/21 0701 - 03/22 0700 03/22 0701 - " 03/23 0700    P.O.  0      I.V. (mL/kg) 1945 (25.1)  1000 (13.4)     IV Piggyback   100     Total Intake(mL/kg) 1945 (25.1) 0 (0) 1100 (14.7)     Urine (mL/kg/hr) 2650 2200 (1.2) 400 (0.2)     Emesis/NG output 0 310 800     Drains 330 65 65     Total Output 2980 2575 1265     Net -1035 -2575 -165             Urine Unmeasured Occurrence   3 x         Lines, Drains & Airways    Active LDAs     Name:   Placement date:   Placement time:   Site:   Days:    Peripheral IV 03/19/21 1540 Right Forearm   03/19/21    1540    Forearm   1    Peripheral IV 03/19/21 1827 Left Antecubital   03/19/21 1827    Antecubital   1    Closed/Suction Drain Right Abdomen Bulb 19 Fr.   03/19/21 1958    Abdomen   1    Closed/Suction Drain Left Abdomen Bulb 19 Fr.   03/19/21 1959    Abdomen   1    NG/OG Tube Nasogastric Right nostril   03/19/21 2200    Right nostril   1    External Urinary Catheter   03/20/21 2000    --   less than 1                  Physical Exam:    Physical Exam  HENT:      Head: Normocephalic.      Nose: Nose normal.   Eyes:      General: No scleral icterus.     Extraocular Movements: Extraocular movements intact.      Pupils: Pupils are equal, round, and reactive to light.   Cardiovascular:      Rate and Rhythm: Normal rate.   Pulmonary:      Effort: Pulmonary effort is normal.   Abdominal:      General: Bowel sounds are normal.   Musculoskeletal:         General: Normal range of motion.      Cervical back: Normal range of motion.   Skin:     General: Skin is warm.   Neurological:      Mental Status: She is alert. Mental status is at baseline.   Psychiatric:         Mood and Affect: Mood normal.              Wounds (last 24 hours)      LDA Wound     Row Name 03/22/21 0400 03/22/21 0000 03/21/21 2000       Wound 03/19/21 1907 midline abdomen Incision    Wound - Properties Group Placement Date: 03/19/21  -HL Placement Time: 1907 -HL Orientation: midline  -HL Location: abdomen  -HL Primary Wound Type: Incision   -HL    Closure  MARIYA abdominal binder in place.  -AW  MARIYA abdominal binder in place.  -AW  MARIYA abdominal binder in place.  -AW    Retired Wound - Properties Group Date first assessed: 03/19/21  -HL Time first assessed: 1907  -HL Location: abdomen  -HL Primary Wound Type: Incision  -HL    Row Name 03/21/21 1600 03/21/21 1250          Wound 03/19/21 1907 midline abdomen Incision    Wound - Properties Group Placement Date: 03/19/21  -HL Placement Time: 1907  -HL Orientation: midline  -HL Location: abdomen  -HL Primary Wound Type: Incision  -HL    Closure  MARIYA abdominal binder in place.  -RG  MARIYA abdominal binder in place.  -RG     Retired Wound - Properties Group Date first assessed: 03/19/21  -HL Time first assessed: 1907  -HL Location: abdomen  -HL Primary Wound Type: Incision  -HL      User Key  (r) = Recorded By, (t) = Taken By, (c) = Cosigned By    Initials Name Provider Type     Kate Patel, RN Registered Nurse    Diana Ruvalcaba RN Registered Nurse    Clemencia Shepherd RN Registered Nurse          Procedures:    Procedure(s):  LAPAROTOMY EXPLORATORY with repair of perforated gastric ulcer, oversew of perforated gastric ulcer, placement of kristie patch, biopsy of stomach          Results Review:     I reviewed the patient's new clinical results.      Lab Results (last 24 hours)     Procedure Component Value Units Date/Time    Anaerobic Culture - Peritoneal Fluid, Peritoneum [[id number]] Collected: 03/19/21 1916    Specimen: Peritoneal Fluid from Peritoneum Updated: 03/22/21 0909     Anaerobic Culture No anaerobes isolated at 3 days    Tissue Pathology Exam [[id number]] Collected: 03/19/21 1922    Specimen: Tissue from Stomach Updated: 03/22/21 0838    Body Fluid Culture - Peritoneal Fluid, Peritoneum [[id number]]  (Abnormal) Collected: 03/19/21 1916    Specimen: Peritoneal Fluid from Peritoneum Updated: 03/22/21 0803     Body Fluid Culture Rare Yeast isolated     Gram Stain Many (4+) WBCs per low power field       No organisms seen    Comprehensive Metabolic Panel [[id number]]  (Abnormal) Collected: 03/22/21 0247    Specimen: Blood Updated: 03/22/21 0323     Glucose 86 mg/dL      BUN 13 mg/dL      Creatinine 0.77 mg/dL      Sodium 142 mmol/L      Potassium 3.4 mmol/L      Chloride 111 mmol/L      CO2 21.0 mmol/L      Calcium 8.6 mg/dL      Total Protein 5.6 g/dL      Albumin 2.80 g/dL      ALT (SGPT) 15 U/L      AST (SGOT) 13 U/L      Alkaline Phosphatase 45 U/L      Total Bilirubin 0.5 mg/dL      eGFR Non African Amer 77 mL/min/1.73      Globulin 2.8 gm/dL      A/G Ratio 1.0 g/dL      BUN/Creatinine Ratio 16.9     Anion Gap 10.0 mmol/L     Narrative:      GFR Normal >60  Chronic Kidney Disease <60  Kidney Failure <15      CBC & Differential [[id number]]  (Abnormal) Collected: 03/22/21 0247    Specimen: Blood Updated: 03/22/21 0253    Narrative:      The following orders were created for panel order CBC & Differential.  Procedure                               Abnormality         Status                     ---------                               -----------         ------                     CBC Auto Differential[[id number]]        Abnormal            Final result                 Please view results for these tests on the individual orders.    CBC Auto Differential [[id number]]  (Abnormal) Collected: 03/22/21 0247    Specimen: Blood Updated: 03/22/21 0253     WBC 12.50 10*3/mm3      RBC 3.61 10*6/mm3      Hemoglobin 10.2 g/dL      Hematocrit 31.5 %      MCV 87.2 fL      MCH 28.3 pg      MCHC 32.5 g/dL      RDW 15.6 %      RDW-SD 47.7 fl      MPV 6.7 fL      Platelets 251 10*3/mm3      Neutrophil % 84.2 %      Lymphocyte % 9.5 %      Monocyte % 4.3 %      Eosinophil % 1.6 %      Basophil % 0.4 %      Neutrophils, Absolute 10.50 10*3/mm3      Lymphocytes, Absolute 1.20 10*3/mm3      Monocytes, Absolute 0.50 10*3/mm3      Eosinophils, Absolute 0.20 10*3/mm3      Basophils, Absolute 0.00 10*3/mm3      nRBC 0.1 /100 WBC     POC  Glucose Once [[id number]]  (Normal) Collected: 03/21/21 1803    Specimen: Blood Updated: 03/21/21 1913     Glucose 82 mg/dL      Comment: Serial Number: [id number]:  [id number]       T3, Free [[id number]]  (Abnormal) Collected: 03/21/21 1500    Specimen: Blood Updated: 03/21/21 1802     T3, Free 1.70 pg/mL     Narrative:      Results may be falsely increased if patient taking Biotin.      Blood Culture - Blood, Arm, Right [[id number]] Collected: 03/19/21 1710    Specimen: Blood from Arm, Right Updated: 03/21/21 1715     Blood Culture No growth at 2 days    Blood Culture - Blood, Arm, Left [[id number]] Collected: 03/19/21 1707    Specimen: Blood from Arm, Left Updated: 03/21/21 1715     Blood Culture No growth at 2 days    TSH [[id number]]  (Abnormal) Collected: 03/21/21 1500    Specimen: Blood Updated: 03/21/21 1539     TSH <0.005 uIU/mL     T4, Free [[id number]]  (Abnormal) Collected: 03/21/21 1500    Specimen: Blood Updated: 03/21/21 1536     Free T4 3.11 ng/dL     Narrative:      Results may be falsely increased if patient taking Biotin.      Sedimentation Rate [[id number]]  (Abnormal) Collected: 03/21/21 1500    Specimen: Blood Updated: 03/21/21 1529     Sed Rate 43 mm/hr         No results found for: HGBA1C            Lab Results   Component Value Date    LIPASE 19 03/19/2021     No results found for: CHOL, CHLPL, TRIG, HDL, LDL, LDLDIRECT    No results found for: INTRAOP, PREDX, FINALDX, COMDX    Microbiology Results (last 10 days)     Procedure Component Value - Date/Time    Anaerobic Culture - Peritoneal Fluid, Peritoneum [[id number]] Collected: 03/19/21 1916    Lab Status: Preliminary result Specimen: Peritoneal Fluid from Peritoneum Updated: 03/22/21 0909     Anaerobic Culture No anaerobes isolated at 3 days    Body Fluid Culture - Peritoneal Fluid, Peritoneum [[id number]]  (Abnormal) Collected: 03/19/21 1916    Lab Status: Preliminary result Specimen: Peritoneal Fluid from Peritoneum Updated: 03/22/21 0803      Body Fluid Culture Rare Yeast isolated     Gram Stain Many (4+) WBCs per low power field      No organisms seen    COVID PRE-OP / PRE-PROCEDURE SCREENING ORDER (NO ISOLATION) - Swab, Nasopharynx [[id number]]  (Normal) Collected: 03/19/21 1729    Lab Status: Final result Specimen: Swab from Nasopharynx Updated: 03/19/21 1822    Narrative:      The following orders were created for panel order COVID PRE-OP / PRE-PROCEDURE SCREENING ORDER (NO ISOLATION) - Swab, Nasopharynx.  Procedure                               Abnormality         Status                     ---------                               -----------         ------                     COVID-19,CEPHEID,COR/PAOLA...[[id number]]  Normal              Final result                 Please view results for these tests on the individual orders.    COVID-19,CEPHEID,COR/PAOLA/PAD IN-HOUSE(OR EMERGENT/ADD-ON),NP SWAB IN TRANSPORT MEDIA 3-4 HR TAT, RT-PCR - Swab, Nasopharynx [[id number]]  (Normal) Collected: 03/19/21 1729    Lab Status: Final result Specimen: Swab from Nasopharynx Updated: 03/19/21 1822     COVID19 Not Detected    Narrative:      Fact sheet for providers: https://www.fda.gov/media/682945/download     Fact sheet for patients: https://www.fda.gov/media/000400/download    Blood Culture - Blood, Arm, Right [[id number]] Collected: 03/19/21 1710    Lab Status: Preliminary result Specimen: Blood from Arm, Right Updated: 03/21/21 1715     Blood Culture No growth at 2 days    Blood Culture - Blood, Arm, Left [[id number]] Collected: 03/19/21 1707    Lab Status: Preliminary result Specimen: Blood from Arm, Left Updated: 03/21/21 1715     Blood Culture No growth at 2 days          ECG/EMG Results (most recent)     Procedure Component Value Units Date/Time    ECG 12 Lead [[id number]] Collected: 03/20/21 1814     Updated: 03/20/21 1816     QT Interval 254 ms     Narrative:      HEART RATE= 200  bpm  RR Interval= 300  ms  W[place] Interval=   ms  P Horizontal Axis=   deg  P Front  Axis=   deg  QRSD Interval= 89  ms  QT Interval= 254  ms  QRS Axis= -24  deg  T Wave Axis= 156  deg  - ABNORMAL ECG -  Atrial fibrillation with rapid V-rate  Probable LVH with secondary repol abnrm  ST depression, probably rate related  When compared with ECG of 19-Mar-2021 15:45:25,  Significant rate increase  Significant axis, voltage or hypertrophy change  Electronically Signed By:   Date and Time of Study: 2021-03-20 18:14:49    ECG 12 Lead [[id number]] Collected: 03/21/21 0709     Updated: 03/21/21 0711     QT Interval 292 ms     Narrative:      HEART RATE= 69  bpm  RR Interval= 872  ms  HI Interval= 138  ms  P Horizontal Axis= -8  deg  P Front Axis= 19  deg  QRSD Interval= 80  ms  QT Interval= 292  ms  QRS Axis= -1  deg  T Wave Axis=   deg  - NORMAL ECG -  Sinus rhythm  When compared with ECG of 20-Mar-2021 18:14:49,  Significant rate decrease  Significant repolarization change  Significant axis, voltage or hypertrophy change  Electronically Signed By:   Date and Time of Study: 2021-03-21 07:09:35    ECG 12 Lead [[id number]] Collected: 03/19/21 1545     Updated: 03/21/21 1249     QT Interval 387 ms     Narrative:      HEART RATE= 63  bpm  RR Interval= 948  ms  HI Interval= 150  ms  P Horizontal Axis= -12  deg  P Front Axis= 38  deg  QRSD Interval= 97  ms  QT Interval= 387  ms  QRS Axis= 19  deg  T Wave Axis= 42  deg  - ABNORMAL ECG -  Sinus rhythm  Probable left atrial enlargement  Anteroseptal infarct, age indeterminate  When compared with ECG of 27-Mar-2020 18:36:26,  Electronically Signed By: Luisito Dykes (PAOLA) 21-Mar-2021 12:49:27  Date and Time of Study: 2021-03-19 15:45:25                  CT Abdomen Pelvis With Contrast    Result Date: 3/19/2021   1. Free intraperitoneal air in the abdomen indicating perforated viscus. 2. Abnormal finding of thickening/inflammation distal gastric body wall, greatest anteriorly. FINDINGS may reflect perforated gastric ulcer. Consider endoscopic correlation.     Electronically Signed By-Mahnaz rOtiz MD On:3/19/2021 4:40 PM This report was finalized on [id number] by  Mahnaz Ortiz MD.    XR Chest 1 View    Result Date: 3/21/2021  Increasing bilateral airspace disease concerning for pneumonia.  Electronically Signed By-Brian Valenzuela MD On:3/21/2021 8:06 AM This report was finalized on [id number] by  Brian Valenzuela MD.    XR Chest 1 View    Result Date: 3/20/2021    1.  Interval placement of nasogastric tube the tip below the diaphragm. 2.  Stable cardiomegaly. 3.  New minimal left basilar airspace disease likely due to atelectasis.   Electronically Signed By-Amauri Mtz MD On:3/20/2021 8:36 AM This report was finalized on [id number] by  Amauri Mtz MD.    XR Chest 1 View    Result Date: 3/19/2021  Free air underneath the right hemidiaphragm. FINDINGS worrisome for perforated viscus. Dedicated CT abdomen and pelvis is recommended for further evaluation. I spoke with the clinician in the emergency room at the time of this dictation.  Electronically Signed By-Mahnaz Ortiz MD On:3/19/2021 4:00 PM This report was finalized on [id number] by  Mahnaz Ortiz MD.    CT Chest Pulmonary Embolism    Result Date: 3/19/2021   1. No evidence of pulmonary embolus. 2. No acute cardiac pulmonary process. 3. Ancillary findings as described above.     Electronically Signed By-Daniella Go MD On:3/19/2021 4:38 PM This report was finalized on [id number] by  Daniella Go MD.    XR Abdomen KUB    Result Date: 3/20/2021   1.  Nasogastric tube in place with the tip in the expected location of the body the stomach. 2.  Air-filled but nondistended loops of small bowel within the left side of the abdomen which may be due to mild ileus.  Electronically Signed By-Amauri Mtz MD On:3/20/2021 7:14 PM This report was finalized on [id number] by  Amauri Mtz MD.          Xrays, labs reviewed personally by physician.    Medication Review:   I have reviewed the patient's  current medication list      Scheduled Meds  amiodarone, 150 mg, Intravenous, Once  arformoterol, 15 mcg, Nebulization, BID - RT  budesonide, 0.5 mg, Nebulization, BID - RT  ipratropium-albuterol, 3 mL, Nebulization, Q4H - RT  metoprolol tartrate, 5 mg, Intravenous, Q8H  nicotine, 1 patch, Transdermal, Q24H  pantoprazole, 40 mg, Intravenous, Q12H  piperacillin-tazobactam, 3.375 g, Intravenous, Q8H  sodium chloride, 1,000 mL, Intravenous, Once        Meds Infusions  amiodarone, 0.5 mg/min, Last Rate: 0.5 mg/min (03/22/21 0341)  sodium chloride, 100 mL/hr, Last Rate: 125 mL/hr (03/22/21 0912)        Meds PRN  HYDROmorphone **AND** naloxone  •  Morphine **AND** naloxone  •  ondansetron **OR** ondansetron  •  sodium chloride  •  sodium chloride        Assessment/Plan   Assessment/Plan     Active Hospital Problems    Diagnosis  POA   • **Acute gastric ulcer with perforation (CMS/HCC) [K25.1]  Yes     Priority: High   • Pneumonia [J18.9]  Yes     Priority: High   • Atrial fibrillation (CMS/HCC) [I48.91]  Yes     Priority: High   • Avascular necrosis of bone (CMS/HCC) [M87.00]  Yes     Priority: Medium   • Sick sinus syndrome (CMS/HCC) [I49.5]  Yes     Priority: Medium   • Hyperthyroidism [E05.90]  Yes     Priority: Medium   • Hypertension [I10]  Yes     Priority: Medium   • Pulmonary hypertension (CMS/HCC) [I27.20]  Yes     Priority: Medium   • Anxiety [F41.9]  Yes      Resolved Hospital Problems   No resolved problems to display.       MEDICAL DECISION MAKING COMPLEXITY BY PROBLEM:       Gastric ulcer with perforation (POA)  -s/p laparotomy exploratory with repair of perforated gastric ulcer, oversew of perforated gastric ulcer, placement of Smith patch and  biopsy of stomach 3/19/2021.  -Observed overnight in the ICU postoperatively  -NPO with NG tube suction    Atrial fibrillation RVR:  -Reason for transfer to PCU 3/20/2021  -Treated with Cardizem drip and amiodarone drip  -Continue IV Lopressor while n.p.o.  -A. fib  RVR exacerbated because of uncontrolled hyperthyroidism  -Patient noncompliant with Eliquis at home  -TTE 3/25/19--> LVEF 55-60%  -CTA chest 3/19/2021--> ruled out pulmonary embolism    Pneumonia:  -X-ray 3/21/2021--> bilateral airspace disease  -Continue Zosyn    Hyperthyroidism:  -Follows with endocrinology and consulted  -On Tapazole at home    Chronic pain secondary to avascular necrosis:  -Patient has been using NSAIDs for pain control which probably exacerbated gastric ulcer        VTE Prophylaxis -   Mechanical Order History:      Ordered        03/19/21 2123  Place Sequential Compression Device  Once         03/19/21 2123  Place Sequential Compression Device  Once         03/19/21 2123  Maintain Sequential Compression Device  Continuous                 Pharmalogical Order History:     None            Code Status -   Code Status and Medical Interventions:   Ordered at: 03/19/21 2123     Code Status:    CPR     Medical Interventions (Level of Support Prior to Arrest):    Full       This patient has been examined wearing appropriate Personal Protective Equipment and discussed with nursing. 03/22/21        Discharge Planning  defer        Electronically signed by Dung Chávez DO, 03/22/21, 11:14 EDT.  [facility] Hospitalist Team

## 2021-03-22 NOTE — PROGRESS NOTES
Continued Stay Note  DONTE Holcomb     Patient Name: Abi Rivera  MRN: [id number]  Today's Date: 3/22/2021    Admit Date: 3/19/2021    Discharge Plan     Row Name 03/22/21 1736       Plan    Plan  DC Plan: Anticipate routine home. Will follow for Medication Assistance at d/c.    Plan Comments  SW noted pt already screened for no insurance by [facility]. Per documentation, pt is awaiting [employer] coverage to become active and coverage will be retroactive to 1/1/21. ALESSANDRA/PEPE to follow for medication assistance.        Phone communication or documentation only - no physical contact with patient or family.    MIKO Hughes, LSW    Office: [phone]  Cell: [phone]  Fax: [phone]  E-mail: [email or web address]

## 2021-03-22 NOTE — PROGRESS NOTES
LOS: 3 days   Patient Care Team:  Provider, No Known as PCP - General    Reason for follow-up: Postop    Subjective   Patient seen and examined.    Objective   Not up ambulating or doing spirometry well enough yet    Vital Signs  Vitals:    03/22/21 1037 03/22/21 1209 03/22/21 1546 03/22/21 1653   BP:  160/69 161/72 154/62   BP Location:  Right arm Right arm Right arm   Patient Position:  Lying Lying Lying   Pulse: 68 70 71 67   Resp:  16 16 16   Temp:  98 °F (36.7 °C) 97.9 °F (36.6 °C) 98.5 °F (36.9 °C)   TempSrc:  Oral Oral Oral   SpO2: 96% 98% 97% 97%   Weight:       Height:             Results Review:       Lab Results (last 24 hours)     Procedure Component Value Units Date/Time    Blood Culture - Blood, Arm, Right [[id number]] Collected: 03/19/21 1710    Specimen: Blood from Arm, Right Updated: 03/22/21 1715     Blood Culture No growth at 3 days    Blood Culture - Blood, Arm, Left [[id number]] Collected: 03/19/21 1707    Specimen: Blood from Arm, Left Updated: 03/22/21 1715     Blood Culture No growth at 3 days    Anaerobic Culture - Peritoneal Fluid, Peritoneum [[id number]] Collected: 03/19/21 1916    Specimen: Peritoneal Fluid from Peritoneum Updated: 03/22/21 0909     Anaerobic Culture No anaerobes isolated at 3 days    Tissue Pathology Exam [[id number]] Collected: 03/19/21 1922    Specimen: Tissue from Stomach Updated: 03/22/21 0838    Body Fluid Culture - Peritoneal Fluid, Peritoneum [[id number]]  (Abnormal) Collected: 03/19/21 1916    Specimen: Peritoneal Fluid from Peritoneum Updated: 03/22/21 0803     Body Fluid Culture Rare Yeast isolated     Gram Stain Many (4+) WBCs per low power field      No organisms seen    Comprehensive Metabolic Panel [[id number]]  (Abnormal) Collected: 03/22/21 0247    Specimen: Blood Updated: 03/22/21 0323     Glucose 86 mg/dL      BUN 13 mg/dL      Creatinine 0.77 mg/dL      Sodium 142 mmol/L      Potassium 3.4 mmol/L      Chloride 111 mmol/L      CO2 21.0 mmol/L      Calcium  8.6 mg/dL      Total Protein 5.6 g/dL      Albumin 2.80 g/dL      ALT (SGPT) 15 U/L      AST (SGOT) 13 U/L      Alkaline Phosphatase 45 U/L      Total Bilirubin 0.5 mg/dL      eGFR Non African Amer 77 mL/min/1.73      Globulin 2.8 gm/dL      A/G Ratio 1.0 g/dL      BUN/Creatinine Ratio 16.9     Anion Gap 10.0 mmol/L     Narrative:      GFR Normal >60  Chronic Kidney Disease <60  Kidney Failure <15      CBC & Differential [[id number]]  (Abnormal) Collected: 03/22/21 0247    Specimen: Blood Updated: 03/22/21 0253    Narrative:      The following orders were created for panel order CBC & Differential.  Procedure                               Abnormality         Status                     ---------                               -----------         ------                     CBC Auto Differential[[id number]]        Abnormal            Final result                 Please view results for these tests on the individual orders.    CBC Auto Differential [[id number]]  (Abnormal) Collected: 03/22/21 0247    Specimen: Blood Updated: 03/22/21 0253     WBC 12.50 10*3/mm3      RBC 3.61 10*6/mm3      Hemoglobin 10.2 g/dL      Hematocrit 31.5 %      MCV 87.2 fL      MCH 28.3 pg      MCHC 32.5 g/dL      RDW 15.6 %      RDW-SD 47.7 fl      MPV 6.7 fL      Platelets 251 10*3/mm3      Neutrophil % 84.2 %      Lymphocyte % 9.5 %      Monocyte % 4.3 %      Eosinophil % 1.6 %      Basophil % 0.4 %      Neutrophils, Absolute 10.50 10*3/mm3      Lymphocytes, Absolute 1.20 10*3/mm3      Monocytes, Absolute 0.50 10*3/mm3      Eosinophils, Absolute 0.20 10*3/mm3      Basophils, Absolute 0.00 10*3/mm3      nRBC 0.1 /100 WBC            Imaging Results (Last 24 Hours)     ** No results found for the last 24 hours. **          Medication Review:     Assessment/Plan         Acute gastric ulcer with perforation (CMS/HCC)    Anxiety    Avascular necrosis of bone (CMS/HCC)    Hypertension    Atrial fibrillation (CMS/HCC)    Hyperthyroidism    Pulmonary  hypertension (CMS/HCC)    Sick sinus syndrome (CMS/HCC)    Pneumonia    Impression: Postop day 3 repair of perforated gastric ulcer, has early pneumonia    Plan: Needs to be up ambulating and using spirometer more.  Plan for Gastrografin study through NG tube on Wednesday and if okay can DC NG and start clear liquids          Luisito Velasco, DO  03/22/21  18:49 EDT

## 2021-03-22 NOTE — PLAN OF CARE
Goal Outcome Evaluation:        Outcome Summary: patient usually complains of 7-8 on pain scale, pain controlled with didlaudid every 2 hours PRN. encouraged patient to get up and move around during the day. Pateint refuses to even turn from side to side. Will ask Dr. Velasco when he comes by about patients activity.

## 2021-03-22 NOTE — PLAN OF CARE
Pt slept throughout the night with no complaints; will continue to monitor     Problem: Adult Inpatient Plan of Care  Goal: Plan of Care Review  Outcome: Ongoing, Progressing  Goal: Patient-Specific Goal (Individualized)  Outcome: Ongoing, Progressing  Goal: Absence of Hospital-Acquired Illness or Injury  Outcome: Ongoing, Progressing  Intervention: Identify and Manage Fall Risk  Recent Flowsheet Documentation  Taken 3/22/2021 0400 by Diana Chong RN  Safety Promotion/Fall Prevention:   activity supervised   assistive device/personal items within reach   clutter free environment maintained   safety round/check completed  Taken 3/22/2021 0200 by Diana Chong RN  Safety Promotion/Fall Prevention:   activity supervised   clutter free environment maintained   assistive device/personal items within reach   safety round/check completed  Taken 3/22/2021 0000 by Diana Chong RN  Safety Promotion/Fall Prevention:   activity supervised   assistive device/personal items within reach   clutter free environment maintained   safety round/check completed  Taken 3/21/2021 2200 by Diana Chong RN  Safety Promotion/Fall Prevention:   activity supervised   assistive device/personal items within reach   clutter free environment maintained   safety round/check completed  Taken 3/21/2021 2000 by Diana Chong RN  Safety Promotion/Fall Prevention:   activity supervised   assistive device/personal items within reach   clutter free environment maintained   safety round/check completed  Goal: Optimal Comfort and Wellbeing  Outcome: Ongoing, Progressing  Intervention: Provide Person-Centered Care  Recent Flowsheet Documentation  Taken 3/21/2021 2000 by Diana Chong RN  Trust Relationship/Rapport:   care explained   choices provided   emotional support provided  Goal: Readiness for Transition of Care  Outcome: Ongoing, Progressing     Problem: Pain Acute  Goal: Optimal Pain Control  Outcome: Ongoing, Progressing  Intervention: Optimize  Psychosocial Wellbeing  Recent Flowsheet Documentation  Taken 3/21/2021 2000 by Diana Chong, RN  Diversional Activities: television     Problem: Skin Injury Risk Increased  Goal: Skin Health and Integrity  Outcome: Ongoing, Progressing   Goal Outcome Evaluation:

## 2021-03-23 LAB
ANION GAP SERPL CALCULATED.3IONS-SCNC: 17 MMOL/L (ref 5–15)
BASOPHILS # BLD AUTO: 0 10*3/MM3 (ref 0–0.2)
BASOPHILS NFR BLD AUTO: 0.3 % (ref 0–1.5)
BUN SERPL-MCNC: 15 MG/DL (ref 6–20)
BUN/CREAT SERPL: 22.7 (ref 7–25)
CALCIUM SPEC-SCNC: 9.6 MG/DL (ref 8.6–10.5)
CHLORIDE SERPL-SCNC: 109 MMOL/L (ref 98–107)
CO2 SERPL-SCNC: 18 MMOL/L (ref 22–29)
CREAT SERPL-MCNC: 0.66 MG/DL (ref 0.57–1)
DEPRECATED RDW RBC AUTO: 48.6 FL (ref 37–54)
EOSINOPHIL # BLD AUTO: 0.2 10*3/MM3 (ref 0–0.4)
EOSINOPHIL NFR BLD AUTO: 1.6 % (ref 0.3–6.2)
ERYTHROCYTE [DISTWIDTH] IN BLOOD BY AUTOMATED COUNT: 15.5 % (ref 12.3–15.4)
GFR SERPL CREATININE-BSD FRML MDRD: 92 ML/MIN/1.73
GLUCOSE BLDC GLUCOMTR-MCNC: 210 MG/DL (ref 70–105)
GLUCOSE BLDC GLUCOMTR-MCNC: 49 MG/DL (ref 70–105)
GLUCOSE SERPL-MCNC: 59 MG/DL (ref 65–99)
HCT VFR BLD AUTO: 32.3 % (ref 34–46.6)
HGB BLD-MCNC: 10.5 G/DL (ref 12–15.9)
LAB AP CASE REPORT: NORMAL
LAB AP DIAGNOSIS COMMENT: NORMAL
LYMPHOCYTES # BLD AUTO: 1.2 10*3/MM3 (ref 0.7–3.1)
LYMPHOCYTES NFR BLD AUTO: 11 % (ref 19.6–45.3)
MAGNESIUM SERPL-MCNC: 1.6 MG/DL (ref 1.6–2.6)
MCH RBC QN AUTO: 28.5 PG (ref 26.6–33)
MCHC RBC AUTO-ENTMCNC: 32.6 G/DL (ref 31.5–35.7)
MCV RBC AUTO: 87.5 FL (ref 79–97)
MONOCYTES # BLD AUTO: 0.8 10*3/MM3 (ref 0.1–0.9)
MONOCYTES NFR BLD AUTO: 7.6 % (ref 5–12)
NEUTROPHILS NFR BLD AUTO: 79.5 % (ref 42.7–76)
NEUTROPHILS NFR BLD AUTO: 8.4 10*3/MM3 (ref 1.7–7)
NRBC BLD AUTO-RTO: 0.1 /100 WBC (ref 0–0.2)
PATH REPORT.FINAL DX SPEC: NORMAL
PATH REPORT.GROSS SPEC: NORMAL
PHOSPHATE SERPL-MCNC: 3.2 MG/DL (ref 2.5–4.5)
PLATELET # BLD AUTO: 258 10*3/MM3 (ref 140–450)
PMV BLD AUTO: 6.7 FL (ref 6–12)
POTASSIUM SERPL-SCNC: 3.2 MMOL/L (ref 3.5–5.2)
POTASSIUM SERPL-SCNC: 3.5 MMOL/L (ref 3.5–5.2)
RBC # BLD AUTO: 3.69 10*6/MM3 (ref 3.77–5.28)
SODIUM SERPL-SCNC: 144 MMOL/L (ref 136–145)
THYROPEROXIDASE AB SERPL-ACNC: <9 IU/ML (ref 0–34)
WBC # BLD AUTO: 10.5 10*3/MM3 (ref 3.4–10.8)

## 2021-03-23 PROCEDURE — 99232 SBSQ HOSP IP/OBS MODERATE 35: CPT | Performed by: HOSPITALIST

## 2021-03-23 PROCEDURE — 82962 GLUCOSE BLOOD TEST: CPT

## 2021-03-23 PROCEDURE — 85025 COMPLETE CBC W/AUTO DIFF WBC: CPT | Performed by: HOSPITALIST

## 2021-03-23 PROCEDURE — 84100 ASSAY OF PHOSPHORUS: CPT | Performed by: HOSPITALIST

## 2021-03-23 PROCEDURE — 84132 ASSAY OF SERUM POTASSIUM: CPT | Performed by: HOSPITALIST

## 2021-03-23 PROCEDURE — 97530 THERAPEUTIC ACTIVITIES: CPT

## 2021-03-23 PROCEDURE — 80048 BASIC METABOLIC PNL TOTAL CA: CPT | Performed by: HOSPITALIST

## 2021-03-23 PROCEDURE — 94799 UNLISTED PULMONARY SVC/PX: CPT

## 2021-03-23 PROCEDURE — 25010000002 MORPHINE PER 10 MG: Performed by: HOSPITALIST

## 2021-03-23 PROCEDURE — 99232 SBSQ HOSP IP/OBS MODERATE 35: CPT | Performed by: INTERNAL MEDICINE

## 2021-03-23 PROCEDURE — 25010000002 PIPERACILLIN SOD-TAZOBACTAM PER 1 G: Performed by: HOSPITALIST

## 2021-03-23 PROCEDURE — 97162 PT EVAL MOD COMPLEX 30 MIN: CPT

## 2021-03-23 PROCEDURE — 83735 ASSAY OF MAGNESIUM: CPT | Performed by: HOSPITALIST

## 2021-03-23 PROCEDURE — 97116 GAIT TRAINING THERAPY: CPT

## 2021-03-23 RX ORDER — POTASSIUM CHLORIDE 7.45 MG/ML
10 INJECTION INTRAVENOUS
Status: DISCONTINUED | OUTPATIENT
Start: 2021-03-23 | End: 2021-03-25 | Stop reason: HOSPADM

## 2021-03-23 RX ORDER — DEXTROSE MONOHYDRATE 25 G/50ML
50 INJECTION, SOLUTION INTRAVENOUS
Status: DISCONTINUED | OUTPATIENT
Start: 2021-03-23 | End: 2021-03-25 | Stop reason: HOSPADM

## 2021-03-23 RX ORDER — MAGNESIUM SULFATE HEPTAHYDRATE 40 MG/ML
2 INJECTION, SOLUTION INTRAVENOUS AS NEEDED
Status: DISCONTINUED | OUTPATIENT
Start: 2021-03-23 | End: 2021-03-25 | Stop reason: HOSPADM

## 2021-03-23 RX ORDER — MAGNESIUM SULFATE HEPTAHYDRATE 40 MG/ML
4 INJECTION, SOLUTION INTRAVENOUS AS NEEDED
Status: DISCONTINUED | OUTPATIENT
Start: 2021-03-23 | End: 2021-03-25 | Stop reason: HOSPADM

## 2021-03-23 RX ORDER — POTASSIUM CHLORIDE 20 MEQ/1
40 TABLET, EXTENDED RELEASE ORAL AS NEEDED
Status: DISCONTINUED | OUTPATIENT
Start: 2021-03-23 | End: 2021-03-25 | Stop reason: HOSPADM

## 2021-03-23 RX ORDER — POTASSIUM CHLORIDE 1.5 G/1.77G
40 POWDER, FOR SOLUTION ORAL AS NEEDED
Status: DISCONTINUED | OUTPATIENT
Start: 2021-03-23 | End: 2021-03-25 | Stop reason: HOSPADM

## 2021-03-23 RX ORDER — DEXTROSE AND SODIUM CHLORIDE 5; .45 G/100ML; G/100ML
75 INJECTION, SOLUTION INTRAVENOUS CONTINUOUS
Status: DISCONTINUED | OUTPATIENT
Start: 2021-03-23 | End: 2021-03-24

## 2021-03-23 RX ORDER — DEXTROSE MONOHYDRATE 25 G/50ML
INJECTION, SOLUTION INTRAVENOUS
Status: COMPLETED
Start: 2021-03-23 | End: 2021-03-23

## 2021-03-23 RX ADMIN — PIPERACILLIN AND TAZOBACTAM 3.38 G: 3; .375 INJECTION, POWDER, LYOPHILIZED, FOR SOLUTION INTRAVENOUS at 08:40

## 2021-03-23 RX ADMIN — ARFORMOTEROL TARTRATE 15 MCG: 15 SOLUTION RESPIRATORY (INHALATION) at 19:46

## 2021-03-23 RX ADMIN — DEXTROSE MONOHYDRATE: 25 INJECTION, SOLUTION INTRAVENOUS at 03:50

## 2021-03-23 RX ADMIN — METOPROLOL TARTRATE 5 MG: 5 INJECTION INTRAVENOUS at 17:59

## 2021-03-23 RX ADMIN — MORPHINE SULFATE 4 MG: 4 INJECTION INTRAVENOUS at 22:17

## 2021-03-23 RX ADMIN — METOPROLOL TARTRATE 5 MG: 5 INJECTION INTRAVENOUS at 08:39

## 2021-03-23 RX ADMIN — Medication 1 PATCH: at 08:47

## 2021-03-23 RX ADMIN — PIPERACILLIN AND TAZOBACTAM 3.38 G: 3; .375 INJECTION, POWDER, LYOPHILIZED, FOR SOLUTION INTRAVENOUS at 22:17

## 2021-03-23 RX ADMIN — MORPHINE SULFATE 4 MG: 4 INJECTION INTRAVENOUS at 03:04

## 2021-03-23 RX ADMIN — IPRATROPIUM BROMIDE AND ALBUTEROL SULFATE 3 ML: 2.5; .5 SOLUTION RESPIRATORY (INHALATION) at 10:54

## 2021-03-23 RX ADMIN — IPRATROPIUM BROMIDE AND ALBUTEROL SULFATE 3 ML: 2.5; .5 SOLUTION RESPIRATORY (INHALATION) at 19:41

## 2021-03-23 RX ADMIN — MORPHINE SULFATE 4 MG: 4 INJECTION INTRAVENOUS at 17:56

## 2021-03-23 RX ADMIN — MORPHINE SULFATE 4 MG: 4 INJECTION INTRAVENOUS at 12:15

## 2021-03-23 RX ADMIN — DEXTROSE AND SODIUM CHLORIDE 75 ML/HR: 5; 450 INJECTION, SOLUTION INTRAVENOUS at 10:14

## 2021-03-23 RX ADMIN — PIPERACILLIN AND TAZOBACTAM 3.38 G: 3; .375 INJECTION, POWDER, LYOPHILIZED, FOR SOLUTION INTRAVENOUS at 14:57

## 2021-03-23 RX ADMIN — METOPROLOL TARTRATE 5 MG: 5 INJECTION INTRAVENOUS at 03:04

## 2021-03-23 RX ADMIN — MORPHINE SULFATE 4 MG: 4 INJECTION INTRAVENOUS at 08:48

## 2021-03-23 RX ADMIN — MORPHINE SULFATE 4 MG: 4 INJECTION INTRAVENOUS at 05:44

## 2021-03-23 RX ADMIN — MORPHINE SULFATE 4 MG: 4 INJECTION INTRAVENOUS at 20:11

## 2021-03-23 RX ADMIN — MORPHINE SULFATE 4 MG: 4 INJECTION INTRAVENOUS at 14:57

## 2021-03-23 RX ADMIN — BUDESONIDE 0.5 MG: 0.5 INHALANT RESPIRATORY (INHALATION) at 19:49

## 2021-03-23 NOTE — CONSULTS
"Nutrition Services    Patient Name: Abi Rivera  YOB: 1960  MRN: [id number]  Admission date: 3/19/2021    Comment:    Patient is NPO x 4 days, will monitor       PPE Documentation        PPE Worn By Provider Mask, Eye Protection    PPE Worn By Patient  None      CLINICAL NUTRITION ASSESSMENT       Reason for Assessment 3/23: NPO x 4 days      H&P:  Per H&P \"Abi Rivera is a 60 y.o. female smoker, history of multiple foot injuries and she reported to me that she is addicted to NSAID because of the pain, presented with few days history of worsening abdominal pain in the epigastric area increased with activity and movement, and today at 9:00 started having severe sharp abdominal pain presented to the emergency room CT abdomen showed Free intraperitoneal air in the abdomen indicating perforated viscus and suspicious for perforated gastric ulcer surgery also consulted and plan to proceed with surgery and she is going to transfer to ICU after that\"     Past Medical History:   Diagnosis Date   • Adenomatous polyp of colon 6/20/2016   • Anxiety 6/16/2016   • Arthritis of foot 1/10/2019   • Atrial fibrillation (CMS/HCC) 6/16/2016   • Avascular necrosis of bone (CMS/HCC) 1/10/2019   • Chronic pain 1/22/2019   • Closed displaced fracture 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 9/19/2019    Added automatically from request for surgery [id number]   • Hypertension 6/16/2016   • Hyperthyroidism 11/2/2016   • Mood disorder (CMS/HCC) 6/16/2016   • Osteoporosis without current pathological fracture 6/4/2020   • Pulmonary hypertension (CMS/HCC) 6/16/2016   • Sick sinus syndrome (CMS/HCC) 1/25/2017   • Tricuspid valve insufficiency 6/16/2016       Past Surgical History:   Procedure Laterality Date   • ANKLE OPEN REDUCTION INTERNAL FIXATION     • APPENDECTOMY     • EXPLORATORY LAPAROTOMY N/A 3/19/2021    Procedure: LAPAROTOMY EXPLORATORY with repair of perforated gastric ulcer, oversew of perforated " "gastric ulcer, placement of kristie patch, biopsy of stomach;  Surgeon: Luisito Velasco DO;  Location: [facility] MAIN OR;  Service: General;  Laterality: N/A;   • HIP SURGERY      Left total hip   • ORIF FOOT FRACTURE Right 9/30/2019    Procedure: OPEN REDUCTION INTERNAL FIXATION OF FIFTH METATARSAL FRACTURE, LOOSE BODY EXCISION/REMOVAL FROM THE TALONAVICULAR JOINT OF THE RIGHT FOOT;  Surgeon: JOSE Fry DPM;  Location: [facility] MAIN OR;  Service: Podiatry        Current Problems:   Gastric ulcer with perforation  -3/19: s/p laparotomy exploratory with repair of perforated gastric ulcer, oversew of perforated gastric ulcer, placement of Kristie patch and  biopsy of stomach   - Transferred out of ICU   - Gen Sx following      Atrial fibrillation RVR   - Reason for transfer to PCU 3/20/2021  - Cardiology following      Pneumonia  - Antibiotics      Hyperthyroidism     Chronic pain secondary to avascular necrosis   - Patient has been using NSAIDs for pain control which probably exacerbated gastric ulcer       Nutrition/Diet History         Narrative     3/23: Visited patient today who reports an acute decreased PO intakes x 5 days in duration directly related to her gastric ulcer. Prior to this point eating well and not losing weight. Patient is agreeable to oral nutritional supplements once diet advances.      Functional Status Assistance from person for ambulation. Is able to feed herself.      Food Allergies NKFA      Factors Affecting   Nutritional Intake Gastric ulcer perforation requiring sx      Anthropometrics        Current Height, Weight Height: 149.9 cm (59\")  Weight: 74.3 kg (163 lb 12.8 oz) (03/23/21 0500)        Admit Height, Weight -    Flowsheet Rows      First Filed Value   Admission Height  149.9 cm (59\") Documented at 03/19/2021 1504   Admission Weight  72.6 kg (160 lb) Documented at 03/19/2021 1504             Ideal Body Weight (IBW) 98 lb    % Ideal Body Weight 166%        Usual Body Weight 150 " lb per pt    % Usual Body Weight 109%    Wt Change Observation 3/23: Wt stable x 2 years    Weight Hx    Wt Readings from Last 30 Encounters:   03/23/21 0500 74.3 kg (163 lb 12.8 oz)   03/22/21 0547 74.8 kg (164 lb 14.5 oz)   03/20/21 1854 75.6 kg (166 lb 11.2 oz)   03/19/21 2224 77.6 kg (171 lb 1.2 oz)   03/19/21 1504 72.6 kg (160 lb)   01/14/21 1441 72.1 kg (159 lb)   11/12/20 1515 73 kg (161 lb)   11/05/20 1418 73 kg (161 lb)   08/06/20 1505 71.2 kg (157 lb)   06/04/20 1440 64.9 kg (143 lb)   05/07/20 1450 64.9 kg (143 lb)   05/01/20 1415 64.9 kg (143 lb)   03/28/20 0618 65.1 kg (143 lb 8.3 oz)   03/27/20 1648 67.6 kg (149 lb)   03/24/20 1407 68 kg (150 lb)   03/09/20 1458 70.3 kg (155 lb)   03/05/20 1423 71.7 kg (158 lb)   12/05/19 1449 71.2 kg (157 lb)   10/31/19 1353 71.2 kg (157 lb)   10/17/19 1536 71.2 kg (157 lb)   09/30/19 1004 71.4 kg (157 lb 6.5 oz)   09/23/19 1451 71.7 kg (158 lb)   09/19/19 0946 71.2 kg (157 lb)        BMI kg/m2 Body mass index is 33.08 kg/m².       Labs/Medications         Pertinent Labs -   Results from last 7 days   Lab Units 03/23/21  0238 03/22/21  0247 03/21/21  0436 03/20/21  1804   SODIUM mmol/L 144 142 141 140   POTASSIUM mmol/L 3.5 3.4* 3.7 3.7   CHLORIDE mmol/L 109* 111* 109* 109*   CO2 mmol/L 18.0* 21.0* 21.0* 22.0   BUN mg/dL 15 13 11 13   CREATININE mg/dL 0.66 0.77 0.71 0.64   CALCIUM mg/dL 9.6 8.6 8.8 8.9   BILIRUBIN mg/dL  --  0.5 0.4 0.4   ALK PHOS U/L  --  45 51 49   ALT (SGPT) U/L  --  15 19 21   AST (SGOT) U/L  --  13 15 16   GLUCOSE mg/dL 59* 86 115* 109*     Results from last 7 days   Lab Units 03/23/21  0238   MAGNESIUM mg/dL 1.6   PHOSPHORUS mg/dL 3.2   HEMOGLOBIN g/dL 10.5*   HEMATOCRIT % 32.3*     COVID19   Date Value Ref Range Status   03/19/2021 Not Detected Not Detected - Ref. Range Final     No results found for: HGBA1C      Pertinent Medications Lopressor, zosyn, D5 + 1/2 NS at 75 mL/hr, cardizem gtt, prn morphine      Physical Findings        Overall  "Physical   Appearance, MSA 3/23: Upon visualization of patient, no obvious s/s of malnutrition.    -  Edema  None charted      Gastrointestinal + BM 3/19 x 4 days ago- Gen sx monitoring      Tubes NG to suction in place-> 1000 mL o/p 3/22      Oral/Mouth Cavity Patient denies issues chewing or swallowing      Skin Midline abdominal incision        Estimated/Assessed Needs       Energy Requirements    Height for Calculation  Height: 149.9 cm (59\")   Weight for Calculation -   Method for Estimation  -   EST Needs (kcal/day) -       Protein Requirements    Weight for Calculation -   EST Protein Needs (g/kg) -   EST Daily Needs (g/day) -       Fluid Requirements     Estimated Needs (mL/day) -       Fluid Deficit    Current Na Level (mEq/L) -   Desired Na Level (mEq/L) -   Estimated Fluid Deficit (L)  -     Current Nutrition Orders & Evaluation of Received Nutrient/Fluid Intake       Oral Nutrition     Current PO Diet NPO Diet   Supplement None    PO Evaluation     % PO Intake 3/23: 0% r/t NPO status    -  Enteral Nutrition    Enteral Route -   TF Modular -   TF Delivery Method -   Current Ordered TF  -   Current Ordered H2O flush  -    TF Observation  -   EN Evaluation     TF Changes -    TF Residual -    TF Tolerance -    Average EN Delivered -       Parenteral Nutrition     TPN Route -   Current Ordered TPN VOL  -   Dextrose (g/kcals)  -   Amino Acid (g/kcals) -   Lipids (mL/Concentration/FREQ)  -   MVI Frequency  -   Trace Element Frequency  -   TPN Observation  -    TPN Evaluation    Total # Days on TPN -   -  Clinical Course    Nutrition Course Details PO diet    3/19 to present (3/23) NPO      Nutritional Risk Screening        NRS-2002 Score   -       Nutrition Diagnosis         Nutrition Dx Problem 1 Inadequate oral intakes r/t perforated gastric ulcer requiring surgical intervention AEB NPO x 4 days       Nutrition Dx Problem 2 -       Intervention Goal         Intervention Goal(s) Diet to advance      Nutrition " Intervention        RD/Tech Action Will continue to monitor        Nutrition Prescription          Diet Prescription NPO    Supplement Prescription -   -  Enteral Prescription -       TPN Prescription -     Monitor/Evaluation        Monitor Weights, intakes, labs, BM, skin and medication changes      Electronically signed by:  Brittney Newton RD  03/23/21 15:29 EDT

## 2021-03-23 NOTE — PROGRESS NOTES
"Subjective   Abi Rivera is a 60 y.o. female.   Seen for thyroid f/u.  Post op perforated gastric ulcer. NG tube draining.  Denies tremors or palpitations.      Objective     /62 (BP Location: Right arm, Patient Position: Lying)   Pulse 67   Temp 98.5 °F (36.9 °C) (Oral)   Resp 16   Ht 149.9 cm (59\")   Wt 74.8 kg (164 lb 14.5 oz)   SpO2 97%   Breastfeeding No   BMI 33.31 kg/m²   TSH <0.005, Free T4 3.11, Free T3 1.7    ASSESSMENT    Patient is stable    PLAN    Continue to follow.   To resume methimazole when able to eat.         Max Villegas MD  3/22/2021  20:55 EDT    "

## 2021-03-23 NOTE — PROGRESS NOTES
"   LOS: 4 days   Patient Care Team:  Provider, No Known as PCP - General    Reason for follow-up: Postop    Subjective   Patient seen and examined.  Is up ambulating with help now    Objective   Incisions clean dry and intact without infection.  Drain tubes with appropriate color and output.  NG with appropriate color and output    Vital Signs  Vitals:    03/23/21 1050 03/23/21 1054 03/23/21 1057 03/23/21 1435   BP: 151/59   158/58   Pulse: 60 60 57 55   Resp: 16 16  20   Temp: 99 °F (37.2 °C)   98.7 °F (37.1 °C)   TempSrc:       SpO2: 93% 96% 99% 99%   Weight:       Height:             Results Review:       Lab Results (last 24 hours)     Procedure Component Value Units Date/Time    Tissue Pathology Exam [[id number]] Collected: 03/19/21 1922    Specimen: Tissue from Stomach Updated: 03/23/21 1116     Case Report --     Surgical Pathology Report                         Case: [id number]                                  Authorizing Provider:  Luisito Velasco DO        Collected:           03/19/2021 07:22 PM          Ordering Location:     [facility] MAIN  Received:            03/22/2021 08:38 AM                                 OR                                                                           Pathologist:           Corbin Lucio MD                                                            Specimen:    Stomach, stomach biopsy                                                                     Final Diagnosis --     Mucosa, antrum, biopsy:    Benign fibroadipose tissue with mixed acute and chronic inflammation and necro-inflammatory debris consistent       with ulcer    No intact epithelium is identified    No malignancy identified     See comment    SY/sms        Comment --     Deeper levels were examined through the block and support the rendered diagnosis.     SY/sms        Gross Description --     Received designated \"Stomach\" is a fragment of tan tissue measuring 1.1 cm in greatest " dimension. It is bisected and submitted in one cassette.     SY/tkd      Body Fluid Culture - Peritoneal Fluid, Peritoneum [[id number]]  (Abnormal) Collected: 03/19/21 1916    Specimen: Peritoneal Fluid from Peritoneum Updated: 03/23/21 0924     Body Fluid Culture Rare Yeast isolated     Gram Stain Many (4+) WBCs per low power field      No organisms seen    POC Glucose Once [[id number]]  (Abnormal) Collected: 03/23/21 0406    Specimen: Blood Updated: 03/23/21 0412     Glucose 210 mg/dL      Comment: Serial Number: [id number]:  [id number]       POC Glucose Once [[id number]]  (Abnormal) Collected: 03/23/21 0344    Specimen: Blood Updated: 03/23/21 0347     Glucose 49 mg/dL      Comment: Serial Number: [id number]:  [id number]       Basic Metabolic Panel [[id number]]  (Abnormal) Collected: 03/23/21 0238    Specimen: Blood Updated: 03/23/21 0341     Glucose 59 mg/dL      BUN 15 mg/dL      Creatinine 0.66 mg/dL      Sodium 144 mmol/L      Potassium 3.5 mmol/L      Chloride 109 mmol/L      CO2 18.0 mmol/L      Calcium 9.6 mg/dL      eGFR Non African Amer 92 mL/min/1.73      BUN/Creatinine Ratio 22.7     Anion Gap 17.0 mmol/L     Narrative:      GFR Normal >60  Chronic Kidney Disease <60  Kidney Failure <15      Thyroid Peroxidase Antibody [[id number]] Collected: 03/21/21 1500    Specimen: Blood Updated: 03/23/21 0336     Thyroid Peroxidase Antibody <9 IU/mL     Narrative:      Performed at:  [address] [place] [address]  [id number]  : Hasmukh Blake PhD, Phone:  [phone]    Phosphorus [[id number]]  (Normal) Collected: 03/23/21 0238    Specimen: Blood Updated: 03/23/21 0319     Phosphorus 3.2 mg/dL     Magnesium [[id number]]  (Normal) Collected: 03/23/21 0238    Specimen: Blood Updated: 03/23/21 0319     Magnesium 1.6 mg/dL     CBC & Differential [[id number]]  (Abnormal) Collected: 03/23/21 0238    Specimen: Blood Updated: 03/23/21 0258    Narrative:      The following orders were  created for panel order CBC & Differential.  Procedure                               Abnormality         Status                     ---------                               -----------         ------                     CBC Auto Differential[[id number]]        Abnormal            Final result                 Please view results for these tests on the individual orders.    CBC Auto Differential [[id number]]  (Abnormal) Collected: 03/23/21 0238    Specimen: Blood Updated: 03/23/21 0258     WBC 10.50 10*3/mm3      RBC 3.69 10*6/mm3      Hemoglobin 10.5 g/dL      Hematocrit 32.3 %      MCV 87.5 fL      MCH 28.5 pg      MCHC 32.6 g/dL      RDW 15.5 %      RDW-SD 48.6 fl      MPV 6.7 fL      Platelets 258 10*3/mm3      Neutrophil % 79.5 %      Lymphocyte % 11.0 %      Monocyte % 7.6 %      Eosinophil % 1.6 %      Basophil % 0.3 %      Neutrophils, Absolute 8.40 10*3/mm3      Lymphocytes, Absolute 1.20 10*3/mm3      Monocytes, Absolute 0.80 10*3/mm3      Eosinophils, Absolute 0.20 10*3/mm3      Basophils, Absolute 0.00 10*3/mm3      nRBC 0.1 /100 WBC            Imaging Results (Last 24 Hours)     ** No results found for the last 24 hours. **          Medication Review:     Assessment/Plan         Acute gastric ulcer with perforation (CMS/HCC)    Anxiety    Avascular necrosis of bone (CMS/HCC)    Hypertension    Atrial fibrillation (CMS/HCC)    Hyperthyroidism    Pulmonary hypertension (CMS/HCC)    Sick sinus syndrome (CMS/HCC)    Pneumonia    Impression: Postop day 4 exploratory laparotomy with repair of perforated gastric ulcer    Plan: We will do Gastrografin study via NG tube in the morning.  If that looks okay then can DC NG and start clear liquids        Luisito Velasco, DO  03/23/21  16:33 EDT

## 2021-03-23 NOTE — THERAPY EVALUATION
Patient Name: Abi Rivera  : 1960    MRN: [id number]                              Today's Date: 3/23/2021       Admit Date: 3/19/2021    Visit Dx:     ICD-10-CM ICD-9-CM   1. Acute gastric ulcer with perforation (CMS/HCC)  K25.1 531.10   2. Generalized abdominal pain  R10.84 789.07   3. Chest pain, unspecified type  R07.9 786.50   4. Chronic ulcer of stomach and intestines with bleeding and perforation (CMS/HCC)  K28.6 569.83     531.60   5. Acute ulcer of the stomach and intestines with perforation (CMS/HCC)  K25.1 531.10    K63.1 569.83     Patient Active Problem List   Diagnosis   • Anxiety   • Arthritis of foot   • Avascular necrosis of bone (CMS/HCC)   • Hypertension   • Mood disorder (CMS/HCC)   • Closed displaced fracture 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   • Arthritis of right foot   • Abnormal results of function studies of other organs and systems   • Adenomatous polyp of colon   • Pain of foot   • Atrial fibrillation (CMS/HCC)   • Chronic pain   • Degeneration of intervertebral disc of lumbar region   • High alkaline phosphatase   • Hyperthyroidism   • Lumbar radiculitis   • Tricuspid valve insufficiency   • Osteoarthritis of lumbosacral spine without myelopathy   • Polyarthralgia   • Pulmonary hypertension (CMS/HCC)   • Sick sinus syndrome (CMS/HCC)   • Syncope and collapse   • Osteoporosis without current pathological fracture   • Hx of pathological fx   • Acute gastric ulcer with perforation (CMS/HCC)   • Pneumonia     Past Medical History:   Diagnosis Date   • Adenomatous polyp of colon 2016   • Anxiety 2016   • Arthritis of foot 1/10/2019   • Atrial fibrillation (CMS/HCC) 2016   • Avascular necrosis of bone (CMS/HCC) 1/10/2019   • Chronic pain 2019   • Closed displaced fracture 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 2019    Added automatically from request for surgery [id number]   • Hypertension 2016   • Hyperthyroidism  11/2/2016   • Mood disorder (CMS/[facility]) 6/16/2016   • Osteoporosis without current pathological fracture 6/4/2020   • Pulmonary hypertension (CMS/[facility]) 6/16/2016   • Sick sinus syndrome (CMS/[facility]) 1/25/2017   • Tricuspid valve insufficiency 6/16/2016     Past Surgical History:   Procedure Laterality Date   • ANKLE OPEN REDUCTION INTERNAL FIXATION     • APPENDECTOMY     • EXPLORATORY LAPAROTOMY N/A 3/19/2021    Procedure: LAPAROTOMY EXPLORATORY with repair of perforated gastric ulcer, oversew of perforated gastric ulcer, placement of kristie patch, biopsy of stomach;  Surgeon: Luisito Velasco DO;  Location: [facility] MAIN OR;  Service: General;  Laterality: N/A;   • HIP SURGERY      Left total hip   • ORIF FOOT FRACTURE Right 9/30/2019    Procedure: OPEN REDUCTION INTERNAL FIXATION OF FIFTH METATARSAL FRACTURE, LOOSE BODY EXCISION/REMOVAL FROM THE TALONAVICULAR JOINT OF THE RIGHT FOOT;  Surgeon: JOSE Fry DPLYNDSEY;  Location: [facility] MAIN OR;  Service: Podiatry     General Information     Row Name 03/23/21 1417          Physical Therapy Time and Intention    Document Type  evaluation  -TAD horne) MD baum) TAD (c)     Mode of Treatment  physical therapy  -TAD horne) MD baum) TAD smith)     Row Name 03/23/21 1417          General Information    Patient Profile Reviewed  yes  -TAD horne) MD (jose) TAD (c)     Prior Level of Function  independent:;ADL's;work;driving;all household mobility;community mobility Pt denies use of AD, but states has RW at home.  -TAD (stefani) MD baum) TAD (arianna)     Existing Precautions/Restrictions  other (see comments) Pt currently with NG tube, 2 LIZBETH drains, & abdominal binder  -TAD baum (r)) TAD (c)     Row Name 03/23/21 1417          Living Environment    Lives With  spouse;child(isai), adult Pt lives with  and 3 adult children  -TAD baum (r)) TAD (c)     Row Name 03/23/21 1417          Home Main Entrance    Number of Stairs, Main Entrance  four  -TAD baum (r)) TAD (c)     Row Name 03/23/21 1417          Stairs Within Home,  Primary    Number of Stairs, Within Home, Primary  ten  -SS (r) MD (t) SS (c)     Row Name 03/23/21 1417          Cognition    Orientation Status (Cognition)  oriented x 4  -SS (r) MD (t) SS (c)     Row Name 03/23/21 1417          Safety Issues, Functional Mobility    Impairments Affecting Function (Mobility)  endurance/activity tolerance;pain;strength  -SS (r) MD (t) SS (c)       User Key  (r) = Recorded By, (t) = Taken By, (c) = Cosigned By    Initials Name Provider Type    SS Viktoriya Zhao, PT Physical Therapist    Kandy Bautista, PT Student PT Student        Mobility     Row Name 03/23/21 1422          Bed Mobility    Bed Mobility  supine-sit  -SS (r) MD (t) SS (c)     Supine-Sit Baker (Bed Mobility)  moderate assist (50% patient effort)  -SS (r) MD (t) SS (c)     Assistive Device (Bed Mobility)  head of bed elevated;bed rails  -SS (r) MD (t) SS (c)     Comment (Bed Mobility)  Pt educated on log rolling, however pt reports that method increases her pain. Pt able to initiate transition from supine to sit and positions BLE properly. Pt requires assist with trunk to achieve sitting position with HOB elevated. Pt able to scoot to EOB using handrails. Pt rests once in seated position and reports pain increased with mobility.  -SS (r) MD (t) SS (c)     Row Name 03/23/21 1422          Transfers    Comment (Transfers)  Pt able to perform STS from EOB without much difficulty. Pt pain is biggest barrier.  -SS (r) MD (t) SS (c)     Row Name 03/23/21 1422          Sit-Stand Transfer    Sit-Stand Baker (Transfers)  contact guard  -SS (r) MD (t) SS (c)     Row Name 03/23/21 1422          Gait/Stairs (Locomotion)    [place] Level (Gait)  contact guard  -SS (r) MD (t) SS (c)     Assistive Device (Gait)  walker, front-wheeled  -SS (r) MD (t) SS (c)     Distance in Feet (Gait)  60ft  -SS (r) MD (t) SS (c)     Deviations/Abnormal Patterns (Gait)  gait speed decreased;stride length decreased  -SS (r)  MD (t) SS (c)     [place] Level (Stairs)  not tested  -SS (r) MD (t) SS (c)     Comment (Gait/Stairs)  Gait initiated with handheld assist, however it was decided pt would benefit from using RW. Pt proceeded to ambulate 60ft with RW & CGA. Pt demonstrates slow paced gait with reduced step length. Pt reports hx of tib-fib fx on RLE that has impacted her ability to ambulate. Pt states she typically only ambulates short distances. Pt educated regarding importance of frequent walks post-surgery to help restore normal GI function.  -SS (r) MD (t) SS (c)       User Key  (r) = Recorded By, (t) = Taken By, (c) = Cosigned By    Initials Name Provider Type    SS Viktoriya Zhao, PT Physical Therapist    Kandy Bautista, PT Student PT Student        Obj/Interventions     Row Name 03/23/21 1430          Range of Motion Comprehensive    General Range of Motion  no range of motion deficits identified  -SS (r) MD (t) SS (c)     Row Name 03/23/21 1430          Strength Comprehensive (MMT)    Comment, General Manual Muscle Testing (MMT) Assessment  BLE 4/5 assessed via functional observation  -SS (r) MD (t) SS (c)     Row Name 03/23/21 1430          Balance    Balance Assessment  sitting static balance;standing static balance;standing dynamic balance  -SS (r) MD (t) SS (c)     Static Sitting Balance  WFL  -SS (r) MD (t) SS (c)     Static Standing Balance  WFL;supported  -SS (r) MD (t) SS (c)     Dynamic Standing Balance  WFL;supported  -SS (r) MD (t) SS (c)     Row Name 03/23/21 1430          Sensory Assessment (Somatosensory)    Sensory Assessment (Somatosensory)  sensation intact  -SS (r) MD (t) SS (c)       User Key  (r) = Recorded By, (t) = Taken By, (c) = Cosigned By    Initials Name Provider Type    SS Viktoriya Zhao, PT Physical Therapist    Kandy Bautista, PT Student PT Student        Goals/Plan     Row Name 03/23/21 1444          Bed Mobility Goal 1 (PT)    Activity/Assistive Device (Bed Mobility  Goal 1, PT)  bed mobility activities, all  -SS (r) MD (t) SS (c)     [place] Level/Cues Needed (Bed Mobility Goal 1, PT)  independent  -SS (r) MD (t) SS (c)     Time Frame (Bed Mobility Goal 1, PT)  long term goal (LTG);2 weeks  -SS (r) MD (t) SS (c)     Row Name 03/23/21 1444          Transfer Goal 1 (PT)    Activity/Assistive Device (Transfer Goal 1, PT)  transfers, all;walker, rolling  -SS (r) MD (t) SS (c)     [place] Level/Cues Needed (Transfer Goal 1, PT)  modified independence  -SS (r) MD (t) SS (c)     Time Frame (Transfer Goal 1, PT)  long term goal (LTG);2 weeks  -SS (r) MD (t) SS (c)     Row Name 03/23/21 1444          Gait Training Goal 1 (PT)    Activity/Assistive Device (Gait Training Goal 1, PT)  gait (walking locomotion);walker, rolling  -SS (r) MD (t) SS (c)     [place] Level (Gait Training Goal 1, PT)  modified independence  -SS (r) MD (t) SS (c)     Distance (Gait Training Goal 1, PT)  100ft  -SS (r) MD (t) SS (c)     Time Frame (Gait Training Goal 1, PT)  long term goal (LTG);2 weeks  -SS (r) MD (t) SS (c)       User Key  (r) = Recorded By, (t) = Taken By, (c) = Cosigned By    Initials Name Provider Type     Viktoriya Zhao, PT Physical Therapist    Kandy Bautista, PT Student PT Student        Clinical Impression     Row Name 03/23/21 9591          Pain    Additional Documentation  Pain Scale: Numbers Pre/Post-Treatment (Group)  -SS (r) MD (t) SS (c)     Row Name 03/23/21 6338          Pain Scale: Numbers Pre/Post-Treatment    Pretreatment Pain Rating  7/10  -SS (r) MD (t) SS (c)     Posttreatment Pain Rating  9/10  -SS (r) MD (t) SS (c)     Pain Location  abdomen  -SS (r) MD (t) SS (c)     Pre/Posttreatment Pain Comment  Pt reports increased pain with mobility.  -SS (r) MD (t) SS (c)     Row Name 03/23/21 2023          Plan of Care Review    Plan of Care Reviewed With  patient  -TAD (r) MD (t) TAD (c)     Progress  improving  -TAD (r) MD (t) TAD (c)     Outcome Summary  Pt  is 59yo F POD 4 ex lap to repair perforated gastric ulcer. Pt currently with NG tube, 2 LIZBETH drains and abdominal binder. Pt lives with  and 3 adult children. Pt reports she was independent in all ADLs, drives and works as a . Pt reports hx of injury to RLE, which has limited her ambulation primarily to short distances. Pt denies use of AD, although reports having RW at home. NSG removed pt from NG tube suctioning for mobility. Pt currently requires modA with bed mobility, CGA for STS transfer and CGA + RW for ambulation. Pt able to ambulate 60ft during evaluation with reduced gait speed and step length. Pt reports pain significantly increases with mobility. Education provided regarding importance of mobility following surgery. Pt reports frequent use of spirometer and today is the first day she has been able to use it without extreme pain. Pt happy to sit in chair following ambulation to get out of bed. Pt seems motivated to return home. Pt biggest limiting factor to mobility at this time is pain. Anticipate pt will be safe to return home with family assist at discharge once medically able. Will keep pt on caseload while inpatient to promote frequent mobility. PPE: gloves, mask, goggles  -TAD (r) MD (t) SS (c)     Row Name 03/23/21 1435          Therapy Assessment/Plan (PT)    Rehab Potential (PT)  good, to achieve stated therapy goals  -TAD (stefani) MD (t) SS (c)     Criteria for Skilled Interventions Met (PT)  yes;skilled treatment is necessary  -TAD (stefani) MD (t) SS (c)     Predicted Duration of Therapy Intervention (PT)  until d/c  -TAD (r) MD (t) SS (c)     Row Name 03/23/21 1435          Vital Signs    O2 Delivery Pre Treatment  room air  -TAD (stefani) MD (t) SS (c)     O2 Delivery Intra Treatment  room air  -TAD (stefani) MD (t) SS (c)     O2 Delivery Post Treatment  room air  -TAD (stefani) MD (t) SS (c)     Row Name 03/23/21 1435          Positioning and Restraints    Pre-Treatment Position  in bed  -TAD (stefani) MD (t) SS (c)      Post Treatment Position  chair  -SS (r) MD (t) TAD (c)     In Chair  notified nsg;sitting;legs elevated;encouraged to call for assist;call light within reach  -TAD (r) MD (t) TAD (c)       User Key  (r) = Recorded By, (t) = Taken By, (c) = Cosigned By    Initials Name Provider Type    SS Viktoriya Zhao, PT Physical Therapist    Kandy Bautista, PT Student PT Student        Outcome Measures    No documentation.       Physical Therapy Education                 Title: PT OT SLP Therapies (In Progress)     Topic: Physical Therapy (In Progress)     Point: Mobility training (Done)     Learning Progress Summary           Patient Acceptance, E,TB, VU by MD at 3/23/2021 1446                   Point: Home exercise program (Not Started)     Learner Progress:  Not documented in this visit.          Point: Body mechanics (Done)     Learning Progress Summary           Patient Acceptance, E,TB, VU by MD at 3/23/2021 1446                   Point: Precautions (Done)     Learning Progress Summary           Patient Acceptance, E,TB, VU by MD at 3/23/2021 1446                               User Key     Initials Effective Dates Name Provider Type Discipline    MD 01/13/21 -  Kandy Nicolas, PT Student PT Student PT              PT Recommendation and Plan  Planned Therapy Interventions (PT): bed mobility training, gait training, patient/family education, transfer training, strengthening  Plan of Care Reviewed With: patient  Progress: improving  Outcome Summary: Pt is 61yo F POD 4 ex lap to repair perforated gastric ulcer. Pt currently with NG tube, 2 LIZBETH drains and abdominal binder. Pt lives with  and 3 adult children. Pt reports she was independent in all ADLs, drives and works as a . Pt reports hx of injury to RLE, which has limited her ambulation primarily to short distances. Pt denies use of AD, although reports having RW at home. NSG removed pt from NG tube suctioning for mobility. Pt currently  requires modA with bed mobility, CGA for STS transfer and CGA + RW for ambulation. Pt able to ambulate 60ft during evaluation with reduced gait speed and step length. Pt reports pain significantly increases with mobility. Education provided regarding importance of mobility following surgery. Pt reports frequent use of spirometer and today is the first day she has been able to use it without extreme pain. Pt happy to sit in chair following ambulation to get out of bed. Pt seems motivated to return home. Pt biggest limiting factor to mobility at this time is pain. Anticipate pt will be safe to return home with family assist at discharge once medically able. Will keep pt on caseload while inpatient to promote frequent mobility. PPE: gloves, mask, goggles     Time Calculation:   PT Charges     Row Name 03/23/21 1449 03/23/21 1446          Time Calculation    Start Time  1209  -SS (r) MD (t) SS (c)  --  -SS (r) MD (t) SS (c)     Stop Time  1249  -SS (r) MD (t) SS (c)  --  -SS (r) MD (t) SS (c)     Time Calculation (min)  40 min  -TAD (r) MD (t)  --  -SS (r) MD (t)     PT Received On  03/23/21  -SS (r) MD (t) SS (c)  --  -SS (r) MD (t) SS (c)     PT - Next Appointment  03/24/21  -TAD (r) MD (t) SS (c)  --  -SS (r) MD (t) SS (c)     PT Goal Re-Cert Due Date  04/06/21  -SS (r) MD (t) SS (c)  --  -SS (r) MD (t) SS (c)        Time Calculation- PT    Total Timed Code Minutes- PT  15 minute(s)  -SS (r) MD (t) SS (c)  --  -SS (r) MD (t) SS (c)       User Key  (r) = Recorded By, (t) = Taken By, (c) = Cosigned By    Initials Name Provider Type    Viktoriya Starkey, PT Physical Therapist    Kandy Bautista, PT Student PT Student        Therapy Charges for Today     Code Description Service Date Service Provider Modifiers Qty    90646753247 HC GAIT TRAINING EA 15 MIN 3/23/2021 Kandy Nicolas, PT Student GP 1    [id number] HC PT EVAL MOD COMPLEXITY 3 3/23/2021 Kandy Nicolas, PT Student GP 1               Kandy  Fidencio, PT Student  3/23/2021

## 2021-03-23 NOTE — PLAN OF CARE
Goal Outcome Evaluation:  Plan of Care Reviewed With: patient  Progress: improving  Outcome Summary: Pt is 61yo F POD 4 ex lap to repair perforated gastric ulcer. Pt currently with NG tube, 2 LIZBETH drains and abdominal binder. Pt lives with  and 3 adult children. Pt reports she was independent in all ADLs, drives and works as a . Pt reports hx of injury to RLE, which has limited her ambulation primarily to short distances. Pt denies use of AD, although reports having RW at home. NSG removed pt from NG tube suctioning for mobility. Pt currently requires modA with bed mobility, CGA for STS transfer and CGA + RW for ambulation. Pt able to ambulate 60ft during evaluation with reduced gait speed and step length. Pt reports pain significantly increases with mobility. Education provided regarding importance of mobility following surgery. Pt reports frequent use of spirometer and today is the first day she has been able to use it without extreme pain. Pt happy to sit in chair following ambulation to get out of bed. Pt seems motivated to return home. Pt biggest limiting factor to mobility at this time is pain. Anticipate pt will be safe to return home with family assist at discharge once medically able. Will keep pt on caseload while inpatient to promote frequent mobility. PPE: gloves, mask, goggles

## 2021-03-23 NOTE — PLAN OF CARE
Pt slept throughout the night with no complaints. Pts BS dropped to 49 and pt was given D50; will continue to monitor     Problem: Adult Inpatient Plan of Care  Goal: Plan of Care Review  Outcome: Ongoing, Progressing  Goal: Patient-Specific Goal (Individualized)  Outcome: Ongoing, Progressing  Goal: Absence of Hospital-Acquired Illness or Injury  Outcome: Ongoing, Progressing  Intervention: Identify and Manage Fall Risk  Recent Flowsheet Documentation  Taken 3/23/2021 0400 by Diana Chong RN  Safety Promotion/Fall Prevention:   activity supervised   assistive device/personal items within reach   clutter free environment maintained   safety round/check completed  Taken 3/23/2021 0200 by Diana Chong RN  Safety Promotion/Fall Prevention:   activity supervised   clutter free environment maintained   assistive device/personal items within reach   safety round/check completed  Taken 3/23/2021 0000 by Diana Chong RN  Safety Promotion/Fall Prevention:   activity supervised   assistive device/personal items within reach   clutter free environment maintained   safety round/check completed  Taken 3/22/2021 2200 by Diana Chong RN  Safety Promotion/Fall Prevention:   activity supervised   assistive device/personal items within reach   clutter free environment maintained   safety round/check completed  Taken 3/22/2021 2000 by Diana Chong RN  Safety Promotion/Fall Prevention:   activity supervised   assistive device/personal items within reach   clutter free environment maintained   safety round/check completed  Goal: Optimal Comfort and Wellbeing  Outcome: Ongoing, Progressing  Intervention: Provide Person-Centered Care  Recent Flowsheet Documentation  Taken 3/22/2021 2000 by Diana Chong RN  Trust Relationship/Rapport:   care explained   choices provided   emotional support provided  Goal: Readiness for Transition of Care  Outcome: Ongoing, Progressing     Problem: Pain Acute  Goal: Optimal Pain Control  Outcome:  Ongoing, Progressing  Intervention: Optimize Psychosocial Wellbeing  Recent Flowsheet Documentation  Taken 3/22/2021 2000 by Diana Chong RN  Diversional Activities: television     Problem: Skin Injury Risk Increased  Goal: Skin Health and Integrity  Outcome: Ongoing, Progressing  Intervention: Promote and Optimize Oral Intake  Recent Flowsheet Documentation  Taken 3/23/2021 0350 by Diana Chong RN  Oral Nutrition Promotion: medicated   Goal Outcome Evaluation:

## 2021-03-23 NOTE — PLAN OF CARE
Goal Outcome Evaluation:        Outcome Summary: Patient continues to be NPO with G tube to ILOWS. Patient complains about ABD multiple times durning day. Morphine given PRN for pain.Pt worked with patient and she has been up in the chair today.

## 2021-03-23 NOTE — PROGRESS NOTES
"      [facility] Medicine Services Daily Progress Note      Hospitalist Team  LOS 4 days      Patient Care Team:  Provider, No Known as PCP - General    Patient Location: 2115/1      Subjective   Subjective     Chief Complaint / Subjective  Chief Complaint   Patient presents with   • Abdominal Pain         Brief Synopsis of Hospital Course/HPI    The patient is an 83 y.o. female with a history of COPD, chronic hypoxemic respiratory failure on 2L/trilogy, depression, hyperlipidemia, hypertension, hypothyroidism, diabetes mellitus and chronic thoracic back pain.      The patient presented to the emergency room on 3/19/2021 for evaluation of several days of productive yellow cough and shortness of air. PCP had prescribed Z-King earlier in the week.     In the ED, ABG showed pH 7.25/98.2/11.297% on 32% FiO2. Chest x-ray showed mild pulmonary edema.     The patient was initially admitted to the ICU and was placed on BiPAP. The hospitalist service was consulted on 3/20/2021 for medical management.         Date::    3/21/21: Transferred to PCU 3/20/2021 for A. fib RVR.  Given amiodarone drip and Cardizem drip.  Seen by cardiology.  Still NPO.  No complaints.  3/22/21: No complaints. + NG tube to suction.  Afebrile.  Needs to be n.p.o. until Wednesday.  3/23/21: Low blood glucose.  Started D5 and 1/2 NS.  Abdominal pain controlled      Review of Systems   All other systems reviewed and are negative.        Objective   Objective      Vital Signs  Temp:  [98.4 °F (36.9 °C)-99 °F (37.2 °C)] 98.7 °F (37.1 °C)  Heart Rate:  [51-73] 55  Resp:  [16-20] 20  BP: (150-180)/(58-72) 158/58  Oxygen Therapy  SpO2: 99 %  Pulse Oximetry Type: Continuous  Device (Oxygen Therapy): room air  Flow (L/min): 2  Flowsheet Rows      First Filed Value   Admission Height  149.9 cm (59\") Documented at 03/19/2021 1504   Admission Weight  72.6 kg (160 lb) Documented at 03/19/2021 1504        Intake & Output (last 3 days)       03/20 " 0701 - 03/21 0700 03/21 0701 - 03/22 0700 03/22 0701 - 03/23 0700 03/23 0701 - 03/24 0700    P.O. 0       I.V. (mL/kg)  1000 (13.4)      IV Piggyback  100      Total Intake(mL/kg) 0 (0) 1100 (14.7)      Urine (mL/kg/hr) 2200 (1.2) 400 (0.2) 750 (0.4)     Emesis/NG output      Drains 65 65 52.5     Total Output 2575 1265 1802.5     Net -2575 -165 -1802.5             Urine Unmeasured Occurrence  3 x 1 x         Lines, Drains & Airways    Active LDAs     Name:   Placement date:   Placement time:   Site:   Days:    Peripheral IV 03/19/21 1540 Right Forearm   03/19/21    1540    Forearm   1    Peripheral IV 03/19/21 1827 Left Antecubital   03/19/21 1827    Antecubital   1    Closed/Suction Drain Right Abdomen Bulb 19 Fr.   03/19/21 1958    Abdomen   1    Closed/Suction Drain Left Abdomen Bulb 19 Fr.   03/19/21 1959    Abdomen   1    NG/OG Tube Nasogastric Right nostril   03/19/21 2200    Right nostril   1    External Urinary Catheter   03/20/21 2000    --   less than 1                  Physical Exam:    Physical Exam  HENT:      Head: Normocephalic.      Nose: Nose normal.   Eyes:      General: No scleral icterus.     Extraocular Movements: Extraocular movements intact.      Pupils: Pupils are equal, round, and reactive to light.   Cardiovascular:      Rate and Rhythm: Normal rate.   Pulmonary:      Effort: Pulmonary effort is normal.   Abdominal:      General: Bowel sounds are normal.   Musculoskeletal:         General: Normal range of motion.      Cervical back: Normal range of motion.   Skin:     General: Skin is warm.   Neurological:      Mental Status: She is alert. Mental status is at baseline.   Psychiatric:         Mood and Affect: Mood normal.              Wounds (last 24 hours)      LDA Wound     Row Name 03/23/21 1200 03/23/21 0800 03/23/21 0400       Wound 03/19/21 1907 midline abdomen Incision    Wound - Properties Group Placement Date: 03/19/21  - Placement Time: 1907  -  Orientation: midline  -HL Location: abdomen  -HL Primary Wound Type: Incision  -HL    Closure  MARIYA abdominal binder in place.  -JE  MARIYA abdominal binder in place.  -JE  MARIYA abdominal binder in place.  -AW    Retired Wound - Properties Group Date first assessed: 03/19/21  -HL Time first assessed: 1907  -HL Location: abdomen  -HL Primary Wound Type: Incision  -HL    Row Name 03/23/21 0000 03/22/21 2000          Wound 03/19/21 1907 midline abdomen Incision    Wound - Properties Group Placement Date: 03/19/21  -HL Placement Time: 1907 -HL Orientation: midline  -HL Location: abdomen  -HL Primary Wound Type: Incision  -HL    Closure  MARIYA abdominal binder in place.  -AW  MARIYA abdominal binder in place.  -AW     Retired Wound - Properties Group Date first assessed: 03/19/21  -HL Time first assessed: 1907  -HL Location: abdomen  -HL Primary Wound Type: Incision  -HL      User Key  (r) = Recorded By, (t) = Taken By, (c) = Cosigned By    Initials Name Provider Type     Kate Patel, RN Registered Nurse    Diana Ruvalcaba RN Registered Nurse    Brooke Deal RN Registered Nurse          Procedures:    Procedure(s):  LAPAROTOMY EXPLORATORY with repair of perforated gastric ulcer, oversew of perforated gastric ulcer, placement of kristie patch, biopsy of stomach          Results Review:     I reviewed the patient's new clinical results.      Lab Results (last 24 hours)     Procedure Component Value Units Date/Time    Tissue Pathology Exam [[id number]] Collected: 03/19/21 1922    Specimen: Tissue from Stomach Updated: 03/23/21 1116     Case Report --     Surgical Pathology Report                         Case: [id number]                                  Authorizing Provider:  Luisito Velasco DO        Collected:           03/19/2021 07:22 PM          Ordering Location:     [facility] MAIN  Received:            03/22/2021 08:38 AM                                 OR                                            "                                Pathologist:           Corbin Lucio MD                                                            Specimen:    Stomach, stomach biopsy                                                                     Final Diagnosis --     Mucosa, antrum, biopsy:    Benign fibroadipose tissue with mixed acute and chronic inflammation and necro-inflammatory debris consistent       with ulcer    No intact epithelium is identified    No malignancy identified     See comment    SY/sms        Comment --     Deeper levels were examined through the block and support the rendered diagnosis.     SY/sms        Gross Description --     Received designated \"Stomach\" is a fragment of tan tissue measuring 1.1 cm in greatest dimension. It is bisected and submitted in one cassette.     SY/tkd      Body Fluid Culture - Peritoneal Fluid, Peritoneum [[id number]]  (Abnormal) Collected: 03/19/21 1916    Specimen: Peritoneal Fluid from Peritoneum Updated: 03/23/21 0924     Body Fluid Culture Rare Yeast isolated     Gram Stain Many (4+) WBCs per low power field      No organisms seen    POC Glucose Once [[id number]]  (Abnormal) Collected: 03/23/21 0406    Specimen: Blood Updated: 03/23/21 0412     Glucose 210 mg/dL      Comment: Serial Number: [id number]:  [id number]       POC Glucose Once [[id number]]  (Abnormal) Collected: 03/23/21 0344    Specimen: Blood Updated: 03/23/21 0347     Glucose 49 mg/dL      Comment: Serial Number: [id number]:  [id number]       Basic Metabolic Panel [[id number]]  (Abnormal) Collected: 03/23/21 0238    Specimen: Blood Updated: 03/23/21 0341     Glucose 59 mg/dL      BUN 15 mg/dL      Creatinine 0.66 mg/dL      Sodium 144 mmol/L      Potassium 3.5 mmol/L      Chloride 109 mmol/L      CO2 18.0 mmol/L      Calcium 9.6 mg/dL      eGFR Non African Amer 92 mL/min/1.73      BUN/Creatinine Ratio 22.7     Anion Gap 17.0 mmol/L     Narrative:      GFR Normal >60  Chronic Kidney Disease " <60  Kidney Failure <15      Thyroid Peroxidase Antibody [[id number]] Collected: 03/21/21 1500    Specimen: Blood Updated: 03/23/21 0336     Thyroid Peroxidase Antibody <9 IU/mL     Narrative:      Performed at:  [address] [place] [address]  [id number]  : Hasmukh Blake PhD, Phone:  [phone]    Phosphorus [[id number]]  (Normal) Collected: 03/23/21 0238    Specimen: Blood Updated: 03/23/21 0319     Phosphorus 3.2 mg/dL     Magnesium [[id number]]  (Normal) Collected: 03/23/21 0238    Specimen: Blood Updated: 03/23/21 0319     Magnesium 1.6 mg/dL     CBC & Differential [[id number]]  (Abnormal) Collected: 03/23/21 0238    Specimen: Blood Updated: 03/23/21 0258    Narrative:      The following orders were created for panel order CBC & Differential.  Procedure                               Abnormality         Status                     ---------                               -----------         ------                     CBC Auto Differential[[id number]]        Abnormal            Final result                 Please view results for these tests on the individual orders.    CBC Auto Differential [[id number]]  (Abnormal) Collected: 03/23/21 0238    Specimen: Blood Updated: 03/23/21 0258     WBC 10.50 10*3/mm3      RBC 3.69 10*6/mm3      Hemoglobin 10.5 g/dL      Hematocrit 32.3 %      MCV 87.5 fL      MCH 28.5 pg      MCHC 32.6 g/dL      RDW 15.5 %      RDW-SD 48.6 fl      MPV 6.7 fL      Platelets 258 10*3/mm3      Neutrophil % 79.5 %      Lymphocyte % 11.0 %      Monocyte % 7.6 %      Eosinophil % 1.6 %      Basophil % 0.3 %      Neutrophils, Absolute 8.40 10*3/mm3      Lymphocytes, Absolute 1.20 10*3/mm3      Monocytes, Absolute 0.80 10*3/mm3      Eosinophils, Absolute 0.20 10*3/mm3      Basophils, Absolute 0.00 10*3/mm3      nRBC 0.1 /100 WBC         No results found for: HGBA1C            Lab Results   Component Value Date    LIPASE 19 03/19/2021     No results found for: CHOL, CHLPL,  TRIG, HDL, LDL, LDLDIRECT    Lab Results   Lab Value Date/Time    FINALDX  03/19/2021 1922     Mucosa, antrum, biopsy:    Benign fibroadipose tissue with mixed acute and chronic inflammation and necro-inflammatory debris consistent       with ulcer    No intact epithelium is identified    No malignancy identified     See comment    SY/sms       COMDX  03/19/2021 1922     Deeper levels were examined through the block and support the rendered diagnosis.     SY/[facility]          Microbiology Results (last 10 days)     Procedure Component Value - Date/Time    Anaerobic Culture - Peritoneal Fluid, Peritoneum [[id number]] Collected: 03/19/21 1916    Lab Status: Preliminary result Specimen: Peritoneal Fluid from Peritoneum Updated: 03/22/21 0909     Anaerobic Culture No anaerobes isolated at 3 days    Body Fluid Culture - Peritoneal Fluid, Peritoneum [[id number]]  (Abnormal) Collected: 03/19/21 1916    Lab Status: Preliminary result Specimen: Peritoneal Fluid from Peritoneum Updated: 03/23/21 0924     Body Fluid Culture Rare Yeast isolated     Gram Stain Many (4+) WBCs per low power field      No organisms seen    COVID PRE-OP / PRE-PROCEDURE SCREENING ORDER (NO ISOLATION) - Swab, Nasopharynx [[id number]]  (Normal) Collected: 03/19/21 1729    Lab Status: Final result Specimen: Swab from Nasopharynx Updated: 03/19/21 1822    Narrative:      The following orders were created for panel order COVID PRE-OP / PRE-PROCEDURE SCREENING ORDER (NO ISOLATION) - Swab, Nasopharynx.  Procedure                               Abnormality         Status                     ---------                               -----------         ------                     COVID-19,CEPHEID,COR/PAOLA...[[id number]]  Normal              Final result                 Please view results for these tests on the individual orders.    COVID-19,CEPHEID,COR/PAOLA/PAD IN-HOUSE(OR EMERGENT/ADD-ON),NP SWAB IN TRANSPORT MEDIA 3-4 HR TAT, RT-PCR - Swab, Nasopharynx [[id number]]   (Normal) Collected: 03/19/21 1729    Lab Status: Final result Specimen: Swab from Nasopharynx Updated: 03/19/21 1822     COVID19 Not Detected    Narrative:      Fact sheet for providers: https://www.fda.gov/media/786675/download     Fact sheet for patients: https://www.fda.gov/media/433107/download    Blood Culture - Blood, Arm, Right [[id number]] Collected: 03/19/21 1710    Lab Status: Preliminary result Specimen: Blood from Arm, Right Updated: 03/22/21 1715     Blood Culture No growth at 3 days    Blood Culture - Blood, Arm, Left [[id number]] Collected: 03/19/21 1707    Lab Status: Preliminary result Specimen: Blood from Arm, Left Updated: 03/22/21 1715     Blood Culture No growth at 3 days          ECG/EMG Results (most recent)     Procedure Component Value Units Date/Time    ECG 12 Lead [[id number]] Collected: 03/20/21 1814     Updated: 03/20/21 1816     QT Interval 254 ms     Narrative:      HEART RATE= 200  bpm  RR Interval= 300  ms  MI Interval=   ms  P Horizontal Axis=   deg  P Front Axis=   deg  QRSD Interval= 89  ms  QT Interval= 254  ms  QRS Axis= -24  deg  T Wave Axis= 156  deg  - ABNORMAL ECG -  Atrial fibrillation with rapid V-rate  Probable LVH with secondary repol abnrm  ST depression, probably rate related  When compared with ECG of 19-Mar-2021 15:45:25,  Significant rate increase  Significant axis, voltage or hypertrophy change  Electronically Signed By:   Date and Time of Study: 2021-03-20 18:14:49    ECG 12 Lead [[id number]] Collected: 03/21/21 0709     Updated: 03/21/21 0711     QT Interval 292 ms     Narrative:      HEART RATE= 69  bpm  RR Interval= 872  ms  MI Interval= 138  ms  P Horizontal Axis= -8  deg  P Front Axis= 19  deg  QRSD Interval= 80  ms  QT Interval= 292  ms  QRS Axis= -1  deg  T Wave Axis=   deg  - NORMAL ECG -  Sinus rhythm  When compared with ECG of 20-Mar-2021 18:14:49,  Significant rate decrease  Significant repolarization change  Significant axis, voltage or hypertrophy  change  Electronically Signed By:   Date and Time of Study: 2021-03-21 07:09:35    ECG 12 Lead [[id number]] Collected: 03/19/21 1545     Updated: 03/21/21 1249     QT Interval 387 ms     Narrative:      HEART RATE= 63  bpm  RR Interval= 948  ms  NC Interval= 150  ms  P Horizontal Axis= -12  deg  P Front Axis= 38  deg  QRSD Interval= 97  ms  QT Interval= 387  ms  QRS Axis= 19  deg  T Wave Axis= 42  deg  - ABNORMAL ECG -  Sinus rhythm  Probable left atrial enlargement  Anteroseptal infarct, age indeterminate  When compared with ECG of 27-Mar-2020 18:36:26,  Electronically Signed By: Luisito Dykes (PAOLA) 21-Mar-2021 12:49:27  Date and Time of Study: 2021-03-19 15:45:25                  CT Abdomen Pelvis With Contrast    Result Date: 3/19/2021   1. Free intraperitoneal air in the abdomen indicating perforated viscus. 2. Abnormal finding of thickening/inflammation distal gastric body wall, greatest anteriorly. FINDINGS may reflect perforated gastric ulcer. Consider endoscopic correlation.    Electronically Signed By-Mahnaz Ortiz MD On:3/19/2021 4:40 PM This report was finalized on [id number] by  Mahnaz Ortiz MD.    XR Chest 1 View    Result Date: 3/21/2021  Increasing bilateral airspace disease concerning for pneumonia.  Electronically Signed By-Brian Valenzuela MD On:3/21/2021 8:06 AM This report was finalized on [id number] by  Brian Valenzuela MD.    XR Chest 1 View    Result Date: 3/20/2021    1.  Interval placement of nasogastric tube the tip below the diaphragm. 2.  Stable cardiomegaly. 3.  New minimal left basilar airspace disease likely due to atelectasis.   Electronically Signed By-Amauri Mtz MD On:3/20/2021 8:36 AM This report was finalized on [id number] by  Amauri Mtz MD.    XR Chest 1 View    Result Date: 3/19/2021  Free air underneath the right hemidiaphragm. FINDINGS worrisome for perforated viscus. Dedicated CT abdomen and pelvis is recommended for further evaluation. I spoke with the  clinician in the emergency room at the time of this dictation.  Electronically Signed By-Mahnaz Ortiz MD On:3/19/2021 4:00 PM This report was finalized on [id number] by  Mahnaz Ortiz MD.    CT Chest Pulmonary Embolism    Result Date: 3/19/2021   1. No evidence of pulmonary embolus. 2. No acute cardiac pulmonary process. 3. Ancillary findings as described above.     Electronically Signed By-Daniella Go MD On:3/19/2021 4:38 PM This report was finalized on [id number] by  Daniella Go MD.    XR Abdomen KUB    Result Date: 3/20/2021   1.  Nasogastric tube in place with the tip in the expected location of the body the stomach. 2.  Air-filled but nondistended loops of small bowel within the left side of the abdomen which may be due to mild ileus.  Electronically Signed By-Amauri Mtz MD On:3/20/2021 7:14 PM This report was finalized on [id number] by  Amauri Mtz MD.          Xrays, labs reviewed personally by physician.    Medication Review:   I have reviewed the patient's current medication list      Scheduled Meds  arformoterol, 15 mcg, Nebulization, BID - RT  budesonide, 0.5 mg, Nebulization, BID - RT  ipratropium-albuterol, 3 mL, Nebulization, Q4H - RT  metoprolol tartrate, 5 mg, Intravenous, Q8H  nicotine, 1 patch, Transdermal, Q24H  piperacillin-tazobactam, 3.375 g, Intravenous, Q8H  sodium chloride, 1,000 mL, Intravenous, Once        Meds Infusions  dextrose 5 % and sodium chloride 0.45 %, 75 mL/hr, Last Rate: 75 mL/hr (03/23/21 1014)  dilTIAZem, 5-15 mg/hr, Last Rate: 2.5 mg/hr (03/23/21 0755)        Meds PRN  dextrose  •  HYDROmorphone **AND** naloxone  •  Morphine **AND** naloxone  •  ondansetron **OR** ondansetron  •  sodium chloride  •  sodium chloride        Assessment/Plan   Assessment/Plan     Active Hospital Problems    Diagnosis  POA   • **Acute gastric ulcer with perforation (CMS/HCC) [K25.1]  Yes     Priority: High   • Pneumonia [J18.9]  Yes     Priority: High   • Atrial  fibrillation (CMS/HCC) [I48.91]  Yes     Priority: High   • Avascular necrosis of bone (CMS/HCC) [M87.00]  Yes     Priority: Medium   • Sick sinus syndrome (CMS/HCC) [I49.5]  Yes     Priority: Medium   • Hyperthyroidism [E05.90]  Yes     Priority: Medium   • Hypertension [I10]  Yes     Priority: Medium   • Pulmonary hypertension (CMS/HCC) [I27.20]  Yes     Priority: Medium   • Anxiety [F41.9]  Yes      Resolved Hospital Problems   No resolved problems to display.       MEDICAL DECISION MAKING COMPLEXITY BY PROBLEM:       Gastric ulcer with perforation (POA)  -s/p laparotomy exploratory with repair of perforated gastric ulcer, oversew of perforated gastric ulcer, placement of Smith patch and  biopsy of stomach 3/19/2021.  -Observed overnight in the ICU postoperatively  -NPO with NG tube suction    Atrial fibrillation RVR:  -Reason for transfer to PCU 3/20/2021  -Treated with Cardizem drip and amiodarone drip  -Continue IV Lopressor while n.p.o.  -A. fib RVR exacerbated because of uncontrolled hyperthyroidism  -Patient noncompliant with Eliquis at home  -TTE 3/25/19--> LVEF 55-60%  -CTA chest 3/19/2021--> ruled out pulmonary embolism    Pneumonia:  -X-ray 3/21/2021--> bilateral airspace disease  -Continue Zosyn    Hyperthyroidism:  -Follows with endocrinology and consulted  -On Tapazole at home    Chronic pain secondary to avascular necrosis:  -Patient has been using NSAIDs for pain control which probably exacerbated gastric ulcer        VTE Prophylaxis -   Mechanical Order History:      Ordered        03/19/21 2123  Place Sequential Compression Device  Once         03/19/21 2123  Place Sequential Compression Device  Once         03/19/21 2123  Maintain Sequential Compression Device  Continuous                 Pharmalogical Order History:     None            Code Status -   Code Status and Medical Interventions:   Ordered at: 03/19/21 2123     Code Status:    CPR     Medical Interventions (Level of Support Prior to  Arrest):    Full       This patient has been examined wearing appropriate Personal Protective Equipment and discussed with nursing. 03/23/21        Discharge Planning  defer        Electronically signed by Dung Chávez DO, 03/23/21, 16:46 EDT.  [facility], [place] Hospitalist Team

## 2021-03-23 NOTE — PROGRESS NOTES
CC--- paroxysmal atrial fibrillation, mild hypertension  Presentation with perforated gastric ulcer with bleeding    Subject  Patient sitting in a chair in no distress and denies any rapid palpitations or chest pain or dyspnea        Past Medical History:   Diagnosis Date   • Adenomatous polyp of colon 6/20/2016   • Anxiety 6/16/2016   • Arthritis of foot 1/10/2019   • Atrial fibrillation (CMS/HCC) 6/16/2016   • Avascular necrosis of bone (CMS/HCC) 1/10/2019   • Chronic pain 1/22/2019   • Closed displaced fracture 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 9/19/2019    Added automatically from request for surgery [id number]   • Hypertension 6/16/2016   • Hyperthyroidism 11/2/2016   • Mood disorder (CMS/HCC) 6/16/2016   • Osteoporosis without current pathological fracture 6/4/2020   • Pulmonary hypertension (CMS/[facility]) 6/16/2016   • Sick sinus syndrome (CMS/[facility]) 1/25/2017   • Tricuspid valve insufficiency 6/16/2016     Past Surgical History:   Procedure Laterality Date   • ANKLE OPEN REDUCTION INTERNAL FIXATION     • APPENDECTOMY     • EXPLORATORY LAPAROTOMY N/A 3/19/2021    Procedure: LAPAROTOMY EXPLORATORY with repair of perforated gastric ulcer, oversew of perforated gastric ulcer, placement of kristie patch, biopsy of stomach;  Surgeon: Luisito Velasco DO;  Location: [facility] MAIN OR;  Service: General;  Laterality: N/A;   • HIP SURGERY      Left total hip   • ORIF FOOT FRACTURE Right 9/30/2019    Procedure: OPEN REDUCTION INTERNAL FIXATION OF FIFTH METATARSAL FRACTURE, LOOSE BODY EXCISION/REMOVAL FROM THE TALONAVICULAR JOINT OF THE RIGHT FOOT;  Surgeon: JOSE Fry DPM;  Location: [facility] MAIN OR;  Service: Podiatry       Review of Systems   General:  positive for fatigue and tiredness  Eyes: No redness  Cardiovascular: No chest pain, no palpitations  Respiratory:   no shortness of breath  Gastrointestinal: No nausea or vomiting  Genitourinary: no hematuria or dysuria  Skin: No rash  Neurologic: No  numbness, tingling, syncope  Hematologic/Lymphatic: No abnormal bleeding      Physical Exam  VITALS REVIEWED--- blood pressure 150/60 pulse rate is 54 patient afebrile respiration 12 times a minute    General:      well developed, well nourished, in no acute distress.    Head:      normocephalic and atraumatic.    Eyes:      PERRL/EOM intact, conjunctiva and sclera clear with out nystagmus.    Neck:      no masses, thyromegaly,  trachea central with normal respiratory effort   Lungs:      clear bilaterally to auscultation.    Heart:      Sinus rhythm without any murmurs gallops or rubs  Extremities:       no cyanosis or clubbin.    Neurologic:      no focal deficits.   alert oriented x3    Psych:      alert and cooperative; normal mood and affect; normal attention span and concentration.          CBC    Results from last 7 days   Lab Units 03/23/21  0238 03/22/21  0247 03/21/21  0436 03/20/21  0355 03/19/21  2206 03/19/21  1537   WBC 10*3/mm3 10.50 12.50* 19.40* 25.30* 24.30* 28.00*   HEMOGLOBIN g/dL 10.5* 10.2* 11.5* 11.4* 12.1 12.8   PLATELETS 10*3/mm3 258 251 259 293 274 303     BMP   Results from last 7 days   Lab Units 03/23/21  0238 03/22/21  0247 03/21/21  0436 03/20/21  1804 03/20/21  0355 03/19/21  2206 03/19/21  1617 03/19/21  1537   SODIUM mmol/L 144 142 141 140 139 138  --  134*   POTASSIUM mmol/L 3.5 3.4* 3.7 3.7 4.7 4.4  --  4.2   CHLORIDE mmol/L 109* 111* 109* 109* 106 103  --  97*   CO2 mmol/L 18.0* 21.0* 21.0* 22.0 22.0 24.0  --  21.0*   BUN mg/dL 15 13 11 13 18 23*  --  32*   CREATININE mg/dL 0.66 0.77 0.71 0.64 0.69 0.78 0.80 0.81   GLUCOSE mg/dL 59* 86 115* 109* 103* 125*  --  125*   MAGNESIUM mg/dL 1.6  --   --   --   --   --   --   --    PHOSPHORUS mg/dL 3.2  --   --   --   --   --   --   --            Assessment and plan    Acute gastric ulcer perforation with bleeding status post surgery  Paroxysmal atrial fibrillation  Essential hypertension  Remote history of sick sinus syndrome  Hyper  thyroidism    Chads vas score is only 2    No clear-cut indication for anticoagulation especially in view of GI bleed  Treat with low-dose of beta-blockers for atrial arrhythmias  Treat underlying hyperthyroidism  Did not use amiodarone and no need for intravenous Cardizem      Electronically signed by Александр Sheth MD, 03/23/21, 5:11 PM EDT.

## 2021-03-24 ENCOUNTER — APPOINTMENT (OUTPATIENT)
Dept: GENERAL RADIOLOGY | Facility: HOSPITAL | Age: 61
End: 2021-03-24

## 2021-03-24 LAB
ANION GAP SERPL CALCULATED.3IONS-SCNC: 12 MMOL/L (ref 5–15)
BACTERIA FLD CULT: ABNORMAL
BACTERIA SPEC AEROBE CULT: NORMAL
BACTERIA SPEC AEROBE CULT: NORMAL
BACTERIA SPEC ANAEROBE CULT: NORMAL
BASOPHILS # BLD AUTO: 0 10*3/MM3 (ref 0–0.2)
BASOPHILS NFR BLD AUTO: 0.3 % (ref 0–1.5)
BUN SERPL-MCNC: 13 MG/DL (ref 6–20)
BUN/CREAT SERPL: 19.1 (ref 7–25)
CALCIUM SPEC-SCNC: 8.6 MG/DL (ref 8.6–10.5)
CHLORIDE SERPL-SCNC: 110 MMOL/L (ref 98–107)
CO2 SERPL-SCNC: 20 MMOL/L (ref 22–29)
CREAT SERPL-MCNC: 0.68 MG/DL (ref 0.57–1)
DEPRECATED RDW RBC AUTO: 45.9 FL (ref 37–54)
EOSINOPHIL # BLD AUTO: 0.2 10*3/MM3 (ref 0–0.4)
EOSINOPHIL NFR BLD AUTO: 1.9 % (ref 0.3–6.2)
ERYTHROCYTE [DISTWIDTH] IN BLOOD BY AUTOMATED COUNT: 15.1 % (ref 12.3–15.4)
GFR SERPL CREATININE-BSD FRML MDRD: 89 ML/MIN/1.73
GLUCOSE SERPL-MCNC: 287 MG/DL (ref 65–99)
GRAM STN SPEC: ABNORMAL
GRAM STN SPEC: ABNORMAL
HCT VFR BLD AUTO: 31.4 % (ref 34–46.6)
HGB BLD-MCNC: 10.4 G/DL (ref 12–15.9)
HOLD SPECIMEN: NORMAL
LYMPHOCYTES # BLD AUTO: 1 10*3/MM3 (ref 0.7–3.1)
LYMPHOCYTES NFR BLD AUTO: 9.7 % (ref 19.6–45.3)
MAGNESIUM SERPL-MCNC: 1.4 MG/DL (ref 1.6–2.6)
MCH RBC QN AUTO: 28.7 PG (ref 26.6–33)
MCHC RBC AUTO-ENTMCNC: 33 G/DL (ref 31.5–35.7)
MCV RBC AUTO: 86.8 FL (ref 79–97)
MONOCYTES # BLD AUTO: 0.8 10*3/MM3 (ref 0.1–0.9)
MONOCYTES NFR BLD AUTO: 7.7 % (ref 5–12)
NEUTROPHILS NFR BLD AUTO: 7.9 10*3/MM3 (ref 1.7–7)
NEUTROPHILS NFR BLD AUTO: 80.4 % (ref 42.7–76)
NRBC BLD AUTO-RTO: 0 /100 WBC (ref 0–0.2)
PLATELET # BLD AUTO: 253 10*3/MM3 (ref 140–450)
PMV BLD AUTO: 6.8 FL (ref 6–12)
POTASSIUM SERPL-SCNC: 2.8 MMOL/L (ref 3.5–5.2)
POTASSIUM SERPL-SCNC: 3.7 MMOL/L (ref 3.5–5.2)
QT INTERVAL: 254 MS
QT INTERVAL: 292 MS
RBC # BLD AUTO: 3.61 10*6/MM3 (ref 3.77–5.28)
SODIUM SERPL-SCNC: 142 MMOL/L (ref 136–145)
TSI SER-ACNC: 0.15 IU/L (ref 0–0.55)
WBC # BLD AUTO: 9.9 10*3/MM3 (ref 3.4–10.8)

## 2021-03-24 PROCEDURE — 84132 ASSAY OF SERUM POTASSIUM: CPT | Performed by: HOSPITALIST

## 2021-03-24 PROCEDURE — 99232 SBSQ HOSP IP/OBS MODERATE 35: CPT | Performed by: INTERNAL MEDICINE

## 2021-03-24 PROCEDURE — 80048 BASIC METABOLIC PNL TOTAL CA: CPT | Performed by: HOSPITALIST

## 2021-03-24 PROCEDURE — 99231 SBSQ HOSP IP/OBS SF/LOW 25: CPT | Performed by: INTERNAL MEDICINE

## 2021-03-24 PROCEDURE — 25010000002 MAGNESIUM SULFATE 2 GM/50ML SOLUTION: Performed by: HOSPITALIST

## 2021-03-24 PROCEDURE — 85025 COMPLETE CBC W/AUTO DIFF WBC: CPT | Performed by: HOSPITALIST

## 2021-03-24 PROCEDURE — 83735 ASSAY OF MAGNESIUM: CPT | Performed by: HOSPITALIST

## 2021-03-24 PROCEDURE — 25010000002 MORPHINE PER 10 MG: Performed by: HOSPITALIST

## 2021-03-24 PROCEDURE — 97530 THERAPEUTIC ACTIVITIES: CPT

## 2021-03-24 PROCEDURE — 25010000002 HYDROMORPHONE PER 4 MG: Performed by: HOSPITALIST

## 2021-03-24 PROCEDURE — 99232 SBSQ HOSP IP/OBS MODERATE 35: CPT | Performed by: HOSPITALIST

## 2021-03-24 PROCEDURE — 99024 POSTOP FOLLOW-UP VISIT: CPT | Performed by: SURGERY

## 2021-03-24 PROCEDURE — 0 IOPAMIDOL PER 1 ML: Performed by: HOSPITALIST

## 2021-03-24 PROCEDURE — 0 POTASSIUM CHLORIDE 10 MEQ/100ML SOLUTION: Performed by: INTERNAL MEDICINE

## 2021-03-24 PROCEDURE — 97116 GAIT TRAINING THERAPY: CPT

## 2021-03-24 PROCEDURE — 74248 X-RAY SM INT F-THRU STD: CPT

## 2021-03-24 PROCEDURE — 74240 X-RAY XM UPR GI TRC 1CNTRST: CPT

## 2021-03-24 PROCEDURE — 25010000003 POTASSIUM CHLORIDE 10 MEQ/100ML SOLUTION: Performed by: INTERNAL MEDICINE

## 2021-03-24 PROCEDURE — 94799 UNLISTED PULMONARY SVC/PX: CPT

## 2021-03-24 PROCEDURE — 25010000002 PIPERACILLIN SOD-TAZOBACTAM PER 1 G: Performed by: HOSPITALIST

## 2021-03-24 RX ORDER — METHIMAZOLE 5 MG/1
20 TABLET ORAL EVERY MORNING
Status: DISCONTINUED | OUTPATIENT
Start: 2021-03-25 | End: 2021-03-24

## 2021-03-24 RX ORDER — MAGNESIUM SULFATE HEPTAHYDRATE 40 MG/ML
2 INJECTION, SOLUTION INTRAVENOUS AS NEEDED
Status: DISCONTINUED | OUTPATIENT
Start: 2021-03-24 | End: 2021-03-24 | Stop reason: SDUPTHER

## 2021-03-24 RX ORDER — METHIMAZOLE 5 MG/1
10 TABLET ORAL EVERY EVENING
Status: DISCONTINUED | OUTPATIENT
Start: 2021-03-24 | End: 2021-03-25 | Stop reason: HOSPADM

## 2021-03-24 RX ORDER — METHIMAZOLE 5 MG/1
10 TABLET ORAL EVERY MORNING
Status: DISCONTINUED | OUTPATIENT
Start: 2021-03-25 | End: 2021-03-25 | Stop reason: HOSPADM

## 2021-03-24 RX ORDER — NALOXONE HCL 0.4 MG/ML
0.1 VIAL (ML) INJECTION
Status: DISCONTINUED | OUTPATIENT
Start: 2021-03-24 | End: 2021-03-25 | Stop reason: HOSPADM

## 2021-03-24 RX ORDER — HYDROCODONE BITARTRATE AND ACETAMINOPHEN 5; 325 MG/1; MG/1
1 TABLET ORAL EVERY 6 HOURS PRN
Status: DISCONTINUED | OUTPATIENT
Start: 2021-03-24 | End: 2021-03-24

## 2021-03-24 RX ORDER — PANTOPRAZOLE SODIUM 40 MG/1
40 TABLET, DELAYED RELEASE ORAL
Status: DISCONTINUED | OUTPATIENT
Start: 2021-03-24 | End: 2021-03-25 | Stop reason: HOSPADM

## 2021-03-24 RX ORDER — HYDROMORPHONE HCL 110MG/55ML
0.5 PATIENT CONTROLLED ANALGESIA SYRINGE INTRAVENOUS EVERY 4 HOURS PRN
Status: DISCONTINUED | OUTPATIENT
Start: 2021-03-24 | End: 2021-03-24

## 2021-03-24 RX ORDER — POTASSIUM CHLORIDE 7.45 MG/ML
10 INJECTION INTRAVENOUS
Status: DISCONTINUED | OUTPATIENT
Start: 2021-03-24 | End: 2021-03-24 | Stop reason: SDUPTHER

## 2021-03-24 RX ORDER — HYDROCODONE BITARTRATE AND ACETAMINOPHEN 5; 325 MG/1; MG/1
1 TABLET ORAL EVERY 4 HOURS PRN
Status: DISCONTINUED | OUTPATIENT
Start: 2021-03-24 | End: 2021-03-25 | Stop reason: HOSPADM

## 2021-03-24 RX ORDER — MAGNESIUM SULFATE HEPTAHYDRATE 40 MG/ML
4 INJECTION, SOLUTION INTRAVENOUS AS NEEDED
Status: DISCONTINUED | OUTPATIENT
Start: 2021-03-24 | End: 2021-03-24 | Stop reason: SDUPTHER

## 2021-03-24 RX ADMIN — MAGNESIUM SULFATE HEPTAHYDRATE 2 G: 40 INJECTION, SOLUTION INTRAVENOUS at 09:56

## 2021-03-24 RX ADMIN — POTASSIUM CHLORIDE 10 MEQ: 7.46 INJECTION, SOLUTION INTRAVENOUS at 03:11

## 2021-03-24 RX ADMIN — POTASSIUM CHLORIDE 10 MEQ: 7.46 INJECTION, SOLUTION INTRAVENOUS at 05:21

## 2021-03-24 RX ADMIN — HYDROCODONE BITARTRATE AND ACETAMINOPHEN 1 TABLET: 5; 325 TABLET ORAL at 21:24

## 2021-03-24 RX ADMIN — MORPHINE SULFATE 4 MG: 4 INJECTION INTRAVENOUS at 13:11

## 2021-03-24 RX ADMIN — METHIMAZOLE 10 MG: 5 TABLET ORAL at 16:54

## 2021-03-24 RX ADMIN — HYDROCODONE BITARTRATE AND ACETAMINOPHEN 1 TABLET: 5; 325 TABLET ORAL at 16:54

## 2021-03-24 RX ADMIN — HYDROMORPHONE HYDROCHLORIDE 0.5 MG: 2 INJECTION, SOLUTION INTRAMUSCULAR; INTRAVENOUS; SUBCUTANEOUS at 06:59

## 2021-03-24 RX ADMIN — BUDESONIDE 0.5 MG: 0.5 INHALANT RESPIRATORY (INHALATION) at 07:22

## 2021-03-24 RX ADMIN — POTASSIUM CHLORIDE 10 MEQ: 7.46 INJECTION, SOLUTION INTRAVENOUS at 06:29

## 2021-03-24 RX ADMIN — MAGNESIUM SULFATE HEPTAHYDRATE 2 G: 40 INJECTION, SOLUTION INTRAVENOUS at 14:28

## 2021-03-24 RX ADMIN — POTASSIUM CHLORIDE 10 MEQ: 7.46 INJECTION, SOLUTION INTRAVENOUS at 07:42

## 2021-03-24 RX ADMIN — MORPHINE SULFATE 4 MG: 4 INJECTION INTRAVENOUS at 04:14

## 2021-03-24 RX ADMIN — PIPERACILLIN AND TAZOBACTAM 3.38 G: 3; .375 INJECTION, POWDER, LYOPHILIZED, FOR SOLUTION INTRAVENOUS at 14:28

## 2021-03-24 RX ADMIN — POTASSIUM CHLORIDE 10 MEQ: 7.46 INJECTION, SOLUTION INTRAVENOUS at 04:14

## 2021-03-24 RX ADMIN — ARFORMOTEROL TARTRATE 15 MCG: 15 SOLUTION RESPIRATORY (INHALATION) at 07:16

## 2021-03-24 RX ADMIN — PANTOPRAZOLE SODIUM 40 MG: 40 TABLET, DELAYED RELEASE ORAL at 14:36

## 2021-03-24 RX ADMIN — IPRATROPIUM BROMIDE AND ALBUTEROL SULFATE 3 ML: 2.5; .5 SOLUTION RESPIRATORY (INHALATION) at 14:42

## 2021-03-24 RX ADMIN — IOPAMIDOL 50 ML: 755 INJECTION, SOLUTION INTRAVENOUS at 12:50

## 2021-03-24 RX ADMIN — POTASSIUM CHLORIDE 10 MEQ: 7.46 INJECTION, SOLUTION INTRAVENOUS at 09:02

## 2021-03-24 RX ADMIN — Medication 1 PATCH: at 09:56

## 2021-03-24 RX ADMIN — MORPHINE SULFATE 4 MG: 4 INJECTION INTRAVENOUS at 01:00

## 2021-03-24 RX ADMIN — MAGNESIUM SULFATE HEPTAHYDRATE 2 G: 40 INJECTION, SOLUTION INTRAVENOUS at 16:00

## 2021-03-24 RX ADMIN — PIPERACILLIN AND TAZOBACTAM 3.38 G: 3; .375 INJECTION, POWDER, LYOPHILIZED, FOR SOLUTION INTRAVENOUS at 06:59

## 2021-03-24 RX ADMIN — METOPROLOL TARTRATE 5 MG: 5 INJECTION INTRAVENOUS at 09:01

## 2021-03-24 NOTE — PROGRESS NOTES
CC--- paroxysmal atrial fibrillation, mild hypertension  Presentation with perforated gastric ulcer with bleeding    Subject  Patient sitting in a chair in no distress and denies any rapid palpitations or chest pain or dyspnea  Patient is able to eat and take oral medications and feels better        Past Medical History:   Diagnosis Date   • Adenomatous polyp of colon 6/20/2016   • Anxiety 6/16/2016   • Arthritis of foot 1/10/2019   • Atrial fibrillation (CMS/HCC) 6/16/2016   • Avascular necrosis of bone (CMS/HCC) 1/10/2019   • Chronic pain 1/22/2019   • Closed displaced fracture 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 9/19/2019    Added automatically from request for surgery [id number]   • Hypertension 6/16/2016   • Hyperthyroidism 11/2/2016   • Mood disorder (CMS/[facility]) 6/16/2016   • Osteoporosis without current pathological fracture 6/4/2020   • Pulmonary hypertension (CMS/[facility]) 6/16/2016   • Sick sinus syndrome (CMS/[facility]) 1/25/2017   • Tricuspid valve insufficiency 6/16/2016     Past Surgical History:   Procedure Laterality Date   • ANKLE OPEN REDUCTION INTERNAL FIXATION     • APPENDECTOMY     • EXPLORATORY LAPAROTOMY N/A 3/19/2021    Procedure: LAPAROTOMY EXPLORATORY with repair of perforated gastric ulcer, oversew of perforated gastric ulcer, placement of kristie patch, biopsy of stomach;  Surgeon: Luisito Velasco DO;  Location: [facility] MAIN OR;  Service: General;  Laterality: N/A;   • HIP SURGERY      Left total hip   • ORIF FOOT FRACTURE Right 9/30/2019    Procedure: OPEN REDUCTION INTERNAL FIXATION OF FIFTH METATARSAL FRACTURE, LOOSE BODY EXCISION/REMOVAL FROM THE TALONAVICULAR JOINT OF THE RIGHT FOOT;  Surgeon: JOSE Fry DPM;  Location: [facility] MAIN OR;  Service: Podiatry       Review of Systems   General:  positive for fatigue and tiredness  Eyes: No redness  Cardiovascular: No chest pain, no palpitations  Respiratory:   no shortness of breath  Gastrointestinal: No nausea or  vomiting  Genitourinary: no hematuria or dysuria  Skin: No rash  Neurologic: No numbness, tingling, syncope  Hematologic/Lymphatic: No abnormal bleeding      Physical Exam  VITALS REVIEWED--- blood pressure 126/83 pulse rate is 64 patient afebrile respiration 12 times a minute    General:      well developed, well nourished, in no acute distress.    Head:      normocephalic and atraumatic.    Eyes:      PERRL/EOM intact, conjunctiva and sclera clear with out nystagmus.    Neck:      no masses, thyromegaly,  trachea central with normal respiratory effort   Lungs:      clear bilaterally to auscultation.    Heart:      Sinus rhythm without any murmurs gallops or rubs  Extremities:       no cyanosis or clubbin.    Neurologic:      no focal deficits.   alert oriented x3    Psych:      alert and cooperative; normal mood and affect; normal attention span and concentration.          CBC    Results from last 7 days   Lab Units 03/24/21  0214 03/23/21  0238 03/22/21  0247 03/21/21  0436 03/20/21  0355 03/19/21  2206 03/19/21  1537   WBC 10*3/mm3 9.90 10.50 12.50* 19.40* 25.30* 24.30* 28.00*   HEMOGLOBIN g/dL 10.4* 10.5* 10.2* 11.5* 11.4* 12.1 12.8   PLATELETS 10*3/mm3 253 258 251 259 293 274 303     BMP   Results from last 7 days   Lab Units 03/24/21  0214 03/23/21  1717 03/23/21  0238 03/22/21  0247 03/21/21  0436 03/20/21  1804 03/20/21  0355 03/19/21  2206   SODIUM mmol/L 142  --  144 142 141 140 139 138   POTASSIUM mmol/L 2.8* 3.2* 3.5 3.4* 3.7 3.7 4.7 4.4   CHLORIDE mmol/L 110*  --  109* 111* 109* 109* 106 103   CO2 mmol/L 20.0*  --  18.0* 21.0* 21.0* 22.0 22.0 24.0   BUN mg/dL 13  --  15 13 11 13 18 23*   CREATININE mg/dL 0.68  --  0.66 0.77 0.71 0.64 0.69 0.78   GLUCOSE mg/dL 287*  --  59* 86 115* 109* 103* 125*   MAGNESIUM mg/dL 1.4*  --  1.6  --   --   --   --   --    PHOSPHORUS mg/dL  --   --  3.2  --   --   --   --   --            Assessment and plan    Acute gastric ulcer perforation with bleeding status post  surgery  Paroxysmal atrial fibrillation  Essential hypertension  Remote history of sick sinus syndrome  Hyper thyroidism    Chads vas score is only 2    No clear-cut indication for anticoagulation especially in view of GI bleed  Treat with low-dose of beta-blockers for atrial arrhythmias--change IV Lopressor to oral Lopressor and orders discussed with the nurse  Treat underlying hyperthyroidism  Did not use amiodarone       Electronically signed by Александр Sheth MD, 03/24/21, 4:17 PM EDT.

## 2021-03-24 NOTE — PLAN OF CARE
Goal Outcome Evaluation:        Outcome Summary: patient passed gastrografin today, d/c NG tube, placed on clear liquids. replaced potassium and mag today. Pain controolled by PO pain med.

## 2021-03-24 NOTE — THERAPY TREATMENT NOTE
Subjective: Pt agreeable to therapeutic plan of care.    Objective:   Bed mobility - SBA    Pt demonstrates increased ease of transitioning from supine to sitting EOB. Minimal verbal cues necessary.      Transfers - CGA and with rolling walker    Pt encouraged to exhale when coming to stand to reduce abdominal discomfort. Pt denies dizziness with position change.     Ambulation - 150 feet CGA and with rolling walker    Pt able to tolerate longer ambulation distance this date. Pt continues to be slow paced, however this is her baseline. Pt reports gait feels better this date due to having her shoes donned vs sock foot walking yesterday. Pt daughter present and able to assist with IV pole due to equipment dysfunction.     Pain: 4 VAS  Education: Provided education on importance of mobility and skilled verbal / tactile cueing throughout intervention.     Assessment: Abi Rivera presents with functional mobility impairments which indicate the need for skilled intervention. Pt demonstrates significant improvements in pain and mobility this date. Pt notes she was up in the chair until 3am because she didn't want to be in bed. Pt states [place] has [place] [facility] Pt encouraged to take frequent walks to help restore GI motility.  Will continue to follow and progress as tolerated.     Plan/Recommendations:   Pt would benefit from Home at discharge from facility and requires no DME at discharge.    Pt desires Home at discharge. Pt cooperative; agreeable to therapeutic recommendations and plan of care.     Post-Tx Position: Up in Chair and Call light and personal items within reach, with daughter    PPE: gloves, surgical mask, eyewear protection

## 2021-03-24 NOTE — PLAN OF CARE
Goal Outcome Evaluation:     Progress: no change  Outcome Summary: Patient continues to ask for pain medication every 2 hours for consistent abdominal pain, NG tube is producing a steady flow. Potassium protocol added for K of 3.2, will continue to monitor.

## 2021-03-24 NOTE — PROGRESS NOTES
Nutrition Services    Patient Name: Abi Rivera  YOB: 1960  MRN: [id number]  Admission date: 3/19/2021    PPE Documentation        PPE Worn By Provider Did not enter room for progress note 3/24   PPE Worn By Patient  None     PROGRESS NOTE      Encounter Information: 3/24: Progress note to check on diet advancement. Pt has been able to advance to clear liquid diet today; is passing flatus; awaiting return of bowel movements. NGT is now removed. Will continue to follow along.       PO Diet: Diet Liquids; Clear Liquid   PO Supplements: -    PO Intake:  Diet just recently advanced - will follow closely for further intake data        Nutrition support orders: -    Nutrition support review: -       Labs (reviewed below): K+ 2.8 - L, replaced today; glucose 287 - H, monitor       GI Function:  BM 3/19 (awaiting return of BM since surgery -- surgeon is following)       Nutrition Intervention: Continue clear liquid diet per MD orders, monitoring for clinical readiness for diet advancement.        Results from last 7 days   Lab Units 03/24/21  0214 03/23/21  1717 03/23/21  0238 03/22/21  0247 03/21/21  0436 03/20/21  1804   SODIUM mmol/L 142  --  144 142 141 140   POTASSIUM mmol/L 2.8* 3.2* 3.5 3.4* 3.7 3.7   CHLORIDE mmol/L 110*  --  109* 111* 109* 109*   CO2 mmol/L 20.0*  --  18.0* 21.0* 21.0* 22.0   BUN mg/dL 13  --  15 13 11 13   CREATININE mg/dL 0.68  --  0.66 0.77 0.71 0.64   CALCIUM mg/dL 8.6  --  9.6 8.6 8.8 8.9   BILIRUBIN mg/dL  --   --   --  0.5 0.4 0.4   ALK PHOS U/L  --   --   --  45 51 49   ALT (SGPT) U/L  --   --   --  15 19 21   AST (SGOT) U/L  --   --   --  13 15 16   GLUCOSE mg/dL 287*  --  59* 86 115* 109*     Results from last 7 days   Lab Units 03/24/21  0214 03/23/21  0238   MAGNESIUM mg/dL 1.4* 1.6   PHOSPHORUS mg/dL  --  3.2   HEMOGLOBIN g/dL 10.4* 10.5*   HEMATOCRIT % 31.4* 32.3*     COVID19   Date Value Ref Range Status   03/19/2021 Not Detected Not Detected - Ref. Range Final      No results found for: HGBA1C    RD to follow up per protocol.    Electronically signed by:  Ayana Griggs RD  03/24/21 14:45 EDT

## 2021-03-24 NOTE — PROGRESS NOTES
LOS: 5 days   Patient Care Team:  Provider, No Known as PCP - General    Reason for follow-up: Postop    Subjective   Patient seen and examined.  Passing flatus no bowel movement.  No nausea or vomiting with nasogastric tube in place.  Upper GI without any evidence of leak    Objective   Incisions clean dry and intact without infection.  Drain tubes with appropriate color and output.  NG with appropriate color and output    Vital Signs  Vitals:    03/24/21 0603 03/24/21 0716 03/24/21 0721 03/24/21 0722   BP:       BP Location:       Patient Position:       Pulse:  61 67 63   Resp:  18 18 18   Temp:       TempSrc:       SpO2:  95% 95% 95%   Weight: 74.7 kg (164 lb 10.9 oz)      Height:             Results Review:       Lab Results (last 24 hours)     Procedure Component Value Units Date/Time    Body Fluid Culture - Peritoneal Fluid, Peritoneum [[id number]]  (Abnormal)  (Susceptibility) Collected: 03/19/21 1916    Specimen: Peritoneal Fluid from Peritoneum Updated: 03/24/21 0806     Body Fluid Culture Rare Candida albicans     Gram Stain Many (4+) WBCs per low power field      No organisms seen    Susceptibility      Candida albicans      DAVY      Fluconazole Susceptible      Micafungin Susceptible               Linear View                   Anaerobic Culture - Peritoneal Fluid, Peritoneum [[id number]] Collected: 03/19/21 1916    Specimen: Peritoneal Fluid from Peritoneum Updated: 03/24/21 0719     Anaerobic Culture No anaerobes isolated at 5 days    Basic Metabolic Panel [[id number]]  (Abnormal) Collected: 03/24/21 0214    Specimen: Blood Updated: 03/24/21 0255     Glucose 287 mg/dL      BUN 13 mg/dL      Creatinine 0.68 mg/dL      Sodium 142 mmol/L      Potassium 2.8 mmol/L      Chloride 110 mmol/L      CO2 20.0 mmol/L      Calcium 8.6 mg/dL      eGFR Non African Amer 89 mL/min/1.73      BUN/Creatinine Ratio 19.1     Anion Gap 12.0 mmol/L     Narrative:      GFR Normal >60  Chronic Kidney Disease <60  Kidney Failure  <15      Magnesium [[id number]]  (Abnormal) Collected: 03/24/21 0214    Specimen: Blood Updated: 03/24/21 0255     Magnesium 1.4 mg/dL     CBC & Differential [[id number]]  (Abnormal) Collected: 03/24/21 0214    Specimen: Blood Updated: 03/24/21 0242    Narrative:      The following orders were created for panel order CBC & Differential.  Procedure                               Abnormality         Status                     ---------                               -----------         ------                     CBC Auto Differential[[id number]]        Abnormal            Final result                 Please view results for these tests on the individual orders.    CBC Auto Differential [[id number]]  (Abnormal) Collected: 03/24/21 0214    Specimen: Blood Updated: 03/24/21 0242     WBC 9.90 10*3/mm3      RBC 3.61 10*6/mm3      Hemoglobin 10.4 g/dL      Hematocrit 31.4 %      MCV 86.8 fL      MCH 28.7 pg      MCHC 33.0 g/dL      RDW 15.1 %      RDW-SD 45.9 fl      MPV 6.8 fL      Platelets 253 10*3/mm3      Neutrophil % 80.4 %      Lymphocyte % 9.7 %      Monocyte % 7.7 %      Eosinophil % 1.9 %      Basophil % 0.3 %      Neutrophils, Absolute 7.90 10*3/mm3      Lymphocytes, Absolute 1.00 10*3/mm3      Monocytes, Absolute 0.80 10*3/mm3      Eosinophils, Absolute 0.20 10*3/mm3      Basophils, Absolute 0.00 10*3/mm3      nRBC 0.0 /100 WBC     Potassium [[id number]]  (Abnormal) Collected: 03/23/21 1717    Specimen: Blood Updated: 03/23/21 1735     Potassium 3.2 mmol/L            Imaging Results (Last 24 Hours)     Procedure Component Value Units Date/Time    FL Upper GI Single Contrast SBFT [[id number]] Collected: 03/24/21 1215     Updated: 03/24/21 1230    Narrative:      DATE OF EXAM:  3/24/2021 9:47 AM     PROCEDURE:  FL UPPER GI SINGLE CONTRAST SBFT-     INDICATIONS:  Perforated gastric ulcer, check for leak after repair; K25.1-Acute  gastric ulcer with perforation; R10.84-Generalized abdominal pain;  R07.9-Chest pain,  unspecified; K28.6-Chronic or unspecified  gastrojejunal ulcer with both hemorrhage and perforation; K25.1-Acute  gastric ulcer with perforation; K63.1-Perforation of intestine  (nontraumatic)     COMPARISON:  CT abdomen and pelvis dated 3/19/2021     TECHNIQUE:    radiograph of the abdomen was obtained.  A single contrast  radiographic exam using water soluble nonionic contrast was performed,  imaging through the upper gastrointestinal tract and small bowel.      Fluoroscopic Time:  Combined 5.5 minutes for the upper GI and small bowel follow-through  component.     FINDINGS:   radiograph demonstrates presence of an NG tube in expected  position. There are 2 surgical drains, one within the right upper  quadrant and the other projecting over the region of the distal stomach.  There is no significant pneumoperitoneum identified on the semiupright  positioning.     200 mL of water soluble Isovue-370 was injected. On the upper GI portion  of the examination, the spot radiographs demonstrate irregularity along  the superior aspect of the gastric antrum at site of prior perforated  ulceration. There is no extravasation of contrast. Water soluble  contrast passes through the area of ulceration and into the duodenum. No  contrast was identified within either of the drain catheters or bulbs.     Patient was attempted to be maneuvered in the lateral position given the  anterior and superior location of the ulcer. Patient is able to be  positioned in the near right lateral decubitus position due to clinical  condition with imaging again demonstrating no evidence of leak.     Subsequent overhead images demonstrate passage of water-soluble contrast  into the small bowel and into the colon by one hour indicating a normal  small bowel transit time. The appearance of the irregularity along the  superior margin of the gastric antrum remains stable. Additional spot  images at the 1 hour eva of the gastric antrum  demonstrated stability  of the appearance without evidence of leak.       Impression:      1. No evidence of contrast leak from the gastric ulcer repair site.  2. No evidence of gastric obstruction at the site of repair.  3. Normal small bowel transit time of 1 hour and     Electronically Signed By-Tk Escudero MD On:3/24/2021 12:23 PM  This report was finalized on [id number] by  Tk Escudero MD.          Medication Review:     Assessment/Plan         Acute gastric ulcer with perforation (CMS/HCC)    Anxiety    Avascular necrosis of bone (CMS/HCC)    Hypertension    Atrial fibrillation (CMS/HCC)    Hyperthyroidism    Pulmonary hypertension (CMS/HCC)    Sick sinus syndrome (CMS/HCC)    Pneumonia    Impression: Postop day 5 exploratory laparotomy with repair of perforated gastric ulcer    Plan: Gastrografin study without any evidence of leak or stricture  Nasogastric tube to be removed  Clear liquid diet  P.o. pain medication        Wyatt Mon MD  03/24/21  13:31 EDT

## 2021-03-24 NOTE — PLAN OF CARE
Objective:   Bed mobility - SBA    Pt demonstrates increased ease of transitioning from supine to sitting EOB. Minimal verbal cues necessary.      Transfers - CGA and with rolling walker    Pt encouraged to exhale when coming to stand to reduce abdominal discomfort. Pt denies dizziness with position change.     Ambulation - 150 feet CGA and with rolling walker    Pt able to tolerate longer ambulation distance this date. Pt continues to be slow paced, however this is her baseline. Pt reports gait feels better this date due to having her shoes donned vs sock foot walking yesterday. Pt daughter present and able to assist with IV pole due to equipment dysfunction.     Assessment: Abi Rivera presents with functional mobility impairments which indicate the need for skilled intervention. Pt demonstrates significant improvements in pain and mobility this date. Pt notes she was up in the chair until 3am because she didn't want to be in bed. Pt states she has been getting OOB to [facility]. Pt encouraged to take frequent walks to help restore GI motility.  Will continue to follow and progress as tolerated.     PPE: gloves, surgical mask, eyewear protection

## 2021-03-24 NOTE — PROGRESS NOTES
"      [facility] Daily Progress Note      Hospitalist Team  LOS 5 days      Patient Care Team:  Provider, No Known as PCP - General    Patient Location: 2115/1      Subjective   Subjective     Chief Complaint / Subjective  Chief Complaint   Patient presents with   • Abdominal Pain         Brief Synopsis of Hospital Course/HPI    The patient is an 83 y.o. female with a history of COPD, chronic hypoxemic respiratory failure on 2L/trilogy, depression, hyperlipidemia, hypertension, hypothyroidism, diabetes mellitus and chronic thoracic back pain.      The patient presented to the emergency room on 3/19/2021 for evaluation of several days of productive yellow cough and shortness of air. PCP had prescribed Z-King earlier in the week.     In the ED, ABG showed pH 7.25/98.2/11.297% on 32% FiO2. Chest x-ray showed mild pulmonary edema.     The patient was initially admitted to the ICU and was placed on BiPAP. The hospitalist service was consulted on 3/20/2021 for medical management.         Date::    3/21/21: Transferred to PCU 3/20/2021 for A. fib RVR.  Given amiodarone drip and Cardizem drip.  Seen by cardiology.  Still NPO.  No complaints.  3/22/21: No complaints. + NG tube to suction.  Afebrile.  Needs to be n.p.o. until Wednesday.  3/23/21: Low blood glucose.  Started D5 and 1/2 NS.  Abdominal pain controlled  2/24/21: s/p Gastrografin study showed no leak.  DC Dilaudid.  Start Norco.  Started on clears.  Afebrile.      Review of Systems   All other systems reviewed and are negative.        Objective   Objective      Vital Signs  Temp:  [98.1 °F (36.7 °C)-98.6 °F (37 °C)] 98.6 °F (37 °C)  Heart Rate:  [55-75] 75  Resp:  [12-18] 16  BP: (126-160)/(64-83) 126/83  Oxygen Therapy  SpO2: 96 %  Pulse Oximetry Type: Continuous  Device (Oxygen Therapy): room air  Flow (L/min): 2  Flowsheet Rows      First Filed Value   Admission Height  149.9 cm (59\") Documented at 03/19/2021 1504   Admission " Weight  72.6 kg (160 lb) Documented at 03/19/2021 1504        Intake & Output (last 3 days)       03/21 0701 - 03/22 0700 03/22 0701 - 03/23 0700 03/23 0701 - 03/24 0700 03/24 0701 - 03/25 0700    P.O.        I.V. (mL/kg) 1000 (13.4)       IV Piggyback 100  500     Total Intake(mL/kg) 1100 (14.7)  500 (6.7)     Urine (mL/kg/hr) 400 (0.2) 750 (0.4) 0 (0)     Emesis/NG output 800 1000 700     Drains 65 52.5      Total Output 1265 1802.5 700     Net -165 -1802.5 -200             Urine Unmeasured Occurrence 3 x 1 x 1 x 2 x        Lines, Drains & Airways    Active LDAs     Name:   Placement date:   Placement time:   Site:   Days:    Peripheral IV 03/19/21 1540 Right Forearm   03/19/21    1540    Forearm   1    Peripheral IV 03/19/21 1827 Left Antecubital   03/19/21 1827    Antecubital   1    Closed/Suction Drain Right Abdomen Bulb 19 Fr.   03/19/21 1958    Abdomen   1    Closed/Suction Drain Left Abdomen Bulb 19 Fr.   03/19/21 1959    Abdomen   1    NG/OG Tube Nasogastric Right nostril   03/19/21    2200    Right nostril   1    External Urinary Catheter   03/20/21 2000    --   less than 1                  Physical Exam:    Physical Exam  HENT:      Head: Normocephalic.      Nose: Nose normal.   Eyes:      General: No scleral icterus.     Extraocular Movements: Extraocular movements intact.      Pupils: Pupils are equal, round, and reactive to light.   Cardiovascular:      Rate and Rhythm: Normal rate.   Pulmonary:      Effort: Pulmonary effort is normal.   Abdominal:      General: Bowel sounds are normal.   Musculoskeletal:         General: Normal range of motion.      Cervical back: Normal range of motion.   Skin:     General: Skin is warm.   Neurological:      Mental Status: She is alert. Mental status is at baseline.   Psychiatric:         Mood and Affect: Mood normal.              Wounds (last 24 hours)      LDA Wound     Row Name 03/24/21 1200 03/24/21 0800 03/24/21 0415       Wound 03/19/21 1907 midline  abdomen Incision    Wound - Properties Group Placement Date: 03/19/21  - Placement Time: 1907 -HL Orientation: midline  -HL Location: abdomen  -HL Primary Wound Type: Incision  -HL    Dressing Appearance  --  --  no drainage  -TJ    Closure  Staples;Tape Abd pad and binder in place  -JE  Rosamond;Tape Abd pad and binder in place  -JE  Rosamond;Tape Abd pad and binder in place  -TJ    Drainage Amount  none  -JE  none  -JE  none  -TJ    Retired Wound - Properties Group Date first assessed: 03/19/21  - Time first assessed: 1907 -HL Location: abdomen  -HL Primary Wound Type: Incision  -HL    Row Name 03/24/21 0023 03/23/21 2020          Wound 03/19/21 1907 midline abdomen Incision    Wound - Properties Group Placement Date: 03/19/21  - Placement Time: 1907 -HL Orientation: midline  -HL Location: abdomen  -HL Primary Wound Type: Incision  -HL    Dressing Appearance  --  no drainage  -TJ     Closure  MARIYA Abd binder in place  -TJ  MARIYA Abd binder in place  -TJ     Drainage Amount  none  -TJ  none  -TJ     Retired Wound - Properties Group Date first assessed: 03/19/21  - Time first assessed: 1907 - Location: abdomen  -HL Primary Wound Type: Incision  -HL      User Key  (r) = Recorded By, (t) = Taken By, (c) = Cosigned By    Initials Name Provider Type     Kate Patel, RN Registered Nurse    Brooke Deal RN Registered Nurse    Nely Chicas RN Registered Nurse          Procedures:    Procedure(s):  LAPAROTOMY EXPLORATORY with repair of perforated gastric ulcer, oversew of perforated gastric ulcer, placement of kristie patch, biopsy of stomach          Results Review:     I reviewed the patient's new clinical results.      Lab Results (last 24 hours)     Procedure Component Value Units Date/Time    Body Fluid Culture - Peritoneal Fluid, Peritoneum [[id number]]  (Abnormal)  (Susceptibility) Collected: 03/19/21 1916    Specimen: Peritoneal Fluid from Peritoneum Updated: 03/24/21 0806     Body  Fluid Culture Rare Candida albicans     Gram Stain Many (4+) WBCs per low power field      No organisms seen    Susceptibility      Candida albicans      DAVY      Fluconazole Susceptible      Micafungin Susceptible               Linear View                   Anaerobic Culture - Peritoneal Fluid, Peritoneum [[id number]] Collected: 03/19/21 1916    Specimen: Peritoneal Fluid from Peritoneum Updated: 03/24/21 0719     Anaerobic Culture No anaerobes isolated at 5 days    Basic Metabolic Panel [[id number]]  (Abnormal) Collected: 03/24/21 0214    Specimen: Blood Updated: 03/24/21 0255     Glucose 287 mg/dL      BUN 13 mg/dL      Creatinine 0.68 mg/dL      Sodium 142 mmol/L      Potassium 2.8 mmol/L      Chloride 110 mmol/L      CO2 20.0 mmol/L      Calcium 8.6 mg/dL      eGFR Non African Amer 89 mL/min/1.73      BUN/Creatinine Ratio 19.1     Anion Gap 12.0 mmol/L     Narrative:      GFR Normal >60  Chronic Kidney Disease <60  Kidney Failure <15      Magnesium [[id number]]  (Abnormal) Collected: 03/24/21 0214    Specimen: Blood Updated: 03/24/21 0255     Magnesium 1.4 mg/dL     CBC & Differential [[id number]]  (Abnormal) Collected: 03/24/21 0214    Specimen: Blood Updated: 03/24/21 0242    Narrative:      The following orders were created for panel order CBC & Differential.  Procedure                               Abnormality         Status                     ---------                               -----------         ------                     CBC Auto Differential[[id number]]        Abnormal            Final result                 Please view results for these tests on the individual orders.    CBC Auto Differential [[id number]]  (Abnormal) Collected: 03/24/21 0214    Specimen: Blood Updated: 03/24/21 0242     WBC 9.90 10*3/mm3      RBC 3.61 10*6/mm3      Hemoglobin 10.4 g/dL      Hematocrit 31.4 %      MCV 86.8 fL      MCH 28.7 pg      MCHC 33.0 g/dL      RDW 15.1 %      RDW-SD 45.9 fl      MPV 6.8 fL      Platelets 253  10*3/mm3      Neutrophil % 80.4 %      Lymphocyte % 9.7 %      Monocyte % 7.7 %      Eosinophil % 1.9 %      Basophil % 0.3 %      Neutrophils, Absolute 7.90 10*3/mm3      Lymphocytes, Absolute 1.00 10*3/mm3      Monocytes, Absolute 0.80 10*3/mm3      Eosinophils, Absolute 0.20 10*3/mm3      Basophils, Absolute 0.00 10*3/mm3      nRBC 0.0 /100 WBC         No results found for: HGBA1C            Lab Results   Component Value Date    LIPASE 19 03/19/2021     No results found for: CHOL, CHLPL, TRIG, HDL, LDL, LDLDIRECT    Lab Results   Lab Value Date/Time    FINALDX  03/19/2021 1922     Mucosa, antrum, biopsy:    Benign fibroadipose tissue with mixed acute and chronic inflammation and necro-inflammatory debris consistent       with ulcer    No intact epithelium is identified    No malignancy identified     See comment    SY/sms       COMDX  03/19/2021 1922     Deeper levels were examined through the block and support the rendered diagnosis.     SY/sms          Microbiology Results (last 10 days)     Procedure Component Value - Date/Time    Anaerobic Culture - Peritoneal Fluid, Peritoneum [[id number]] Collected: 03/19/21 1916    Lab Status: Final result Specimen: Peritoneal Fluid from Peritoneum Updated: 03/24/21 0719     Anaerobic Culture No anaerobes isolated at 5 days    Body Fluid Culture - Peritoneal Fluid, Peritoneum [[id number]]  (Abnormal)  (Susceptibility) Collected: 03/19/21 1916    Lab Status: Final result Specimen: Peritoneal Fluid from Peritoneum Updated: 03/24/21 0806     Body Fluid Culture Rare Candida albicans     Gram Stain Many (4+) WBCs per low power field      No organisms seen    Susceptibility      Candida albicans      DAVY      Fluconazole Susceptible      Micafungin Susceptible               Linear View                   COVID PRE-OP / PRE-PROCEDURE SCREENING ORDER (NO ISOLATION) - Swab, Nasopharynx [[id number]]  (Normal) Collected: 03/19/21 1729    Lab Status: Final result Specimen: Swab from  Nasopharynx Updated: 03/19/21 1822    Narrative:      The following orders were created for panel order COVID PRE-OP / PRE-PROCEDURE SCREENING ORDER (NO ISOLATION) - Swab, Nasopharynx.  Procedure                               Abnormality         Status                     ---------                               -----------         ------                     COVID-19,CEPHEID,COR/PAOLA...[[id number]]  Normal              Final result                 Please view results for these tests on the individual orders.    COVID-19,CEPHEID,COR/PAOLA/PAD IN-HOUSE(OR EMERGENT/ADD-ON),NP SWAB IN TRANSPORT MEDIA 3-4 HR TAT, RT-PCR - Swab, Nasopharynx [[id number]]  (Normal) Collected: 03/19/21 1729    Lab Status: Final result Specimen: Swab from Nasopharynx Updated: 03/19/21 1822     COVID19 Not Detected    Narrative:      Fact sheet for providers: https://www.fda.gov/media/786771/download     Fact sheet for patients: https://www.fda.gov/media/983760/download    Blood Culture - Blood, Arm, Right [[id number]] Collected: 03/19/21 1710    Lab Status: Preliminary result Specimen: Blood from Arm, Right Updated: 03/23/21 1715     Blood Culture No growth at 4 days    Blood Culture - Blood, Arm, Left [[id number]] Collected: 03/19/21 1707    Lab Status: Preliminary result Specimen: Blood from Arm, Left Updated: 03/23/21 1715     Blood Culture No growth at 4 days          ECG/EMG Results (most recent)     Procedure Component Value Units Date/Time    ECG 12 Lead [[id number]] Collected: 03/19/21 1545     Updated: 03/21/21 1249     QT Interval 387 ms     Narrative:      HEART RATE= 63  bpm  RR Interval= 948  ms  UT Interval= 150  ms  P Horizontal Axis= -12  deg  P Front Axis= 38  deg  QRSD Interval= 97  ms  QT Interval= 387  ms  QRS Axis= 19  deg  T Wave Axis= 42  deg  - ABNORMAL ECG -  Sinus rhythm  Probable left atrial enlargement  Anteroseptal infarct, age indeterminate  When compared with ECG of 27-Mar-2020 18:36:26,  Electronically Signed By:  Luisito Dykes ([facility]) 21-Mar-2021 12:49:27  Date and Time of Study: 2021-03-19 15:45:25    ECG 12 Lead [[id number]] Collected: 03/21/21 0709     Updated: 03/24/21 1403     QT Interval 292 ms     Narrative:      HEART RATE= 69  bpm  RR Interval= 872  ms  GA Interval= 138  ms  P Horizontal Axis= -8  deg  P Front Axis= 19  deg  QRSD Interval= 80  ms  QT Interval= 292  ms  QRS Axis= -1  deg  T Wave Axis=   deg  - NORMAL ECG -  Sinus rhythm  When compared with ECG of 20-Mar-2021 18:14:49,  Significant rate decrease  Significant repolarization change  Significant axis, voltage or hypertrophy change  Electronically Signed By: Dixon Singleton ([facility]) 24-Mar-2021 14:03:21  Date and Time of Study: 2021-03-21 07:09:35    ECG 12 Lead [[id number]] Collected: 03/20/21 1814     Updated: 03/24/21 1404     QT Interval 254 ms     Narrative:      HEART RATE= 200  bpm  RR Interval= 300  ms  GA Interval=   ms  P Horizontal Axis=   deg  P Front Axis=   deg  QRSD Interval= 89  ms  QT Interval= 254  ms  QRS Axis= -24  deg  T Wave Axis= 156  deg  - ABNORMAL ECG -  Atrial fibrillation with rapid V-rate  Probable LVH with secondary repol abnrm  ST depression, probably rate related  When compared with ECG of 19-Mar-2021 15:45:25,  Significant rate increase  Significant axis, voltage or hypertrophy change  Electronically Signed By: Dixon Singleton ([facility]) 24-Mar-2021 14:03:36  Date and Time of Study: 2021-03-20 18:14:49                  CT Abdomen Pelvis With Contrast    Result Date: 3/19/2021   1. Free intraperitoneal air in the abdomen indicating perforated viscus. 2. Abnormal finding of thickening/inflammation distal gastric body wall, greatest anteriorly. FINDINGS may reflect perforated gastric ulcer. Consider endoscopic correlation.    Electronically Signed By-Mahnaz Ortiz MD On:3/19/2021 4:40 PM This report was finalized on [id number] by  Mahnaz Ortiz MD.    XR Chest 1 View    Result Date: 3/21/2021  Increasing bilateral airspace  disease concerning for pneumonia.  Electronically Signed By-Brian Valenzuela MD On:3/21/2021 8:06 AM This report was finalized on [id number] by  Brian Valenzuela MD.    XR Chest 1 View    Result Date: 3/20/2021    1.  Interval placement of nasogastric tube the tip below the diaphragm. 2.  Stable cardiomegaly. 3.  New minimal left basilar airspace disease likely due to atelectasis.   Electronically Signed By-Amauri Mtz MD On:3/20/2021 8:36 AM This report was finalized on [id number] by  Amauri Mtz MD.    XR Chest 1 View    Result Date: 3/19/2021  Free air underneath the right hemidiaphragm. FINDINGS worrisome for perforated viscus. Dedicated CT abdomen and pelvis is recommended for further evaluation. I spoke with the clinician in the emergency room at the time of this dictation.  Electronically Signed By-Mahnaz Ortiz MD On:3/19/2021 4:00 PM This report was finalized on [id number] by  Mahnaz Ortiz MD.    CT Chest Pulmonary Embolism    Result Date: 3/19/2021   1. No evidence of pulmonary embolus. 2. No acute cardiac pulmonary process. 3. Ancillary findings as described above.     Electronically Signed By-Daniella Go MD On:3/19/2021 4:38 PM This report was finalized on [id number] by  Daniella Go MD.    FL Upper GI Single Contrast SBFT    Result Date: 3/24/2021  1. No evidence of contrast leak from the gastric ulcer repair site. 2. No evidence of gastric obstruction at the site of repair. 3. Normal small bowel transit time of 1 hour and  Electronically Signed By-Tk Escudero MD On:3/24/2021 12:23 PM This report was finalized on [id number] by  Tk Escudero MD.    XR Abdomen KUB    Result Date: 3/20/2021   1.  Nasogastric tube in place with the tip in the expected location of the body the stomach. 2.  Air-filled but nondistended loops of small bowel within the left side of the abdomen which may be due to mild ileus.  Electronically Signed By-Amauri Mtz MD On:3/20/2021 7:14 PM This  report was finalized on [id number] by  Amauri Mtz MD.          Xrays, labs reviewed personally by physician.    Medication Review:   I have reviewed the patient's current medication list      Scheduled Meds  arformoterol, 15 mcg, Nebulization, BID - RT  budesonide, 0.5 mg, Nebulization, BID - RT  iopamidol, 50 mL, Per G Tube, Once in imaging  ipratropium-albuterol, 3 mL, Nebulization, Q4H - RT  metoprolol tartrate, 25 mg, Oral, Q12H  nicotine, 1 patch, Transdermal, Q24H  pantoprazole, 40 mg, Oral, Q AM  piperacillin-tazobactam, 3.375 g, Intravenous, Q8H  sodium chloride, 1,000 mL, Intravenous, Once        Meds Infusions       Meds PRN  dextrose  •  HYDROcodone-acetaminophen  •  magnesium sulfate **OR** magnesium sulfate **OR** magnesium sulfate  •  [DISCONTINUED] HYDROmorphone **AND** naloxone  •  ondansetron **OR** ondansetron  •  potassium chloride **OR** potassium chloride **OR** potassium chloride  •  potassium phosphate infusion greater than 15 mMoles **OR** potassium phosphate infusion greater than 15 mMoles **OR** potassium phosphate **OR** sodium phosphate IVPB **OR** sodium phosphate IVPB **OR** sodium phosphate IVPB  •  sodium chloride  •  sodium chloride        Assessment/Plan   Assessment/Plan     Active Hospital Problems    Diagnosis  POA   • **Acute gastric ulcer with perforation (CMS/HCC) [K25.1]  Yes     Priority: High   • Pneumonia [J18.9]  Yes     Priority: High   • Atrial fibrillation (CMS/HCC) [I48.91]  Yes     Priority: High   • Avascular necrosis of bone (CMS/HCC) [M87.00]  Yes     Priority: Medium   • Sick sinus syndrome (CMS/HCC) [I49.5]  Yes     Priority: Medium   • Hyperthyroidism [E05.90]  Yes     Priority: Medium   • Hypertension [I10]  Yes     Priority: Medium   • Pulmonary hypertension (CMS/HCC) [I27.20]  Yes     Priority: Medium   • Anxiety [F41.9]  Yes      Resolved Hospital Problems   No resolved problems to display.       MEDICAL DECISION MAKING COMPLEXITY BY PROBLEM:        Gastric ulcer with perforation (POA)  -s/p laparotomy exploratory with repair of perforated gastric ulcer, oversew of perforated gastric ulcer, placement of Smith patch and  biopsy of stomach 3/19/2021.  -Observed overnight in the ICU postoperatively  -s/p Gastrografin study 3/24/2021--> no leak--> started on clears    Atrial fibrillation RVR:  -Reason for transfer to PCU 3/20/2021  -Treated with Cardizem drip and amiodarone drip  -Continue IV Lopressor while n.p.o.  -A. fib RVR exacerbated because of uncontrolled hyperthyroidism  -Patient noncompliant with Eliquis at home  -TTE 3/25/19--> LVEF 55-60%  -CTA chest 3/19/2021--> ruled out pulmonary embolism    Pneumonia:  -X-ray 3/21/2021--> bilateral airspace disease  -Continue Zosyn    Hyperthyroidism:  -Follows with endocrinology and consulted  -On Tapazole at home    Chronic pain secondary to avascular necrosis:  -Patient has been using NSAIDs for pain control which probably exacerbated gastric ulcer        VTE Prophylaxis -   Mechanical Order History:      Ordered        03/19/21 2123  Place Sequential Compression Device  Once         03/19/21 2123  Place Sequential Compression Device  Once         03/19/21 2123  Maintain Sequential Compression Device  Continuous                 Pharmalogical Order History:     None            Code Status -   Code Status and Medical Interventions:   Ordered at: 03/19/21 2123     Code Status:    CPR     Medical Interventions (Level of Support Prior to Arrest):    Full       This patient has been examined wearing appropriate Personal Protective Equipment and discussed with nursing. 03/24/21        Discharge Planning  defer        Electronically signed by Dung Chávez DO, 03/24/21, 16:21 EDT.  [facility] Hospitalist Team

## 2021-03-25 VITALS
DIASTOLIC BLOOD PRESSURE: 65 MMHG | RESPIRATION RATE: 18 BRPM | WEIGHT: 164.24 LBS | HEIGHT: 59 IN | HEART RATE: 81 BPM | OXYGEN SATURATION: 96 % | SYSTOLIC BLOOD PRESSURE: 108 MMHG | BODY MASS INDEX: 33.11 KG/M2 | TEMPERATURE: 98 F

## 2021-03-25 LAB
ANION GAP SERPL CALCULATED.3IONS-SCNC: 7 MMOL/L (ref 5–15)
BASOPHILS # BLD AUTO: 0 10*3/MM3 (ref 0–0.2)
BASOPHILS NFR BLD AUTO: 0.3 % (ref 0–1.5)
BUN SERPL-MCNC: 11 MG/DL (ref 6–20)
BUN/CREAT SERPL: 15.7 (ref 7–25)
CALCIUM SPEC-SCNC: 8.5 MG/DL (ref 8.6–10.5)
CHLORIDE SERPL-SCNC: 106 MMOL/L (ref 98–107)
CO2 SERPL-SCNC: 23 MMOL/L (ref 22–29)
CREAT SERPL-MCNC: 0.7 MG/DL (ref 0.57–1)
DEPRECATED RDW RBC AUTO: 46.8 FL (ref 37–54)
EOSINOPHIL # BLD AUTO: 0.2 10*3/MM3 (ref 0–0.4)
EOSINOPHIL NFR BLD AUTO: 2.3 % (ref 0.3–6.2)
ERYTHROCYTE [DISTWIDTH] IN BLOOD BY AUTOMATED COUNT: 15.3 % (ref 12.3–15.4)
GFR SERPL CREATININE-BSD FRML MDRD: 86 ML/MIN/1.73
GLUCOSE SERPL-MCNC: 89 MG/DL (ref 65–99)
HCT VFR BLD AUTO: 31.2 % (ref 34–46.6)
HGB BLD-MCNC: 10.1 G/DL (ref 12–15.9)
IL6 SERPL-MCNC: 38.6 PG/ML (ref 0–13)
LYMPHOCYTES # BLD AUTO: 1.3 10*3/MM3 (ref 0.7–3.1)
LYMPHOCYTES NFR BLD AUTO: 13.6 % (ref 19.6–45.3)
MAGNESIUM SERPL-MCNC: 1.9 MG/DL (ref 1.6–2.6)
MCH RBC QN AUTO: 28.3 PG (ref 26.6–33)
MCHC RBC AUTO-ENTMCNC: 32.3 G/DL (ref 31.5–35.7)
MCV RBC AUTO: 87.7 FL (ref 79–97)
MONOCYTES # BLD AUTO: 1 10*3/MM3 (ref 0.1–0.9)
MONOCYTES NFR BLD AUTO: 10 % (ref 5–12)
NEUTROPHILS NFR BLD AUTO: 7.3 10*3/MM3 (ref 1.7–7)
NEUTROPHILS NFR BLD AUTO: 73.8 % (ref 42.7–76)
NRBC BLD AUTO-RTO: 0 /100 WBC (ref 0–0.2)
PLATELET # BLD AUTO: 239 10*3/MM3 (ref 140–450)
PMV BLD AUTO: 7 FL (ref 6–12)
POTASSIUM SERPL-SCNC: 3.3 MMOL/L (ref 3.5–5.2)
POTASSIUM SERPL-SCNC: 4.6 MMOL/L (ref 3.5–5.2)
RBC # BLD AUTO: 3.56 10*6/MM3 (ref 3.77–5.28)
SODIUM SERPL-SCNC: 136 MMOL/L (ref 136–145)
WBC # BLD AUTO: 9.9 10*3/MM3 (ref 3.4–10.8)

## 2021-03-25 PROCEDURE — 84132 ASSAY OF SERUM POTASSIUM: CPT | Performed by: HOSPITALIST

## 2021-03-25 PROCEDURE — 99024 POSTOP FOLLOW-UP VISIT: CPT | Performed by: SURGERY

## 2021-03-25 PROCEDURE — 99231 SBSQ HOSP IP/OBS SF/LOW 25: CPT | Performed by: INTERNAL MEDICINE

## 2021-03-25 PROCEDURE — 94799 UNLISTED PULMONARY SVC/PX: CPT

## 2021-03-25 PROCEDURE — 83735 ASSAY OF MAGNESIUM: CPT | Performed by: HOSPITALIST

## 2021-03-25 PROCEDURE — 80048 BASIC METABOLIC PNL TOTAL CA: CPT | Performed by: HOSPITALIST

## 2021-03-25 PROCEDURE — 85025 COMPLETE CBC W/AUTO DIFF WBC: CPT | Performed by: HOSPITALIST

## 2021-03-25 PROCEDURE — 97116 GAIT TRAINING THERAPY: CPT

## 2021-03-25 PROCEDURE — 25010000002 PIPERACILLIN SOD-TAZOBACTAM PER 1 G: Performed by: HOSPITALIST

## 2021-03-25 PROCEDURE — 99232 SBSQ HOSP IP/OBS MODERATE 35: CPT | Performed by: NURSE PRACTITIONER

## 2021-03-25 PROCEDURE — 99238 HOSP IP/OBS DSCHRG MGMT 30/<: CPT | Performed by: HOSPITALIST

## 2021-03-25 RX ORDER — PANTOPRAZOLE SODIUM 40 MG/1
40 TABLET, DELAYED RELEASE ORAL DAILY
Qty: 30 TABLET | Refills: 0 | Status: SHIPPED | OUTPATIENT
Start: 2021-03-25 | End: 2021-12-02

## 2021-03-25 RX ORDER — ASPIRIN 81 MG/1
81 TABLET ORAL DAILY
Qty: 30 TABLET | Refills: 0 | Status: SHIPPED | OUTPATIENT
Start: 2021-03-25 | End: 2021-03-30 | Stop reason: SDUPTHER

## 2021-03-25 RX ADMIN — HYDROCODONE BITARTRATE AND ACETAMINOPHEN 1 TABLET: 5; 325 TABLET ORAL at 06:58

## 2021-03-25 RX ADMIN — IPRATROPIUM BROMIDE AND ALBUTEROL SULFATE 3 ML: 2.5; .5 SOLUTION RESPIRATORY (INHALATION) at 15:36

## 2021-03-25 RX ADMIN — METHIMAZOLE 10 MG: 5 TABLET ORAL at 18:03

## 2021-03-25 RX ADMIN — HYDROCODONE BITARTRATE AND ACETAMINOPHEN 1 TABLET: 5; 325 TABLET ORAL at 13:19

## 2021-03-25 RX ADMIN — PIPERACILLIN AND TAZOBACTAM 3.38 G: 3; .375 INJECTION, POWDER, LYOPHILIZED, FOR SOLUTION INTRAVENOUS at 00:00

## 2021-03-25 RX ADMIN — HYDROCODONE BITARTRATE AND ACETAMINOPHEN 1 TABLET: 5; 325 TABLET ORAL at 18:07

## 2021-03-25 RX ADMIN — PIPERACILLIN AND TAZOBACTAM 3.38 G: 3; .375 INJECTION, POWDER, LYOPHILIZED, FOR SOLUTION INTRAVENOUS at 15:31

## 2021-03-25 RX ADMIN — POTASSIUM CHLORIDE 40 MEQ: 1500 TABLET, EXTENDED RELEASE ORAL at 05:45

## 2021-03-25 RX ADMIN — HYDROCODONE BITARTRATE AND ACETAMINOPHEN 1 TABLET: 5; 325 TABLET ORAL at 01:58

## 2021-03-25 RX ADMIN — IPRATROPIUM BROMIDE AND ALBUTEROL SULFATE 3 ML: 2.5; .5 SOLUTION RESPIRATORY (INHALATION) at 11:09

## 2021-03-25 RX ADMIN — PANTOPRAZOLE SODIUM 40 MG: 40 TABLET, DELAYED RELEASE ORAL at 05:45

## 2021-03-25 RX ADMIN — Medication 1 PATCH: at 09:47

## 2021-03-25 RX ADMIN — METHIMAZOLE 10 MG: 5 TABLET ORAL at 09:42

## 2021-03-25 RX ADMIN — Medication 10 ML: at 09:44

## 2021-03-25 RX ADMIN — PIPERACILLIN AND TAZOBACTAM 3.38 G: 3; .375 INJECTION, POWDER, LYOPHILIZED, FOR SOLUTION INTRAVENOUS at 09:46

## 2021-03-25 RX ADMIN — POTASSIUM CHLORIDE 40 MEQ: 1500 TABLET, EXTENDED RELEASE ORAL at 09:42

## 2021-03-25 NOTE — PLAN OF CARE
Objective:   Bed mobility - Modified Independent   Pt transitions from supine to sit without difficulty, no cuing needed. PT assisted pt with donning shoes.                                      Transfers - Modified Independent with rolling walker    Pt denies any dizziness upon standing from EOB.     Ambulation - 400 feet, Modified Independent and with rolling walker    Pt significantly increased ambulation distance today. NSG had disconnected IV today and pt reports it is much easier to move without worrying about it. Pt gait speed also improved, believe pt is much closer to baseline function.      Assessment: Abi Rivera presents with functional mobility impairments which indicate the need for skilled intervention. Pt happy about drains and IV being removed. Pt has made great improvements in mobility and displays increased confidence. Pt able to move at modified independent level and requires less cuing. Pt excited about return home. Pt educated about mobility upon return home and strategies to reduce pain. Pt verbalized understanding of weight lifting restrictions.       PPE: gloves, surgical mask, eyewear protection

## 2021-03-25 NOTE — PLAN OF CARE
Goal Outcome Evaluation:  Plan of Care Reviewed With: patient  Progress: improving  Outcome Summary: Patient has ambulated around unit and had bowel movent this shift, will continue to monitor..

## 2021-03-25 NOTE — THERAPY TREATMENT NOTE
Subjective: Pt agreeable to therapeutic plan of care. Pt happy about getting to eat for the first time since surgery and is excited about possible d/c home today.      Objective:   Bed mobility - Modified Independent   Pt transitions from supine to sit without difficulty, no cuing needed. PT assisted pt with donning shoes.                                      Transfers - Modified Independent with rolling walker    Pt denies any dizziness upon standing from EOB.     Ambulation - 400 feet, Modified Independent and with rolling walker    Pt significantly increased ambulation distance today. NSG had disconnected IV today and pt reports it is much easier to move without worrying about it. Pt gait speed also improved, believe pt is much closer to baseline function.                                          Pain: 4 VAS- Pt reports her pain is much more tolerable.     Education: Provided education on importance of mobility and skilled verbal / tactile cueing throughout intervention.      Assessment: Abi Rivera presents with functional mobility impairments which indicate the need for skilled intervention. Pt happy about drains and IV being removed. Pt has made great improvements in mobility and displays increased confidence. Pt able to move at modified independent level and requires less cuing. Pt excited about return home. Pt educated about mobility upon return home and strategies to reduce pain. Pt verbalized understanding of weight lifting restrictions.       Plan/Recommendations:   Pt would benefit from Home at discharge from facility and requires no DME at discharge.    Pt desires Home at discharge. Pt cooperative; agreeable to therapeutic recommendations and plan of care.      Post-Tx Position: Up in Chair and Call light and personal items within reach, with daughter     PPE: gloves, surgical mask, eyewear protection

## 2021-03-25 NOTE — PROGRESS NOTES
Continued Stay Note   Zhang     Patient Name: Abi Rivera  MRN: [id number]  Today's Date: 3/25/2021    Admit Date: 3/19/2021    Discharge Plan     Row Name 03/25/21 1346       Plan    Plan  DC Plan: Anticipate routine home. MSW following for medication assistance at dc.    Plan Comments  PT recommending home with assist at this time. Diet upgraded from clear liquid to low residue per surgery recommendations. DC Barriers: Hypokalemia (K 3.3), replacement ordered.        Phone communication or documentation only - no physical contact with patient or family.    Tayler Thakur RN     Office Phone: [phone]  Office Cell: [phone]

## 2021-03-25 NOTE — DISCHARGE SUMMARY
[facility] Medicine Services  DISCHARGE SUMMARY        Prepared For PCP:  Provider, No Known    Patient Name: Abi Rivera  : 1960  MRN: [id number]      Date of Admission:   3/19/2021    Date of Discharge:  3/25/2021    Length of stay:  LOS: 6 days     Hospital Course     Presenting Problem:   Acute gastric ulcer with perforation (CMS/HCC) [K25.1]  Generalized abdominal pain [R10.84]  Chest pain, unspecified type [R07.9]      Active Hospital Problems    Diagnosis  POA   • **Acute gastric ulcer with perforation (CMS/HCC) [K25.1]  Yes     Priority: High   • Pneumonia [J18.9]  Yes     Priority: High   • Atrial fibrillation (CMS/HCC) [I48.91]  Yes     Priority: High   • Avascular necrosis of bone (CMS/HCC) [M87.00]  Yes     Priority: Medium   • Sick sinus syndrome (CMS/HCC) [I49.5]  Yes     Priority: Medium   • Hyperthyroidism [E05.90]  Yes     Priority: Medium   • Hypertension [I10]  Yes     Priority: Medium   • Pulmonary hypertension (CMS/HCC) [I27.20]  Yes     Priority: Medium   • Anxiety [F41.9]  Yes     Priority: Medium      Resolved Hospital Problems   No resolved problems to display.           Hospital Course:    The patient was initially admitted to the ICU and was placed on BiPAP. The hospitalist service was consulted on 3/20/2021 for medical management.  The patient was briefly on the SIPS floor until she went into A. fib RVR on 3/20/2021 thus was transferred to the PCU.     Cardiologist was consulted for A. fib RVR and was placed on amiodarone drip.  Endocrinologist was consulted because of hyperthyroidism.  The patient was kept n.p.o. until 3/24/2021 to facilitate healing.  She had NG tube for suctioning and tolerated diet which was initiated on 3/24/2021.  She has been treated with 7 days of Zosyn for intra-abdominal infection and pneumonia.  She will be discharged home on 3/25/2021.  She will need to follow-up with Dr. Farooq, endocrinologist for management of  hyperthyroidism.  The patient was strongly advised to be compliant with her medications.  Chads vasc score was 2 and will be discharged on aspirin for stroke prophylaxis.  Eliquis will be held in light of the fact that it is  high risk for GI bleed.    Problem list addressed during hospitalization:    Gastric ulcer with perforation (POA)  -s/p laparotomy exploratory with repair of perforated gastric ulcer, oversew of perforated gastric ulcer, placement of Smith patch and  biopsy of stomach 3/19/2021.  -Observed overnight in the ICU postoperatively  -s/p Gastrografin study 3/24/2021--> no leak--> started on clears on 3/24/2021  -Discharged home on 3/25/2021     Atrial fibrillation RVR:  -Reason for transfer to PCU 3/20/2021  -Treated briefly Cardizem drip and amiodarone drip in PCU  -Treated with IV metoprolol during hospitalization  -A. fib RVR exacerbated because of uncontrolled hyperthyroidism  -Patient noncompliant with Eliquis at home  -TTE 3/25/19--> LVEF 55-60%  -CTA chest 3/19/2021--> ruled out pulmonary embolism  -Cardiologist consulted to manage A. fib RVR 3/20/2021  -Aspirin for CVA prophylaxis.  Discharge on increased dose of metoprolol.     Pneumonia:  -X-ray 3/21/2021--> bilateral airspace disease  -Treated with 7 days of Zosyn.     Hyperthyroidism:  -Follows with Dr. Mack Endocrinology and consulted  -On Tapazole at home     Chronic pain secondary to avascular necrosis:  -Patient has been using NSAIDs for pain control which probably exacerbated gastric ulcer        Recommendation for Outpatient Providers:             Reasons For Change In Medications and Indications for New Medications:        Day of Discharge     HPI:       The patient is an 83 y.o. female with a history of COPD, chronic hypoxemic respiratory failure on 2L/trilogy, depression, hyperlipidemia, hypertension, hypothyroidism, diabetes mellitus and chronic thoracic back pain.      The patient presented to the emergency room on 3/19/2021  for evaluation of several days of productive yellow cough and shortness of air. PCP had prescribed Z-King earlier in the week.     In the ED, ABG showed pH 7.25/98.2/11.297% on 32% FiO2. Chest x-ray showed mild pulmonary edema.          Vital Signs:   Temp:  [97.6 °F (36.4 °C)-98.5 °F (36.9 °C)] 98 °F (36.7 °C)  Heart Rate:  [52-81] 81  Resp:  [14-18] 18  BP: (108-144)/(51-85) 108/65     Physical Exam:  Physical Exam  HENT:      Head: Normocephalic.      Nose: Nose normal.   Eyes:      Extraocular Movements: Extraocular movements intact.      Pupils: Pupils are equal, round, and reactive to light.   Cardiovascular:      Rate and Rhythm: Normal rate and regular rhythm.   Pulmonary:      Effort: Pulmonary effort is normal.      Breath sounds: Normal breath sounds.   Abdominal:      General: Bowel sounds are normal.      Palpations: Abdomen is soft.   Musculoskeletal:         General: Normal range of motion.      Cervical back: Normal range of motion.   Skin:     General: Skin is warm.   Neurological:      General: No focal deficit present.      Mental Status: She is alert.   Psychiatric:         Mood and Affect: Mood normal.         Pertinent  and/or Most Recent Results     Results from last 7 days   Lab Units 03/25/21  1424 03/25/21  0421 03/24/21  1903 03/24/21  0214 03/23/21  1717 03/23/21  0238 03/22/21  0247 03/21/21  0436 03/20/21  1804 03/20/21  0355 03/19/21  2206   WBC 10*3/mm3  --  9.90  --  9.90  --  10.50 12.50* 19.40*  --  25.30* 24.30*   HEMOGLOBIN g/dL  --  10.1*  --  10.4*  --  10.5* 10.2* 11.5*  --  11.4* 12.1   HEMATOCRIT %  --  31.2*  --  31.4*  --  32.3* 31.5* 36.2  --  35.5 37.9   PLATELETS 10*3/mm3  --  239  --  253  --  258 251 259  --  293 274   SODIUM mmol/L  --  136  --  142  --  144 142 141 140 139 138   POTASSIUM mmol/L 4.6 3.3* 3.7 2.8* 3.2* 3.5 3.4* 3.7 3.7 4.7 4.4   CHLORIDE mmol/L  --  106  --  110*  --  109* 111* 109* 109* 106 103   CO2 mmol/L  --  23.0  --  20.0*  --  18.0* 21.0* 21.0*  22.0 22.0 24.0   BUN mg/dL  --  11  --  13  --  15 13 11 13 18 23*   CREATININE mg/dL  --  0.70  --  0.68  --  0.66 0.77 0.71 0.64 0.69 0.78   GLUCOSE mg/dL  --  89  --  287*  --  59* 86 115* 109* 103* 125*   CALCIUM mg/dL  --  8.5*  --  8.6  --  9.6 8.6 8.8 8.9 8.0* 8.2*     Results from last 7 days   Lab Units 03/22/21  0247 03/21/21  0436 03/20/21  1804 03/20/21  0355 03/19/21  2206 03/19/21  1537   BILIRUBIN mg/dL 0.5 0.4 0.4 0.4 0.3 0.3   ALK PHOS U/L 45 51 49 44 44 48   ALT (SGPT) U/L 15 19 21 25 33 12   AST (SGOT) U/L 13 15 16 22 29 12           Invalid input(s): TG, LDLCALC, LDLREALC  Results from last 7 days   Lab Units 03/21/21  1500 03/21/21  0436 03/20/21  1804 03/20/21  0355 03/19/21  1537   TSH uIU/mL <0.005*  --   --  <0.005*  --    PROBNP pg/mL  --   --   --   --  335.6   TROPONIN T ng/mL  --   --   --   --  <0.010   PROCALCITONIN ng/mL  --  1.85*  --   --   --    LACTATE mmol/L  --   --  1.1  --   --        Brief Urine Lab Results     None          Microbiology Results Abnormal     Procedure Component Value - Date/Time    Blood Culture - Blood, Arm, Right [[id number]] Collected: 03/19/21 1710    Lab Status: Final result Specimen: Blood from Arm, Right Updated: 03/24/21 1715     Blood Culture No growth at 5 days    Blood Culture - Blood, Arm, Left [[id number]] Collected: 03/19/21 1707    Lab Status: Final result Specimen: Blood from Arm, Left Updated: 03/24/21 1715     Blood Culture No growth at 5 days    Body Fluid Culture - Peritoneal Fluid, Peritoneum [[id number]]  (Abnormal)  (Susceptibility) Collected: 03/19/21 1916    Lab Status: Final result Specimen: Peritoneal Fluid from Peritoneum Updated: 03/24/21 0806     Body Fluid Culture Rare Candida albicans     Gram Stain Many (4+) WBCs per low power field      No organisms seen    Susceptibility      Candida albicans      DAVY      Fluconazole Susceptible      Micafungin Susceptible               Linear View                   Anaerobic Culture -  Peritoneal Fluid, Peritoneum [[id number]] Collected: 03/19/21 1916    Lab Status: Final result Specimen: Peritoneal Fluid from Peritoneum Updated: 03/24/21 0719     Anaerobic Culture No anaerobes isolated at 5 days    COVID PRE-OP / PRE-PROCEDURE SCREENING ORDER (NO ISOLATION) - Swab, Nasopharynx [[id number]]  (Normal) Collected: 03/19/21 1729    Lab Status: Final result Specimen: Swab from Nasopharynx Updated: 03/19/21 1822    Narrative:      The following orders were created for panel order COVID PRE-OP / PRE-PROCEDURE SCREENING ORDER (NO ISOLATION) - Swab, Nasopharynx.  Procedure                               Abnormality         Status                     ---------                               -----------         ------                     COVID-19,CEPHEID,COR/PAOLA...[[id number]]  Normal              Final result                 Please view results for these tests on the individual orders.    COVID-19,CEPHEID,COR/PAOLA/PAD IN-HOUSE(OR EMERGENT/ADD-ON),NP SWAB IN TRANSPORT MEDIA 3-4 HR TAT, RT-PCR - Swab, Nasopharynx [[id number]]  (Normal) Collected: 03/19/21 1729    Lab Status: Final result Specimen: Swab from Nasopharynx Updated: 03/19/21 1822     COVID19 Not Detected    Narrative:      Fact sheet for providers: https://www.fda.gov/media/825836/download     Fact sheet for patients: https://www.fda.gov/media/028501/download          CT Abdomen Pelvis With Contrast    Result Date: 3/19/2021  Impression:  1. Free intraperitoneal air in the abdomen indicating perforated viscus. 2. Abnormal finding of thickening/inflammation distal gastric body wall, greatest anteriorly. FINDINGS may reflect perforated gastric ulcer. Consider endoscopic correlation.    Electronically Signed By-Mahnaz Ortiz MD On:3/19/2021 4:40 PM This report was finalized on [id number] by  Mahnaz Ortiz MD.    XR Chest 1 View    Result Date: 3/21/2021  Impression: Increasing bilateral airspace disease concerning for pneumonia.  Electronically Signed  By-Brian Valenzuela MD On:3/21/2021 8:06 AM This report was finalized on [id number] by  Brian Valenzuela MD.    XR Chest 1 View    Result Date: 3/20/2021  Impression:   1.  Interval placement of nasogastric tube the tip below the diaphragm. 2.  Stable cardiomegaly. 3.  New minimal left basilar airspace disease likely due to atelectasis.   Electronically Signed By-Amauri Mtz MD On:3/20/2021 8:36 AM This report was finalized on [id number] by  Amauri Mtz MD.    XR Chest 1 View    Result Date: 3/19/2021  Impression: Free air underneath the right hemidiaphragm. FINDINGS worrisome for perforated viscus. Dedicated CT abdomen and pelvis is recommended for further evaluation. I spoke with the clinician in the emergency room at the time of this dictation.  Electronically Signed By-Mahnaz Ortiz MD On:3/19/2021 4:00 PM This report was finalized on [id number] by  Mahnaz Ortiz MD.    CT Chest Pulmonary Embolism    Result Date: 3/19/2021  Impression:  1. No evidence of pulmonary embolus. 2. No acute cardiac pulmonary process. 3. Ancillary findings as described above.     Electronically Signed By-Daniella Go MD On:3/19/2021 4:38 PM This report was finalized on [id number] by  Daniella Go MD.    FL Upper GI Single Contrast SBFT    Result Date: 3/24/2021  Impression: 1. No evidence of contrast leak from the gastric ulcer repair site. 2. No evidence of gastric obstruction at the site of repair. 3. Normal small bowel transit time of 1 hour and  Electronically Signed By-Tk Escudreo MD On:3/24/2021 12:23 PM This report was finalized on [id number] by  Tk Escudero MD.    XR Abdomen KUB    Result Date: 3/20/2021  Impression:  1.  Nasogastric tube in place with the tip in the expected location of the body the stomach. 2.  Air-filled but nondistended loops of small bowel within the left side of the abdomen which may be due to mild ileus.  Electronically Signed By-Amauri Mtz MD On:3/20/2021 7:14 PM  This report was finalized on [id number] by  Amauri Mtz MD.                          Test Results Pending at Discharge        Procedures Performed  Procedure(s):  LAPAROTOMY EXPLORATORY with repair of perforated gastric ulcer, oversew of perforated gastric ulcer, placement of kristie patch, biopsy of stomach         Consults:   Consults     Date and Time Order Name Status Description    3/20/2021  6:28 PM Inpatient Endocrinology Consult      3/20/2021  5:34 PM Inpatient Cardiology Consult Completed     3/19/2021  9:22 PM Inpatient Hospitalist Consult Completed     3/19/2021  5:19 PM Intensivist (on-call MD unless specified) Completed     3/19/2021  4:56 PM Surgery (on-call MD unless specified) Completed             Discharge Details        Discharge Medications      New Medications      Instructions Start Date   aspirin 81 MG EC tablet   81 mg, Oral, Daily      pantoprazole 40 MG EC tablet  Commonly known as: PROTONIX   40 mg, Oral, Daily         Changes to Medications      Instructions Start Date   Tymlos 3120 MCG/1.56ML solution pen-injector  Generic drug: Abaloparatide  What changed: additional instructions   0.08 mg, Subcutaneous, Daily         Continue These Medications      Instructions Start Date   acetaminophen 500 MG tablet  Commonly known as: TYLENOL   500 mg, Oral, Every 6 Hours PRN      Calcium 600+D 600-800 MG-UNIT tablet  Generic drug: calcium carb-cholecalciferol   1 tablet, Oral, Daily      fish oil 1200 MG capsule capsule   1 tablet daily      GLUCOSAMINE 1500 COMPLEX PO   1 tablet, Oral, Daily      MAGNESIUM OXIDE PO   250 mg, Oral, Daily      methIMAzole 10 MG tablet  Commonly known as: TAPAZOLE   10 mg, Oral, 2 times daily, Take 2 tablets in the morning, take 1 tablet in the evening.      Multi-Vitamin tablet tablet  Generic drug: multivitamin   1 tablet, Oral, Daily      Potassium Gluconate 550 MG tablet   1 tablet, Oral, Daily         Stop These Medications    apixaban 5 MG tablet  tablet  Commonly known as: ELIQUIS        ASK your doctor about these medications      Instructions Start Date   metoprolol tartrate 25 MG tablet  Commonly known as: LOPRESSOR  Ask about: Which instructions should I use?   12.5 mg, Oral, 2 Times Daily      metoprolol tartrate 25 MG tablet  Commonly known as: LOPRESSOR  Ask about: Which instructions should I use?   25 mg, Oral, Every 12 Hours Scheduled             No Known Allergies      Discharge Disposition:  Home or Self Care    Diet:  Hospital:  Diet Order   Procedures   • Diet Gastrointestinal; Low Residue         Discharge Activity: as tolerated        CODE STATUS:    Code Status and Medical Interventions:   Ordered at: 03/19/21 2123     Code Status:    CPR     Medical Interventions (Level of Support Prior to Arrest):    Full         Follow-up Appointments  Future Appointments   Date Time Provider Department Center   5/27/2021  3:00 PM Karen Mack MD [facility] None       Additional Instructions for the Follow-ups that You Need to Schedule     Discharge Follow-up with PCP   As directed       Currently Documented PCP:    Provider, No Known    PCP Phone Number:    None     Follow Up Details: 2 weeks                 Condition on Discharge:      Stable      This patient has been examined wearing appropriate Personal Protective Equipment and discussed with nursing. 03/25/21      Electronically signed by Dung Chávez DO, 03/25/21, 4:54 PM EDT.      Time: I spent  15 minutes on this discharge activity which included face-to-face encounter with the patient/reviewing the data in the system/coordination of the care with the nursing staff as well as consultants/documentation/entering orders.

## 2021-03-25 NOTE — PROGRESS NOTES
"Subjective   Abi Rivera is a 60 y.o. female.   Seen for thyroid f/u.  NG tube removed today.   Tolerating oral fluids. Pain better  Denies tremors or palpitations.    Objective     /75 (BP Location: Right arm, Patient Position: Sitting)   Pulse 61   Temp 97.6 °F (36.4 °C) (Axillary)   Resp 14   Ht 149.9 cm (59\")   Wt 74.7 kg (164 lb 10.9 oz)   SpO2 99%   Breastfeeding No   BMI 33.26 kg/m²   Heart rate controlled    ASSESSMENT    Patient is Improving    PLAN    Will restart methimazole, but will use lower dose initially since pt is not clinically toxic.         Mxa Villegas MD  3/24/2021  23:35 EDT    "

## 2021-03-25 NOTE — PROGRESS NOTES
CC--- paroxysmal atrial fibrillation, mild hypertension  Presentation with perforated gastric ulcer with bleeding    Subject  Patient sitting in a chair in no distress and denies any palpitations or chest pain or dyspnea  Patient is able to eat and take oral medications and feels better        Past Medical History:   Diagnosis Date   • Adenomatous polyp of colon 6/20/2016   • Anxiety 6/16/2016   • Arthritis of foot 1/10/2019   • Atrial fibrillation (CMS/HCC) 6/16/2016   • Avascular necrosis of bone (CMS/HCC) 1/10/2019   • Chronic pain 1/22/2019   • Closed displaced fracture 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 9/19/2019    Added automatically from request for surgery [id number]   • Hypertension 6/16/2016   • Hyperthyroidism 11/2/2016   • Mood disorder (CMS/HCC) 6/16/2016   • Osteoporosis without current pathological fracture 6/4/2020   • Pulmonary hypertension (CMS/[facility]) 6/16/2016   • Sick sinus syndrome (CMS/[facility]) 1/25/2017   • Tricuspid valve insufficiency 6/16/2016     Past Surgical History:   Procedure Laterality Date   • ANKLE OPEN REDUCTION INTERNAL FIXATION     • APPENDECTOMY     • EXPLORATORY LAPAROTOMY N/A 3/19/2021    Procedure: LAPAROTOMY EXPLORATORY with repair of perforated gastric ulcer, oversew of perforated gastric ulcer, placement of kristie patch, biopsy of stomach;  Surgeon: Luisito Velasco DO;  Location: [facility] MAIN OR;  Service: General;  Laterality: N/A;   • HIP SURGERY      Left total hip   • ORIF FOOT FRACTURE Right 9/30/2019    Procedure: OPEN REDUCTION INTERNAL FIXATION OF FIFTH METATARSAL FRACTURE, LOOSE BODY EXCISION/REMOVAL FROM THE TALONAVICULAR JOINT OF THE RIGHT FOOT;  Surgeon: JOSE Fry DPM;  Location: [facility] MAIN OR;  Service: Podiatry       Review of Systems   General:  positive for fatigue and tiredness  Eyes: No redness  Cardiovascular: No chest pain, no palpitations  Respiratory:   no shortness of breath  Gastrointestinal: No nausea or  vomiting  Genitourinary: no hematuria or dysuria  Skin: No rash  Neurologic: No numbness, tingling, syncope  Hematologic/Lymphatic: No abnormal bleeding      Physical Exam  VITALS REVIEWED--- blood pressure 126/83 pulse rate is 64 patient afebrile respiration 12 times a minute    General:      well developed, well nourished, in no acute distress.    Head:      normocephalic and atraumatic.    Eyes:      PERRL/EOM intact, conjunctiva and sclera clear with out nystagmus.    Neck:      no masses, thyromegaly,  trachea central with normal respiratory effort   Lungs:      clear bilaterally to auscultation.    Heart:      Sinus rhythm without any murmurs gallops or rubs  Extremities:       no cyanosis or clubbin.    Neurologic:      no focal deficits.   alert oriented x3    Psych:      alert and cooperative; normal mood and affect; normal attention span and concentration.          CBC    Results from last 7 days   Lab Units 03/25/21  0421 03/24/21  0214 03/23/21  0238 03/22/21  0247 03/21/21  0436 03/20/21  0355 03/19/21  2206   WBC 10*3/mm3 9.90 9.90 10.50 12.50* 19.40* 25.30* 24.30*   HEMOGLOBIN g/dL 10.1* 10.4* 10.5* 10.2* 11.5* 11.4* 12.1   PLATELETS 10*3/mm3 239 253 258 251 259 293 274     BMP   Results from last 7 days   Lab Units 03/25/21  1424 03/25/21  0421 03/24/21  1903 03/24/21  0214 03/23/21  1717 03/23/21  0238 03/22/21  0247 03/21/21  0436 03/20/21  1804 03/20/21  0355   SODIUM mmol/L  --  136  --  142  --  144 142 141 140 139   POTASSIUM mmol/L 4.6 3.3* 3.7 2.8* 3.2* 3.5 3.4* 3.7 3.7 4.7   CHLORIDE mmol/L  --  106  --  110*  --  109* 111* 109* 109* 106   CO2 mmol/L  --  23.0  --  20.0*  --  18.0* 21.0* 21.0* 22.0 22.0   BUN mg/dL  --  11  --  13  --  15 13 11 13 18   CREATININE mg/dL  --  0.70  --  0.68  --  0.66 0.77 0.71 0.64 0.69   GLUCOSE mg/dL  --  89  --  287*  --  59* 86 115* 109* 103*   MAGNESIUM mg/dL  --  1.9  --  1.4*  --  1.6  --   --   --   --    PHOSPHORUS mg/dL  --   --   --   --   --  3.2   --   --   --   --            Assessment and plan    Acute gastric ulcer perforation with bleeding status post surgery  Paroxysmal atrial fibrillation  Essential hypertension  Remote history of sick sinus syndrome  Hyper thyroidism  Maintaining sinus rhythm    Chads vas score is only 2    No clear-cut indication for anticoagulation especially in view of GI bleed  Treat with low-dose of beta-blockers for atrial arrhythmias--continue oral Lopressor a.  Treat underlying hyperthyroidism  Do not use amiodarone        Electronically signed by INDRA Richey, 03/25/21, 3:57 PM EDT.

## 2021-03-25 NOTE — PROGRESS NOTES
"Subjective   Abi Rivera is a 60 y.o. female.   Seen for thyroid f/u.  Doing better.   Eating without problems.  Being discharged today.      Objective     /65   Pulse 81   Temp 98 °F (36.7 °C) (Oral)   Resp 18   Ht 149.9 cm (59\")   Wt 74.5 kg (164 lb 3.9 oz)   SpO2 96%   Breastfeeding No   BMI 33.17 kg/m²   K 3.3 this am. Repeated post Rx 4.6    ASSESSMENT    Patient is Improving    PLAN    Ok to discharge home from endocrinology standpoint.  Will have pt come to [place] in 2 weeks to repeat TFT's & adjust methimazole dose if needed.  I told her to resume 20 mg of methimazole in am & 10 mg in pm post discharge.  Has f/u appt with Dr. Mack on 5/27 @ 3p.         Max Villegas MD  3/25/2021  15:54 EDT    "

## 2021-03-25 NOTE — PROGRESS NOTES
LOS: 6 days   Patient Care Team:  Provider, No Known as PCP - General    Reason for follow-up: Postop    Subjective   Patient seen and examined.  Had a bowel movement today no nausea or vomiting with clear liquid diet.  Pain controlled.  Ambulatory.    Objective   Incisions clean dry and intact without infection.  LIZBETH drains with serosanguineous output and was recorded in the computer is very minimal over the last couple of days.    Vital Signs  Vitals:    03/25/21 0943 03/25/21 1048 03/25/21 1109 03/25/21 1115   BP: 123/51 132/78     BP Location:       Patient Position:       Pulse: 56 57 54 53   Resp:  18 18 18   Temp:  97.6 °F (36.4 °C)     TempSrc:  Oral     SpO2:  97%  100%   Weight:       Height:             Results Review:       Lab Results (last 24 hours)     Procedure Component Value Units Date/Time    Interleukin-6 [[id number]]  (Abnormal) Collected: 03/21/21 1500    Specimen: Blood Updated: 03/25/21 0912     Interleukin 6 38.6 pg/mL      Comment: Based on the available clinical data, PCR-confirmed COVID-19 patients  with IL-6 concentrations >35.0 pg/mL at presentation are at risk for  mechanical ventilation during their hospitalization. IL-6 values  should be used in conjunction with clinical findings and the results  of other laboratory findings. IL-6 values alone are not indicative of  the need for endotracheal intubation or mechanical ventilation.       Narrative:      Test(s) 140922-Interleukin-6, Serum  has not been FDA cleared or approved. This test has been  authorized by FDA under an EUA for use by authorized laboratories.  This test has been authorized only to assist in identifying  severe inflammatory response, when used as an aid in determining  the risk of intubation with mechanical ventilation in confirmed  COVID-19 patients. This test is only authorized for the  duration of the declaration that circumstances exist justifying the  authorization of emergency use of medical devices under  Section  564(b)(1) of the Act, 21 U.S.C. / 360bbb-3(b)(1), unless the  authorization is terminated or revoked sooner.  Performed at:  [address]  [id number]  : Louis Caraballo MD, Phone:  [phone]    Magnesium [[id number]]  (Normal) Collected: 03/25/21 0421    Specimen: Blood Updated: 03/25/21 0453     Magnesium 1.9 mg/dL     Basic Metabolic Panel [[id number]]  (Abnormal) Collected: 03/25/21 0421    Specimen: Blood Updated: 03/25/21 0448     Glucose 89 mg/dL      BUN 11 mg/dL      Creatinine 0.70 mg/dL      Sodium 136 mmol/L      Potassium 3.3 mmol/L      Chloride 106 mmol/L      CO2 23.0 mmol/L      Calcium 8.5 mg/dL      eGFR Non African Amer 86 mL/min/1.73      BUN/Creatinine Ratio 15.7     Anion Gap 7.0 mmol/L     Narrative:      GFR Normal >60  Chronic Kidney Disease <60  Kidney Failure <15      CBC & Differential [[id number]]  (Abnormal) Collected: 03/25/21 0421    Specimen: Blood Updated: 03/25/21 0427    Narrative:      The following orders were created for panel order CBC & Differential.  Procedure                               Abnormality         Status                     ---------                               -----------         ------                     CBC Auto Differential[[id number]]        Abnormal            Final result                 Please view results for these tests on the individual orders.    CBC Auto Differential [[id number]]  (Abnormal) Collected: 03/25/21 0421    Specimen: Blood Updated: 03/25/21 0427     WBC 9.90 10*3/mm3      RBC 3.56 10*6/mm3      Hemoglobin 10.1 g/dL      Hematocrit 31.2 %      MCV 87.7 fL      MCH 28.3 pg      MCHC 32.3 g/dL      RDW 15.3 %      RDW-SD 46.8 fl      MPV 7.0 fL      Platelets 239 10*3/mm3      Neutrophil % 73.8 %      Lymphocyte % 13.6 %      Monocyte % 10.0 %      Eosinophil % 2.3 %      Basophil % 0.3 %      Neutrophils, Absolute 7.30 10*3/mm3      Lymphocytes, Absolute 1.30 10*3/mm3       Monocytes, Absolute 1.00 10*3/mm3      Eosinophils, Absolute 0.20 10*3/mm3      Basophils, Absolute 0.00 10*3/mm3      nRBC 0.0 /100 WBC     Extra Tubes [[id number]] Collected: 03/24/21 1903    Specimen: Blood, Venous Line Updated: 03/24/21 2015    Narrative:      The following orders were created for panel order Extra Tubes.  Procedure                               Abnormality         Status                     ---------                               -----------         ------                     Gold Top - SST[[id number]]                                   Final result                 Please view results for these tests on the individual orders.    Gold Top - SST [[id number]] Collected: 03/24/21 1903    Specimen: Blood Updated: 03/24/21 2015     Extra Tube Hold for add-ons.     Comment: Auto resulted.       Potassium [[id number]]  (Normal) Collected: 03/24/21 1903    Specimen: Blood Updated: 03/24/21 1934     Potassium 3.7 mmol/L     Blood Culture - Blood, Arm, Right [[id number]] Collected: 03/19/21 1710    Specimen: Blood from Arm, Right Updated: 03/24/21 1715     Blood Culture No growth at 5 days    Blood Culture - Blood, Arm, Left [[id number]] Collected: 03/19/21 1707    Specimen: Blood from Arm, Left Updated: 03/24/21 1715     Blood Culture No growth at 5 days    Thyroid Stimulating Immunoglobulin [[id number]] Collected: 03/21/21 1500    Specimen: Blood Updated: 03/24/21 1709     Thyroid Stimulating Immunoglobulin 0.15 IU/L     Narrative:      Performed at:  [address] [place] [address]  [id number]  : Louis Caraballo MD, Phone:  [phone]           Imaging Results (Last 24 Hours)     ** No results found for the last 24 hours. **          Medication Review:     Assessment/Plan         Acute gastric ulcer with perforation (CMS/HCC)    Anxiety    Avascular necrosis of bone (CMS/HCC)    Hypertension    Atrial fibrillation (CMS/HCC)    Hyperthyroidism    Pulmonary hypertension  (CMS/HCC)    Sick sinus syndrome (CMS/HCC)    Pneumonia    Impression: Postop day 6 exploratory laparotomy with repair of perforated gastric ulcer    Plan:   Low residue diet  Okay to remove the drains  P.o. pain medication  Oral PPI at discharge    Follow-up with Dr. Velasco in 1 to 2 weeks for staple removal  Okay to shower  No lifting more than 10 to 15 pounds for 6 weeks        Wyatt Mon MD  03/25/21  11:27 EDT

## 2021-03-26 ENCOUNTER — READMISSION MANAGEMENT (OUTPATIENT)
Dept: CALL CENTER | Facility: HOSPITAL | Age: 61
End: 2021-03-26

## 2021-03-26 ENCOUNTER — TELEPHONE (OUTPATIENT)
Dept: ENDOCRINOLOGY | Facility: CLINIC | Age: 61
End: 2021-03-26

## 2021-03-26 DIAGNOSIS — E05.90 HYPERTHYROIDISM: Primary | ICD-10-CM

## 2021-03-26 NOTE — TELEPHONE ENCOUNTER
Pt cb. Lab appt scheduled 4/9/2021 at 1:00 PM. Pt had question about her potassium. Call tx to [facility].

## 2021-03-26 NOTE — OUTREACH NOTE
Prep Survey      Responses   [facility] patient discharged from?  [facility]   Is LACE score < 7 ?  No   Emergency Room discharge w/ pulse ox?  No   Eligibility  Readm Mgmt   Discharge diagnosis  Acute gastric ulcer with perforation, PNA, atrial fibrillation   Does the patient have one of the following disease processes/diagnoses(primary or secondary)?  COPD/Pneumonia   Does the patient have Home health ordered?  No   Is there a DME ordered?  No   Comments regarding appointments  Needs f/u scheduled   Medication alerts for this patient  Start Aspirin.  Meds per AVS.   Prep survey completed?  Yes          Etta Rosario RN

## 2021-03-26 NOTE — PROGRESS NOTES
Case Management Discharge Note        Selected Continued Care - Discharged on 3/25/2021 Admission date: 3/19/2021 - Discharge disposition: Home or Self Care     Final Discharge Disposition Code: 01 - home or self-care

## 2021-03-26 NOTE — TELEPHONE ENCOUNTER
"Pt states her discharge papers say to take 550 mg of potassium gluconate, and she only has 99 mg capsules at home, wanted to know if a prescription had been sent or if she is just supposed to take 5 of those. I advised patient that the 550 mg was a historical medication in her chart and that it was listed as \"resume at discharge\". I explained that the system sometimes doesn't have the correct dose if it is a patient-reported medication, especially if the bottle wasn't present when it was entered. Pt seemed to understand. I advised her to resume her usual dose and make sure she has labs rechecked on the 9th. Pt to call if she has any concerns that her potassium level is \"bottoming out\" like it did at the hospital so we can do labs sooner. Pt also advised to seek emergency medical treatment over the weekend if needed.  "

## 2021-03-26 NOTE — TELEPHONE ENCOUNTER
----- Message from Max Villegas MD sent at 3/26/2021 11:49 AM EDT -----  Dr. BOOTHE's pt. I saw her in hosp. Needs labs in 2 week. I placed the orders. Has appt [facility] on 5/27 already sruthi.

## 2021-03-30 RX ORDER — ASPIRIN 81 MG/1
81 TABLET ORAL DAILY
Qty: 30 TABLET | Refills: 3 | Status: SHIPPED | OUTPATIENT
Start: 2021-03-30 | End: 2022-01-19 | Stop reason: HOSPADM

## 2021-03-31 ENCOUNTER — READMISSION MANAGEMENT (OUTPATIENT)
Dept: CALL CENTER | Facility: HOSPITAL | Age: 61
End: 2021-03-31

## 2021-03-31 NOTE — OUTREACH NOTE
COPD/PN Week 1 Survey      Responses   [facility] patient discharged from?  [facility]   Does the patient have one of the following disease processes/diagnoses(primary or secondary)?  COPD/Pneumonia   Was the primary reason for admission:  Pneumonia [Gastric Ulcer with perforation]   Week 1 attempt successful?  No   Unsuccessful attempts  Attempt 1          Cassy Navarro RN

## 2021-04-02 ENCOUNTER — READMISSION MANAGEMENT (OUTPATIENT)
Dept: CALL CENTER | Facility: HOSPITAL | Age: 61
End: 2021-04-02

## 2021-04-02 NOTE — OUTREACH NOTE
COPD/PN Week 1 Survey      Responses   [facility] patient discharged from?  [facility]   Does the patient have one of the following disease processes/diagnoses(primary or secondary)?  COPD/Pneumonia   Was the primary reason for admission:  Pneumonia   Week 1 attempt successful?  No   Unsuccessful attempts  Attempt 2          Ava Prater RN

## 2021-04-07 ENCOUNTER — READMISSION MANAGEMENT (OUTPATIENT)
Dept: CALL CENTER | Facility: HOSPITAL | Age: 61
End: 2021-04-07

## 2021-04-07 NOTE — OUTREACH NOTE
COPD/PN Week 2 Survey      Responses   [facility] patient discharged from?  [facility]   Does the patient have one of the following disease processes/diagnoses(primary or secondary)?  COPD/Pneumonia   Was the primary reason for admission:  Pneumonia [Acute gastric ulcer with perforation ]   Week 2 attempt successful?  No   Unsuccessful attempts  Attempt 1          Cassy Navarro RN

## 2021-04-08 ENCOUNTER — TELEPHONE (OUTPATIENT)
Dept: SURGERY | Facility: CLINIC | Age: 61
End: 2021-04-08

## 2021-04-08 NOTE — TELEPHONE ENCOUNTER
Left another message for pt to call our office to resched. Her appt for today, Dr. Velasco is out of the office.

## 2021-04-09 ENCOUNTER — READMISSION MANAGEMENT (OUTPATIENT)
Dept: CALL CENTER | Facility: HOSPITAL | Age: 61
End: 2021-04-09

## 2021-04-13 ENCOUNTER — OFFICE VISIT (OUTPATIENT)
Dept: SURGERY | Facility: CLINIC | Age: 61
End: 2021-04-13

## 2021-04-13 VITALS
BODY MASS INDEX: 32.46 KG/M2 | HEIGHT: 59 IN | TEMPERATURE: 98.2 F | SYSTOLIC BLOOD PRESSURE: 137 MMHG | WEIGHT: 161 LBS | HEART RATE: 58 BPM | OXYGEN SATURATION: 100 % | DIASTOLIC BLOOD PRESSURE: 83 MMHG

## 2021-04-13 DIAGNOSIS — K25.1 ACUTE GASTRIC ULCER WITH PERFORATION (HCC): Primary | ICD-10-CM

## 2021-04-13 PROCEDURE — 99024 POSTOP FOLLOW-UP VISIT: CPT | Performed by: SURGERY

## 2021-04-13 NOTE — PROGRESS NOTES
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  He is doing well.  Staying on her medication.  Bowels are moving well she is eating well    OBJECTIVE:    Incision is healing appropriately without infection.  Staples are removed in the office today    ASSESSMENT:    Satisfactory postop progress    PLAN:    Recheck in the office in 1 month

## 2021-04-20 ENCOUNTER — READMISSION MANAGEMENT (OUTPATIENT)
Dept: CALL CENTER | Facility: HOSPITAL | Age: 61
End: 2021-04-20

## 2021-04-20 NOTE — OUTREACH NOTE
COPD/PN Week 3 Survey      Responses   [facility] patient discharged from?  [facility]   Does the patient have one of the following disease processes/diagnoses(primary or secondary)?  COPD/Pneumonia   Was the primary reason for admission:  Pneumonia   Week 3 attempt successful?  No   Call start time  1532   Unsuccessful attempts  Attempt 1   Discharge diagnosis  Acute gastric ulcer with perforation, PNA, atrial fibrillation          Diana Tellez RN

## 2021-04-22 ENCOUNTER — READMISSION MANAGEMENT (OUTPATIENT)
Dept: CALL CENTER | Facility: HOSPITAL | Age: 61
End: 2021-04-22

## 2021-04-22 NOTE — OUTREACH NOTE
COPD/PN Week 3 Survey      Responses   [facility] patient discharged from?  [facility]   Does the patient have one of the following disease processes/diagnoses(primary or secondary)?  COPD/Pneumonia   Was the primary reason for admission:  Pneumonia   Week 3 attempt successful?  No   Unsuccessful attempts  Attempt 2          Danita Sheikh RN

## 2021-05-26 ENCOUNTER — OFFICE VISIT (OUTPATIENT)
Dept: SURGERY | Facility: CLINIC | Age: 61
End: 2021-05-26

## 2021-05-26 VITALS
OXYGEN SATURATION: 100 % | WEIGHT: 161 LBS | HEART RATE: 55 BPM | TEMPERATURE: 97.3 F | HEIGHT: 59 IN | DIASTOLIC BLOOD PRESSURE: 94 MMHG | BODY MASS INDEX: 32.46 KG/M2 | SYSTOLIC BLOOD PRESSURE: 164 MMHG

## 2021-05-26 DIAGNOSIS — K25.1 ACUTE GASTRIC ULCER WITH PERFORATION (HCC): Primary | ICD-10-CM

## 2021-05-26 PROCEDURE — 99024 POSTOP FOLLOW-UP VISIT: CPT | Performed by: SURGERY
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
## 2021-05-27 ENCOUNTER — OFFICE VISIT (OUTPATIENT)
Dept: ENDOCRINOLOGY | Facility: CLINIC | Age: 61
End: 2021-05-27

## 2021-05-27 VITALS
WEIGHT: 160 LBS | HEART RATE: 60 BPM | DIASTOLIC BLOOD PRESSURE: 74 MMHG | HEIGHT: 59 IN | BODY MASS INDEX: 32.25 KG/M2 | OXYGEN SATURATION: 93 % | SYSTOLIC BLOOD PRESSURE: 135 MMHG | TEMPERATURE: 97.3 F

## 2021-05-27 DIAGNOSIS — E05.90 HYPERTHYROIDISM: Primary | Chronic | ICD-10-CM

## 2021-05-27 DIAGNOSIS — M81.0 AGE-RELATED OSTEOPOROSIS WITHOUT CURRENT PATHOLOGICAL FRACTURE: ICD-10-CM

## 2021-05-27 PROCEDURE — 99214 OFFICE O/P EST MOD 30 MIN: CPT | Performed by: INTERNAL MEDICINE

## 2021-05-27 NOTE — PATIENT INSTRUCTIONS
Please stop smoking  Continue methimazole 10 mg tablets, 2 in the morning 1 in the afternoon or evening  Start Tymlos when you can get it from the pharmacy  Check labs in the next few days  Please call if you develop any abdominal pain, nausea, vomiting or frequent infections.

## 2021-05-27 NOTE — PROGRESS NOTES
Endocrine Progress Note Outpatient     Patient Care Team:  Provider, No Known as PCP - General    Chief Complaint: Follow-up hyperthyroidism          HPI: 60-year-old female with history of hypothyroidism and osteoporosis is here for follow-up.  She was hospitalized in March 2021 with perforated gastric ulcer followed by some cardiac events but she is doing fairly well at this time.    For hyperthyroidism: She tells me that she is on methimazole 10 mg tablets, she takes 2 in the morning and 1 in the evening.  She denies any issues with shaking sweating palpitations at this time.    Osteoporosis: She was prescribed Tymlos but there was some issues with insurance with which looks like has been resolved and she will be starting it.  She is on calcium and vitamin D supplementation.  No fracture since last seen.    Past Medical History:   Diagnosis Date   • Adenomatous polyp of colon 6/20/2016   • Anxiety 6/16/2016   • Arthritis of foot 1/10/2019   • Atrial fibrillation (CMS/HCC) 6/16/2016   • Avascular necrosis of bone (CMS/HCC) 1/10/2019   • Chronic pain 1/22/2019   • Closed displaced fracture 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 9/19/2019    Added automatically from request for surgery [id number]   • Hypertension 6/16/2016   • Hyperthyroidism 11/2/2016   • Mood disorder (CMS/HCC) 6/16/2016   • Osteoporosis without current pathological fracture 6/4/2020   • Pulmonary hypertension (CMS/HCC) 6/16/2016   • Sick sinus syndrome (CMS/HCC) 1/25/2017   • Tricuspid valve insufficiency 6/16/2016       Social History     Socioeconomic History   • Marital status:      Spouse name: Not on file   • Number of children: Not on file   • Years of education: Not on file   • Highest education level: Not on file   Tobacco Use   • Smoking status: Current Every Day Smoker     Packs/day: 0.50     Types: Cigarettes   • Smokeless tobacco: Never Used   Vaping Use   • Vaping Use: Never used   Substance and Sexual Activity   •  Alcohol use: Yes     Comment: jayson   • Drug use: No   • Sexual activity: Defer       Family History   Problem Relation Age of Onset   • Hypertension Mother    • Diabetes Father    • Heart disease Father    • Hypertension Father        No Known Allergies    ROS:   Constitutional:  Admit fatigue, tiredness.    Eyes:  Denies change in visual acuity   HENT:  Denies nasal congestion or sore throat   Respiratory: denies cough, shortness of breath.   Cardiovascular:  denies chest pain, edema   GI:  Denies abdominal pain, nausea, vomiting.   Musculoskeletal:  Denies back pain or joint pain   Integument:  Denies dry skin and rash   Neurologic:  Denies headache, focal weakness or sensory changes   Endocrine:  Denies polyuria or polydipsia   Psychiatric:  Denies depression or anxiety      Vitals:    05/27/21 1525   BP: 135/74   Pulse: 60   Temp: 97.3 °F (36.3 °C)   SpO2: 93%     Body mass index is 32.32 kg/m².     Physical Exam:  GEN: NAD, conversant  EYES: EOMI, PERRL, no conjunctival erythema  NECK: no thyromegaly, full ROM   CV: RRR, no murmurs/rubs/gallops, no peripheral edema  LUNG: CTAB, no wheezes/rales/ronchi  SKIN: no rashes, no acanthosis  MSK: no deformities, full ROM of all extremities  NEURO: no tremors, DTR normal  PSYCH: AOX3, appropriate mood, affect normal      Results Review:     I reviewed the patient's new clinical results.    No results found for: HGBA1C   Lab Results   Component Value Date    GLUCOSE 89 03/25/2021    BUN 11 03/25/2021    CREATININE 0.70 03/25/2021    EGFRIFNONA 86 03/25/2021    BCR 15.7 03/25/2021    K 4.6 03/25/2021    CO2 23.0 03/25/2021    CALCIUM 8.5 (L) 03/25/2021    ALBUMIN 2.80 (L) 03/22/2021    LABIL2 1.2 02/20/2017    AST 13 03/22/2021    ALT 15 03/22/2021     Lab Results   Component Value Date    TSH <0.005 (L) 03/21/2021    FREET4 3.11 (H) 03/21/2021    THYROIDAB <9 03/21/2021     Thyroid Peroxidase Antibody (03/21/2021 15:00)   Thyroid Stimulating Immunoglobulin (03/21/2021  15:00)   TSH (03/21/2021 15:00)   T4, Free (03/21/2021 15:00)   T3, Free (03/21/2021 15:00)       Medication Review: Reviewed.       Current Outpatient Medications:   •  Abaloparatide (Tymlos) 3120 MCG/1.56ML solution pen-injector, Inject 0.04 mL under the skin into the appropriate area as directed Daily. (Patient taking differently: Inject 80 mcg under the skin into the appropriate area as directed Daily. Not started 3/19/21), Disp: 1.56 mL, Rfl: 6  •  acetaminophen (TYLENOL) 500 MG tablet, Take 500 mg by mouth Every 6 (Six) Hours As Needed for Mild Pain ., Disp: , Rfl:   •  aspirin (aspirin) 81 MG EC tablet, Take 1 tablet by mouth Daily., Disp: 30 tablet, Rfl: 3  •  calcium carb-cholecalciferol (CALCIUM 600+D) 600-800 MG-UNIT tablet, Take 1 tablet by mouth Daily., Disp: , Rfl:   •  Glucosamine-Chondroit-Vit C-Mn (GLUCOSAMINE 1500 COMPLEX PO), Take 1 tablet by mouth Daily., Disp: , Rfl:   •  MAGNESIUM OXIDE PO, Take 250 mg by mouth Daily., Disp: , Rfl:   •  methIMAzole (TAPAZOLE) 10 MG tablet, Take 1 tablet by mouth 2 (two) times a day. Take 2 tablets in the morning, take 1 tablet in the evening. (Patient taking differently: Take 10 mg by mouth 2 (two) times a day. Take 2 tablets in the am and 1 tablet in the pm), Disp: 90 tablet, Rfl: 6  •  metoprolol tartrate (LOPRESSOR) 25 MG tablet, Take 1 tablet by mouth Every 12 (Twelve) Hours., Disp: 30 tablet, Rfl: 0  •  Multiple Vitamin (MULTI-VITAMIN) tablet, Take 1 tablet by mouth Daily., Disp: , Rfl:   •  Omega-3 Fatty Acids (FISH OIL) 1200 MG capsule capsule, 1 tablet daily, Disp: , Rfl:   •  pantoprazole (PROTONIX) 40 MG EC tablet, Take 1 tablet by mouth Daily., Disp: 30 tablet, Rfl: 0  •  Potassium Gluconate 550 MG tablet, Take 1 tablet by mouth Daily., Disp: , Rfl:       Assessment/Plan   1.  Hyperthyroidism: Etiology unknown, she is on methimazole total of 30 mg once a day.  I will check TSH, free T4, free T3 and as well as CBC and CMP and then make further  recommendations.    2.  Osteoporosis: She is going to start Tymlos.  Continue calcium and vitamin D supplementation.            Karen Mack MD FACE.

## 2021-06-02 ENCOUNTER — LAB (OUTPATIENT)
Dept: LAB | Facility: HOSPITAL | Age: 61
End: 2021-06-02

## 2021-06-02 DIAGNOSIS — E05.90 HYPERTHYROIDISM: Chronic | ICD-10-CM

## 2021-06-02 LAB
ALBUMIN SERPL-MCNC: 4.5 G/DL (ref 3.5–5.2)
ALBUMIN/GLOB SERPL: 1.5 G/DL
ALP SERPL-CCNC: 89 U/L (ref 39–117)
ALT SERPL W P-5'-P-CCNC: 8 U/L (ref 1–33)
ANION GAP SERPL CALCULATED.3IONS-SCNC: 7.9 MMOL/L (ref 5–15)
AST SERPL-CCNC: 20 U/L (ref 1–32)
BASOPHILS # BLD AUTO: 0.08 10*3/MM3 (ref 0–0.2)
BASOPHILS NFR BLD AUTO: 0.9 % (ref 0–1.5)
BILIRUB SERPL-MCNC: 0.3 MG/DL (ref 0–1.2)
BUN SERPL-MCNC: 14 MG/DL (ref 6–20)
BUN/CREAT SERPL: 13 (ref 7–25)
CALCIUM SPEC-SCNC: 10.1 MG/DL (ref 8.6–10.5)
CHLORIDE SERPL-SCNC: 99 MMOL/L (ref 98–107)
CO2 SERPL-SCNC: 27.1 MMOL/L (ref 22–29)
CREAT SERPL-MCNC: 1.08 MG/DL (ref 0.57–1)
DEPRECATED RDW RBC AUTO: 49.3 FL (ref 37–54)
EOSINOPHIL # BLD AUTO: 0.12 10*3/MM3 (ref 0–0.4)
EOSINOPHIL NFR BLD AUTO: 1.3 % (ref 0.3–6.2)
ERYTHROCYTE [DISTWIDTH] IN BLOOD BY AUTOMATED COUNT: 16.1 % (ref 12.3–15.4)
GFR SERPL CREATININE-BSD FRML MDRD: 52 ML/MIN/1.73
GLOBULIN UR ELPH-MCNC: 3.1 GM/DL
GLUCOSE SERPL-MCNC: 98 MG/DL (ref 65–99)
HCT VFR BLD AUTO: 45.1 % (ref 34–46.6)
HGB BLD-MCNC: 14.4 G/DL (ref 12–15.9)
IMM GRANULOCYTES # BLD AUTO: 0.03 10*3/MM3 (ref 0–0.05)
IMM GRANULOCYTES NFR BLD AUTO: 0.3 % (ref 0–0.5)
LYMPHOCYTES # BLD AUTO: 3.05 10*3/MM3 (ref 0.7–3.1)
LYMPHOCYTES NFR BLD AUTO: 32.9 % (ref 19.6–45.3)
MCH RBC QN AUTO: 27.2 PG (ref 26.6–33)
MCHC RBC AUTO-ENTMCNC: 31.9 G/DL (ref 31.5–35.7)
MCV RBC AUTO: 85.3 FL (ref 79–97)
MONOCYTES # BLD AUTO: 0.63 10*3/MM3 (ref 0.1–0.9)
MONOCYTES NFR BLD AUTO: 6.8 % (ref 5–12)
NEUTROPHILS NFR BLD AUTO: 5.35 10*3/MM3 (ref 1.7–7)
NEUTROPHILS NFR BLD AUTO: 57.8 % (ref 42.7–76)
NRBC BLD AUTO-RTO: 0 /100 WBC (ref 0–0.2)
PLATELET # BLD AUTO: 317 10*3/MM3 (ref 140–450)
PMV BLD AUTO: 10 FL (ref 6–12)
POTASSIUM SERPL-SCNC: 4.5 MMOL/L (ref 3.5–5.2)
PROT SERPL-MCNC: 7.6 G/DL (ref 6–8.5)
RBC # BLD AUTO: 5.29 10*6/MM3 (ref 3.77–5.28)
SODIUM SERPL-SCNC: 134 MMOL/L (ref 136–145)
T3FREE SERPL-MCNC: 0.94 PG/ML (ref 2–4.4)
T4 FREE SERPL-MCNC: 0.53 NG/DL (ref 0.93–1.7)
TSH SERPL DL<=0.05 MIU/L-ACNC: 3.42 UIU/ML (ref 0.27–4.2)
WBC # BLD AUTO: 9.26 10*3/MM3 (ref 3.4–10.8)

## 2021-06-02 PROCEDURE — 84481 FREE ASSAY (FT-3): CPT

## 2021-06-02 PROCEDURE — 85025 COMPLETE CBC W/AUTO DIFF WBC: CPT

## 2021-06-02 PROCEDURE — 36415 COLL VENOUS BLD VENIPUNCTURE: CPT

## 2021-06-02 PROCEDURE — 80053 COMPREHEN METABOLIC PANEL: CPT

## 2021-06-02 PROCEDURE — 84439 ASSAY OF FREE THYROXINE: CPT

## 2021-06-02 PROCEDURE — 84443 ASSAY THYROID STIM HORMONE: CPT

## 2021-06-25 ENCOUNTER — OFFICE VISIT (OUTPATIENT)
Dept: FAMILY MEDICINE CLINIC | Facility: CLINIC | Age: 61
End: 2021-06-25

## 2021-06-25 VITALS
WEIGHT: 160.4 LBS | OXYGEN SATURATION: 96 % | DIASTOLIC BLOOD PRESSURE: 80 MMHG | TEMPERATURE: 98.6 F | SYSTOLIC BLOOD PRESSURE: 160 MMHG | BODY MASS INDEX: 32.34 KG/M2 | HEART RATE: 62 BPM | HEIGHT: 59 IN | RESPIRATION RATE: 16 BRPM

## 2021-06-25 DIAGNOSIS — K43.2 INCISIONAL HERNIA, WITHOUT OBSTRUCTION OR GANGRENE: ICD-10-CM

## 2021-06-25 DIAGNOSIS — N60.02 CYST OF LEFT BREAST: Primary | ICD-10-CM

## 2021-06-25 DIAGNOSIS — R14.0 ABDOMINAL BLOATING: ICD-10-CM

## 2021-06-25 DIAGNOSIS — I10 ESSENTIAL HYPERTENSION: Chronic | ICD-10-CM

## 2021-06-25 DIAGNOSIS — D12.6 ADENOMATOUS POLYP OF COLON, UNSPECIFIED PART OF COLON: ICD-10-CM

## 2021-06-25 PROCEDURE — 99204 OFFICE O/P NEW MOD 45 MIN: CPT | Performed by: FAMILY MEDICINE

## 2021-06-25 RX ORDER — OMEPRAZOLE 20 MG/1
20 CAPSULE, DELAYED RELEASE ORAL 2 TIMES DAILY
COMMUNITY

## 2021-07-01 ENCOUNTER — OFFICE VISIT (OUTPATIENT)
Dept: CARDIOLOGY | Facility: CLINIC | Age: 61
End: 2021-07-01

## 2021-07-01 VITALS
SYSTOLIC BLOOD PRESSURE: 126 MMHG | HEART RATE: 66 BPM | RESPIRATION RATE: 18 BRPM | DIASTOLIC BLOOD PRESSURE: 84 MMHG | OXYGEN SATURATION: 98 % | BODY MASS INDEX: 31.65 KG/M2 | WEIGHT: 157 LBS | HEIGHT: 59 IN

## 2021-07-01 DIAGNOSIS — I48.0 PAROXYSMAL ATRIAL FIBRILLATION (HCC): Primary | ICD-10-CM

## 2021-07-01 DIAGNOSIS — I36.1 NONRHEUMATIC TRICUSPID VALVE REGURGITATION: ICD-10-CM

## 2021-07-01 DIAGNOSIS — I49.5 SICK SINUS SYNDROME (HCC): ICD-10-CM

## 2021-07-01 DIAGNOSIS — I10 ESSENTIAL HYPERTENSION: Chronic | ICD-10-CM

## 2021-07-01 PROCEDURE — 93000 ELECTROCARDIOGRAM COMPLETE: CPT | Performed by: INTERNAL MEDICINE

## 2021-07-01 PROCEDURE — 99214 OFFICE O/P EST MOD 30 MIN: CPT | Performed by: INTERNAL MEDICINE

## 2021-07-01 NOTE — PROGRESS NOTES
Cardiology Office Visit      Encounter Date:  07/01/2021    Patient ID:   Abi Rivera is a 60 y.o. female.    Reason For Followup:    History of sick sinus syndrome  Paroxysmal atrial fibrillation  Hyperthyroidism    Brief Clinical History:  Dear Shala Moon, INDRA    I had the pleasure of seeing Abi Rivera today. As you are well aware, this is a 60 y.o. female with a past medical history that is significant for history of hypothyroidism and hyperthyroidism also history of atrial fibrillation in the setting of hyperthyroidism and also sick sinus syndrome symptomatic was advised to have a permanent pacemaker placed in the past presented here for follow-up in the office      Interval History:  Patient was hospitalized diagnosed with perforated gastric ulcer was seen by cardiology for atrial fibrillation  Still having some intermittent atrial fibrillation but the hypothyroidism is better controlled    Assessment & Plan    Impressions:  A-fib,  now Sick sinus syndrome with tachycardia /bradycardia  Hypothyroidism/hyperthyroidism   Recurrent syncope likely secondary to bradycardia and pauses based on prior history but no prove  Advised patient hospitalization but she refused  Patient is advised to get some labs recently by the podiatry service and we will follow-up on those  Prior stress test with no inducible ischemia  Prior echocardiogram with normal LV systolic function  Severe uncontrolled hypothyroidism    Recommendations:  Patient wishes to be conservatively managed from cardiac standpoint at this time  Patient is not on anticoagulation therapy secondary to recent GI bleed  Plan aspirin 81 mg p.o. once a day  Currently Sinus rhythm  Hyper thyroidism is better controlled at least based on the recent labs  Continue current dose of beta-blocker  Commend extended Holter monitor to further evaluate the atrial fibrillation burden  Risk benefits and alternatives reviewed and discussed the patient  Follow-up  "in office in 2 months    Objective:    Vitals:  Vitals:    07/01/21 1451   BP: 126/84   BP Location: Left arm   Pulse: 66   Resp: 18   SpO2: 98%   Weight: 71.2 kg (157 lb)   Height: 149.9 cm (59\")       Physical Exam:    General: Alert, cooperative, no distress, appears stated age  Head:  Normocephalic, atraumatic, mucous membranes moist  Eyes:  Conjunctiva/corneas clear, EOM's intact     Neck:  Supple,  no adenopathy;      Lungs: Clear to auscultation bilaterally, no wheezes rhonchi rales are noted  Chest wall: No tenderness  Heart::  Regular rate and rhythm, S1 and S2 normal, no murmur, rub or gallop  Abdomen: Soft, non-tender, nondistended bowel sounds active  Extremities: No cyanosis, clubbing, or edema  Pulses: 2+ and symmetric all extremities  Skin:  No rashes or lesions  Neuro/psych: A&O x3. CN II through XII are grossly intact with appropriate affect      Allergies:  No Known Allergies    Medication Review:     Current Outpatient Medications:   •  acetaminophen (TYLENOL) 500 MG tablet, Take 500 mg by mouth Every 6 (Six) Hours As Needed for Mild Pain ., Disp: , Rfl:   •  aspirin (aspirin) 81 MG EC tablet, Take 1 tablet by mouth Daily., Disp: 30 tablet, Rfl: 3  •  calcium carb-cholecalciferol (CALCIUM 600+D) 600-800 MG-UNIT tablet, Take 1 tablet by mouth Daily., Disp: , Rfl:   •  Glucosamine-Chondroit-Vit C-Mn (GLUCOSAMINE 1500 COMPLEX PO), Take 1 tablet by mouth Daily., Disp: , Rfl:   •  MAGNESIUM OXIDE PO, Take 250 mg by mouth Daily., Disp: , Rfl:   •  methIMAzole (TAPAZOLE) 10 MG tablet, Take 1 tablet by mouth 2 (two) times a day. Take 2 tablets in the morning, take 1 tablet in the evening. (Patient taking differently: Take 10 mg by mouth 2 (two) times a day.), Disp: 90 tablet, Rfl: 6  •  metoprolol tartrate (LOPRESSOR) 25 MG tablet, Take 1 tablet by mouth 2 (Two) Times a Day., Disp: 60 tablet, Rfl: 0  •  Multiple Vitamin (MULTI-VITAMIN) tablet, Take 1 tablet by mouth Daily., Disp: , Rfl:   •  Omega-3 Fatty " Acids (FISH OIL) 1200 MG capsule capsule, 1 tablet daily, Disp: , Rfl:   •  omeprazole (priLOSEC) 20 MG capsule, Take 20 mg by mouth Daily., Disp: , Rfl:   •  pantoprazole (PROTONIX) 40 MG EC tablet, Take 1 tablet by mouth Daily., Disp: 30 tablet, Rfl: 0  •  Potassium Gluconate 550 MG tablet, Take 1 tablet by mouth Daily., Disp: , Rfl:   •  Abaloparatide (Tymlos) 3120 MCG/1.56ML solution pen-injector, Inject 0.04 mL under the skin into the appropriate area as directed Daily. (Patient taking differently: Inject 80 mcg under the skin into the appropriate area as directed Daily. Not started 3/19/21), Disp: 1.56 mL, Rfl: 6    Family History:  Family History   Problem Relation Age of Onset   • Hypertension Mother    • Thyroid disease Mother    • Diabetes Father    • Heart disease Father    • Hypertension Father        Past Medical History:  Past Medical History:   Diagnosis Date   • Adenomatous polyp of colon 6/20/2016   • Anemia    • Anxiety 6/16/2016   • Arthritis of foot 1/10/2019   • Atrial fibrillation (CMS/HCC) 6/16/2016   • Avascular necrosis of bone (CMS/HCC) 1/10/2019   • Chronic pain 1/22/2019   • Closed displaced fracture 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 9/19/2019    Added automatically from request for surgery [id number]   • Hypertension 6/16/2016   • Hyperthyroidism 11/2/2016   • Mood disorder (CMS/HCC) 6/16/2016   • Osteoporosis without current pathological fracture 6/4/2020   • Pulmonary hypertension (CMS/HCC) 6/16/2016   • Sick sinus syndrome (CMS/HCC) 1/25/2017   • Tricuspid valve insufficiency 6/16/2016       Past surgical History:  Past Surgical History:   Procedure Laterality Date   • ANKLE OPEN REDUCTION INTERNAL FIXATION     • APPENDECTOMY     • BILATERAL BREAST REDUCTION     • EXPLORATORY LAPAROTOMY N/A 3/19/2021    Procedure: LAPAROTOMY EXPLORATORY with repair of perforated gastric ulcer, oversew of perforated gastric ulcer, placement of kristie patch, biopsy of stomach;  Surgeon:  Luisito Velasco DO;  Location: [facility] MAIN OR;  Service: General;  Laterality: N/A;   • HIP SURGERY      Left total hip   • ORIF FOOT FRACTURE Right 9/30/2019    Procedure: OPEN REDUCTION INTERNAL FIXATION OF FIFTH METATARSAL FRACTURE, LOOSE BODY EXCISION/REMOVAL FROM THE TALONAVICULAR JOINT OF THE RIGHT FOOT;  Surgeon: JOSE Fry DPM;  Location: [facility] [place];  Service: Podiatry       Social History:  Social History     Socioeconomic History   • Marital status:      Spouse name: Not on file   • Number of children: Not on file   • Years of education: Not on file   • Highest education level: Not on file   Tobacco Use   • Smoking status: Current Every Day Smoker     Packs/day: 0.50     Years: 34.00     Pack years: 17.00     Types: Cigarettes     Start date: 1987   • Smokeless tobacco: Never Used   Vaping Use   • Vaping Use: Former   • Substances: Nicotine, Flavoring   • Devices: Refillable tank   Substance and Sexual Activity   • Alcohol use: Yes     Comment: rarely/socially    • Drug use: No   • Sexual activity: Defer       Review of Systems:  The following systems were reviewed as they relate to the cardiovascular system: Constitutional, Eyes, ENT, Cardiovascular, Respiratory, Gastrointestinal, Integumentary, Neurological, Psychiatric, Hematologic, Endocrine, Musculoskeletal, and Genitourinary. The pertinent cardiovascular findings are reported above with all other cardiovascular points within those systems being negative.    Diagnostic Study Review:     Current Electrocardiogram:    ECG 12 Lead    Date/Time: 7/1/2021 3:15 PM  Performed by: Laine Pearl MD  Authorized by: Laine Pearl MD   Comparison: compared with previous ECG   Similar to previous ECG  Rhythm: sinus rhythm  Rate: normal  BPM: 66  Conduction: conduction normal  ST Segments: ST segments normal  T Waves: T waves normal  QRS axis: normal    Clinical impression: normal ECG              NOTE: The following  portions of the patient's history were reviewed and updated this visit as appropriate: allergies, current medications, past family history, past medical history, past social history, past surgical history and problem list.

## 2021-07-07 ENCOUNTER — TELEPHONE (OUTPATIENT)
Dept: ENDOCRINOLOGY | Facility: CLINIC | Age: 61
End: 2021-07-07

## 2021-07-07 ENCOUNTER — HOSPITAL ENCOUNTER (OUTPATIENT)
Dept: CARDIOLOGY | Facility: HOSPITAL | Age: 61
Discharge: HOME | End: 2021-07-07

## 2021-07-07 DIAGNOSIS — I48.0 PAROXYSMAL ATRIAL FIBRILLATION (HCC): ICD-10-CM

## 2021-07-07 NOTE — TELEPHONE ENCOUNTER
PA required on Tymlos    PA submitted via [email or web address]    PA approved Approved today  PA Case: [id number], Status: Approved, Coverage Starts on: 7/7/2021 12:00:00 AM, Coverage Ends on: 7/7/2023 12:00:00 AM. Questions? Contact [phone].    Pharmacy notified.

## 2021-07-19 ENCOUNTER — APPOINTMENT (OUTPATIENT)
Dept: OTHER | Facility: HOSPITAL | Age: 61
End: 2021-07-19

## 2021-07-19 ENCOUNTER — HOSPITAL ENCOUNTER (OUTPATIENT)
Dept: MAMMOGRAPHY | Facility: HOSPITAL | Age: 61
Discharge: HOME | End: 2021-07-19

## 2021-07-19 ENCOUNTER — HOSPITAL ENCOUNTER (OUTPATIENT)
Dept: ULTRASOUND IMAGING | Facility: HOSPITAL | Age: 61
Discharge: HOME | End: 2021-07-19

## 2021-07-19 DIAGNOSIS — Z09 FOLLOW UP: ICD-10-CM

## 2021-07-19 DIAGNOSIS — Z00.6 EXAMINATION FOR NORMAL COMPARISON OR CONTROL IN CLINICAL RESEARCH: ICD-10-CM

## 2021-07-19 DIAGNOSIS — N60.02 CYST OF LEFT BREAST: ICD-10-CM

## 2021-07-19 PROCEDURE — G0279 TOMOSYNTHESIS, MAMMO: HCPCS

## 2021-07-19 PROCEDURE — 76642 ULTRASOUND BREAST LIMITED: CPT

## 2021-07-19 PROCEDURE — 77066 DX MAMMO INCL CAD BI: CPT

## 2021-07-26 ENCOUNTER — HOSPITAL ENCOUNTER (OUTPATIENT)
Dept: ULTRASOUND IMAGING | Facility: HOSPITAL | Age: 61
Discharge: HOME | End: 2021-07-26

## 2021-07-26 ENCOUNTER — HOSPITAL ENCOUNTER (OUTPATIENT)
Dept: MAMMOGRAPHY | Facility: HOSPITAL | Age: 61
Discharge: HOME | End: 2021-07-26

## 2021-07-26 DIAGNOSIS — R92.8 ABNORMALITY OF LEFT BREAST ON SCREENING MAMMOGRAM: ICD-10-CM

## 2021-07-26 PROCEDURE — 76942 ECHO GUIDE FOR BIOPSY: CPT

## 2021-07-29 ENCOUNTER — TELEPHONE (OUTPATIENT)
Dept: CARDIOLOGY | Facility: CLINIC | Age: 61
End: 2021-07-29

## 2021-07-29 ENCOUNTER — DOCUMENTATION (OUTPATIENT)
Dept: CARDIOLOGY | Facility: CLINIC | Age: 61
End: 2021-07-29

## 2021-07-29 NOTE — PROGRESS NOTES
Discussed with patient about the bradycardia and tachycardia episodes and need for further evaluation and treatment options  Advised patient to follow-up with the EP as soon as possible  Advised the patient to come to the emergency room for any symptomatic episodes  Need for close monitoring follow-up and compliance with medical therapy reviewed and discussed with patient

## 2021-07-29 NOTE — TELEPHONE ENCOUNTER
Spoke with pt at Dr Pearl  And Dr Sheth's request. Pt will stop the Metoprolol and she would like to think about the ablation. She will keep scheduled appt and call the office with any changes.

## 2021-08-02 ENCOUNTER — TELEPHONE (OUTPATIENT)
Dept: GASTROENTEROLOGY | Facility: CLINIC | Age: 61
End: 2021-08-02

## 2021-08-02 NOTE — TELEPHONE ENCOUNTER
Received Referral. Attempted to call PT to schedule office visit, 1st attempt, no answer, left VM to contact office.

## 2021-08-06 ENCOUNTER — OFFICE VISIT (OUTPATIENT)
Dept: CARDIOLOGY | Facility: CLINIC | Age: 61
End: 2021-08-06

## 2021-08-06 VITALS
DIASTOLIC BLOOD PRESSURE: 91 MMHG | BODY MASS INDEX: 32.11 KG/M2 | WEIGHT: 159 LBS | OXYGEN SATURATION: 99 % | SYSTOLIC BLOOD PRESSURE: 147 MMHG | HEART RATE: 55 BPM

## 2021-08-06 DIAGNOSIS — E05.90 HYPERTHYROIDISM: ICD-10-CM

## 2021-08-06 DIAGNOSIS — I49.5 SICK SINUS SYNDROME (HCC): ICD-10-CM

## 2021-08-06 DIAGNOSIS — I48.0 PAROXYSMAL ATRIAL FIBRILLATION (HCC): Primary | ICD-10-CM

## 2021-08-06 DIAGNOSIS — R00.2 PALPITATIONS: ICD-10-CM

## 2021-08-06 PROCEDURE — 93000 ELECTROCARDIOGRAM COMPLETE: CPT | Performed by: INTERNAL MEDICINE

## 2021-08-06 PROCEDURE — 99214 OFFICE O/P EST MOD 30 MIN: CPT | Performed by: INTERNAL MEDICINE

## 2021-08-06 RX ORDER — FERROUS SULFATE 325(65) MG
325 TABLET ORAL
COMMUNITY
End: 2023-03-16

## 2021-08-06 RX ORDER — LOSARTAN POTASSIUM 50 MG/1
50 TABLET ORAL DAILY
Qty: 30 TABLET | Refills: 2 | Status: SHIPPED | OUTPATIENT
Start: 2021-08-06 | End: 2021-11-08

## 2021-08-06 NOTE — PROGRESS NOTES
CC--syncope with sick sinus syndrome    Sub--60-year-old very pleasant patient had multiple episodes of spontaneous syncope  and she was seen in our cardiology office and an event recorder was placed with multiple episodes of rapid atrial fibrillation associated with long sinus pauses--during that evaluation patient was found to be having hyper thyroidism and was treated with methimazole.  Since then patient had an admission to the hospital with bleeding gastric ulcer with perforation needing surgical evaluation--post surgery had atrial fibrillation which was treated with beta-blockers.  Recent evaluation shows recurrent atrial fibrillation with pauses and beta-blockers have been stopped and patient sent back for evaluation.  She has a prior history of hyperthyroidism which was treated with radioiodine and currently she is euthyroid according to the patient--patient is on methimazole  She denies any TIA or stroke and denies any diabetes, hepatic or renal problems  She had a prior echocardiogram for showing normal LV ejection fraction  She is actively employed as a   She has noticed increased blood pressure since beta-blockers have been stopped        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 9/19/2019    Added automatically from request for surgery [id number]   • Hypertension 6/16/2016   • Mood disorder (CMS/HCC) 6/16/2016   • Pulmonary hypertension (CMS/HCC) 6/16/2016   • Sick sinus syndrome (CMS/[facility]) 1/25/2017   • Tricuspid valve insufficiency 6/16/2016     Past Surgical History:   Procedure Laterality Date   • ANKLE OPEN REDUCTION INTERNAL FIXATION     • APPENDECTOMY     • HIP SURGERY      Left total hip   • ORIF FOOT FRACTURE Right  9/30/2019    Procedure: OPEN REDUCTION INTERNAL FIXATION OF FIFTH METATARSAL FRACTURE, LOOSE BODY EXCISION/REMOVAL FROM THE TALONAVICULAR JOINT OF THE RIGHT FOOT;  Surgeon: JOSE Fry DPM;  Location: [facility] MAIN OR;  Service: Podiatry     Family History   Problem Relation Age of Onset   • Hypertension Mother    • Diabetes Father    • Heart disease Father    • Hypertension Father      Social History     Tobacco Use   • Smoking status: Current Every Day Smoker     Packs/day: 0.50     Types: Cigarettes   • Smokeless tobacco: Never Used   Substance Use Topics   • Alcohol use: Yes     Comment: jayson   • Drug use: No         Review of Systems  General: Negative  for fatigue and tiredness  Eyes: No redness  Cardiovascular: No chest pain, positive for palpitations      Physical Exam      General:      well developed, well nourished, in no acute distress.    Head:      normocephalic and atraumatic.    Eyes:      PERRL/EOM intact, conjunctiva and sclera clear with out nystagmus.    Neck:      no masses, thyromegaly, trachea central with normal respiratory effort  Lungs:      clear bilaterally to auscultation.    Heart:   regular rate and rhythm, S1, S2 without murmurs, rubs, or gallops      Assessment and plan  History of perforated gastric ulcer needing surgery and post surgery atrial fibrillation  Hyperthyroidism currently euthyroid on methimazole with normal TSH which was checked treated most recently  Recurrent atrial fibrillation with termination of atrial fibrillation with long pauses  Off beta-blockers and patient became hypertensive  Start losartan 50 mg a day to optimize hypertension  CMP to be checked in 2 weeks  Options for atrial fibrillation including AF ablation versus pacemaker implantation with control of arrhythmia with antiarrhythmics discussed with the patient  After discussing with patient patient wants to think about the options  For a total of 5 episodes on event recorder of atrial fibrillation  with pauses on termination which were discussed with the patient  Patient clearly has sick sinus picture      ECG 12 Lead    Date/Time: 8/6/2021 1:19 PM  Performed by: Александр Sheth MD  Authorized by: Александр Sheth MD   Comparison: compared with previous ECG   Similar to previous ECG  Rhythm: sinus rhythm  Rate: normal  Conduction: conduction normal  QRS axis: normal  Other findings: non-specific ST-T wave changes            Electronically signed by Александр Sheth MD, 08/06/21, 1:19 PM EDT.

## 2021-08-10 ENCOUNTER — OFFICE VISIT (OUTPATIENT)
Dept: SURGERY | Facility: CLINIC | Age: 61
End: 2021-08-10

## 2021-08-10 VITALS — TEMPERATURE: 96.8 F | HEIGHT: 59 IN | BODY MASS INDEX: 31.45 KG/M2 | WEIGHT: 156 LBS

## 2021-08-10 DIAGNOSIS — K43.0 INCISIONAL HERNIA WITH OBSTRUCTION BUT NO GANGRENE: Primary | ICD-10-CM

## 2021-08-10 DIAGNOSIS — K43.2 INCISIONAL HERNIA, WITHOUT OBSTRUCTION OR GANGRENE: Primary | ICD-10-CM

## 2021-08-10 PROCEDURE — 99213 OFFICE O/P EST LOW 20 MIN: CPT | Performed by: SURGERY

## 2021-08-10 NOTE — PROGRESS NOTES
Subjective   Abi Rivera is a 60 y.o. female.     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  Back in March she underwent exploratory laparotomy for perforated gastric ulcer.  She has developed an incisional hernia.    Past Medical History:   Diagnosis Date   • Adenomatous polyp of colon 6/20/2016   • Anemia    • Anxiety 6/16/2016   • Arthritis of foot 1/10/2019   • Atrial fibrillation (CMS/HCC) 6/16/2016   • Avascular necrosis of bone (CMS/HCC) 1/10/2019   • Chronic pain 1/22/2019   • Closed displaced fracture 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 9/19/2019    Added automatically from request for surgery [id number]   • Hypertension 6/16/2016   • Hyperthyroidism 11/2/2016   • Mood disorder (CMS/HCC) 6/16/2016   • Osteoporosis without current pathological fracture 6/4/2020   • Pulmonary hypertension (CMS/HCC) 6/16/2016   • Sick sinus syndrome (CMS/HCC) 1/25/2017   • Tricuspid valve insufficiency 6/16/2016       Past Surgical History:   Procedure Laterality Date   • ANKLE OPEN REDUCTION INTERNAL FIXATION     • APPENDECTOMY     • BILATERAL BREAST REDUCTION     • EXPLORATORY LAPAROTOMY N/A 3/19/2021    Procedure: LAPAROTOMY EXPLORATORY with repair of perforated gastric ulcer, oversew of perforated gastric ulcer, placement of kristie patch, biopsy of stomach;  Surgeon: Luisito Velasco DO;  Location: [facility] MAIN OR;  Service: General;  Laterality: N/A;   • HIP SURGERY      Left total hip   • ORIF FOOT FRACTURE Right 9/30/2019    Procedure: OPEN REDUCTION INTERNAL FIXATION OF FIFTH METATARSAL FRACTURE, LOOSE BODY EXCISION/REMOVAL FROM THE TALONAVICULAR JOINT OF THE RIGHT FOOT;  Surgeon: JOSE Fry DPM;  Location: [facility] MAIN OR;  Service: Podiatry   • REDUCTION MAMMAPLASTY     • US GUIDED CYST ASPIRATION BREAST N/A 7/26/2021       Outpatient Encounter Medications as of 8/10/2021   Medication Sig Dispense Refill   • Abaloparatide  (Tymlos) 3120 MCG/1.56ML solution pen-injector Inject 0.04 mL under the skin into the appropriate area as directed Daily. (Patient taking differently: Inject 80 mcg under the skin into the appropriate area as directed Daily. Not started 3/19/21) 1.56 mL 6   • acetaminophen (TYLENOL) 500 MG tablet Take 500 mg by mouth Every 6 (Six) Hours As Needed for Mild Pain .     • aspirin (aspirin) 81 MG EC tablet Take 1 tablet by mouth Daily. 30 tablet 3   • calcium carb-cholecalciferol (CALCIUM 600+D) 600-800 MG-UNIT tablet Take 1 tablet by mouth Daily.     • ferrous sulfate 325 (65 FE) MG tablet Take 325 mg by mouth Daily With Breakfast.     • Glucosamine-Chondroit-Vit C-Mn (GLUCOSAMINE 1500 COMPLEX PO) Take 1 tablet by mouth Daily.     • losartan (Cozaar) 50 MG tablet Take 1 tablet by mouth Daily. 30 tablet 2   • MAGNESIUM OXIDE PO Take 250 mg by mouth Daily.     • methIMAzole (TAPAZOLE) 10 MG tablet Take 1 tablet by mouth 2 (two) times a day. Take 2 tablets in the morning, take 1 tablet in the evening. (Patient taking differently: Take 10 mg by mouth 2 (two) times a day.) 90 tablet 6   • Multiple Vitamin (MULTI-VITAMIN) tablet Take 1 tablet by mouth Daily.     • Omega-3 Fatty Acids (FISH OIL) 1200 MG capsule capsule 1 tablet daily     • omeprazole (priLOSEC) 20 MG capsule Take 20 mg by mouth Daily.     • pantoprazole (PROTONIX) 40 MG EC tablet Take 1 tablet by mouth Daily. 30 tablet 0   • Potassium Gluconate 550 MG tablet Take 1 tablet by mouth Daily.       No facility-administered encounter medications on file as of 8/10/2021.       No Known Allergies    Family History   Problem Relation Age of Onset   • Hypertension Mother    • Thyroid disease Mother    • Diabetes Father    • Heart disease Father    • Hypertension Father        Social History     Socioeconomic History   • Marital status:      Spouse name: Not on file   • Number of children: Not on file   • Years of education: Not on file   • Highest education  level: Not on file   Tobacco Use   • Smoking status: Current Every Day Smoker     Packs/day: 0.50     Years: 34.00     Pack years: 17.00     Types: Cigarettes     Start date: 1987   • Smokeless tobacco: Never Used   Vaping Use   • Vaping Use: Former   • Substances: Nicotine, Flavoring   • Devices: Refillable tank   Substance and Sexual Activity   • Alcohol use: Yes     Comment: rarely/socially    • Drug use: No   • Sexual activity: Defer       The following portions of the patient's history were reviewed and updated as appropriate: allergies, current medications, past family history, past medical history, past social history, past surgical history and problem list.    Objective   Exam shows obvious incisional hernia is difficult to tell how widely  the fascia is on exam.  Consequently we will get a CT scan to get a better handle on the defect.  This will help us know whether we can fix this laparoscopically or whether we will have to convert to an open.    Impression: Incisional hernia after repair of perforated gastric ulcer    Plan: CT scan then recheck in the office    Assessment/Plan   There are no diagnoses linked to this encounter.               Luisito Velasco DO  8/10/2021  11:42 EDT

## 2021-08-13 LAB
MAXIMAL PREDICTED HEART RATE: 162 BPM
STRESS TARGET HR: 138 BPM

## 2021-08-16 ENCOUNTER — PREP FOR SURGERY (OUTPATIENT)
Dept: OTHER | Facility: HOSPITAL | Age: 61
End: 2021-08-16

## 2021-08-16 DIAGNOSIS — I36.1 NONRHEUMATIC TRICUSPID VALVE REGURGITATION: ICD-10-CM

## 2021-08-16 DIAGNOSIS — I49.5 SICK SINUS SYNDROME (HCC): ICD-10-CM

## 2021-08-16 DIAGNOSIS — I48.0 PAROXYSMAL ATRIAL FIBRILLATION (HCC): Primary | ICD-10-CM

## 2021-08-16 DIAGNOSIS — R00.2 PALPITATIONS: ICD-10-CM

## 2021-08-16 RX ORDER — SODIUM CHLORIDE 0.9 % (FLUSH) 0.9 %
3 SYRINGE (ML) INJECTION EVERY 12 HOURS SCHEDULED
Status: CANCELLED | OUTPATIENT
Start: 2021-08-16

## 2021-08-16 RX ORDER — SODIUM CHLORIDE 9 MG/ML
80 INJECTION, SOLUTION INTRAVENOUS CONTINUOUS
Status: CANCELLED | OUTPATIENT
Start: 2021-08-16

## 2021-08-16 RX ORDER — SODIUM CHLORIDE 0.9 % (FLUSH) 0.9 %
3-10 SYRINGE (ML) INJECTION AS NEEDED
Status: CANCELLED | OUTPATIENT
Start: 2021-08-16

## 2021-08-18 ENCOUNTER — HOSPITAL ENCOUNTER (OUTPATIENT)
Dept: CT IMAGING | Facility: HOSPITAL | Age: 61
Discharge: HOME | End: 2021-08-18
Admitting: SURGERY

## 2021-08-18 DIAGNOSIS — K43.2 INCISIONAL HERNIA, WITHOUT OBSTRUCTION OR GANGRENE: ICD-10-CM

## 2021-08-18 PROCEDURE — 74150 CT ABDOMEN W/O CONTRAST: CPT

## 2021-08-25 ENCOUNTER — OFFICE VISIT (OUTPATIENT)
Dept: SURGERY | Facility: CLINIC | Age: 61
End: 2021-08-25

## 2021-08-25 VITALS
OXYGEN SATURATION: 99 % | TEMPERATURE: 98 F | HEIGHT: 59 IN | WEIGHT: 160 LBS | HEART RATE: 69 BPM | SYSTOLIC BLOOD PRESSURE: 155 MMHG | BODY MASS INDEX: 32.25 KG/M2 | DIASTOLIC BLOOD PRESSURE: 87 MMHG

## 2021-08-25 DIAGNOSIS — K43.0 INCISIONAL HERNIA WITH OBSTRUCTION BUT NO GANGRENE: Primary | ICD-10-CM

## 2021-08-25 PROBLEM — R00.2 PALPITATIONS: Status: ACTIVE | Noted: 2021-08-25

## 2021-08-25 PROCEDURE — 99213 OFFICE O/P EST LOW 20 MIN: CPT | Performed by: SURGERY

## 2021-08-25 NOTE — PROGRESS NOTES
Subjective   Abi Rivera is a 60 y.o. female.     History of present illness  Dr. Montañoyusuf is seen in the office today follow-up from her repair of perforated gastric ulcer many months ago.  She has developed an incisional hernia.  At her last visit we ordered a CT scan we have since gone over that and it does show a significant incisional hernia.  She was closed with #1 Ethibond in short runs at the time of her surgery before.  Due to the width of the hernia she will require a component release with placement of mesh to get this repaired adequately.  It will be about a 3-hour surgery.  She needs a cardiac ablation first before we do this so she will call when she is ready to proceed with surgery    Past Medical History:   Diagnosis Date   • Adenomatous polyp of colon 6/20/2016   • Anemia    • Anxiety 6/16/2016   • Arthritis of foot 1/10/2019   • Atrial fibrillation (CMS/HCC) 6/16/2016   • Avascular necrosis of bone (CMS/HCC) 1/10/2019   • Chronic pain 1/22/2019   • Closed displaced fracture 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 9/19/2019    Added automatically from request for surgery [id number]   • Hypertension 6/16/2016   • Hyperthyroidism 11/2/2016   • Mood disorder (CMS/HCC) 6/16/2016   • Osteoporosis without current pathological fracture 6/4/2020   • Pulmonary hypertension (CMS/HCC) 6/16/2016   • Sick sinus syndrome (CMS/HCC) 1/25/2017   • Tricuspid valve insufficiency 6/16/2016       Past Surgical History:   Procedure Laterality Date   • ANKLE OPEN REDUCTION INTERNAL FIXATION     • APPENDECTOMY     • BILATERAL BREAST REDUCTION     • EXPLORATORY LAPAROTOMY N/A 3/19/2021    Procedure: LAPAROTOMY EXPLORATORY with repair of perforated gastric ulcer, oversew of perforated gastric ulcer, placement of kristie patch, biopsy of stomach;  Surgeon: Luisito Velasco DO;  Location: [facility] MAIN OR;  Service: General;  Laterality: N/A;   • HIP SURGERY      Left total hip   • ORIF FOOT FRACTURE Right 9/30/2019     Procedure: OPEN REDUCTION INTERNAL FIXATION OF FIFTH METATARSAL FRACTURE, LOOSE BODY EXCISION/REMOVAL FROM THE TALONAVICULAR JOINT OF THE RIGHT FOOT;  Surgeon: JOSE Fry DPM;  Location: [facility] MAIN OR;  Service: Podiatry   • REDUCTION MAMMAPLASTY     • US GUIDED CYST ASPIRATION BREAST N/A 7/26/2021       Outpatient Encounter Medications as of 8/25/2021   Medication Sig Dispense Refill   • Abaloparatide (Tymlos) 3120 MCG/1.56ML solution pen-injector Inject 0.04 mL under the skin into the appropriate area as directed Daily. (Patient taking differently: Inject 80 mcg under the skin into the appropriate area as directed Daily. Not started 3/19/21) 1.56 mL 6   • acetaminophen (TYLENOL) 500 MG tablet Take 500 mg by mouth Every 6 (Six) Hours As Needed for Mild Pain .     • aspirin (aspirin) 81 MG EC tablet Take 1 tablet by mouth Daily. 30 tablet 3   • calcium carb-cholecalciferol (CALCIUM 600+D) 600-800 MG-UNIT tablet Take 1 tablet by mouth Daily.     • ferrous sulfate 325 (65 FE) MG tablet Take 325 mg by mouth Daily With Breakfast.     • Glucosamine-Chondroit-Vit C-Mn (GLUCOSAMINE 1500 COMPLEX PO) Take 1 tablet by mouth Daily.     • losartan (Cozaar) 50 MG tablet Take 1 tablet by mouth Daily. 30 tablet 2   • MAGNESIUM OXIDE PO Take 250 mg by mouth Daily.     • methIMAzole (TAPAZOLE) 10 MG tablet Take 1 tablet by mouth 2 (two) times a day. Take 2 tablets in the morning, take 1 tablet in the evening. (Patient taking differently: Take 10 mg by mouth 2 (two) times a day.) 90 tablet 6   • Multiple Vitamin (MULTI-VITAMIN) tablet Take 1 tablet by mouth Daily.     • Omega-3 Fatty Acids (FISH OIL) 1200 MG capsule capsule 1 tablet daily     • omeprazole (priLOSEC) 20 MG capsule Take 20 mg by mouth Daily.     • pantoprazole (PROTONIX) 40 MG EC tablet Take 1 tablet by mouth Daily. 30 tablet 0   • Potassium Gluconate 550 MG tablet Take 1 tablet by mouth Daily.       No facility-administered encounter medications on file  as of 8/25/2021.       No Known Allergies    Family History   Problem Relation Age of Onset   • Hypertension Mother    • Thyroid disease Mother    • Diabetes Father    • Heart disease Father    • Hypertension Father        Social History     Socioeconomic History   • Marital status:      Spouse name: Not on file   • Number of children: Not on file   • Years of education: Not on file   • Highest education level: Not on file   Tobacco Use   • Smoking status: Current Every Day Smoker     Packs/day: 0.50     Years: 34.00     Pack years: 17.00     Types: Cigarettes     Start date: 1987   • Smokeless tobacco: Never Used   Vaping Use   • Vaping Use: Former   • Substances: Nicotine, Flavoring   • Devices: Refillable tank   Substance and Sexual Activity   • Alcohol use: Yes     Comment: rarely/socially    • Drug use: No   • Sexual activity: Defer       The following portions of the patient's history were reviewed and updated as appropriate: allergies, current medications, past family history, past medical history, past social history, past surgical history and problem list.    Objective   Review of systems is unchanged from last visit on 8/10/21 I would refer you to it for further details.    Exam shows pleasant 60-year-old female.  HEENT is negative.  Heart regular.  Lungs clear.  Abdomen soft.  She has an obvious large incisional hernia.  Extremities with equal range of motion and usage.  Neuro no obvious focal deficit.    Impression incisional hernia    Assessment/Plan   There are no diagnoses linked to this encounter.      Plan: Open incisional hernia repair with mesh and component release.           Luisito Velasco DO  8/25/2021  15:55 EDT

## 2021-09-13 ENCOUNTER — LAB (OUTPATIENT)
Dept: LAB | Facility: HOSPITAL | Age: 61
End: 2021-09-13

## 2021-09-13 DIAGNOSIS — R00.2 PALPITATIONS: ICD-10-CM

## 2021-09-13 DIAGNOSIS — E05.90 HYPERTHYROIDISM: ICD-10-CM

## 2021-09-13 DIAGNOSIS — Z01.818 PRE-OP TESTING: Primary | ICD-10-CM

## 2021-09-13 DIAGNOSIS — I49.5 SICK SINUS SYNDROME (HCC): ICD-10-CM

## 2021-09-13 DIAGNOSIS — I10 ESSENTIAL HYPERTENSION: ICD-10-CM

## 2021-09-13 DIAGNOSIS — I48.0 PAROXYSMAL ATRIAL FIBRILLATION (HCC): ICD-10-CM

## 2021-09-13 DIAGNOSIS — I36.1 NONRHEUMATIC TRICUSPID VALVE REGURGITATION: ICD-10-CM

## 2021-09-13 PROCEDURE — C9803 HOPD COVID-19 SPEC COLLECT: HCPCS

## 2021-09-13 PROCEDURE — U0004 COV-19 TEST NON-CDC HGH THRU: HCPCS

## 2021-09-13 PROCEDURE — 83735 ASSAY OF MAGNESIUM: CPT

## 2021-09-13 PROCEDURE — 80053 COMPREHEN METABOLIC PANEL: CPT

## 2021-09-13 PROCEDURE — 84443 ASSAY THYROID STIM HORMONE: CPT

## 2021-09-13 PROCEDURE — 84439 ASSAY OF FREE THYROXINE: CPT

## 2021-09-13 PROCEDURE — 85025 COMPLETE CBC W/AUTO DIFF WBC: CPT

## 2021-09-13 PROCEDURE — 36415 COLL VENOUS BLD VENIPUNCTURE: CPT

## 2021-09-14 ENCOUNTER — TELEPHONE (OUTPATIENT)
Dept: CARDIOLOGY | Facility: CLINIC | Age: 61
End: 2021-09-14

## 2021-09-14 LAB
ALBUMIN SERPL-MCNC: 4.4 G/DL (ref 3.5–5.2)
ALBUMIN/GLOB SERPL: 2.1 G/DL
ALP SERPL-CCNC: 81 U/L (ref 39–117)
ALT SERPL W P-5'-P-CCNC: 10 U/L (ref 1–33)
ANION GAP SERPL CALCULATED.3IONS-SCNC: 9.1 MMOL/L (ref 5–15)
AST SERPL-CCNC: 14 U/L (ref 1–32)
BASOPHILS # BLD AUTO: 0.06 10*3/MM3 (ref 0–0.2)
BASOPHILS NFR BLD AUTO: 0.8 % (ref 0–1.5)
BILIRUB SERPL-MCNC: <0.2 MG/DL (ref 0–1.2)
BUN SERPL-MCNC: 18 MG/DL (ref 6–20)
BUN/CREAT SERPL: 19.6 (ref 7–25)
CALCIUM SPEC-SCNC: 9.4 MG/DL (ref 8.6–10.5)
CHLORIDE SERPL-SCNC: 105 MMOL/L (ref 98–107)
CO2 SERPL-SCNC: 24.9 MMOL/L (ref 22–29)
CREAT SERPL-MCNC: 0.92 MG/DL (ref 0.57–1)
DEPRECATED RDW RBC AUTO: 51.8 FL (ref 37–54)
EOSINOPHIL # BLD AUTO: 0.16 10*3/MM3 (ref 0–0.4)
EOSINOPHIL NFR BLD AUTO: 2 % (ref 0.3–6.2)
ERYTHROCYTE [DISTWIDTH] IN BLOOD BY AUTOMATED COUNT: 15.7 % (ref 12.3–15.4)
GFR SERPL CREATININE-BSD FRML MDRD: 63 ML/MIN/1.73
GLOBULIN UR ELPH-MCNC: 2.1 GM/DL
GLUCOSE SERPL-MCNC: 89 MG/DL (ref 65–99)
HCT VFR BLD AUTO: 42.4 % (ref 34–46.6)
HGB BLD-MCNC: 14 G/DL (ref 12–15.9)
IMM GRANULOCYTES # BLD AUTO: 0.03 10*3/MM3 (ref 0–0.05)
IMM GRANULOCYTES NFR BLD AUTO: 0.4 % (ref 0–0.5)
LYMPHOCYTES # BLD AUTO: 2.92 10*3/MM3 (ref 0.7–3.1)
LYMPHOCYTES NFR BLD AUTO: 36.5 % (ref 19.6–45.3)
MAGNESIUM SERPL-MCNC: 2 MG/DL (ref 1.6–2.6)
MCH RBC QN AUTO: 29.7 PG (ref 26.6–33)
MCHC RBC AUTO-ENTMCNC: 33 G/DL (ref 31.5–35.7)
MCV RBC AUTO: 90 FL (ref 79–97)
MONOCYTES # BLD AUTO: 0.58 10*3/MM3 (ref 0.1–0.9)
MONOCYTES NFR BLD AUTO: 7.3 % (ref 5–12)
NEUTROPHILS NFR BLD AUTO: 4.24 10*3/MM3 (ref 1.7–7)
NEUTROPHILS NFR BLD AUTO: 53 % (ref 42.7–76)
NRBC BLD AUTO-RTO: 0 /100 WBC (ref 0–0.2)
PLATELET # BLD AUTO: 259 10*3/MM3 (ref 140–450)
PMV BLD AUTO: 9.9 FL (ref 6–12)
POTASSIUM SERPL-SCNC: 4.4 MMOL/L (ref 3.5–5.2)
PROT SERPL-MCNC: 6.5 G/DL (ref 6–8.5)
RBC # BLD AUTO: 4.71 10*6/MM3 (ref 3.77–5.28)
SARS-COV-2 ORF1AB RESP QL NAA+PROBE: NOT DETECTED
SODIUM SERPL-SCNC: 139 MMOL/L (ref 136–145)
T4 FREE SERPL-MCNC: 1.99 NG/DL (ref 0.93–1.7)
TSH SERPL DL<=0.05 MIU/L-ACNC: 0.01 UIU/ML (ref 0.27–4.2)
WBC # BLD AUTO: 7.99 10*3/MM3 (ref 3.4–10.8)

## 2021-09-14 NOTE — TELEPHONE ENCOUNTER
Spoke with pt regarding TSH results, per Dr Sheth she will schedule appt with Dr Mack and will call to reschedule Ablation.

## 2021-09-15 ENCOUNTER — HOSPITAL ENCOUNTER (OUTPATIENT)
Dept: CARDIOLOGY | Facility: HOSPITAL | Age: 61
End: 2021-09-15

## 2021-09-22 ENCOUNTER — TELEPHONE (OUTPATIENT)
Dept: ENDOCRINOLOGY | Facility: CLINIC | Age: 61
End: 2021-09-22

## 2021-09-22 NOTE — TELEPHONE ENCOUNTER
Pt called to let you know the she is taking Methimazole 10mg  1 morning and 1 at night. This is from the results  labs drawn on 09/13/2021. You asked how she was taking the Methimazole. Please advise

## 2021-09-23 DIAGNOSIS — E05.90 HYPERTHYROIDISM: Primary | ICD-10-CM

## 2021-09-23 DIAGNOSIS — E05.90 HYPERTHYROIDISM: ICD-10-CM

## 2021-09-23 RX ORDER — METHIMAZOLE 10 MG/1
10 TABLET ORAL 2 TIMES DAILY
Qty: 90 TABLET | Refills: 6 | Status: ON HOLD | OUTPATIENT
Start: 2021-09-23 | End: 2022-01-19

## 2021-09-23 NOTE — TELEPHONE ENCOUNTER
Patient is asking if her labs are good in 6 weeks can you sign off on cardiac clearance or does she have to wait until she sees you in December.

## 2021-09-23 NOTE — TELEPHONE ENCOUNTER
TSH low and free T4 high, change methimazole to 20 mg in the morning and 10 mg in the evening and check TSH and free T4 in about 6 to 8 weeks.

## 2021-09-28 ENCOUNTER — TELEPHONE (OUTPATIENT)
Dept: CARDIOLOGY | Facility: CLINIC | Age: 61
End: 2021-09-28

## 2021-09-28 NOTE — TELEPHONE ENCOUNTER
----- Message from Александр QUIROZ MD sent at 9/22/2021 11:01 AM EDT -----  Her TSH has to be normal and endocrinology has to give us green signal for ablation    Dr CAMACHO  ----- Message -----  From: Ana Lilia Guthrie MA  Sent: 9/22/2021   9:16 AM EDT  To: Александр QUIROZ MD    Pt would like to know what you want her thyroid labs to be at in order for her to get the procedure?

## 2021-09-28 NOTE — TELEPHONE ENCOUNTER
Pt has been advised by Dr Mack's office to recheck labs in 6 weeks and if normal will clear for procedure with Dr Sheth. Will schedule procedure once clearance is received.

## 2021-10-29 ENCOUNTER — TELEPHONE (OUTPATIENT)
Dept: SURGERY | Facility: CLINIC | Age: 61
End: 2021-10-29

## 2021-10-29 RX ORDER — SULFAMETHOXAZOLE AND TRIMETHOPRIM 800; 160 MG/1; MG/1
1 TABLET ORAL 2 TIMES DAILY
Qty: 14 TABLET | Refills: 2 | Status: SHIPPED | OUTPATIENT
Start: 2021-10-29 | End: 2021-12-02

## 2021-10-29 NOTE — TELEPHONE ENCOUNTER
Pt called and said she was having drainage from surgical incision from back in March, she said it was red and swollen, draining yellowish fluid.  Dr. Gardner called in Bactrim DS, told her to have a liquid diet and made her an appt to see Dr. Velasco on Monday, Nov.1,2021.  jenifer

## 2021-11-01 ENCOUNTER — OFFICE VISIT (OUTPATIENT)
Dept: SURGERY | Facility: CLINIC | Age: 61
End: 2021-11-01

## 2021-11-01 VITALS
SYSTOLIC BLOOD PRESSURE: 135 MMHG | BODY MASS INDEX: 32.25 KG/M2 | TEMPERATURE: 98.2 F | HEIGHT: 59 IN | HEART RATE: 69 BPM | WEIGHT: 160 LBS | OXYGEN SATURATION: 97 % | DIASTOLIC BLOOD PRESSURE: 83 MMHG

## 2021-11-01 DIAGNOSIS — L02.211 CUTANEOUS ABSCESS OF ABDOMINAL WALL: Primary | ICD-10-CM

## 2021-11-01 PROCEDURE — 99213 OFFICE O/P EST LOW 20 MIN: CPT | Performed by: SURGERY

## 2021-11-01 NOTE — PROGRESS NOTES
Subjective   Abi Rivera is a 60 y.o. female.     History of present illness  DrTiffanie Lai is seen in the office today for an area of drainage from her midline incision.  He had called to talk to Dr. Gardner over the weekend and had some redness and drainage and he had just called in some Bactrim for her.  In the office today the area shows an opening of about 2 cm.  I suspect she may have had a stitch granuloma that got infected the skin probably bubbled up and has sloughed.  There does not appear to be any sort of enterocutaneous fistula.    Past Medical History:   Diagnosis Date   • Adenomatous polyp of colon 6/20/2016   • Anemia    • Anxiety 6/16/2016   • Arthritis of foot 1/10/2019   • Atrial fibrillation (HCC) 6/16/2016   • Avascular necrosis of bone (HCC) 1/10/2019   • Chronic pain 1/22/2019   • Closed displaced fracture 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 9/19/2019    Added automatically from request for surgery [id number]   • Hypertension 6/16/2016   • Hyperthyroidism 11/2/2016   • Mood disorder (HCC) 6/16/2016   • Osteoporosis without current pathological fracture 6/4/2020   • Pulmonary hypertension ([facility]) 6/16/2016   • Sick sinus syndrome ([facility]) 1/25/2017   • Tricuspid valve insufficiency 6/16/2016       Past Surgical History:   Procedure Laterality Date   • ANKLE OPEN REDUCTION INTERNAL FIXATION     • APPENDECTOMY     • BILATERAL BREAST REDUCTION     • EXPLORATORY LAPAROTOMY N/A 3/19/2021    Procedure: LAPAROTOMY EXPLORATORY with repair of perforated gastric ulcer, oversew of perforated gastric ulcer, placement of kristie patch, biopsy of stomach;  Surgeon: Luisito Velasco DO;  Location: [facility] MAIN OR;  Service: General;  Laterality: N/A;   • HIP SURGERY      Left total hip   • ORIF FOOT FRACTURE Right 9/30/2019    Procedure: OPEN REDUCTION INTERNAL FIXATION OF FIFTH METATARSAL FRACTURE, LOOSE BODY EXCISION/REMOVAL FROM THE TALONAVICULAR JOINT OF THE RIGHT FOOT;  Surgeon: JOSE Fry,  ZORAIDA;  Location: [facility] MAIN OR;  Service: Podiatry   • REDUCTION MAMMAPLASTY     • US GUIDED CYST ASPIRATION BREAST N/A 7/26/2021       Outpatient Encounter Medications as of 11/1/2021   Medication Sig Dispense Refill   • Abaloparatide (Tymlos) 3120 MCG/1.56ML solution pen-injector Inject 0.04 mL under the skin into the appropriate area as directed Daily. (Patient taking differently: Inject 80 mcg under the skin into the appropriate area as directed Daily. Not started 3/19/21) 1.56 mL 6   • acetaminophen (TYLENOL) 500 MG tablet Take 500 mg by mouth Every 6 (Six) Hours As Needed for Mild Pain .     • aspirin (aspirin) 81 MG EC tablet Take 1 tablet by mouth Daily. 30 tablet 3   • calcium carb-cholecalciferol (CALCIUM 600+D) 600-800 MG-UNIT tablet Take 1 tablet by mouth Daily.     • ferrous sulfate 325 (65 FE) MG tablet Take 325 mg by mouth Daily With Breakfast.     • Glucosamine-Chondroit-Vit C-Mn (GLUCOSAMINE 1500 COMPLEX PO) Take 1 tablet by mouth Daily.     • losartan (Cozaar) 50 MG tablet Take 1 tablet by mouth Daily. 30 tablet 2   • MAGNESIUM OXIDE PO Take 250 mg by mouth Daily.     • methIMAzole (TAPAZOLE) 10 MG tablet Take 1 tablet by mouth 2 (two) times a day. Take 2 tablets in the morning, take 1 tablet in the evening. 90 tablet 6   • Multiple Vitamin (MULTI-VITAMIN) tablet Take 1 tablet by mouth Daily.     • Omega-3 Fatty Acids (FISH OIL) 1200 MG capsule capsule 1 tablet daily     • omeprazole (priLOSEC) 20 MG capsule Take 20 mg by mouth Daily.     • Potassium Gluconate 550 MG tablet Take 1 tablet by mouth Daily.     • sulfamethoxazole-trimethoprim (Bactrim DS) 800-160 MG per tablet Take 1 tablet by mouth 2 (Two) Times a Day. 14 tablet 2   • pantoprazole (PROTONIX) 40 MG EC tablet Take 1 tablet by mouth Daily. 30 tablet 0     No facility-administered encounter medications on file as of 11/1/2021.       No Known Allergies    Family History   Problem Relation Age of Onset   • Hypertension Mother    • Thyroid  disease Mother    • Diabetes Father    • Heart disease Father    • Hypertension Father        Social History     Socioeconomic History   • Marital status:    Tobacco Use   • Smoking status: Current Every Day Smoker     Packs/day: 0.50     Years: 34.00     Pack years: 17.00     Types: Cigarettes     Start date: 1987   • Smokeless tobacco: Never Used   Vaping Use   • Vaping Use: Former   • Substances: Nicotine, Flavoring   • Devices: Refillable tank   Substance and Sexual Activity   • Alcohol use: Yes     Comment: rarely/socially    • Drug use: No   • Sexual activity: Defer       The following portions of the patient's history were reviewed and updated as appropriate: allergies, current medications, past family history, past medical history, past social history, past surgical history and problem list.    Objective   Review of systems is unchanged from previous visit.  I would refer you to it for further details.  Physical exam limited the system at hand.  It shows her to have in the mid part of the incision midway between the xiphoid and the umbilicus and opening where it looks like she probably had some skin that was infected and blistered and sloughed.  I do not see a stitch exposed but I suspect that one of the underlying permanent stitches has caused a granuloma that got infected.  Assessment/Plan   There are no diagnoses linked to this encounter.    Impression: Probable infected stitch granuloma with some skin slough    Recommendation: Continue the Bactrim for the full course.  Daily dressing changes.  Recheck in the office in 1 week           Luisito Velasco DO  11/1/2021  15:20 EDT

## 2021-11-08 ENCOUNTER — PREP FOR SURGERY (OUTPATIENT)
Dept: OTHER | Facility: HOSPITAL | Age: 61
End: 2021-11-08

## 2021-11-08 ENCOUNTER — LAB (OUTPATIENT)
Dept: LAB | Facility: HOSPITAL | Age: 61
End: 2021-11-08

## 2021-11-08 ENCOUNTER — OFFICE VISIT (OUTPATIENT)
Dept: SURGERY | Facility: CLINIC | Age: 61
End: 2021-11-08

## 2021-11-08 VITALS
TEMPERATURE: 97.7 F | WEIGHT: 161 LBS | OXYGEN SATURATION: 98 % | HEART RATE: 73 BPM | HEIGHT: 59 IN | SYSTOLIC BLOOD PRESSURE: 146 MMHG | DIASTOLIC BLOOD PRESSURE: 87 MMHG | BODY MASS INDEX: 32.46 KG/M2

## 2021-11-08 DIAGNOSIS — K43.0 INCISIONAL HERNIA WITH OBSTRUCTION BUT NO GANGRENE: Primary | ICD-10-CM

## 2021-11-08 DIAGNOSIS — E05.90 HYPERTHYROIDISM: ICD-10-CM

## 2021-11-08 LAB
T4 FREE SERPL-MCNC: 0.15 NG/DL (ref 0.93–1.7)
TSH SERPL DL<=0.05 MIU/L-ACNC: 9.27 UIU/ML (ref 0.27–4.2)

## 2021-11-08 PROCEDURE — 99212 OFFICE O/P EST SF 10 MIN: CPT | Performed by: SURGERY

## 2021-11-08 PROCEDURE — 36415 COLL VENOUS BLD VENIPUNCTURE: CPT

## 2021-11-08 PROCEDURE — 84439 ASSAY OF FREE THYROXINE: CPT

## 2021-11-08 PROCEDURE — 84443 ASSAY THYROID STIM HORMONE: CPT

## 2021-11-08 RX ORDER — LOSARTAN POTASSIUM 50 MG/1
TABLET ORAL
Qty: 30 TABLET | Refills: 0 | Status: SHIPPED | OUTPATIENT
Start: 2021-11-08 | End: 2021-12-07

## 2021-11-08 RX ORDER — SODIUM CHLORIDE 9 MG/ML
100 INJECTION, SOLUTION INTRAVENOUS CONTINUOUS
Status: CANCELLED | OUTPATIENT
Start: 2021-11-08

## 2021-11-08 NOTE — PROGRESS NOTES
Subjective   Abi Rivera is a 60 y.o. female.     History of present illness  DrTiffanie Ming is seen in the office today follow-up from the visit last week where she had a small stitch granuloma that got infected and the overlying skin necrosis and sloughed.  She is doing well.  She needs her incisional hernia repaired but needs a cardiac ablation first.  Were waiting for her to get that done before scheduling her surgery.  The area on the midline incision looks about the same.  There is no reason that should interfere with her being able to have a cardiac ablation.  This will not prevent us from being able to do her hernia repair.    Past Medical History:   Diagnosis Date   • Adenomatous polyp of colon 6/20/2016   • Anemia    • Anxiety 6/16/2016   • Arthritis of foot 1/10/2019   • Atrial fibrillation (HCC) 6/16/2016   • Avascular necrosis of bone (HCC) 1/10/2019   • Chronic pain 1/22/2019   • Closed displaced fracture 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 9/19/2019    Added automatically from request for surgery [id number]   • Hypertension 6/16/2016   • Hyperthyroidism 11/2/2016   • Mood disorder (HCC) 6/16/2016   • Osteoporosis without current pathological fracture 6/4/2020   • Pulmonary hypertension (HCC) 6/16/2016   • Sick sinus syndrome ([facility]) 1/25/2017   • Tricuspid valve insufficiency 6/16/2016       Past Surgical History:   Procedure Laterality Date   • ANKLE OPEN REDUCTION INTERNAL FIXATION     • APPENDECTOMY     • BILATERAL BREAST REDUCTION     • EXPLORATORY LAPAROTOMY N/A 3/19/2021    Procedure: LAPAROTOMY EXPLORATORY with repair of perforated gastric ulcer, oversew of perforated gastric ulcer, placement of kristie patch, biopsy of stomach;  Surgeon: Luisito Velasco DO;  Location: [facility] MAIN OR;  Service: General;  Laterality: N/A;   • HIP SURGERY      Left total hip   • ORIF FOOT FRACTURE Right 9/30/2019    Procedure: OPEN REDUCTION INTERNAL FIXATION OF FIFTH METATARSAL FRACTURE, LOOSE BODY  EXCISION/REMOVAL FROM THE TALONAVICULAR JOINT OF THE RIGHT FOOT;  Surgeon: JOSE Fry DPM;  Location: [facility] MAIN OR;  Service: Podiatry   • REDUCTION MAMMAPLASTY     • US GUIDED CYST ASPIRATION BREAST N/A 7/26/2021       Outpatient Encounter Medications as of 11/8/2021   Medication Sig Dispense Refill   • Abaloparatide (Tymlos) 3120 MCG/1.56ML solution pen-injector Inject 0.04 mL under the skin into the appropriate area as directed Daily. (Patient taking differently: Inject 80 mcg under the skin into the appropriate area as directed Daily. Not started 3/19/21) 1.56 mL 6   • acetaminophen (TYLENOL) 500 MG tablet Take 500 mg by mouth Every 6 (Six) Hours As Needed for Mild Pain .     • aspirin (aspirin) 81 MG EC tablet Take 1 tablet by mouth Daily. 30 tablet 3   • calcium carb-cholecalciferol (CALCIUM 600+D) 600-800 MG-UNIT tablet Take 1 tablet by mouth Daily.     • ferrous sulfate 325 (65 FE) MG tablet Take 325 mg by mouth Daily With Breakfast.     • Glucosamine-Chondroit-Vit C-Mn (GLUCOSAMINE 1500 COMPLEX PO) Take 1 tablet by mouth Daily.     • losartan (Cozaar) 50 MG tablet Take 1 tablet by mouth Daily. 30 tablet 2   • MAGNESIUM OXIDE PO Take 250 mg by mouth Daily.     • methIMAzole (TAPAZOLE) 10 MG tablet Take 1 tablet by mouth 2 (two) times a day. Take 2 tablets in the morning, take 1 tablet in the evening. 90 tablet 6   • Multiple Vitamin (MULTI-VITAMIN) tablet Take 1 tablet by mouth Daily.     • Omega-3 Fatty Acids (FISH OIL) 1200 MG capsule capsule 1 tablet daily     • omeprazole (priLOSEC) 20 MG capsule Take 20 mg by mouth Daily.     • pantoprazole (PROTONIX) 40 MG EC tablet Take 1 tablet by mouth Daily. 30 tablet 0   • Potassium Gluconate 550 MG tablet Take 1 tablet by mouth Daily.     • sulfamethoxazole-trimethoprim (Bactrim DS) 800-160 MG per tablet Take 1 tablet by mouth 2 (Two) Times a Day. 14 tablet 2     No facility-administered encounter medications on file as of 11/8/2021.       No Known  Allergies    Family History   Problem Relation Age of Onset   • Hypertension Mother    • Thyroid disease Mother    • Diabetes Father    • Heart disease Father    • Hypertension Father        Social History     Socioeconomic History   • Marital status:    Tobacco Use   • Smoking status: Current Every Day Smoker     Packs/day: 0.50     Years: 34.00     Pack years: 17.00     Types: Cigarettes     Start date: 1987   • Smokeless tobacco: Never Used   Vaping Use   • Vaping Use: Former   • Substances: Nicotine, Flavoring   • Devices: Refillable tank   Substance and Sexual Activity   • Alcohol use: Yes     Comment: rarely/socially    • Drug use: No   • Sexual activity: Defer       The following portions of the patient's history were reviewed and updated as appropriate: allergies, current medications, past family history, past medical history, past social history, past surgical history and problem list.    Objective     Exam unchanged from previous visit.  I would refer you to it for further details as well as her review of systems.  Assessment/Plan   There are no diagnoses linked to this encounter.    Impression: Small a skin slough in the midline incision.  She will call when she is ready to schedule her incisional hernia repair.  This will be a 3 to 4-hour surgery with component release and placement of mesh           Luisito Velasco DO  11/8/2021  13:21 EST

## 2021-11-08 NOTE — H&P
Subjective   Abi Rivera is a 60 y.o. female.     History of present illness  DrTiffanie Lai is seen in the office today for an area of drainage from her midline incision.  He had called to talk to Dr. Gardner over the weekend and had some redness and drainage and he had just called in some Bactrim for her.  In the office today the area shows an opening of about 2 cm.  I suspect she may have had a stitch granuloma that got infected the skin probably bubbled up and has sloughed.  There does not appear to be any sort of enterocutaneous fistula.    Past Medical History:   Diagnosis Date   • Adenomatous polyp of colon 6/20/2016   • Anemia    • Anxiety 6/16/2016   • Arthritis of foot 1/10/2019   • Atrial fibrillation (HCC) 6/16/2016   • Avascular necrosis of bone (HCC) 1/10/2019   • Chronic pain 1/22/2019   • Closed displaced fracture 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 9/19/2019    Added automatically from request for surgery [id number]   • Hypertension 6/16/2016   • Hyperthyroidism 11/2/2016   • Mood disorder (HCC) 6/16/2016   • Osteoporosis without current pathological fracture 6/4/2020   • Pulmonary hypertension ([facility]) 6/16/2016   • Sick sinus syndrome ([facility]) 1/25/2017   • Tricuspid valve insufficiency 6/16/2016       Past Surgical History:   Procedure Laterality Date   • ANKLE OPEN REDUCTION INTERNAL FIXATION     • APPENDECTOMY     • BILATERAL BREAST REDUCTION     • EXPLORATORY LAPAROTOMY N/A 3/19/2021    Procedure: LAPAROTOMY EXPLORATORY with repair of perforated gastric ulcer, oversew of perforated gastric ulcer, placement of kristie patch, biopsy of stomach;  Surgeon: Luisito Velasco DO;  Location: [facility] MAIN OR;  Service: General;  Laterality: N/A;   • HIP SURGERY      Left total hip   • ORIF FOOT FRACTURE Right 9/30/2019    Procedure: OPEN REDUCTION INTERNAL FIXATION OF FIFTH METATARSAL FRACTURE, LOOSE BODY EXCISION/REMOVAL FROM THE TALONAVICULAR JOINT OF THE RIGHT FOOT;  Surgeon: JOSE Fry,  ZORAIDA;  Location: [facility] MAIN OR;  Service: Podiatry   • REDUCTION MAMMAPLASTY     • US GUIDED CYST ASPIRATION BREAST N/A 7/26/2021       Outpatient Encounter Medications as of 11/8/2021   Medication Sig Dispense Refill   • Abaloparatide (Tymlos) 3120 MCG/1.56ML solution pen-injector Inject 0.04 mL under the skin into the appropriate area as directed Daily. (Patient taking differently: Inject 80 mcg under the skin into the appropriate area as directed Daily. Not started 3/19/21) 1.56 mL 6   • acetaminophen (TYLENOL) 500 MG tablet Take 500 mg by mouth Every 6 (Six) Hours As Needed for Mild Pain .     • aspirin (aspirin) 81 MG EC tablet Take 1 tablet by mouth Daily. 30 tablet 3   • calcium carb-cholecalciferol (CALCIUM 600+D) 600-800 MG-UNIT tablet Take 1 tablet by mouth Daily.     • ferrous sulfate 325 (65 FE) MG tablet Take 325 mg by mouth Daily With Breakfast.     • Glucosamine-Chondroit-Vit C-Mn (GLUCOSAMINE 1500 COMPLEX PO) Take 1 tablet by mouth Daily.     • losartan (Cozaar) 50 MG tablet Take 1 tablet by mouth Daily. 30 tablet 2   • MAGNESIUM OXIDE PO Take 250 mg by mouth Daily.     • methIMAzole (TAPAZOLE) 10 MG tablet Take 1 tablet by mouth 2 (two) times a day. Take 2 tablets in the morning, take 1 tablet in the evening. 90 tablet 6   • Multiple Vitamin (MULTI-VITAMIN) tablet Take 1 tablet by mouth Daily.     • Omega-3 Fatty Acids (FISH OIL) 1200 MG capsule capsule 1 tablet daily     • omeprazole (priLOSEC) 20 MG capsule Take 20 mg by mouth Daily.     • pantoprazole (PROTONIX) 40 MG EC tablet Take 1 tablet by mouth Daily. 30 tablet 0   • Potassium Gluconate 550 MG tablet Take 1 tablet by mouth Daily.     • sulfamethoxazole-trimethoprim (Bactrim DS) 800-160 MG per tablet Take 1 tablet by mouth 2 (Two) Times a Day. 14 tablet 2     No facility-administered encounter medications on file as of 11/8/2021.       No Known Allergies    Family History   Problem Relation Age of Onset   • Hypertension Mother    • Thyroid  disease Mother    • Diabetes Father    • Heart disease Father    • Hypertension Father        Social History     Socioeconomic History   • Marital status:    Tobacco Use   • Smoking status: Current Every Day Smoker     Packs/day: 0.50     Years: 34.00     Pack years: 17.00     Types: Cigarettes     Start date: 1987   • Smokeless tobacco: Never Used   Vaping Use   • Vaping Use: Former   • Substances: Nicotine, Flavoring   • Devices: Refillable tank   Substance and Sexual Activity   • Alcohol use: Yes     Comment: rarely/socially    • Drug use: No   • Sexual activity: Defer       The following portions of the patient's history were reviewed and updated as appropriate: allergies, current medications, past family history, past medical history, past social history, past surgical history and problem list.    Objective   Review of systems is unchanged from previous visit.  I would refer you to it for further details.  Physical exam limited the system at hand.  It shows her to have in the mid part of the incision midway between the xiphoid and the umbilicus and opening where it looks like she probably had some skin that was infected and blistered and sloughed.  I do not see a stitch exposed but I suspect that one of the underlying permanent stitches has caused a granuloma that got infected.  Assessment/Plan   There are no diagnoses linked to this encounter.    Impression: Probable infected stitch granuloma with some skin slough    Recommendation: Continue the Bactrim for the full course.  Daily dressing changes.  Recheck in the office in 1 week           Luisito Velasco DO  11/8/2021  13:24 EST

## 2021-11-11 DIAGNOSIS — E05.90 HYPERTHYROIDISM: Primary | ICD-10-CM

## 2021-11-23 RX ORDER — PEN NEEDLE, DIABETIC 31 GX5/16"
NEEDLE, DISPOSABLE MISCELLANEOUS DAILY
Status: ON HOLD | COMMUNITY
Start: 2021-11-01 | End: 2022-03-02

## 2021-12-02 ENCOUNTER — OFFICE VISIT (OUTPATIENT)
Dept: ENDOCRINOLOGY | Facility: CLINIC | Age: 61
End: 2021-12-02

## 2021-12-02 VITALS
HEART RATE: 71 BPM | OXYGEN SATURATION: 100 % | BODY MASS INDEX: 33.06 KG/M2 | DIASTOLIC BLOOD PRESSURE: 80 MMHG | WEIGHT: 164 LBS | HEIGHT: 59 IN | TEMPERATURE: 97.8 F | SYSTOLIC BLOOD PRESSURE: 135 MMHG

## 2021-12-02 DIAGNOSIS — M81.0 AGE-RELATED OSTEOPOROSIS WITHOUT CURRENT PATHOLOGICAL FRACTURE: ICD-10-CM

## 2021-12-02 DIAGNOSIS — E05.90 HYPERTHYROIDISM: Primary | Chronic | ICD-10-CM

## 2021-12-02 PROCEDURE — 99214 OFFICE O/P EST MOD 30 MIN: CPT | Performed by: INTERNAL MEDICINE

## 2021-12-02 NOTE — PATIENT INSTRUCTIONS
Please stop smoking  Continue methimazole 2.5 pills of 10 mg daily  Please check labs in 6 weeks  Arrange for thyroid ultrasound  Okay to proceed with cardiac ablation and you can notify Dr. Sheth

## 2021-12-02 NOTE — PROGRESS NOTES
Endocrine Progress Note Outpatient     Patient Care Team:  Chaparrita Chun MD as PCP - General (Family Medicine)    Chief Complaint: Follow-up hyperthyroidism    HPI: 60-year-old female with history of hypothyroidism and osteoporosis is here for follow-up.  She was hospitalized in March 2021 with perforated gastric ulcer followed by some cardiac events but she is doing fairly well at this time.    For hyperthyroidism: She tells me that she is on methimazole 10 mg tablets, she takes 1.5 in the morning and 1 in the evening.  She denies any issues with shaking sweating palpitations at this time.  She is now complaining of fatigue and tiredness with some weight gain as well as dry skin.  She also have puffy face.    Osteoporosis: She was prescribed Tymlos and now taking it.  She is on calcium and vitamin D supplementation.  No fracture since last seen.    Past Medical History:   Diagnosis Date   • Adenomatous polyp of colon 6/20/2016   • Anemia    • Anxiety 6/16/2016   • Arthritis of foot 1/10/2019   • Atrial fibrillation (HCC) 6/16/2016   • Avascular necrosis of bone ([facility]) 1/10/2019   • Chronic pain 1/22/2019   • Closed displaced fracture 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 9/19/2019    Added automatically from request for surgery [id number]   • Hypertension 6/16/2016   • Hyperthyroidism 11/2/2016   • Mood disorder (HCC) 6/16/2016   • Osteoporosis without current pathological fracture 6/4/2020   • Pulmonary hypertension ([facility]) 6/16/2016   • Sick sinus syndrome ([facility]) 1/25/2017   • Tricuspid valve insufficiency 6/16/2016       Social History     Socioeconomic History   • Marital status:    Tobacco Use   • Smoking status: Current Every Day Smoker     Packs/day: 0.50     Years: 34.00     Pack years: 17.00     Types: Cigarettes     Start date: 1987   • Smokeless tobacco: Never Used   Vaping Use   • Vaping Use: Never used   Substance and Sexual Activity   • Alcohol use: Yes     Comment:  rarely/socially    • Drug use: No   • Sexual activity: Defer       Family History   Problem Relation Age of Onset   • Hypertension Mother    • Thyroid disease Mother    • Diabetes Father    • Heart disease Father    • Hypertension Father        No Known Allergies    ROS:   Constitutional:  Admit fatigue, tiredness.    Eyes:  Denies change in visual acuity   HENT:  Denies nasal congestion or sore throat   Respiratory: denies cough, shortness of breath.   Cardiovascular:  denies chest pain, edema   GI:  Denies abdominal pain, nausea, vomiting.   Musculoskeletal:  Denies back pain or joint pain   Integument:  Denies dry skin and rash   Neurologic:  Denies headache, focal weakness or sensory changes   Endocrine:  Denies polyuria or polydipsia   Psychiatric:  Denies depression or anxiety      Vitals:    12/02/21 1441   BP: 135/80   Pulse: 71   Temp: 97.8 °F (36.6 °C)   SpO2: 100%     Body mass index is 33.12 kg/m².     Physical Exam:  GEN: NAD, conversant  EYES: EOMI, PERRL, no conjunctival erythema  NECK: no thyromegaly, full ROM   CV: RRR, no murmurs/rubs/gallops, no peripheral edema  LUNG: CTAB, no wheezes/rales/ronchi  SKIN: no rashes, no acanthosis  MSK: no deformities, full ROM of all extremities  NEURO: no tremors, DTR normal  PSYCH: AOX3, appropriate mood, affect normal      Results Review:     I reviewed the patient's new clinical results.      Lab Results   Component Value Date    GLUCOSE 89 09/13/2021    BUN 18 09/13/2021    CREATININE 0.92 09/13/2021    EGFRIFNONA 63 09/13/2021    BCR 19.6 09/13/2021    K 4.4 09/13/2021    CO2 24.9 09/13/2021    CALCIUM 9.4 09/13/2021    ALBUMIN 4.40 09/13/2021    LABIL2 1.2 02/20/2017    AST 14 09/13/2021    ALT 10 09/13/2021     Lab Results   Component Value Date    TSH 9.270 (H) 11/08/2021    FREET4 0.15 (L) 11/08/2021    THYROIDAB <9 03/21/2021     Thyroid Peroxidase Antibody (03/21/2021 15:00)   Thyroid Stimulating Immunoglobulin (03/21/2021 15:00)   TSH (03/21/2021  15:00)   T4, Free (03/21/2021 15:00)   T3, Free (03/21/2021 15:00)   DEXA Bone Density Axial (05/26/2020 15:02)       Medication Review: Reviewed.       Current Outpatient Medications:   •  Abaloparatide (Tymlos) 3120 MCG/1.56ML solution pen-injector, Inject 0.04 mL under the skin into the appropriate area as directed Daily. (Patient taking differently: Inject 80 mcg under the skin into the appropriate area as directed Daily. Not started 3/19/21), Disp: 1.56 mL, Rfl: 6  •  acetaminophen (TYLENOL) 500 MG tablet, Take 500 mg by mouth Every 6 (Six) Hours As Needed for Mild Pain ., Disp: , Rfl:   •  aspirin (aspirin) 81 MG EC tablet, Take 1 tablet by mouth Daily., Disp: 30 tablet, Rfl: 3  •  calcium carb-cholecalciferol (CALCIUM 600+D) 600-800 MG-UNIT tablet, Take 1 tablet by mouth Daily., Disp: , Rfl:   •  ferrous sulfate 325 (65 FE) MG tablet, Take 325 mg by mouth Daily With Breakfast., Disp: , Rfl:   •  Glucosamine-Chondroit-Vit C-Mn (GLUCOSAMINE 1500 COMPLEX PO), Take 1 tablet by mouth Daily., Disp: , Rfl:   •  Insupen Ultrafin 31G X 8 MM misc, Daily., Disp: , Rfl:   •  losartan (COZAAR) 50 MG tablet, TAKE 1 TABLET BY MOUTH EVERY DAY, Disp: 30 tablet, Rfl: 0  •  MAGNESIUM OXIDE PO, Take 250 mg by mouth Daily., Disp: , Rfl:   •  methIMAzole (TAPAZOLE) 10 MG tablet, Take 1 tablet by mouth 2 (two) times a day. Take 2 tablets in the morning, take 1 tablet in the evening. (Patient taking differently: Take 10 mg by mouth 2 (two) times a day. Take 1.5 tablets in the morning, take 1 tablet in the evening.), Disp: 90 tablet, Rfl: 6  •  Multiple Vitamin (MULTI-VITAMIN) tablet, Take 1 tablet by mouth Daily., Disp: , Rfl:   •  Omega-3 Fatty Acids (FISH OIL) 1200 MG capsule capsule, 1 tablet daily, Disp: , Rfl:   •  omeprazole (priLOSEC) 20 MG capsule, Take 20 mg by mouth Daily., Disp: , Rfl:   •  Potassium Gluconate 550 MG tablet, Take 1 tablet by mouth Daily., Disp: , Rfl:   •  Sharps Container (BD Nestable Sharps )  misc, USE TO DISPOSE OF SHARPS, Disp: , Rfl:       Assessment/Plan   1.  Hyperthyroidism: Etiology unknown, she is on methimazole total of 25 mg once a day.  She is now hypothyroid.  I have reduced the dose of methimazole from 30 mg to 25 mg p.o. daily.  We will continue that and follow labs.  She is planning to do ablation surgery on her heart which she should be okay to proceed now.  She has done radioactive iodine in the past which obviously did not work.  We talked about surgery she is open to the idea but she has couple of surgery that she needs to take care of first including cardiac ablation as well as hernia surgery which she is going to proceed now.  Will follow labs and make further adjustments on methimazole and I will check thyroid ultrasound as well.  She is advised to call if she develops any abdominal pain nausea vomiting or frequent infections.    2.  Osteoporosis: Continue Tymlos along with calcium and vitamin D supplementation.            Karen Mack MD FACE.

## 2021-12-07 RX ORDER — LOSARTAN POTASSIUM 50 MG/1
TABLET ORAL
Qty: 90 TABLET | Refills: 0 | Status: ON HOLD | OUTPATIENT
Start: 2021-12-07 | End: 2022-01-19

## 2021-12-20 ENCOUNTER — HOSPITAL ENCOUNTER (OUTPATIENT)
Dept: ULTRASOUND IMAGING | Facility: HOSPITAL | Age: 61
Discharge: HOME | End: 2021-12-20
Admitting: INTERNAL MEDICINE

## 2021-12-20 DIAGNOSIS — E05.90 HYPERTHYROIDISM: Chronic | ICD-10-CM

## 2021-12-20 DIAGNOSIS — M81.0 AGE-RELATED OSTEOPOROSIS WITHOUT CURRENT PATHOLOGICAL FRACTURE: ICD-10-CM

## 2021-12-20 PROCEDURE — 76536 US EXAM OF HEAD AND NECK: CPT

## 2022-01-05 DIAGNOSIS — E05.90 HYPERTHYROIDISM: Primary | ICD-10-CM

## 2022-01-13 RX ORDER — ABALOPARATIDE 2000 UG/ML
INJECTION, SOLUTION SUBCUTANEOUS
Qty: 1.56 ML | Refills: 0 | Status: ON HOLD | OUTPATIENT
Start: 2022-01-13 | End: 2022-01-19

## 2022-01-13 NOTE — TELEPHONE ENCOUNTER
I do not know much about this medication. Please advise if this is the correct amount patient is to take.

## 2022-01-17 ENCOUNTER — LAB (OUTPATIENT)
Dept: LAB | Facility: HOSPITAL | Age: 62
End: 2022-01-17

## 2022-01-17 DIAGNOSIS — M81.0 AGE-RELATED OSTEOPOROSIS WITHOUT CURRENT PATHOLOGICAL FRACTURE: ICD-10-CM

## 2022-01-17 DIAGNOSIS — E05.90 HYPERTHYROIDISM: ICD-10-CM

## 2022-01-17 DIAGNOSIS — I48.0 PAROXYSMAL ATRIAL FIBRILLATION: ICD-10-CM

## 2022-01-17 LAB — MAGNESIUM SERPL-MCNC: 2.2 MG/DL (ref 1.6–2.6)

## 2022-01-17 PROCEDURE — 84443 ASSAY THYROID STIM HORMONE: CPT

## 2022-01-17 PROCEDURE — 80053 COMPREHEN METABOLIC PANEL: CPT

## 2022-01-17 PROCEDURE — 85025 COMPLETE CBC W/AUTO DIFF WBC: CPT

## 2022-01-17 PROCEDURE — U0004 COV-19 TEST NON-CDC HGH THRU: HCPCS

## 2022-01-17 PROCEDURE — 83735 ASSAY OF MAGNESIUM: CPT

## 2022-01-17 PROCEDURE — C9803 HOPD COVID-19 SPEC COLLECT: HCPCS

## 2022-01-17 PROCEDURE — 84439 ASSAY OF FREE THYROXINE: CPT

## 2022-01-17 PROCEDURE — 36415 COLL VENOUS BLD VENIPUNCTURE: CPT

## 2022-01-18 ENCOUNTER — ANESTHESIA EVENT (OUTPATIENT)
Dept: CARDIOLOGY | Facility: HOSPITAL | Age: 62
End: 2022-01-18

## 2022-01-18 LAB
ALBUMIN SERPL-MCNC: 4.6 G/DL (ref 3.5–5.2)
ALBUMIN/GLOB SERPL: 1.8 G/DL
ALP SERPL-CCNC: 77 U/L (ref 39–117)
ALT SERPL W P-5'-P-CCNC: 9 U/L (ref 1–33)
ANION GAP SERPL CALCULATED.3IONS-SCNC: 9 MMOL/L (ref 5–15)
AST SERPL-CCNC: 24 U/L (ref 1–32)
BASOPHILS # BLD AUTO: 0.06 10*3/MM3 (ref 0–0.2)
BASOPHILS NFR BLD AUTO: 0.7 % (ref 0–1.5)
BILIRUB SERPL-MCNC: 0.2 MG/DL (ref 0–1.2)
BUN SERPL-MCNC: 21 MG/DL (ref 6–20)
BUN/CREAT SERPL: 14.9 (ref 7–25)
CALCIUM SPEC-SCNC: 10 MG/DL (ref 8.6–10.5)
CHLORIDE SERPL-SCNC: 98 MMOL/L (ref 98–107)
CO2 SERPL-SCNC: 26 MMOL/L (ref 22–29)
CREAT SERPL-MCNC: 1.41 MG/DL (ref 0.57–1)
DEPRECATED RDW RBC AUTO: 51.5 FL (ref 37–54)
EOSINOPHIL # BLD AUTO: 0.31 10*3/MM3 (ref 0–0.4)
EOSINOPHIL NFR BLD AUTO: 3.7 % (ref 0.3–6.2)
ERYTHROCYTE [DISTWIDTH] IN BLOOD BY AUTOMATED COUNT: 14.3 % (ref 12.3–15.4)
GFR SERPL CREATININE-BSD FRML MDRD: 38 ML/MIN/1.73
GLOBULIN UR ELPH-MCNC: 2.6 GM/DL
GLUCOSE SERPL-MCNC: 62 MG/DL (ref 65–99)
HCT VFR BLD AUTO: 41.5 % (ref 34–46.6)
HGB BLD-MCNC: 13.9 G/DL (ref 12–15.9)
IMM GRANULOCYTES # BLD AUTO: 0.03 10*3/MM3 (ref 0–0.05)
IMM GRANULOCYTES NFR BLD AUTO: 0.4 % (ref 0–0.5)
LYMPHOCYTES # BLD AUTO: 3.05 10*3/MM3 (ref 0.7–3.1)
LYMPHOCYTES NFR BLD AUTO: 36.4 % (ref 19.6–45.3)
MCH RBC QN AUTO: 32.9 PG (ref 26.6–33)
MCHC RBC AUTO-ENTMCNC: 33.5 G/DL (ref 31.5–35.7)
MCV RBC AUTO: 98.3 FL (ref 79–97)
MONOCYTES # BLD AUTO: 0.64 10*3/MM3 (ref 0.1–0.9)
MONOCYTES NFR BLD AUTO: 7.6 % (ref 5–12)
NEUTROPHILS NFR BLD AUTO: 4.28 10*3/MM3 (ref 1.7–7)
NEUTROPHILS NFR BLD AUTO: 51.2 % (ref 42.7–76)
NRBC BLD AUTO-RTO: 0 /100 WBC (ref 0–0.2)
PLATELET # BLD AUTO: 231 10*3/MM3 (ref 140–450)
PMV BLD AUTO: 9.9 FL (ref 6–12)
POTASSIUM SERPL-SCNC: 5.5 MMOL/L (ref 3.5–5.2)
PROT SERPL-MCNC: 7.2 G/DL (ref 6–8.5)
RBC # BLD AUTO: 4.22 10*6/MM3 (ref 3.77–5.28)
SARS-COV-2 ORF1AB RESP QL NAA+PROBE: NOT DETECTED
SODIUM SERPL-SCNC: 133 MMOL/L (ref 136–145)
T4 FREE SERPL-MCNC: 0.28 NG/DL (ref 0.93–1.7)
TSH SERPL DL<=0.05 MIU/L-ACNC: 11.6 UIU/ML (ref 0.27–4.2)
WBC NRBC COR # BLD: 8.37 10*3/MM3 (ref 3.4–10.8)

## 2022-01-19 ENCOUNTER — APPOINTMENT (OUTPATIENT)
Dept: CARDIOLOGY | Facility: HOSPITAL | Age: 62
End: 2022-01-19

## 2022-01-19 ENCOUNTER — HOSPITAL ENCOUNTER (OUTPATIENT)
Facility: HOSPITAL | Age: 62
Setting detail: HOSPITAL OUTPATIENT SURGERY
Discharge: HOME | End: 2022-01-19
Attending: INTERNAL MEDICINE | Admitting: INTERNAL MEDICINE

## 2022-01-19 ENCOUNTER — ANESTHESIA (OUTPATIENT)
Dept: CARDIOLOGY | Facility: HOSPITAL | Age: 62
End: 2022-01-19

## 2022-01-19 VITALS
OXYGEN SATURATION: 97 % | HEART RATE: 74 BPM | RESPIRATION RATE: 15 BRPM | TEMPERATURE: 97 F | WEIGHT: 164.46 LBS | BODY MASS INDEX: 33.16 KG/M2 | SYSTOLIC BLOOD PRESSURE: 139 MMHG | HEIGHT: 59 IN | DIASTOLIC BLOOD PRESSURE: 78 MMHG

## 2022-01-19 VITALS — DIASTOLIC BLOOD PRESSURE: 63 MMHG | SYSTOLIC BLOOD PRESSURE: 117 MMHG | OXYGEN SATURATION: 98 %

## 2022-01-19 DIAGNOSIS — I48.0 PAROXYSMAL ATRIAL FIBRILLATION: Primary | ICD-10-CM

## 2022-01-19 DIAGNOSIS — R00.2 PALPITATIONS: ICD-10-CM

## 2022-01-19 DIAGNOSIS — I49.5 SICK SINUS SYNDROME: ICD-10-CM

## 2022-01-19 DIAGNOSIS — I48.0 PAROXYSMAL ATRIAL FIBRILLATION: ICD-10-CM

## 2022-01-19 DIAGNOSIS — I36.1 NONRHEUMATIC TRICUSPID VALVE REGURGITATION: ICD-10-CM

## 2022-01-19 LAB
ACT BLD: 136 SECONDS (ref 89–137)
ACT BLD: 422 SECONDS (ref 89–137)
ACT BLD: 428 SECONDS (ref 89–137)
ALBUMIN SERPL-MCNC: 3.8 G/DL (ref 3.5–5.2)
ALBUMIN/GLOB SERPL: 1.7 G/DL
ALP SERPL-CCNC: 66 U/L (ref 39–117)
ALT SERPL W P-5'-P-CCNC: 11 U/L (ref 1–33)
ANION GAP SERPL CALCULATED.3IONS-SCNC: 11 MMOL/L (ref 5–15)
AST SERPL-CCNC: 18 U/L (ref 1–32)
BH CV ECHO MEAS - EF(MOD-BP): 65 %
BILIRUB SERPL-MCNC: 0.2 MG/DL (ref 0–1.2)
BUN SERPL-MCNC: 27 MG/DL (ref 6–20)
BUN/CREAT SERPL: 20.3 (ref 7–25)
CALCIUM SPEC-SCNC: 9 MG/DL (ref 8.6–10.5)
CHLORIDE SERPL-SCNC: 102 MMOL/L (ref 98–107)
CO2 SERPL-SCNC: 23 MMOL/L (ref 22–29)
CREAT SERPL-MCNC: 1.33 MG/DL (ref 0.57–1)
GFR SERPL CREATININE-BSD FRML MDRD: 41 ML/MIN/1.73
GLOBULIN UR ELPH-MCNC: 2.3 GM/DL
GLUCOSE SERPL-MCNC: 91 MG/DL (ref 65–99)
MAXIMAL PREDICTED HEART RATE: 161 BPM
POTASSIUM SERPL-SCNC: 3.8 MMOL/L (ref 3.5–5.2)
PROT SERPL-MCNC: 6.1 G/DL (ref 6–8.5)
SODIUM SERPL-SCNC: 136 MMOL/L (ref 136–145)
STRESS TARGET HR: 137 BPM

## 2022-01-19 PROCEDURE — 80053 COMPREHEN METABOLIC PANEL: CPT | Performed by: INTERNAL MEDICINE

## 2022-01-19 PROCEDURE — 25010000002 HYDROCORTISONE SODIUM SUCCINATE 100 MG RECONSTITUTED SOLUTION: Performed by: NURSE ANESTHETIST, CERTIFIED REGISTERED

## 2022-01-19 PROCEDURE — C1894 INTRO/SHEATH, NON-LASER: HCPCS | Performed by: INTERNAL MEDICINE

## 2022-01-19 PROCEDURE — 25010000002 MIDAZOLAM PER 1 MG: Performed by: NURSE ANESTHETIST, CERTIFIED REGISTERED

## 2022-01-19 PROCEDURE — C1733 CATH, EP, OTHR THAN COOL-TIP: HCPCS | Performed by: INTERNAL MEDICINE

## 2022-01-19 PROCEDURE — 25010000002 ONDANSETRON PER 1 MG: Performed by: NURSE ANESTHETIST, CERTIFIED REGISTERED

## 2022-01-19 PROCEDURE — C1769 GUIDE WIRE: HCPCS | Performed by: INTERNAL MEDICINE

## 2022-01-19 PROCEDURE — 25010000002 HEPARIN (PORCINE) PER 1000 UNITS: Performed by: NURSE ANESTHETIST, CERTIFIED REGISTERED

## 2022-01-19 PROCEDURE — 25010000002 PROPOFOL 10 MG/ML EMULSION: Performed by: NURSE ANESTHETIST, CERTIFIED REGISTERED

## 2022-01-19 PROCEDURE — C1759 CATH, INTRA ECHOCARDIOGRAPHY: HCPCS | Performed by: INTERNAL MEDICINE

## 2022-01-19 PROCEDURE — 93656 COMPRE EP EVAL ABLTJ ATR FIB: CPT | Performed by: INTERNAL MEDICINE

## 2022-01-19 PROCEDURE — 93320 DOPPLER ECHO COMPLETE: CPT

## 2022-01-19 PROCEDURE — 93320 DOPPLER ECHO COMPLETE: CPT | Performed by: INTERNAL MEDICINE

## 2022-01-19 PROCEDURE — 0 IOPAMIDOL PER 1 ML: Performed by: INTERNAL MEDICINE

## 2022-01-19 PROCEDURE — C1730 CATH, EP, 19 OR FEW ELECT: HCPCS | Performed by: INTERNAL MEDICINE

## 2022-01-19 PROCEDURE — C1732 CATH, EP, DIAG/ABL, 3D/VECT: HCPCS | Performed by: INTERNAL MEDICINE

## 2022-01-19 PROCEDURE — 93325 DOPPLER ECHO COLOR FLOW MAPG: CPT

## 2022-01-19 PROCEDURE — C1766 INTRO/SHEATH,STRBLE,NON-PEEL: HCPCS | Performed by: INTERNAL MEDICINE

## 2022-01-19 PROCEDURE — 85347 COAGULATION TIME ACTIVATED: CPT

## 2022-01-19 PROCEDURE — 25010000002 FENTANYL CITRATE (PF) 100 MCG/2ML SOLUTION: Performed by: NURSE ANESTHETIST, CERTIFIED REGISTERED

## 2022-01-19 PROCEDURE — C1893 INTRO/SHEATH, FIXED,NON-PEEL: HCPCS | Performed by: INTERNAL MEDICINE

## 2022-01-19 PROCEDURE — 93312 ECHO TRANSESOPHAGEAL: CPT

## 2022-01-19 PROCEDURE — 93325 DOPPLER ECHO COLOR FLOW MAPG: CPT | Performed by: INTERNAL MEDICINE

## 2022-01-19 PROCEDURE — 25010000002 PHENYLEPHRINE 10 MG/ML SOLUTION 5 ML VIAL: Performed by: NURSE ANESTHETIST, CERTIFIED REGISTERED

## 2022-01-19 PROCEDURE — 93312 ECHO TRANSESOPHAGEAL: CPT | Performed by: INTERNAL MEDICINE

## 2022-01-19 RX ORDER — PROPOFOL 10 MG/ML
VIAL (ML) INTRAVENOUS AS NEEDED
Status: DISCONTINUED | OUTPATIENT
Start: 2022-01-19 | End: 2022-01-19 | Stop reason: SURG

## 2022-01-19 RX ORDER — ONDANSETRON 2 MG/ML
4 INJECTION INTRAMUSCULAR; INTRAVENOUS ONCE AS NEEDED
Status: CANCELLED | OUTPATIENT
Start: 2022-01-19

## 2022-01-19 RX ORDER — MORPHINE SULFATE 4 MG/ML
1 INJECTION, SOLUTION INTRAMUSCULAR; INTRAVENOUS
Status: CANCELLED | OUTPATIENT
Start: 2022-01-19 | End: 2022-01-19

## 2022-01-19 RX ORDER — GLYCOPYRROLATE 1 MG/5 ML
SYRINGE (ML) INTRAVENOUS AS NEEDED
Status: DISCONTINUED | OUTPATIENT
Start: 2022-01-19 | End: 2022-01-19 | Stop reason: SURG

## 2022-01-19 RX ORDER — HYDROCODONE BITARTRATE AND ACETAMINOPHEN 5; 325 MG/1; MG/1
1 TABLET ORAL EVERY 4 HOURS PRN
Status: DISCONTINUED | OUTPATIENT
Start: 2022-01-19 | End: 2022-01-19 | Stop reason: HOSPADM

## 2022-01-19 RX ORDER — MIDAZOLAM HYDROCHLORIDE 1 MG/ML
INJECTION INTRAMUSCULAR; INTRAVENOUS AS NEEDED
Status: DISCONTINUED | OUTPATIENT
Start: 2022-01-19 | End: 2022-01-19 | Stop reason: SURG

## 2022-01-19 RX ORDER — SODIUM CHLORIDE 9 MG/ML
80 INJECTION, SOLUTION INTRAVENOUS CONTINUOUS
Status: DISCONTINUED | OUTPATIENT
Start: 2022-01-19 | End: 2022-01-19 | Stop reason: HOSPADM

## 2022-01-19 RX ORDER — DIPHENHYDRAMINE HYDROCHLORIDE 50 MG/ML
12.5 INJECTION INTRAMUSCULAR; INTRAVENOUS
Status: CANCELLED | OUTPATIENT
Start: 2022-01-19

## 2022-01-19 RX ORDER — ACETAMINOPHEN 650 MG/1
650 SUPPOSITORY RECTAL ONCE AS NEEDED
Status: CANCELLED | OUTPATIENT
Start: 2022-01-19

## 2022-01-19 RX ORDER — PANTOPRAZOLE SODIUM 40 MG/10ML
INJECTION, POWDER, LYOPHILIZED, FOR SOLUTION INTRAVENOUS AS NEEDED
Status: DISCONTINUED | OUTPATIENT
Start: 2022-01-19 | End: 2022-01-19 | Stop reason: SURG

## 2022-01-19 RX ORDER — DROPERIDOL 2.5 MG/ML
1.25 INJECTION, SOLUTION INTRAMUSCULAR; INTRAVENOUS ONCE AS NEEDED
Status: CANCELLED | OUTPATIENT
Start: 2022-01-19

## 2022-01-19 RX ORDER — ACETAMINOPHEN 650 MG/1
650 SUPPOSITORY RECTAL EVERY 4 HOURS PRN
Status: DISCONTINUED | OUTPATIENT
Start: 2022-01-19 | End: 2022-01-19 | Stop reason: HOSPADM

## 2022-01-19 RX ORDER — METHIMAZOLE 10 MG/1
TABLET ORAL EVERY EVENING
COMMUNITY
End: 2022-01-25 | Stop reason: SDUPTHER

## 2022-01-19 RX ORDER — IPRATROPIUM BROMIDE AND ALBUTEROL SULFATE 2.5; .5 MG/3ML; MG/3ML
SOLUTION RESPIRATORY (INHALATION) AS NEEDED
Status: DISCONTINUED | OUTPATIENT
Start: 2022-01-19 | End: 2022-01-19 | Stop reason: SURG

## 2022-01-19 RX ORDER — EPHEDRINE SULFATE 5 MG/ML
5 INJECTION INTRAVENOUS ONCE AS NEEDED
Status: CANCELLED | OUTPATIENT
Start: 2022-01-19

## 2022-01-19 RX ORDER — ENALAPRILAT 2.5 MG/2ML
1.25 INJECTION INTRAVENOUS ONCE AS NEEDED
Status: CANCELLED | OUTPATIENT
Start: 2022-01-19

## 2022-01-19 RX ORDER — SODIUM CHLORIDE 0.9 % (FLUSH) 0.9 %
3-10 SYRINGE (ML) INJECTION AS NEEDED
Status: DISCONTINUED | OUTPATIENT
Start: 2022-01-19 | End: 2022-01-19 | Stop reason: HOSPADM

## 2022-01-19 RX ORDER — ABALOPARATIDE 2000 UG/ML
80 INJECTION, SOLUTION SUBCUTANEOUS DAILY
COMMUNITY
End: 2022-02-11

## 2022-01-19 RX ORDER — FLUMAZENIL 0.1 MG/ML
0.1 INJECTION INTRAVENOUS AS NEEDED
Status: CANCELLED | OUTPATIENT
Start: 2022-01-19

## 2022-01-19 RX ORDER — FENTANYL CITRATE 50 UG/ML
INJECTION, SOLUTION INTRAMUSCULAR; INTRAVENOUS AS NEEDED
Status: DISCONTINUED | OUTPATIENT
Start: 2022-01-19 | End: 2022-01-19 | Stop reason: SURG

## 2022-01-19 RX ORDER — HEPARIN SODIUM 1000 [USP'U]/ML
INJECTION, SOLUTION INTRAVENOUS; SUBCUTANEOUS AS NEEDED
Status: DISCONTINUED | OUTPATIENT
Start: 2022-01-19 | End: 2022-01-19 | Stop reason: SURG

## 2022-01-19 RX ORDER — LORAZEPAM 2 MG/ML
1 INJECTION INTRAMUSCULAR
Status: CANCELLED | OUTPATIENT
Start: 2022-01-19 | End: 2022-01-26

## 2022-01-19 RX ORDER — EPHEDRINE SULFATE 50 MG/ML
INJECTION INTRAVENOUS AS NEEDED
Status: DISCONTINUED | OUTPATIENT
Start: 2022-01-19 | End: 2022-01-19 | Stop reason: SURG

## 2022-01-19 RX ORDER — FENTANYL CITRATE 50 UG/ML
50 INJECTION, SOLUTION INTRAMUSCULAR; INTRAVENOUS
Status: CANCELLED | OUTPATIENT
Start: 2022-01-19

## 2022-01-19 RX ORDER — NALOXONE HCL 0.4 MG/ML
0.4 VIAL (ML) INJECTION
Status: DISCONTINUED | OUTPATIENT
Start: 2022-01-19 | End: 2022-01-19 | Stop reason: HOSPADM

## 2022-01-19 RX ORDER — LIDOCAINE HYDROCHLORIDE 40 MG/ML
SOLUTION TOPICAL AS NEEDED
Status: DISCONTINUED | OUTPATIENT
Start: 2022-01-19 | End: 2022-01-19 | Stop reason: SURG

## 2022-01-19 RX ORDER — SODIUM CHLORIDE 9 MG/ML
100 INJECTION, SOLUTION INTRAVENOUS CONTINUOUS
Status: CANCELLED | OUTPATIENT
Start: 2022-01-19

## 2022-01-19 RX ORDER — ROCURONIUM BROMIDE 10 MG/ML
INJECTION, SOLUTION INTRAVENOUS AS NEEDED
Status: DISCONTINUED | OUTPATIENT
Start: 2022-01-19 | End: 2022-01-19 | Stop reason: SURG

## 2022-01-19 RX ORDER — METHIMAZOLE 10 MG/1
15 TABLET ORAL DAILY
COMMUNITY
End: 2022-02-01 | Stop reason: ALTCHOICE

## 2022-01-19 RX ORDER — ACETAMINOPHEN 325 MG/1
650 TABLET ORAL ONCE AS NEEDED
Status: CANCELLED | OUTPATIENT
Start: 2022-01-19

## 2022-01-19 RX ORDER — IPRATROPIUM BROMIDE AND ALBUTEROL SULFATE 2.5; .5 MG/3ML; MG/3ML
3 SOLUTION RESPIRATORY (INHALATION) ONCE AS NEEDED
Status: CANCELLED | OUTPATIENT
Start: 2022-01-19

## 2022-01-19 RX ORDER — NALOXONE HCL 0.4 MG/ML
0.4 VIAL (ML) INJECTION AS NEEDED
Status: CANCELLED | OUTPATIENT
Start: 2022-01-19

## 2022-01-19 RX ORDER — ONDANSETRON 2 MG/ML
INJECTION INTRAMUSCULAR; INTRAVENOUS AS NEEDED
Status: DISCONTINUED | OUTPATIENT
Start: 2022-01-19 | End: 2022-01-19 | Stop reason: SURG

## 2022-01-19 RX ORDER — LOSARTAN POTASSIUM 50 MG/1
50 TABLET ORAL DAILY
COMMUNITY
End: 2022-03-14

## 2022-01-19 RX ORDER — MEPERIDINE HYDROCHLORIDE 25 MG/ML
12.5 INJECTION INTRAMUSCULAR; INTRAVENOUS; SUBCUTANEOUS
Status: CANCELLED | OUTPATIENT
Start: 2022-01-19 | End: 2022-01-20

## 2022-01-19 RX ORDER — MORPHINE SULFATE 4 MG/ML
1 INJECTION, SOLUTION INTRAMUSCULAR; INTRAVENOUS EVERY 4 HOURS PRN
Status: DISCONTINUED | OUTPATIENT
Start: 2022-01-19 | End: 2022-01-19 | Stop reason: HOSPADM

## 2022-01-19 RX ORDER — SODIUM CHLORIDE 0.9 % (FLUSH) 0.9 %
3 SYRINGE (ML) INJECTION EVERY 12 HOURS SCHEDULED
Status: DISCONTINUED | OUTPATIENT
Start: 2022-01-19 | End: 2022-01-19 | Stop reason: HOSPADM

## 2022-01-19 RX ORDER — ACETAMINOPHEN 325 MG/1
650 TABLET ORAL EVERY 4 HOURS PRN
Status: DISCONTINUED | OUTPATIENT
Start: 2022-01-19 | End: 2022-01-19 | Stop reason: HOSPADM

## 2022-01-19 RX ADMIN — EPHEDRINE SULFATE 10 MG: 50 INJECTION INTRAVENOUS at 08:45

## 2022-01-19 RX ADMIN — HEPARIN SODIUM 5000 UNITS: 1000 INJECTION INTRAVENOUS; SUBCUTANEOUS at 08:57

## 2022-01-19 RX ADMIN — SODIUM CHLORIDE: 9 INJECTION, SOLUTION INTRAVENOUS at 10:04

## 2022-01-19 RX ADMIN — HEPARIN SODIUM 7000 UNITS: 1000 INJECTION INTRAVENOUS; SUBCUTANEOUS at 08:59

## 2022-01-19 RX ADMIN — SUGAMMADEX 200 MG: 100 INJECTION, SOLUTION INTRAVENOUS at 09:19

## 2022-01-19 RX ADMIN — IPRATROPIUM BROMIDE AND ALBUTEROL SULFATE 3 ML: 2.5; .5 SOLUTION RESPIRATORY (INHALATION) at 09:41

## 2022-01-19 RX ADMIN — SODIUM CHLORIDE 80 ML/HR: 9 INJECTION, SOLUTION INTRAVENOUS at 07:40

## 2022-01-19 RX ADMIN — PROPOFOL 200 MG: 10 INJECTION, EMULSION INTRAVENOUS at 08:36

## 2022-01-19 RX ADMIN — FAMOTIDINE 20 MG: 10 INJECTION, SOLUTION INTRAVENOUS at 08:12

## 2022-01-19 RX ADMIN — PHENYLEPHRINE HYDROCHLORIDE 0.5 MCG/KG/MIN: 10 INJECTION INTRAVENOUS at 08:51

## 2022-01-19 RX ADMIN — Medication 0.2 MG: at 09:23

## 2022-01-19 RX ADMIN — ONDANSETRON 4 MG: 2 INJECTION INTRAMUSCULAR; INTRAVENOUS at 09:47

## 2022-01-19 RX ADMIN — PROPOFOL 150 MG: 10 INJECTION, EMULSION INTRAVENOUS at 08:14

## 2022-01-19 RX ADMIN — LIDOCAINE HYDROCHLORIDE 1 EACH: 40 SOLUTION TOPICAL at 08:40

## 2022-01-19 RX ADMIN — ROCURONIUM BROMIDE 50 MG: 50 INJECTION, SOLUTION INTRAVENOUS at 08:36

## 2022-01-19 RX ADMIN — FENTANYL CITRATE 50 MCG: 50 INJECTION, SOLUTION INTRAMUSCULAR; INTRAVENOUS at 09:16

## 2022-01-19 RX ADMIN — PANTOPRAZOLE SODIUM 40 MG: 40 INJECTION, POWDER, FOR SOLUTION INTRAVENOUS at 09:12

## 2022-01-19 RX ADMIN — MIDAZOLAM 2 MG: 1 INJECTION INTRAMUSCULAR; INTRAVENOUS at 09:00

## 2022-01-19 RX ADMIN — MIDAZOLAM 2 MG: 1 INJECTION INTRAMUSCULAR; INTRAVENOUS at 08:12

## 2022-01-19 RX ADMIN — EPHEDRINE SULFATE 10 MG: 50 INJECTION INTRAVENOUS at 08:48

## 2022-01-19 RX ADMIN — SODIUM CHLORIDE: 9 INJECTION, SOLUTION INTRAVENOUS at 08:35

## 2022-01-19 RX ADMIN — HYDROCORTISONE SODIUM SUCCINATE 100 MG: 100 INJECTION, POWDER, FOR SOLUTION INTRAMUSCULAR; INTRAVENOUS at 08:53

## 2022-01-19 NOTE — ANESTHESIA PREPROCEDURE EVALUATION
Anesthesia Evaluation     Patient summary reviewed and Nursing notes reviewed   NPO Solid Status: > 4 hours  NPO Liquid Status: > 2 hours           Airway   Mallampati: III  TM distance: >3 FB  Neck ROM: full  Possible difficult intubation  Dental      Pulmonary    (+) a smoker Current Smoked day of surgery, shortness of breath,   Cardiovascular   Exercise tolerance: good (4-7 METS)    ECG reviewed    (+) hypertension, dysrhythmias Paroxysmal Atrial Fib, Tachycardia, PVC, PAC,     ROS comment: Afib ans SSS causing syncope    Neuro/Psych  (+) syncope, numbness, psychiatric history Anxiety,     GI/Hepatic/Renal/Endo    (+) obesity,  GERD, PUD,      Musculoskeletal     (+) back pain, radiculopathy  Abdominal    Substance History      OB/GYN          Other   arthritis,      ROS/Med Hx Other: TTE 2019: Comments  The left ventricle is grossly normal size. There is normal left ventricular  wall thickness. Left ventricular systolic function is normal. Ejection  Fraction is 55-60%. The left ventricular wall motion is normal. The right  ventricle is normal size. The right ventricular systolic function is normal.  The left atrium is mildly dilated. Right atrial size is normal. The mitral  valve is grossly normal in structure. There is no mitral valve stenosis. There  is trace mitral regurgitation. The tricuspid valve is not well visualized, but  is grossly normal. There is no tricuspid stenosis. There is mild tricuspid  regurgitation. Right ventricular systolic pressure is normal. The aortic valve  is trileaflet. The aortic valve opens well. No hemodynamically significant  valvular aortic stenosis. Mild aortic regurgitation. The pulmonic valve is not  well visualized. There is no pulmonic valvular stenosis. Trace pulmonic  valvular regurgitation. The aortic root is normal size. There is no  pericardial effusion.        Interpretation  Left ventricular systolic function is normal.  Ejection Fraction is 55-60%  The left  ventricular wall motion is normal.                         Anesthesia Plan    ASA 3     general     intravenous induction     Anesthetic plan, all risks, benefits, and alternatives have been provided, discussed and informed consent has been obtained with: patient.    Plan discussed with CRNA.

## 2022-01-19 NOTE — ANESTHESIA PROCEDURE NOTES
Airway  Urgency: elective    Date/Time: 1/19/2022 8:40 AM  Airway not difficult    General Information and Staff    Anesthesiologist: Mag Ponce MD  CRNA: Brooke Driscoll CRNA    Indications and Patient Condition  Indications for airway management: airway protection    Preoxygenated: yes  MILS maintained throughout  Mask difficulty assessment: 2 - vent by mask + OA or adjuvant +/- NMBA    Final Airway Details  Final airway type: endotracheal airway      Successful airway: ETT  Cuffed: yes   Successful intubation technique: direct laryngoscopy  Facilitating devices/methods: intubating stylet  Endotracheal tube insertion site: oral  Blade: Zhang  Blade size: 3  ETT size (mm): 7.0  Cormack-Lehane Classification: grade I - full view of glottis  Placement verified by: chest auscultation and capnometry   Measured from: lips  ETT/EBT  to lips (cm): 21  Number of attempts at approach: 1  Assessment: lips, teeth, and gum same as pre-op and atraumatic intubation

## 2022-01-19 NOTE — ANESTHESIA POSTPROCEDURE EVALUATION
Patient: Abi Rivera    Procedure Summary     Date: 01/19/22 Room / Location: [place] CATH LAB 3 / [facility] CATH INVASIVE LOCATION    Anesthesia Start: 0812 Anesthesia Stop: 1009    Procedures:       EP/Ablation Cherry and Ivan aware (N/A )      Intracardiac echocardiogram (N/A ) Diagnosis:       Paroxysmal atrial fibrillation (HCC)      Sick sinus syndrome (HCC)      Palpitations      Nonrheumatic tricuspid valve regurgitation      (Post pulmonary vein isolation without complications)    Providers: Александр Sheth MD Provider: Mag Ponce MD    Anesthesia Type: general ASA Status: 3          Anesthesia Type: general    Vitals  Vitals Value Taken Time   /64 01/19/22 1055   Temp 97 °F (36.1 °C) 01/19/22 1008   Pulse 78 01/19/22 1055   Resp 15 01/19/22 1010   SpO2 95 % 01/19/22 1055   Vitals shown include unvalidated device data.        Post Anesthesia Care and Evaluation    Patient location during evaluation: PACU  Patient participation: complete - patient participated  Pain management: adequate  Airway patency: patent  Anesthetic complications: No anesthetic complications  PONV Status: none  Cardiovascular status: acceptable and hemodynamically stable  Respiratory status: acceptable and spontaneous ventilation  Hydration status: acceptable

## 2022-01-25 RX ORDER — METHIMAZOLE 10 MG/1
10 TABLET ORAL 2 TIMES DAILY
Qty: 60 TABLET | Refills: 0 | COMMUNITY
Start: 2022-01-25

## 2022-02-01 ENCOUNTER — OFFICE VISIT (OUTPATIENT)
Dept: CARDIOLOGY | Facility: CLINIC | Age: 62
End: 2022-02-01

## 2022-02-01 VITALS
HEIGHT: 59 IN | HEART RATE: 50 BPM | OXYGEN SATURATION: 100 % | BODY MASS INDEX: 33.47 KG/M2 | DIASTOLIC BLOOD PRESSURE: 82 MMHG | WEIGHT: 166 LBS | SYSTOLIC BLOOD PRESSURE: 143 MMHG

## 2022-02-01 DIAGNOSIS — I48.0 PAROXYSMAL ATRIAL FIBRILLATION: Chronic | ICD-10-CM

## 2022-02-01 DIAGNOSIS — I49.5 SICK SINUS SYNDROME: Primary | ICD-10-CM

## 2022-02-01 DIAGNOSIS — I10 PRIMARY HYPERTENSION: Chronic | ICD-10-CM

## 2022-02-01 DIAGNOSIS — R00.2 PALPITATIONS: ICD-10-CM

## 2022-02-01 PROCEDURE — 93000 ELECTROCARDIOGRAM COMPLETE: CPT | Performed by: NURSE PRACTITIONER

## 2022-02-01 PROCEDURE — 99214 OFFICE O/P EST MOD 30 MIN: CPT | Performed by: NURSE PRACTITIONER

## 2022-02-01 RX ORDER — ASPIRIN 81 MG/1
81 TABLET ORAL DAILY
COMMUNITY
Start: 2022-02-01

## 2022-02-01 NOTE — PROGRESS NOTES
[facility] CARDIOLOGY      REASON FOR FOLLOW-UP:  Follow-up ablation          Chief Complaint   Patient presents with   • Atrial Fibrillation     2 week ablation f/u          Dear Lay, Chaparrita Gibbs MD        History of Present Illness   61-year-old female with history of recurrent symptomatic paroxysmal atrial fibrillation and failed medical therapy, multiple episodes of spontaneous syncope noted to have long sinus pauses on termination of A. fib noted on monitor.  She underwent A. fib ablation 1/19/2022.  ADAM performed at that time showed mild concentric LVH, small pericardial effusion, normal LV systolic function, mild MR/TR.  She presents today in follow-up from that procedure.  Today, the patient reports 2 episodes immediately following procedure of palpitations that lasted less than 2 minutes each.  She denies any shortness of breath but does report occasional tightness/pressure sensation that is mild, occurred prior to procedure but more noticeable since procedure.  She reports feeling more fatigued.  She did verbalize concern about her heart rate which is currently 50.  She denies any dizziness, lightheadedness, near syncopal or syncopal episodes since procedure.  Her site is without bruising or hematoma.      ASSESSMENT:  Recurrent atrial fibrillation with failed medical therapy with long sinus pauses following termination of arrhythmia  Syncope and collapse secondary to above  Hypothyroidism  History of perforated gastric ulcer status post surgical repair  Essential hypertension          PLAN:  Continue aspirin.  Patient took Xarelto x14 days  Paroxysmal atrial fibrillation causing sinus pauses at termination status post ablation  Call if any change in symptoms  Follow-up Dr. Sheth in 3 months          The following portions of the patient's history were reviewed and updated as appropriate: allergies, current medications, past family history, past medical history, past  social history, past surgical history and problem list.    REVIEW OF SYSTEMS:    Review of Systems   Constitutional: Positive for malaise/fatigue.   Cardiovascular: Positive for chest pain.   All other systems reviewed and are negative.      Vitals:    02/01/22 1434   BP: 143/82   Pulse: 50   SpO2: 100%         PHYSICAL EXAM:    General: Alert, cooperative, no distress, appears stated age  Head:  Normocephalic, atraumatic, mucous membranes moist  Eyes:  Conjunctiva/corneas clear, EOM's intact     Neck:  Supple,  no JVD or bruit     Lungs: Clear to auscultation bilaterally, no wheezes rhonchi rales are noted  Chest wall: No tenderness  Musculoskeletal:   Ambulates freely without assistance  Heart::  Regular rate and rhythm, S1 and S2 normal, no murmur, rub or gallop  Abdomen: Soft, non-tender, nondistended, bowel sounds active, no abdominal bruit  Extremities: No cyanosis, clubbing, or edema   Pulses: 2+ and symmetric all extremities  Skin:  No rashes or lesions  Neuro/psych: A&O x3. CN II through XII are grossly intact with appropriate affect        Past Medical History:   Diagnosis Date   • Adenomatous polyp of colon 6/20/2016   • Anemia    • Anxiety 6/16/2016   • Arthritis of foot 1/10/2019   • Atrial fibrillation (HCC) 6/16/2016   • Avascular necrosis of bone ([facility]) 1/10/2019   • Chronic pain 1/22/2019   • Closed displaced fracture 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 9/19/2019    Added automatically from request for surgery [id number]   • Hypertension 6/16/2016   • Hyperthyroidism 11/2/2016   • Mood disorder (HCC) 6/16/2016   • Osteoporosis without current pathological fracture 6/4/2020   • Pulmonary hypertension (HCC) 6/16/2016   • Sick sinus syndrome ([facility]) 1/25/2017   • Tricuspid valve insufficiency 6/16/2016       Past Surgical History:   Procedure Laterality Date   • ANKLE OPEN REDUCTION INTERNAL FIXATION     • APPENDECTOMY     • BILATERAL BREAST REDUCTION     • CARDIAC CATHETERIZATION N/A  1/19/2022    Procedure: Intracardiac echocardiogram;  Surgeon: Александр Sheth MD;  Location: [facility] CATH INVASIVE LOCATION;  Service: Cardiovascular;  Laterality: N/A;   • CARDIAC ELECTROPHYSIOLOGY PROCEDURE N/A 1/19/2022    Procedure: EP/Ablation [place] and Ivan aware;  Surgeon: Александр Sheth MD;  Location: [facility] CATH INVASIVE LOCATION;  Service: Cardiovascular;  Laterality: N/A;   • EXPLORATORY LAPAROTOMY N/A 3/19/2021    Procedure: LAPAROTOMY EXPLORATORY with repair of perforated gastric ulcer, oversew of perforated gastric ulcer, placement of kristie patch, biopsy of stomach;  Surgeon: Luisito Velasco DO;  Location: [facility] MAIN OR;  Service: General;  Laterality: N/A;   • HIP SURGERY      Left total hip   • ORIF FOOT FRACTURE Right 9/30/2019    Procedure: OPEN REDUCTION INTERNAL FIXATION OF FIFTH METATARSAL FRACTURE, LOOSE BODY EXCISION/REMOVAL FROM THE TALONAVICULAR JOINT OF THE RIGHT FOOT;  Surgeon: JOSE Fry DPM;  Location: [facility] MAIN OR;  Service: Podiatry   • ORIF TIBIA/FIBULA FRACTURES Left    • REDUCTION MAMMAPLASTY     • US GUIDED CYST ASPIRATION BREAST N/A 7/26/2021         Current Outpatient Medications:   •  Abaloparatide (Tymlos) 3120 MCG/1.56ML solution pen-injector, Inject 80 mcg under the skin into the appropriate area as directed Daily., Disp: , Rfl:   •  acetaminophen (TYLENOL) 500 MG tablet, Take 500 mg by mouth Every 6 (Six) Hours As Needed for Mild Pain ., Disp: , Rfl:   •  calcium carb-cholecalciferol (CALCIUM 600+D) 600-800 MG-UNIT tablet, Take 1 tablet by mouth Daily., Disp: , Rfl:   •  ferrous sulfate 325 (65 FE) MG tablet, Take 325 mg by mouth Daily With Breakfast., Disp: , Rfl:   •  Glucosamine-Chondroit-Vit C-Mn (GLUCOSAMINE 1500 COMPLEX PO), Take 1 tablet by mouth Daily., Disp: , Rfl:   •  Insupen Ultrafin 31G X 8 MM misc, Daily., Disp: , Rfl:   •  losartan (COZAAR) 50 MG tablet, Take 50 mg by mouth Daily., Disp: , Rfl:   •  MAGNESIUM OXIDE PO, Take 250 mg by  "mouth Daily., Disp: , Rfl:   •  methIMAzole (TAPAZOLE) 10 MG tablet, Take 1 tablet by mouth 2 (Two) Times a Day., Disp: 60 tablet, Rfl: 0  •  Multiple Vitamin (MULTI-VITAMIN) tablet, Take 1 tablet by mouth Daily., Disp: , Rfl:   •  Omega-3 Fatty Acids (FISH OIL) 1200 MG capsule capsule, Take 1,200 mg by mouth Daily With Breakfast., Disp: , Rfl:   •  omeprazole (priLOSEC) 20 MG capsule, Take 20 mg by mouth Daily., Disp: , Rfl:   •  Potassium Gluconate 550 MG tablet, Take 1 tablet by mouth Daily., Disp: , Rfl:   •  rivaroxaban (Xarelto) 20 MG tablet, Take 1 tablet by mouth Daily for 14 days., Disp: 14 tablet, Rfl: 0  •  Sharps Container (BD Nestable Sharps ) misc, USE TO DISPOSE OF SHARPS, Disp: , Rfl:   •  methIMAzole (TAPAZOLE) 10 MG tablet, Take 15 mg by mouth Daily., Disp: , Rfl:     No Known Allergies    Family History   Problem Relation Age of Onset   • Hypertension Mother    • Thyroid disease Mother    • Diabetes Father    • Heart disease Father    • Hypertension Father        Social History     Tobacco Use   • Smoking status: Current Every Day Smoker     Packs/day: 0.50     Years: 34.00     Pack years: 17.00     Types: Cigarettes     Start date: 1987   • Smokeless tobacco: Never Used   Substance Use Topics   • Alcohol use: Yes     Comment: rarely/socially            Current Electrocardiogram:    ECG 12 Lead    Date/Time: 2/1/2022 4:25 PM  Performed by: Abby Nguyen APRN  Authorized by: Abby Nguyen APRN   Comparison: not compared with previous ECG   Rhythm: sinus rhythm and sinus bradycardia  BPM: 50  Q waves: V1, V2 and V3                    EMR Dragon/Transcription:   \"Dictated utilizing Dragon dictation\".       "

## 2022-02-08 ENCOUNTER — TELEPHONE (OUTPATIENT)
Dept: SURGERY | Facility: CLINIC | Age: 62
End: 2022-02-08

## 2022-02-08 NOTE — TELEPHONE ENCOUNTER
PATIENT CALLED TO SCHEDULE SURGERY. ORDERS ARE  CAN YOU PUT IN NEW ONES OR DO YOU NEED TO SEE THE PATIENT IN THE OFFICE FIRST SINCE YOU HAVEN'T SEEN HER SINCE 2021?

## 2022-02-09 ENCOUNTER — PREP FOR SURGERY (OUTPATIENT)
Dept: OTHER | Facility: HOSPITAL | Age: 62
End: 2022-02-09

## 2022-02-09 DIAGNOSIS — K43.0 INCISIONAL HERNIA WITH OBSTRUCTION BUT NO GANGRENE: Primary | ICD-10-CM

## 2022-02-09 RX ORDER — SODIUM CHLORIDE 9 MG/ML
100 INJECTION, SOLUTION INTRAVENOUS CONTINUOUS
Status: CANCELLED | OUTPATIENT
Start: 2022-02-09

## 2022-02-09 NOTE — H&P
Subjective   Abi Rivera is a 61 y.o. female.     History of present illness  Dr. Lai is seen in the office today for an area of drainage from her midline incision.  He had called to talk to Dr. Gardner over the weekend and had some redness and drainage and he had just called in some Bactrim for her.  In the office today the area shows an opening of about 2 cm.  I suspect she may have had a stitch granuloma that got infected the skin probably bubbled up and has sloughed.  There does not appear to be any sort of enterocutaneous fistula.    Past Medical History:   Diagnosis Date   • Adenomatous polyp of colon 6/20/2016   • Anemia    • Anxiety 6/16/2016   • Arthritis of foot 1/10/2019   • Atrial fibrillation (HCC) 6/16/2016   • Avascular necrosis of bone (Formerly [facility]) 1/10/2019   • Chronic pain 1/22/2019   • Closed displaced fracture 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 9/19/2019    Added automatically from request for surgery [id number]   • Hypertension 6/16/2016   • Hyperthyroidism 11/2/2016   • Mood disorder (HCC) 6/16/2016   • Osteoporosis without current pathological fracture 6/4/2020   • Pulmonary hypertension (Formerly [facility]) 6/16/2016   • Sick sinus syndrome (Formerly [facility]) 1/25/2017   • Tricuspid valve insufficiency 6/16/2016       Past Surgical History:   Procedure Laterality Date   • ANKLE OPEN REDUCTION INTERNAL FIXATION     • APPENDECTOMY     • BILATERAL BREAST REDUCTION     • CARDIAC CATHETERIZATION N/A 1/19/2022    Procedure: Intracardiac echocardiogram;  Surgeon: Александр Sheth MD;  Location: [facility] CATH INVASIVE LOCATION;  Service: Cardiovascular;  Laterality: N/A;   • CARDIAC ELECTROPHYSIOLOGY PROCEDURE N/A 1/19/2022    Procedure: EP/Ablation Cherry and Ivan quiroz;  Surgeon: Александр Sheth MD;  Location: [facility] CATH INVASIVE LOCATION;  Service: Cardiovascular;  Laterality: N/A;   • EXPLORATORY LAPAROTOMY N/A 3/19/2021    Procedure: LAPAROTOMY EXPLORATORY with repair of perforated gastric ulcer, oversew  of perforated gastric ulcer, placement of kristie patch, biopsy of stomach;  Surgeon: Luisito Velasco DO;  Location: [facility] MAIN OR;  Service: General;  Laterality: N/A;   • HIP SURGERY      Left total hip   • ORIF FOOT FRACTURE Right 9/30/2019    Procedure: OPEN REDUCTION INTERNAL FIXATION OF FIFTH METATARSAL FRACTURE, LOOSE BODY EXCISION/REMOVAL FROM THE TALONAVICULAR JOINT OF THE RIGHT FOOT;  Surgeon: JOSE Fry DPM;  Location: [facility] MAIN OR;  Service: Podiatry   • ORIF TIBIA/FIBULA FRACTURES Left    • REDUCTION MAMMAPLASTY     • US GUIDED CYST ASPIRATION BREAST N/A 7/26/2021       Outpatient Encounter Medications as of 2/9/2022   Medication Sig Dispense Refill   • Abaloparatide (Tymlos) 3120 MCG/1.56ML solution pen-injector Inject 80 mcg under the skin into the appropriate area as directed Daily.     • acetaminophen (TYLENOL) 500 MG tablet Take 500 mg by mouth Every 6 (Six) Hours As Needed for Mild Pain .     • aspirin (aspirin) 81 MG EC tablet Take 1 tablet by mouth Daily.     • calcium carb-cholecalciferol (CALCIUM 600+D) 600-800 MG-UNIT tablet Take 1 tablet by mouth Daily.     • ferrous sulfate 325 (65 FE) MG tablet Take 325 mg by mouth Daily With Breakfast.     • Glucosamine-Chondroit-Vit C-Mn (GLUCOSAMINE 1500 COMPLEX PO) Take 1 tablet by mouth Daily.     • Insupen Ultrafin 31G X 8 MM misc Daily.     • losartan (COZAAR) 50 MG tablet Take 50 mg by mouth Daily.     • MAGNESIUM OXIDE PO Take 250 mg by mouth Daily.     • methIMAzole (TAPAZOLE) 10 MG tablet Take 1 tablet by mouth 2 (Two) Times a Day. 60 tablet 0   • Multiple Vitamin (MULTI-VITAMIN) tablet Take 1 tablet by mouth Daily.     • Omega-3 Fatty Acids (FISH OIL) 1200 MG capsule capsule Take 1,200 mg by mouth Daily With Breakfast.     • omeprazole (priLOSEC) 20 MG capsule Take 20 mg by mouth Daily.     • Potassium Gluconate 550 MG tablet Take 1 tablet by mouth Daily.     • Sharps Container (BD Nestable Sharps ) misc USE TO DISPOSE OF  SHARPS       No facility-administered encounter medications on file as of 2/9/2022.       No Known Allergies    Family History   Problem Relation Age of Onset   • Hypertension Mother    • Thyroid disease Mother    • Diabetes Father    • Heart disease Father    • Hypertension Father        Social History     Socioeconomic History   • Marital status:    Tobacco Use   • Smoking status: Current Every Day Smoker     Packs/day: 0.50     Years: 34.00     Pack years: 17.00     Types: Cigarettes     Start date: 1987   • Smokeless tobacco: Never Used   Vaping Use   • Vaping Use: Never used   Substance and Sexual Activity   • Alcohol use: Yes     Comment: rarely/socially    • Drug use: No   • Sexual activity: Defer       The following portions of the patient's history were reviewed and updated as appropriate: allergies, current medications, past family history, past medical history, past social history, past surgical history and problem list.    Objective   Review of systems is unchanged from previous visit.  I would refer you to it for further details.  Physical exam limited the system at hand.  It shows her to have in the mid part of the incision midway between the xiphoid and the umbilicus and opening where it looks like she probably had some skin that was infected and blistered and sloughed.  I do not see a stitch exposed but I suspect that one of the underlying permanent stitches has caused a granuloma that got infected.  Assessment/Plan   There are no diagnoses linked to this encounter.    Impression: Probable infected stitch granuloma with some skin slough    Recommendation: Continue the Bactrim for the full course.  Daily dressing changes.  Recheck in the office in 1 week           Luisito Velasco DO  2/9/2022  10:58 EST

## 2022-02-11 RX ORDER — ABALOPARATIDE 2000 UG/ML
INJECTION, SOLUTION SUBCUTANEOUS
Qty: 1.56 ML | Refills: 3 | Status: ON HOLD | OUTPATIENT
Start: 2022-02-11 | End: 2022-03-03

## 2022-02-21 ENCOUNTER — LAB (OUTPATIENT)
Dept: LAB | Facility: HOSPITAL | Age: 62
End: 2022-02-21

## 2022-02-21 DIAGNOSIS — K43.0 INCISIONAL HERNIA WITH OBSTRUCTION BUT NO GANGRENE: ICD-10-CM

## 2022-02-21 LAB
ALBUMIN SERPL-MCNC: 5.1 G/DL (ref 3.5–5.2)
ALBUMIN/GLOB SERPL: 2.6 G/DL
ALP SERPL-CCNC: 77 U/L (ref 39–117)
ALT SERPL W P-5'-P-CCNC: 12 U/L (ref 1–33)
ANION GAP SERPL CALCULATED.3IONS-SCNC: 12.4 MMOL/L (ref 5–15)
AST SERPL-CCNC: 20 U/L (ref 1–32)
BASOPHILS # BLD AUTO: 0.08 10*3/MM3 (ref 0–0.2)
BASOPHILS NFR BLD AUTO: 1 % (ref 0–1.5)
BILIRUB SERPL-MCNC: 0.3 MG/DL (ref 0–1.2)
BUN SERPL-MCNC: 21 MG/DL (ref 6–20)
BUN/CREAT SERPL: 13.2 (ref 7–25)
CALCIUM SPEC-SCNC: 9.8 MG/DL (ref 8.6–10.5)
CHLORIDE SERPL-SCNC: 92 MMOL/L (ref 98–107)
CO2 SERPL-SCNC: 27.6 MMOL/L (ref 22–29)
CREAT SERPL-MCNC: 1.59 MG/DL (ref 0.57–1)
DEPRECATED RDW RBC AUTO: 44.1 FL (ref 37–54)
EOSINOPHIL # BLD AUTO: 0.21 10*3/MM3 (ref 0–0.4)
EOSINOPHIL NFR BLD AUTO: 2.5 % (ref 0.3–6.2)
ERYTHROCYTE [DISTWIDTH] IN BLOOD BY AUTOMATED COUNT: 12.5 % (ref 12.3–15.4)
GFR SERPL CREATININE-BSD FRML MDRD: 33 ML/MIN/1.73
GLOBULIN UR ELPH-MCNC: 2 GM/DL
GLUCOSE SERPL-MCNC: 85 MG/DL (ref 65–99)
HCT VFR BLD AUTO: 39 % (ref 34–46.6)
HGB BLD-MCNC: 13.8 G/DL (ref 12–15.9)
IMM GRANULOCYTES # BLD AUTO: 0.01 10*3/MM3 (ref 0–0.05)
IMM GRANULOCYTES NFR BLD AUTO: 0.1 % (ref 0–0.5)
LYMPHOCYTES # BLD AUTO: 2.41 10*3/MM3 (ref 0.7–3.1)
LYMPHOCYTES NFR BLD AUTO: 29.1 % (ref 19.6–45.3)
MCH RBC QN AUTO: 34.8 PG (ref 26.6–33)
MCHC RBC AUTO-ENTMCNC: 35.4 G/DL (ref 31.5–35.7)
MCV RBC AUTO: 98.5 FL (ref 79–97)
MONOCYTES # BLD AUTO: 0.74 10*3/MM3 (ref 0.1–0.9)
MONOCYTES NFR BLD AUTO: 8.9 % (ref 5–12)
NEUTROPHILS NFR BLD AUTO: 4.84 10*3/MM3 (ref 1.7–7)
NEUTROPHILS NFR BLD AUTO: 58.4 % (ref 42.7–76)
NRBC BLD AUTO-RTO: 0 /100 WBC (ref 0–0.2)
PLATELET # BLD AUTO: 264 10*3/MM3 (ref 140–450)
PMV BLD AUTO: 9.5 FL (ref 6–12)
POTASSIUM SERPL-SCNC: 4.6 MMOL/L (ref 3.5–5.2)
PROT SERPL-MCNC: 7.1 G/DL (ref 6–8.5)
RBC # BLD AUTO: 3.96 10*6/MM3 (ref 3.77–5.28)
SODIUM SERPL-SCNC: 132 MMOL/L (ref 136–145)
WBC NRBC COR # BLD: 8.29 10*3/MM3 (ref 3.4–10.8)

## 2022-02-21 PROCEDURE — 36415 COLL VENOUS BLD VENIPUNCTURE: CPT

## 2022-02-21 PROCEDURE — 80053 COMPREHEN METABOLIC PANEL: CPT

## 2022-02-21 PROCEDURE — 85025 COMPLETE CBC W/AUTO DIFF WBC: CPT

## 2022-02-21 RX ORDER — IBUPROFEN 200 MG
400 TABLET ORAL AS NEEDED
COMMUNITY

## 2022-02-22 ENCOUNTER — TELEPHONE (OUTPATIENT)
Dept: CARDIOLOGY | Facility: CLINIC | Age: 62
End: 2022-02-22

## 2022-02-22 NOTE — TELEPHONE ENCOUNTER
Spoke with pt advised to hold aspirin 7 days prior to procedure, and that clearance has been sent to Dr Velasco. Pt verbalized understanding and will call with any other questions.

## 2022-02-22 NOTE — TELEPHONE ENCOUNTER
----- Message from Belinda Brink MA sent at 2/16/2022  3:49 PM EST -----  Pt calling regarding a question with her clearance form.  Asking for you to call at your convenience.  I told her it would be tomorrow as you were in a satellite office today.

## 2022-02-28 ENCOUNTER — LAB (OUTPATIENT)
Dept: LAB | Facility: HOSPITAL | Age: 62
End: 2022-02-28

## 2022-02-28 DIAGNOSIS — Z01.818 PREOP TESTING: Primary | ICD-10-CM

## 2022-02-28 PROCEDURE — U0004 COV-19 TEST NON-CDC HGH THRU: HCPCS

## 2022-03-01 ENCOUNTER — ANESTHESIA EVENT (OUTPATIENT)
Dept: PERIOP | Facility: HOSPITAL | Age: 62
End: 2022-03-01

## 2022-03-01 LAB — SARS-COV-2 ORF1AB RESP QL NAA+PROBE: NOT DETECTED

## 2022-03-02 ENCOUNTER — ANESTHESIA (OUTPATIENT)
Dept: PERIOP | Facility: HOSPITAL | Age: 62
End: 2022-03-02

## 2022-03-02 ENCOUNTER — HOSPITAL ENCOUNTER (INPATIENT)
Facility: HOSPITAL | Age: 62
LOS: 5 days | Discharge: HOME | End: 2022-03-07
Attending: SURGERY | Admitting: INTERNAL MEDICINE

## 2022-03-02 DIAGNOSIS — K43.0 INCISIONAL HERNIA WITH OBSTRUCTION BUT NO GANGRENE: ICD-10-CM

## 2022-03-02 PROCEDURE — 15734 MUSCLE-SKIN GRAFT TRUNK: CPT | Performed by: REGISTERED NURSE

## 2022-03-02 PROCEDURE — 49568 PR IMPLANT MESH HERNIA REPAIR/DEBRIDEMENT CLOSURE: CPT | Performed by: SURGERY

## 2022-03-02 PROCEDURE — 49560 PR REPAIR INCISIONAL HERNIA,REDUCIBLE: CPT | Performed by: REGISTERED NURSE

## 2022-03-02 PROCEDURE — 25010000002 DEXAMETHASONE PER 1 MG: Performed by: ANESTHESIOLOGIST ASSISTANT

## 2022-03-02 PROCEDURE — 88302 TISSUE EXAM BY PATHOLOGIST: CPT | Performed by: SURGERY

## 2022-03-02 PROCEDURE — C1781 MESH (IMPLANTABLE): HCPCS | Performed by: SURGERY

## 2022-03-02 PROCEDURE — 25010000002 FENTANYL CITRATE (PF) 100 MCG/2ML SOLUTION: Performed by: ANESTHESIOLOGIST ASSISTANT

## 2022-03-02 PROCEDURE — 94799 UNLISTED PULMONARY SVC/PX: CPT

## 2022-03-02 PROCEDURE — 49560 PR REPAIR INCISIONAL HERNIA,REDUCIBLE: CPT | Performed by: SURGERY

## 2022-03-02 PROCEDURE — 0WUF0JZ SUPPLEMENT ABDOMINAL WALL WITH SYNTHETIC SUBSTITUTE, OPEN APPROACH: ICD-10-PCS | Performed by: SURGERY

## 2022-03-02 PROCEDURE — 15734 MUSCLE-SKIN GRAFT TRUNK: CPT | Performed by: SURGERY

## 2022-03-02 PROCEDURE — 25010000002 MIDAZOLAM PER 1 MG: Performed by: ANESTHESIOLOGIST ASSISTANT

## 2022-03-02 PROCEDURE — 25010000002 MORPHINE PER 10 MG: Performed by: ANESTHESIOLOGY

## 2022-03-02 PROCEDURE — 25010000002 HYDROMORPHONE PER 4 MG: Performed by: SURGERY

## 2022-03-02 PROCEDURE — 25010000002 PROPOFOL 10 MG/ML EMULSION: Performed by: ANESTHESIOLOGIST ASSISTANT

## 2022-03-02 PROCEDURE — 25010000002 PHENYLEPHRINE 10 MG/ML SOLUTION 5 ML VIAL: Performed by: ANESTHESIOLOGIST ASSISTANT

## 2022-03-02 PROCEDURE — 25010000002 CEFAZOLIN PER 500 MG: Performed by: SURGERY

## 2022-03-02 PROCEDURE — 25010000002 HYDROMORPHONE PER 4 MG: Performed by: ANESTHESIOLOGIST ASSISTANT

## 2022-03-02 PROCEDURE — 25010000002 LORAZEPAM PER 2 MG: Performed by: NURSE ANESTHETIST, CERTIFIED REGISTERED

## 2022-03-02 PROCEDURE — 49568 PR IMPLANT MESH HERNIA REPAIR/DEBRIDEMENT CLOSURE: CPT | Performed by: REGISTERED NURSE

## 2022-03-02 PROCEDURE — 76942 ECHO GUIDE FOR BIOPSY: CPT | Performed by: SURGERY

## 2022-03-02 DEVICE — BARD COMPOSIX L/P MESH
Type: IMPLANTABLE DEVICE | Site: ABDOMEN | Status: FUNCTIONAL
Brand: BARD COMPOSIX L/P MESH

## 2022-03-02 DEVICE — MESH FLUT SHT 3X6IN: Type: IMPLANTABLE DEVICE | Site: ABDOMEN | Status: FUNCTIONAL

## 2022-03-02 RX ORDER — ONDANSETRON 2 MG/ML
4 INJECTION INTRAMUSCULAR; INTRAVENOUS ONCE AS NEEDED
Status: DISCONTINUED | OUTPATIENT
Start: 2022-03-02 | End: 2022-03-02 | Stop reason: HOSPADM

## 2022-03-02 RX ORDER — ACETAMINOPHEN 325 MG/1
650 TABLET ORAL ONCE AS NEEDED
Status: DISCONTINUED | OUTPATIENT
Start: 2022-03-02 | End: 2022-03-02 | Stop reason: HOSPADM

## 2022-03-02 RX ORDER — MEPERIDINE HYDROCHLORIDE 25 MG/ML
12.5 INJECTION INTRAMUSCULAR; INTRAVENOUS; SUBCUTANEOUS
Status: DISCONTINUED | OUTPATIENT
Start: 2022-03-02 | End: 2022-03-02 | Stop reason: HOSPADM

## 2022-03-02 RX ORDER — PHENYLEPHRINE HCL IN 0.9% NACL 1 MG/10 ML
SYRINGE (ML) INTRAVENOUS AS NEEDED
Status: DISCONTINUED | OUTPATIENT
Start: 2022-03-02 | End: 2022-03-02 | Stop reason: SURG

## 2022-03-02 RX ORDER — HYDROMORPHONE HCL 110MG/55ML
0.5 PATIENT CONTROLLED ANALGESIA SYRINGE INTRAVENOUS
Status: DISCONTINUED | OUTPATIENT
Start: 2022-03-02 | End: 2022-03-07 | Stop reason: HOSPADM

## 2022-03-02 RX ORDER — ENALAPRILAT 2.5 MG/2ML
1.25 INJECTION INTRAVENOUS ONCE AS NEEDED
Status: DISCONTINUED | OUTPATIENT
Start: 2022-03-02 | End: 2022-03-02 | Stop reason: HOSPADM

## 2022-03-02 RX ORDER — FENTANYL CITRATE 50 UG/ML
50 INJECTION, SOLUTION INTRAMUSCULAR; INTRAVENOUS
Status: DISCONTINUED | OUTPATIENT
Start: 2022-03-02 | End: 2022-03-02

## 2022-03-02 RX ORDER — DEXAMETHASONE SODIUM PHOSPHATE 4 MG/ML
INJECTION, SOLUTION INTRA-ARTICULAR; INTRALESIONAL; INTRAMUSCULAR; INTRAVENOUS; SOFT TISSUE
Status: COMPLETED | OUTPATIENT
Start: 2022-03-02 | End: 2022-03-02

## 2022-03-02 RX ORDER — FENTANYL CITRATE 50 UG/ML
50 INJECTION, SOLUTION INTRAMUSCULAR; INTRAVENOUS
Status: DISCONTINUED | OUTPATIENT
Start: 2022-03-02 | End: 2022-03-02 | Stop reason: HOSPADM

## 2022-03-02 RX ORDER — PROPOFOL 10 MG/ML
VIAL (ML) INTRAVENOUS AS NEEDED
Status: DISCONTINUED | OUTPATIENT
Start: 2022-03-02 | End: 2022-03-02 | Stop reason: SURG

## 2022-03-02 RX ORDER — HYDROMORPHONE HCL 110MG/55ML
0.25 PATIENT CONTROLLED ANALGESIA SYRINGE INTRAVENOUS
Status: DISCONTINUED | OUTPATIENT
Start: 2022-03-02 | End: 2022-03-02 | Stop reason: HOSPADM

## 2022-03-02 RX ORDER — FENTANYL CITRATE 50 UG/ML
INJECTION, SOLUTION INTRAMUSCULAR; INTRAVENOUS AS NEEDED
Status: DISCONTINUED | OUTPATIENT
Start: 2022-03-02 | End: 2022-03-02 | Stop reason: SURG

## 2022-03-02 RX ORDER — FAMOTIDINE 10 MG/ML
20 INJECTION, SOLUTION INTRAVENOUS 2 TIMES DAILY
Status: DISCONTINUED | OUTPATIENT
Start: 2022-03-02 | End: 2022-03-03

## 2022-03-02 RX ORDER — IPRATROPIUM BROMIDE AND ALBUTEROL SULFATE 2.5; .5 MG/3ML; MG/3ML
3 SOLUTION RESPIRATORY (INHALATION) ONCE AS NEEDED
Status: DISCONTINUED | OUTPATIENT
Start: 2022-03-02 | End: 2022-03-02

## 2022-03-02 RX ORDER — SODIUM CHLORIDE 9 MG/ML
100 INJECTION, SOLUTION INTRAVENOUS CONTINUOUS
Status: DISCONTINUED | OUTPATIENT
Start: 2022-03-02 | End: 2022-03-07

## 2022-03-02 RX ORDER — IPRATROPIUM BROMIDE AND ALBUTEROL SULFATE 2.5; .5 MG/3ML; MG/3ML
3 SOLUTION RESPIRATORY (INHALATION) ONCE AS NEEDED
Status: DISCONTINUED | OUTPATIENT
Start: 2022-03-02 | End: 2022-03-02 | Stop reason: HOSPADM

## 2022-03-02 RX ORDER — ONDANSETRON 2 MG/ML
4 INJECTION INTRAMUSCULAR; INTRAVENOUS EVERY 6 HOURS PRN
Status: DISCONTINUED | OUTPATIENT
Start: 2022-03-02 | End: 2022-03-07 | Stop reason: HOSPADM

## 2022-03-02 RX ORDER — ONDANSETRON 4 MG/1
4 TABLET, FILM COATED ORAL EVERY 6 HOURS PRN
Status: DISCONTINUED | OUTPATIENT
Start: 2022-03-02 | End: 2022-03-07 | Stop reason: HOSPADM

## 2022-03-02 RX ORDER — OXYCODONE HYDROCHLORIDE 5 MG/1
10 TABLET ORAL EVERY 4 HOURS PRN
Status: DISCONTINUED | OUTPATIENT
Start: 2022-03-02 | End: 2022-03-07 | Stop reason: HOSPADM

## 2022-03-02 RX ORDER — ACETAMINOPHEN 650 MG/1
650 SUPPOSITORY RECTAL ONCE AS NEEDED
Status: DISCONTINUED | OUTPATIENT
Start: 2022-03-02 | End: 2022-03-02

## 2022-03-02 RX ORDER — DROPERIDOL 2.5 MG/ML
1.25 INJECTION, SOLUTION INTRAMUSCULAR; INTRAVENOUS ONCE AS NEEDED
Status: DISCONTINUED | OUTPATIENT
Start: 2022-03-02 | End: 2022-03-02 | Stop reason: HOSPADM

## 2022-03-02 RX ORDER — MORPHINE SULFATE 4 MG/ML
4 INJECTION, SOLUTION INTRAMUSCULAR; INTRAVENOUS
Status: DISCONTINUED | OUTPATIENT
Start: 2022-03-02 | End: 2022-03-07 | Stop reason: HOSPADM

## 2022-03-02 RX ORDER — KETAMINE HCL IN NACL, ISO-OSM 100MG/10ML
SYRINGE (ML) INJECTION AS NEEDED
Status: DISCONTINUED | OUTPATIENT
Start: 2022-03-02 | End: 2022-03-02 | Stop reason: SURG

## 2022-03-02 RX ORDER — BUPIVACAINE HYDROCHLORIDE 2.5 MG/ML
INJECTION, SOLUTION EPIDURAL; INFILTRATION; INTRACAUDAL
Status: COMPLETED | OUTPATIENT
Start: 2022-03-02 | End: 2022-03-02

## 2022-03-02 RX ORDER — DIPHENHYDRAMINE HYDROCHLORIDE 50 MG/ML
12.5 INJECTION INTRAMUSCULAR; INTRAVENOUS
Status: DISCONTINUED | OUTPATIENT
Start: 2022-03-02 | End: 2022-03-02 | Stop reason: HOSPADM

## 2022-03-02 RX ORDER — NALOXONE HCL 0.4 MG/ML
0.1 VIAL (ML) INJECTION
Status: DISCONTINUED | OUTPATIENT
Start: 2022-03-02 | End: 2022-03-07 | Stop reason: HOSPADM

## 2022-03-02 RX ORDER — NALOXONE HCL 0.4 MG/ML
0.4 VIAL (ML) INJECTION
Status: DISCONTINUED | OUTPATIENT
Start: 2022-03-02 | End: 2022-03-07 | Stop reason: HOSPADM

## 2022-03-02 RX ORDER — LORAZEPAM 2 MG/ML
1 INJECTION INTRAMUSCULAR
Status: DISCONTINUED | OUTPATIENT
Start: 2022-03-02 | End: 2022-03-02 | Stop reason: HOSPADM

## 2022-03-02 RX ORDER — ACETAMINOPHEN 325 MG/1
650 TABLET ORAL ONCE AS NEEDED
Status: DISCONTINUED | OUTPATIENT
Start: 2022-03-02 | End: 2022-03-02

## 2022-03-02 RX ORDER — MORPHINE SULFATE 4 MG/ML
1 INJECTION, SOLUTION INTRAMUSCULAR; INTRAVENOUS
Status: DISCONTINUED | OUTPATIENT
Start: 2022-03-02 | End: 2022-03-02 | Stop reason: HOSPADM

## 2022-03-02 RX ORDER — LIDOCAINE HYDROCHLORIDE 20 MG/ML
INJECTION, SOLUTION EPIDURAL; INFILTRATION; INTRACAUDAL; PERINEURAL AS NEEDED
Status: DISCONTINUED | OUTPATIENT
Start: 2022-03-02 | End: 2022-03-02 | Stop reason: SURG

## 2022-03-02 RX ORDER — MORPHINE SULFATE 4 MG/ML
INJECTION, SOLUTION INTRAMUSCULAR; INTRAVENOUS AS NEEDED
Status: DISCONTINUED | OUTPATIENT
Start: 2022-03-02 | End: 2022-03-02 | Stop reason: SURG

## 2022-03-02 RX ORDER — NALOXONE HCL 0.4 MG/ML
0.4 VIAL (ML) INJECTION AS NEEDED
Status: DISCONTINUED | OUTPATIENT
Start: 2022-03-02 | End: 2022-03-02 | Stop reason: HOSPADM

## 2022-03-02 RX ORDER — MIDAZOLAM HYDROCHLORIDE 1 MG/ML
INJECTION INTRAMUSCULAR; INTRAVENOUS AS NEEDED
Status: DISCONTINUED | OUTPATIENT
Start: 2022-03-02 | End: 2022-03-02 | Stop reason: SURG

## 2022-03-02 RX ORDER — EPHEDRINE SULFATE 5 MG/ML
5 INJECTION INTRAVENOUS ONCE AS NEEDED
Status: DISCONTINUED | OUTPATIENT
Start: 2022-03-02 | End: 2022-03-02 | Stop reason: HOSPADM

## 2022-03-02 RX ORDER — FLUMAZENIL 0.1 MG/ML
0.2 INJECTION INTRAVENOUS AS NEEDED
Status: DISCONTINUED | OUTPATIENT
Start: 2022-03-02 | End: 2022-03-02 | Stop reason: HOSPADM

## 2022-03-02 RX ORDER — ACETAMINOPHEN 650 MG/1
650 SUPPOSITORY RECTAL ONCE AS NEEDED
Status: DISCONTINUED | OUTPATIENT
Start: 2022-03-02 | End: 2022-03-02 | Stop reason: HOSPADM

## 2022-03-02 RX ORDER — HYDROCODONE BITARTRATE AND ACETAMINOPHEN 5; 325 MG/1; MG/1
1 TABLET ORAL EVERY 4 HOURS PRN
Status: DISCONTINUED | OUTPATIENT
Start: 2022-03-02 | End: 2022-03-07 | Stop reason: HOSPADM

## 2022-03-02 RX ORDER — SODIUM CHLORIDE 9 MG/ML
100 INJECTION, SOLUTION INTRAVENOUS CONTINUOUS
Status: DISCONTINUED | OUTPATIENT
Start: 2022-03-02 | End: 2022-03-07 | Stop reason: HOSPADM

## 2022-03-02 RX ORDER — DEXAMETHASONE SODIUM PHOSPHATE 4 MG/ML
INJECTION, SOLUTION INTRA-ARTICULAR; INTRALESIONAL; INTRAMUSCULAR; INTRAVENOUS; SOFT TISSUE AS NEEDED
Status: DISCONTINUED | OUTPATIENT
Start: 2022-03-02 | End: 2022-03-02 | Stop reason: SURG

## 2022-03-02 RX ORDER — ROCURONIUM BROMIDE 10 MG/ML
INJECTION, SOLUTION INTRAVENOUS AS NEEDED
Status: DISCONTINUED | OUTPATIENT
Start: 2022-03-02 | End: 2022-03-02 | Stop reason: SURG

## 2022-03-02 RX ORDER — FLUMAZENIL 0.1 MG/ML
0.1 INJECTION INTRAVENOUS AS NEEDED
Status: DISCONTINUED | OUTPATIENT
Start: 2022-03-02 | End: 2022-03-02 | Stop reason: HOSPADM

## 2022-03-02 RX ORDER — EPHEDRINE SULFATE 5 MG/ML
INJECTION INTRAVENOUS AS NEEDED
Status: DISCONTINUED | OUTPATIENT
Start: 2022-03-02 | End: 2022-03-02 | Stop reason: SURG

## 2022-03-02 RX ADMIN — HYDROMORPHONE HYDROCHLORIDE 0.25 MG: 2 INJECTION, SOLUTION INTRAMUSCULAR; INTRAVENOUS; SUBCUTANEOUS at 13:18

## 2022-03-02 RX ADMIN — LORAZEPAM 1 MG: 2 INJECTION INTRAMUSCULAR; INTRAVENOUS at 14:26

## 2022-03-02 RX ADMIN — SODIUM CHLORIDE: 9 INJECTION, SOLUTION INTRAVENOUS at 11:30

## 2022-03-02 RX ADMIN — EPHEDRINE SULFATE 10 MG: 5 INJECTION INTRAVENOUS at 10:57

## 2022-03-02 RX ADMIN — MIDAZOLAM 2 MG: 1 INJECTION INTRAMUSCULAR; INTRAVENOUS at 10:01

## 2022-03-02 RX ADMIN — BUPIVACAINE HYDROCHLORIDE 60 ML: 2.5 INJECTION, SOLUTION EPIDURAL; INFILTRATION; INTRACAUDAL; PERINEURAL at 08:15

## 2022-03-02 RX ADMIN — HYDROCODONE BITARTRATE AND ACETAMINOPHEN 1 TABLET: 5; 325 TABLET ORAL at 21:08

## 2022-03-02 RX ADMIN — HYDROMORPHONE HYDROCHLORIDE 0.5 MG: 2 INJECTION, SOLUTION INTRAMUSCULAR; INTRAVENOUS; SUBCUTANEOUS at 16:11

## 2022-03-02 RX ADMIN — Medication 200 MCG: at 10:22

## 2022-03-02 RX ADMIN — DEXAMETHASONE SODIUM PHOSPHATE 8 MG: 4 INJECTION, SOLUTION INTRA-ARTICULAR; INTRALESIONAL; INTRAMUSCULAR; INTRAVENOUS; SOFT TISSUE at 08:15

## 2022-03-02 RX ADMIN — HYDROMORPHONE HYDROCHLORIDE 0.5 MG: 2 INJECTION, SOLUTION INTRAMUSCULAR; INTRAVENOUS; SUBCUTANEOUS at 14:01

## 2022-03-02 RX ADMIN — PHENYLEPHRINE HYDROCHLORIDE 0.5 MCG/KG/MIN: 10 INJECTION INTRAVENOUS at 10:53

## 2022-03-02 RX ADMIN — Medication 30 MG: at 10:35

## 2022-03-02 RX ADMIN — PROPOFOL 180 MG: 10 INJECTION, EMULSION INTRAVENOUS at 10:08

## 2022-03-02 RX ADMIN — ROCURONIUM BROMIDE 50 MG: 10 INJECTION INTRAVENOUS at 10:10

## 2022-03-02 RX ADMIN — FENTANYL CITRATE 100 MCG: 50 INJECTION INTRAMUSCULAR; INTRAVENOUS at 10:06

## 2022-03-02 RX ADMIN — SUGAMMADEX 200 MG: 100 INJECTION, SOLUTION INTRAVENOUS at 12:36

## 2022-03-02 RX ADMIN — HYDROMORPHONE HYDROCHLORIDE 0.25 MG: 2 INJECTION, SOLUTION INTRAMUSCULAR; INTRAVENOUS; SUBCUTANEOUS at 13:04

## 2022-03-02 RX ADMIN — ROCURONIUM BROMIDE 10 MG: 10 INJECTION INTRAVENOUS at 11:38

## 2022-03-02 RX ADMIN — CEFAZOLIN SODIUM 1 G: 1 INJECTION, POWDER, FOR SOLUTION INTRAMUSCULAR; INTRAVENOUS at 18:22

## 2022-03-02 RX ADMIN — CEFAZOLIN SODIUM 2 G: 1 INJECTION, POWDER, FOR SOLUTION INTRAMUSCULAR; INTRAVENOUS at 10:30

## 2022-03-02 RX ADMIN — LIDOCAINE HYDROCHLORIDE 60 MG: 20 INJECTION, SOLUTION EPIDURAL; INFILTRATION; INTRACAUDAL; PERINEURAL at 10:08

## 2022-03-02 RX ADMIN — MORPHINE SULFATE 8 MG: 4 INJECTION INTRAVENOUS at 12:36

## 2022-03-02 RX ADMIN — FAMOTIDINE 20 MG: 10 INJECTION INTRAVENOUS at 20:32

## 2022-03-02 RX ADMIN — EPHEDRINE SULFATE 10 MG: 5 INJECTION INTRAVENOUS at 10:17

## 2022-03-02 RX ADMIN — Medication 200 MCG: at 10:30

## 2022-03-02 RX ADMIN — EPHEDRINE SULFATE 10 MG: 5 INJECTION INTRAVENOUS at 10:21

## 2022-03-02 RX ADMIN — Medication 200 MCG: at 10:49

## 2022-03-02 RX ADMIN — SODIUM CHLORIDE 100 ML/HR: 9 INJECTION, SOLUTION INTRAVENOUS at 09:22

## 2022-03-02 RX ADMIN — DEXAMETHASONE SODIUM PHOSPHATE 4 MG: 4 INJECTION, SOLUTION INTRAMUSCULAR; INTRAVENOUS at 10:24

## 2022-03-02 RX ADMIN — ROCURONIUM BROMIDE 10 MG: 10 INJECTION INTRAVENOUS at 12:09

## 2022-03-02 RX ADMIN — HYDROMORPHONE HYDROCHLORIDE 0.5 MG: 2 INJECTION, SOLUTION INTRAMUSCULAR; INTRAVENOUS; SUBCUTANEOUS at 13:37

## 2022-03-02 RX ADMIN — Medication 200 MCG: at 10:42

## 2022-03-02 RX ADMIN — SODIUM CHLORIDE 100 ML/HR: 9 INJECTION, SOLUTION INTRAVENOUS at 16:02

## 2022-03-02 RX ADMIN — EPHEDRINE SULFATE 5 MG: 5 INJECTION INTRAVENOUS at 10:30

## 2022-03-02 RX ADMIN — ROCURONIUM BROMIDE 20 MG: 10 INJECTION INTRAVENOUS at 11:04

## 2022-03-02 NOTE — ANESTHESIA PREPROCEDURE EVALUATION
Anesthesia Evaluation     Patient summary reviewed and Nursing notes reviewed   NPO Solid Status: > 8 hours  NPO Liquid Status: > 8 hours           Airway   Mallampati: II  TM distance: >3 FB  Neck ROM: full  No difficulty expected  Dental - normal exam     Pulmonary - normal exam    breath sounds clear to auscultation  (+) pneumonia ,   Cardiovascular - normal exam  Exercise tolerance: unable to assess    ECG reviewed  Rhythm: regular  Rate: normal    (+) hypertension, dysrhythmias Atrial Fib,     ROS comment: Interpretation Summary       · Left ventricular wall thickness is consistent with mild concentric hypertrophy.  · Estimated right ventricular systolic pressure from tricuspid regurgitation is normal (<35 mmHg).  · There is a small (<1cm) pericardial effusion.    S/p Afib Ablation    Neuro/Psych  (+) syncope, numbness, psychiatric history,    GI/Hepatic/Renal/Endo    (+)  PUD,  renal disease CRI, thyroid problem hypothyroidism    Musculoskeletal     Abdominal  - normal exam   Substance History - negative use     OB/GYN negative ob/gyn ROS         Other   arthritis,                    Anesthesia Plan    ASA 3     general   (TAP Block)  intravenous induction     Anesthetic plan, all risks, benefits, and alternatives have been provided, discussed and informed consent has been obtained with: patient.        CODE STATUS:

## 2022-03-02 NOTE — INTERVAL H&P NOTE
Abi is a pleasant 61-year-old female seen in the office with a large incisional hernia.  Several years ago she had a perforated gastric ulcer and underwent emergent repair of that and developed an incisional hernia postop.  Is quite sizable and we recommended that she undergo an open repair with bilateral component release and placement of mesh.  We have discussed the procedure and risks in detail.  She understands those accepts those and is for that reason presents today.    Complete review of systems is done and unremarkable with exception the chief complaint.    Physical exam shows pleasant 61-year-old female.  HEENT is negative.  Heart regular.  Lungs clear.  Abdomen soft nontender she has a large upper midline incisional hernia.  Extremities with equal range of motion and usage.  Neuro with no obvious focal deficit.    Impression: Large incisional hernia    Plan: Open repair with component release and placement of mesh  H&P reviewed. The patient was examined and there are no changes to the H&P.

## 2022-03-02 NOTE — OP NOTE
VENTRAL/INCISIONAL HERNIA REPAIR  Procedure Report    Patient Name:  Abi Rivera  YOB: 1960    Date of Surgery:  3/2/2022     Indications: Large incisional hernia    Pre-op Diagnosis:   Incisional hernia with obstruction but no gangrene [K43.0]       Post-Op Diagnosis Codes:     * Incisional hernia with obstruction but no gangrene [K43.0]    Procedure/CPT® Codes:      Procedure(s):  OPEN INCISIONAL HERNIA REPAIR, BILATERAL COMPONENT RELEASE, LYSIS OF ADHESIONS, PLACEMENT OF INTRAPERITONEAL MESH AND ONLAY MESH     Staff:  Surgeon(s):  Luisito Velasco DO    Assistant: Rozina Zhang RNFA    Anesthesia: General    Anesthetist: Dr. Unger    Estimated Blood Loss: minimal    Implants:    Implant Name Type Inv. Item Serial No.  Lot No. LRB No. Used Action   MESH KAMI COMPOSIX LP 8X10IN ELIPS - [id number] Implant MESH KAMI COMPOSIX LP 8X10IN ELIPS  DAVOL  (DIV OF CR BARD [employer]) [id number] N/A 1 Implanted   MESH FLUT SHT 3X6IN - [id number] Implant MESH FLUT SHT 3X6IN  BARD ELECTROPHYSIOLOGY [id number] Left 1 Implanted   MESH FLUT SHT 3X6IN - [id number] Implant MESH FLUT SHT 3X6IN  BARD ELECTROPHYSIOLOGY [id number] Right 1 Implanted       Specimen:          Specimens     ID Source Type Tests Collected By Collected At Frozen?    A Hernia, Sac Tissue · TISSUE PATHOLOGY EXAM   Luisito Velasco DO 3/2/22 1049     Description: hernia sac              Findings: Large incisional hernia with wide muscle retraction    Complications: None    Description of Procedure: Ms. jl Hardens 61-year-old well-known to us from previous surgical adventures.  Couple years ago she had a perforated gastric ulcer which was repaired with an onlay patch.  In her postop recovery time she developed an incisional hernia presents at this time for repair.  Due to the large size of the hernia and the wide retraction of the muscle I explained to her it would require an open repair with bilateral component release and mesh placement.   We discussed the procedure and risks.  She understands those and accepts those.  All questions have been answered today.    Patient was taken the operating room placed in the supine position.  General was done by Dr. Unger.  Timeout done identity verified.  Abdomen is prepped and draped after 3-minute dry time.  Midline incision was made through her old scar the hernia sac was very close to the skin so we carefully entered the hernia sac and then opened the abdominal wall near and far with cautery.  We then removed the hernia sac from each side of the incision by placing it on traction and using cautery to excise it.  We then grasped the rectus muscle with 2 Kochers placed in on medial traction and then using the cautery the subcu tissues were dissected off of the muscle far laterally so that we could accomplish a anterior/external oblique component release.  Once the subcu was dissected off the muscle then we split the external oblique from the level of the umbilicus to the ribs.  This allowed us to advance the rectus muscle several inches medially.  We then carried out a similar procedure on the patient's left side.  Of note she did have some significant adhesions to the hernia sac which were taken down with cautery she had omentum stuck to the undersurface of the abdominal wall which all had to be taken down as well so we could place a large wide ribbon to place stitches through the mesh.  So on the patient's left side then the rectus muscles were grasped placing them on medial traction and again the subcu tissues were dissected off of the muscle laterally.  The external oblique fascia was incised from the level of the umbilicus to the ribs on the left side this allowed us to rotate the muscle towards the midline.  We felt with this that we would be able to close the muscle primarily however we felt it prudent to go ahead and place an 8 x 10 inch Davol dual Composix mesh intraperitoneally.  This was done by  securing the mesh about 3 to 3-1/2 inches from the midline with interrupted #1 Ethibond stitches the length of the mesh.  This was carried out on both sides and the stitches were tied down.  We then were able to approximate the muscle in the midline using interrupted #1 Ethibond stitches.  We were able to incorporate the polypropylene side of the mesh into the closure as well.  This helped a lot mesh be firm against the anterior abdominal wall for tissue ingrowth.  Once that was done then we copiously irrigated the subcu space with Irrisept and then we chose a 3 inch x 6 inch polypropylene Bard mesh for an onlay over where the complement release was done.  These meshes were sewn in with interrupted 2-0 Prolene's circumferentially.  This was done on both sides.  Gave us nice closure good strengthening the abdominal wall.  We then again copiously irrigated the subcu space with Irrisept and then to 15 Ermias drains were placed 1 through each side of the abdominal wall and laid it on top of the 3 x 6 mesh.  We then closed the subcu tissues with a running 2-0 Vicryl and then skin was approximated with a skin stapler.  Sterile dressing was applied.  Abdominal binder then applied.  She was awakened and transferred then to recovery in satisfactory condition.  The final sponge instrument and needle counts were correct.    Assistant: Rozina Zhang RNFA  was responsible for performing the following activities: Retraction, Irrigation, Suturing, Closing and Harvesting of Vessels and their skilled assistance was necessary for the success of this case.    Luisito Velasco,      Date: 3/2/2022  Time: 12:43 EST

## 2022-03-02 NOTE — ANESTHESIA PROCEDURE NOTES
Peripheral Block      Patient reassessed immediately prior to procedure    Patient location during procedure: holding area  Start time: 3/2/2022 8:15 AM  Reason for block: at surgeon's request and post-op pain management  Performed by  Anesthesiologist: Rj Unger MD  Preanesthetic Checklist  Completed: patient identified, IV checked, site marked, risks and benefits discussed, surgical consent, monitors and equipment checked, pre-op evaluation and timeout performed  Prep:  Pt Position: supine  Sterile barriers:gloves, sterile barriers and cap  Prep: ChloraPrep  Patient monitoring: blood pressure monitoring, continuous pulse oximetry and EKG  Procedure    Sedation: yes  Performed under: PNB  Guidance:ultrasound guided    ULTRASOUND INTERPRETATION.  Using ultrasound guidance a 20 G gauge needle was placed in close proximity to the femoral and sciatic nerve, at which point, under ultrasound guidance anesthetic was injected in the area of the nerve and spread of the anesthesia was seen on ultrasound in close proximity thereto.  There were no abnormalities seen on ultrasound; a digital image was taken; and the patient tolerated the procedure with no complications. Images:still images obtained, printed/placed on chart    Laterality:Bilateral  Block Type:TAP  Injection Technique:single-shot  Needle Type:echogenic  Needle Gauge:20 G  Resistance on Injection: none    Medications Used: dexamethasone (DECADRON) injection, 8 mg  bupivacaine PF (MARCAINE) 0.25 % injection, 60 mL  Med administered at 3/2/2022 8:15 AM      Medications  Comment:30 ml Bup 0.25%  plus Dec 4 mg at each site. No complications ./ US used for nedle placement anfd meds      Post Assessment  Injection Assessment: negative aspiration for heme, no paresthesia on injection and incremental injection  Patient Tolerance:comfortable throughout block  Complications:no

## 2022-03-02 NOTE — PLAN OF CARE
Goal Outcome Evaluation:   Moves in bed.  Does IS with encouragement.  Very reluctant to get up to walk.  Still a little sleepy from anesthesia.  No longer need O2.  Sats are good on room air.  Dressing is clean and dry.

## 2022-03-02 NOTE — ANESTHESIA POSTPROCEDURE EVALUATION
Patient: Abi Rivera    Procedure Summary     Date: 03/02/22 Room / Location: [facility] OR 09 / [facility] MAIN OR    Anesthesia Start: 1001 Anesthesia Stop: 1243    Procedure: OPEN INCISIONAL HERNIA REPAIR, BILATERAL COMPONENT RELEASE, LYSIS OF ADHESIONS, PLACEMENT OF INTRAPERITONEAL MESH AND ONLAY MESH  (N/A Abdomen) Diagnosis:       Incisional hernia with obstruction but no gangrene      (Incisional hernia with obstruction but no gangrene [K43.0])    Surgeons: Luisito Velasco DO Provider: Rj Unger MD    Anesthesia Type: general ASA Status: 3          Anesthesia Type: general    Vitals  Vitals Value Taken Time   /59 03/02/22 1500   Temp 97 °F (36.1 °C) 03/02/22 1500   Pulse 71 03/02/22 1500   Resp 11 03/02/22 1500   SpO2 97 % 03/02/22 1500           Post Anesthesia Care and Evaluation    Patient location during evaluation: PACU  Patient participation: complete - patient participated  Level of consciousness: awake  Pain scale: See nurse's notes for pain score.  Pain management: adequate  Airway patency: patent  Anesthetic complications: No anesthetic complications  PONV Status: none  Cardiovascular status: acceptable  Respiratory status: acceptable  Hydration status: acceptable    Comments: Patient seen and examined postoperatively; vital signs stable; SpO2 greater than or equal to 90%; cardiopulmonary status stable; nausea/vomiting adequately controlled; pain adequately controlled; no apparent anesthesia complications; patient discharged from anesthesia care when discharge criteria were met

## 2022-03-02 NOTE — ANESTHESIA PROCEDURE NOTES
Airway  Date/Time: 3/2/2022 10:12 AM  Airway not difficult    General Information and Staff    Patient location during procedure: OR  Anesthesiologist: Rj Unger MD    Indications and Patient Condition  Indications for airway management: airway protection    Preoxygenated: yes  Mask difficulty assessment: 2 - vent by mask + OA or adjuvant +/- NMBA    Final Airway Details  Final airway type: endotracheal airway      Successful airway: ETT    Successful intubation technique: video laryngoscopy  Endotracheal tube insertion site: oral  Blade: Flores  Blade size: 3  ETT size (mm): 7.0  Cormack-Lehane Classification: grade I - full view of glottis  Placement verified by: chest auscultation and capnometry   Measured from: teeth  ETT/EBT  to teeth (cm): 20  Number of attempts at approach: 2    Additional Comments  First attempt grade III with Mac 3 DL. Second attempt grade I with Flores 3. Intubated by RONALD

## 2022-03-03 ENCOUNTER — TELEPHONE (OUTPATIENT)
Dept: SURGERY | Facility: CLINIC | Age: 62
End: 2022-03-03

## 2022-03-03 PROBLEM — Z72.0 TOBACCO ABUSE: Status: ACTIVE | Noted: 2022-03-03

## 2022-03-03 LAB
ALBUMIN SERPL-MCNC: 3.6 G/DL (ref 3.5–5.2)
ALBUMIN/GLOB SERPL: 1.6 G/DL
ALP SERPL-CCNC: 61 U/L (ref 39–117)
ALT SERPL W P-5'-P-CCNC: 12 U/L (ref 1–33)
ANION GAP SERPL CALCULATED.3IONS-SCNC: 9 MMOL/L (ref 5–15)
AST SERPL-CCNC: 24 U/L (ref 1–32)
BASOPHILS # BLD AUTO: 0 10*3/MM3 (ref 0–0.2)
BASOPHILS NFR BLD AUTO: 0.1 % (ref 0–1.5)
BILIRUB SERPL-MCNC: 0.3 MG/DL (ref 0–1.2)
BUN SERPL-MCNC: 13 MG/DL (ref 6–20)
BUN/CREAT SERPL: 12.1 (ref 7–25)
CALCIUM SPEC-SCNC: 9 MG/DL (ref 8.6–10.5)
CHLORIDE SERPL-SCNC: 105 MMOL/L (ref 98–107)
CO2 SERPL-SCNC: 23 MMOL/L (ref 22–29)
CREAT SERPL-MCNC: 1.07 MG/DL (ref 0.57–1)
DEPRECATED RDW RBC AUTO: 47.7 FL (ref 37–54)
EGFRCR SERPLBLD CKD-EPI 2021: 60 ML/MIN/1.73
EOSINOPHIL # BLD AUTO: 0 10*3/MM3 (ref 0–0.4)
EOSINOPHIL NFR BLD AUTO: 0.1 % (ref 0.3–6.2)
ERYTHROCYTE [DISTWIDTH] IN BLOOD BY AUTOMATED COUNT: 13.1 % (ref 12.3–15.4)
GLOBULIN UR ELPH-MCNC: 2.3 GM/DL
GLUCOSE SERPL-MCNC: 123 MG/DL (ref 65–99)
HCT VFR BLD AUTO: 34.2 % (ref 34–46.6)
HGB BLD-MCNC: 11.5 G/DL (ref 12–15.9)
LAB AP CASE REPORT: NORMAL
LYMPHOCYTES # BLD AUTO: 1.2 10*3/MM3 (ref 0.7–3.1)
LYMPHOCYTES NFR BLD AUTO: 9.3 % (ref 19.6–45.3)
MAGNESIUM SERPL-MCNC: 1.7 MG/DL (ref 1.6–2.6)
MCH RBC QN AUTO: 34.7 PG (ref 26.6–33)
MCHC RBC AUTO-ENTMCNC: 33.6 G/DL (ref 31.5–35.7)
MCV RBC AUTO: 103.4 FL (ref 79–97)
MONOCYTES # BLD AUTO: 1 10*3/MM3 (ref 0.1–0.9)
MONOCYTES NFR BLD AUTO: 7.6 % (ref 5–12)
NEUTROPHILS NFR BLD AUTO: 11 10*3/MM3 (ref 1.7–7)
NEUTROPHILS NFR BLD AUTO: 82.9 % (ref 42.7–76)
NRBC BLD AUTO-RTO: 0 /100 WBC (ref 0–0.2)
PATH REPORT.FINAL DX SPEC: NORMAL
PATH REPORT.GROSS SPEC: NORMAL
PHOSPHATE SERPL-MCNC: 2.5 MG/DL (ref 2.5–4.5)
PLATELET # BLD AUTO: 251 10*3/MM3 (ref 140–450)
PMV BLD AUTO: 7.1 FL (ref 6–12)
POTASSIUM SERPL-SCNC: 4.2 MMOL/L (ref 3.5–5.2)
PROT SERPL-MCNC: 5.9 G/DL (ref 6–8.5)
RBC # BLD AUTO: 3.31 10*6/MM3 (ref 3.77–5.28)
SODIUM SERPL-SCNC: 137 MMOL/L (ref 136–145)
T4 FREE SERPL-MCNC: 0.24 NG/DL (ref 0.93–1.7)
TSH SERPL DL<=0.05 MIU/L-ACNC: 5.15 UIU/ML (ref 0.27–4.2)
WBC NRBC COR # BLD: 13.3 10*3/MM3 (ref 3.4–10.8)

## 2022-03-03 PROCEDURE — 83735 ASSAY OF MAGNESIUM: CPT | Performed by: SURGERY

## 2022-03-03 PROCEDURE — 84443 ASSAY THYROID STIM HORMONE: CPT | Performed by: INTERNAL MEDICINE

## 2022-03-03 PROCEDURE — 99253 IP/OBS CNSLTJ NEW/EST LOW 45: CPT | Performed by: INTERNAL MEDICINE

## 2022-03-03 PROCEDURE — 85025 COMPLETE CBC W/AUTO DIFF WBC: CPT | Performed by: SURGERY

## 2022-03-03 PROCEDURE — 84439 ASSAY OF FREE THYROXINE: CPT | Performed by: INTERNAL MEDICINE

## 2022-03-03 PROCEDURE — G0378 HOSPITAL OBSERVATION PER HR: HCPCS

## 2022-03-03 PROCEDURE — 25010000002 CEFAZOLIN PER 500 MG: Performed by: SURGERY

## 2022-03-03 PROCEDURE — 25010000002 HYDROMORPHONE PER 4 MG: Performed by: SURGERY

## 2022-03-03 PROCEDURE — 84100 ASSAY OF PHOSPHORUS: CPT | Performed by: SURGERY

## 2022-03-03 PROCEDURE — 80053 COMPREHEN METABOLIC PANEL: CPT | Performed by: SURGERY

## 2022-03-03 PROCEDURE — 97161 PT EVAL LOW COMPLEX 20 MIN: CPT

## 2022-03-03 PROCEDURE — 94799 UNLISTED PULMONARY SVC/PX: CPT

## 2022-03-03 RX ORDER — NICOTINE 21 MG/24HR
1 PATCH, TRANSDERMAL 24 HOURS TRANSDERMAL
Status: DISCONTINUED | OUTPATIENT
Start: 2022-03-03 | End: 2022-03-07 | Stop reason: HOSPADM

## 2022-03-03 RX ORDER — LOSARTAN POTASSIUM 50 MG/1
50 TABLET ORAL
Status: DISCONTINUED | OUTPATIENT
Start: 2022-03-03 | End: 2022-03-07 | Stop reason: HOSPADM

## 2022-03-03 RX ORDER — PANTOPRAZOLE SODIUM 40 MG/1
40 TABLET, DELAYED RELEASE ORAL
Status: DISCONTINUED | OUTPATIENT
Start: 2022-03-03 | End: 2022-03-07 | Stop reason: HOSPADM

## 2022-03-03 RX ADMIN — LOSARTAN POTASSIUM 50 MG: 50 TABLET, FILM COATED ORAL at 10:46

## 2022-03-03 RX ADMIN — PANTOPRAZOLE SODIUM 40 MG: 40 TABLET, DELAYED RELEASE ORAL at 06:11

## 2022-03-03 RX ADMIN — HYDROMORPHONE HYDROCHLORIDE 0.5 MG: 2 INJECTION, SOLUTION INTRAMUSCULAR; INTRAVENOUS; SUBCUTANEOUS at 04:20

## 2022-03-03 RX ADMIN — CEFAZOLIN SODIUM 1 G: 1 INJECTION, POWDER, FOR SOLUTION INTRAMUSCULAR; INTRAVENOUS at 02:04

## 2022-03-03 RX ADMIN — CEFAZOLIN SODIUM 1 G: 1 INJECTION, POWDER, FOR SOLUTION INTRAMUSCULAR; INTRAVENOUS at 17:35

## 2022-03-03 RX ADMIN — HYDROMORPHONE HYDROCHLORIDE 0.5 MG: 2 INJECTION, SOLUTION INTRAMUSCULAR; INTRAVENOUS; SUBCUTANEOUS at 20:15

## 2022-03-03 RX ADMIN — OXYCODONE 10 MG: 5 TABLET ORAL at 06:11

## 2022-03-03 RX ADMIN — OXYCODONE 10 MG: 5 TABLET ORAL at 23:28

## 2022-03-03 RX ADMIN — CEFAZOLIN SODIUM 1 G: 1 INJECTION, POWDER, FOR SOLUTION INTRAMUSCULAR; INTRAVENOUS at 10:46

## 2022-03-03 RX ADMIN — SODIUM CHLORIDE 100 ML/HR: 9 INJECTION, SOLUTION INTRAVENOUS at 17:35

## 2022-03-03 RX ADMIN — HYDROCODONE BITARTRATE AND ACETAMINOPHEN 1 TABLET: 5; 325 TABLET ORAL at 01:50

## 2022-03-03 RX ADMIN — OXYCODONE 10 MG: 5 TABLET ORAL at 10:40

## 2022-03-03 RX ADMIN — SODIUM CHLORIDE 100 ML/HR: 9 INJECTION, SOLUTION INTRAVENOUS at 02:44

## 2022-03-03 RX ADMIN — NICOTINE 1 PATCH: 14 PATCH, EXTENDED RELEASE TRANSDERMAL at 10:46

## 2022-03-03 RX ADMIN — HYDROMORPHONE HYDROCHLORIDE 0.5 MG: 2 INJECTION, SOLUTION INTRAMUSCULAR; INTRAVENOUS; SUBCUTANEOUS at 12:47

## 2022-03-03 RX ADMIN — OXYCODONE 10 MG: 5 TABLET ORAL at 17:35

## 2022-03-03 NOTE — PLAN OF CARE
Goal Outcome Evaluation:  Plan of Care Reviewed With: patient           Outcome Summary: 60 y/o female s/p open hernia repair on 3/2. PMH Includes afib, OA, osteoporosis, tobacco abuse. Pt normally independent and still works as a  and [employer]. At time of eval, pt able to perform all bed mobility and transfer with modified independence due to pain. Pt able to ambulate in room , 10 ft x 2 , gait slow and pt reaching for furniture for support. More stable with rw use. Pt should be safe to dc home once medically stable.

## 2022-03-03 NOTE — PROGRESS NOTES
"   LOS: 1 day   Patient Care Team:  Chaparrita Chun MD as PCP - General (Family Medicine)    Reason for follow-up: Postop    Subjective   Patient seen and examined.  Complaint is abdominal pain.    Objective   Dressings are clean dry and intact.  Drain tubes with appropriate color and output    Vital Signs  Vitals:    03/02/22 2345 03/03/22 0206 03/03/22 0451 03/03/22 0746   BP: 125/76 145/84 108/74 128/72   BP Location: Right arm Right arm Right arm Left arm   Patient Position: Sitting Sitting Lying Lying   Pulse: 76 76 71 67   Resp: 18 18 18 18   Temp: 98.8 °F (37.1 °C) 98.6 °F (37 °C) 97.6 °F (36.4 °C) 97.5 °F (36.4 °C)   TempSrc: Oral Oral Oral Oral   SpO2: 97% 96% 95% 96%   Weight:   73.6 kg (162 lb 4.8 oz)    Height:   149.9 cm (59\")          Results Review:       Lab Results (last 24 hours)     Procedure Component Value Units Date/Time    Tissue Pathology Exam [[id number]] Collected: 03/02/22 1049    Specimen: Tissue from Hernia, Sac Updated: 03/03/22 1424     Case Report --     Surgical Pathology Report                         Case: [id number]                                  Authorizing Provider:  Luisito Velasco DO        Collected:           03/02/2022 10:49 AM          Ordering Location:     [facility] MAIN  Received:            03/02/2022 01:28 PM                                 OR                                                                           Pathologist:           Corbin Lucio MD                                                            Specimen:    Hernia, Sac, hernia sac                                                                     Final Diagnosis --     Soft tissue, site not specified, excision:    Benign fibroadipose tissue consistent with hernia sac    No malignancy identified     [facility]        Gross Description --     Received in formalin designated \"Hernia sac\" are two unoriented fragments of yellowish pink fibrofatty and membranous tissue measuring 28 x " 4 x 1.5 cm in aggregate. Sectioning reveals thin membranous cut surfaces; no nodules or masses are identified. Representative sections of fibromembranous tissue are submitted in one cassette.     SY/sms       TSH [[id number]]  (Abnormal) Collected: 03/03/22 0455    Specimen: Blood Updated: 03/03/22 0615     TSH 5.150 uIU/mL     T4, Free [[id number]]  (Abnormal) Collected: 03/03/22 0455    Specimen: Blood Updated: 03/03/22 0615     Free T4 0.24 ng/dL     Narrative:      Results may be falsely increased if patient taking Biotin.      Comprehensive Metabolic Panel [[id number]]  (Abnormal) Collected: 03/03/22 0455    Specimen: Blood Updated: 03/03/22 0609     Glucose 123 mg/dL      BUN 13 mg/dL      Creatinine 1.07 mg/dL      Sodium 137 mmol/L      Potassium 4.2 mmol/L      Chloride 105 mmol/L      CO2 23.0 mmol/L      Calcium 9.0 mg/dL      Total Protein 5.9 g/dL      Albumin 3.60 g/dL      ALT (SGPT) 12 U/L      AST (SGOT) 24 U/L      Alkaline Phosphatase 61 U/L      Total Bilirubin 0.3 mg/dL      Globulin 2.3 gm/dL      A/G Ratio 1.6 g/dL      BUN/Creatinine Ratio 12.1     Anion Gap 9.0 mmol/L      eGFR 60.0 mL/min/1.73      Comment: National Kidney Foundation and American Society of Nephrology (ASN) Task Force recommended calculation based on the Chronic Kidney Disease Epidemiology Collaboration (CKD-EPI) equation refit without adjustment for race.       Narrative:      GFR Normal >60  Chronic Kidney Disease <60  Kidney Failure <15      Phosphorus [[id number]]  (Normal) Collected: 03/03/22 0455    Specimen: Blood Updated: 03/03/22 0609     Phosphorus 2.5 mg/dL     Magnesium [[id number]]  (Normal) Collected: 03/03/22 0455    Specimen: Blood Updated: 03/03/22 0609     Magnesium 1.7 mg/dL     CBC & Differential [[id number]]  (Abnormal) Collected: 03/03/22 0455    Specimen: Blood Updated: 03/03/22 0600    Narrative:      The following orders were created for panel order CBC & Differential.  Procedure                                Abnormality         Status                     ---------                               -----------         ------                     CBC Auto Differential[[id number]]        Abnormal            Final result                 Please view results for these tests on the individual orders.    CBC Auto Differential [[id number]]  (Abnormal) Collected: 03/03/22 0455    Specimen: Blood Updated: 03/03/22 0600     WBC 13.30 10*3/mm3      RBC 3.31 10*6/mm3      Hemoglobin 11.5 g/dL      Hematocrit 34.2 %      .4 fL      MCH 34.7 pg      MCHC 33.6 g/dL      RDW 13.1 %      RDW-SD 47.7 fl      MPV 7.1 fL      Platelets 251 10*3/mm3      Neutrophil % 82.9 %      Lymphocyte % 9.3 %      Monocyte % 7.6 %      Eosinophil % 0.1 %      Basophil % 0.1 %      Neutrophils, Absolute 11.00 10*3/mm3      Lymphocytes, Absolute 1.20 10*3/mm3      Monocytes, Absolute 1.00 10*3/mm3      Eosinophils, Absolute 0.00 10*3/mm3      Basophils, Absolute 0.00 10*3/mm3      nRBC 0.0 /100 WBC            Imaging Results (Last 24 Hours)     ** No results found for the last 24 hours. **          Medication Review:     Assessment/Plan         Incisional hernia with obstruction but no gangrene    Hypertension    Atrial fibrillation (HCC)    Hyperthyroidism    Sick sinus syndrome (HCC)    Tobacco abuse    Impression: Postop day 1 repair of incisional hernia open with component release and mesh placement    Plan: Ambulate with help.  Encourage spirometry frequently.  But anticipates she would be here another 24 to 48 hours.          Luisito Velasco DO  03/03/22  15:35 EST

## 2022-03-03 NOTE — THERAPY EVALUATION
Patient Name: Abi Rivera  : 1960    MRN: [id number]                              Today's Date: 3/3/2022       Admit Date: 3/2/2022    Visit Dx:     ICD-10-CM ICD-9-CM   1. Incisional hernia with obstruction but no gangrene  K43.0 552.21     Patient Active Problem List   Diagnosis   • Anxiety   • Arthritis of foot   • Avascular necrosis of bone (HCC)   • Hypertension   • Mood disorder (HCC)   • Closed displaced fracture 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   • Arthritis of right foot   • Abnormal results of function studies of other organs and systems   • Adenomatous polyp of colon   • Pain of foot   • Atrial fibrillation (HCC)   • Chronic pain   • Degeneration of intervertebral disc of lumbar region   • High alkaline phosphatase   • Hyperthyroidism   • Lumbar radiculitis   • Tricuspid valve insufficiency   • Osteoarthritis of lumbosacral spine without myelopathy   • Polyarthralgia   • Pulmonary hypertension (HCC)   • Sick sinus syndrome (HCC)   • Syncope and collapse   • Osteoporosis without current pathological fracture   • Hx of pathological fx   • Acute gastric ulcer with perforation ([facility])   • Pneumonia   • Incisional hernia with obstruction but no gangrene   • Abdominal bloating   • Cyst of left breast   • Palpitations   • Cutaneous abscess of abdominal wall   • Tobacco abuse     Past Medical History:   Diagnosis Date   • Adenomatous polyp of colon 2016   • Anemia    • Anemia    • Anxiety 2016   • Arthritis of foot 1/10/2019   • Atrial fibrillation (HCC) 2016   • Avascular necrosis of bone (HCC) 1/10/2019   • Chronic pain 2019   • Closed displaced fracture 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 2019    Added automatically from request for surgery [id number]   • Gastric ulcer    • Hypertension 2016   • Hyperthyroidism 2016   • Hypokalemia    • Hypomagnesemia    • Mood disorder (HCC) 2016   • Osteoporosis without current pathological  fracture 6/4/2020   • Pulmonary hypertension (HCC) 6/16/2016   • Sick sinus syndrome (HCC) 1/25/2017   • Tricuspid valve insufficiency 6/16/2016     Past Surgical History:   Procedure Laterality Date   • APPENDECTOMY     • BILATERAL BREAST REDUCTION     • CARDIAC CATHETERIZATION N/A 1/19/2022    Procedure: Intracardiac echocardiogram;  Surgeon: Александр Sheth MD;  Location: [facility] CATH INVASIVE LOCATION;  Service: Cardiovascular;  Laterality: N/A;   • CARDIAC ELECTROPHYSIOLOGY PROCEDURE N/A 1/19/2022    Procedure: EP/Ablation [place] and Ivan aware;  Surgeon: Александр Sheth MD;  Location: [facility] CATH INVASIVE LOCATION;  Service: Cardiovascular;  Laterality: N/A;   • EXPLORATORY LAPAROTOMY N/A 3/19/2021    Procedure: LAPAROTOMY EXPLORATORY with repair of perforated gastric ulcer, oversew of perforated gastric ulcer, placement of kristie patch, biopsy of stomach;  Surgeon: Luisito Velasco DO;  Location: [facility] MAIN OR;  Service: General;  Laterality: N/A;   • HIP SURGERY      Left total hip   • ORIF FOOT FRACTURE Right 9/30/2019    Procedure: OPEN REDUCTION INTERNAL FIXATION OF FIFTH METATARSAL FRACTURE, LOOSE BODY EXCISION/REMOVAL FROM THE TALONAVICULAR JOINT OF THE RIGHT FOOT;  Surgeon: JOSE Fry DPLYNDSEY;  Location: [facility] MAIN OR;  Service: Podiatry   • ORIF TIBIA/FIBULA FRACTURES Left    • US GUIDED CYST ASPIRATION BREAST N/A 7/26/2021      General Information     Row Name 03/03/22 1508          Physical Therapy Time and Intention    Document Type evaluation  -CR     Mode of Treatment physical therapy  -CR     Row Name 03/03/22 5650          General Information    Patient Profile Reviewed yes  -CR     Prior Level of Function independent:; all household mobility; community mobility; work; driving  pt is a , [facility]  -CR     Existing Precautions/Restrictions --  abd incision  -CR     Barriers to Rehab medically complex  -CR     Row Name 03/03/22 4010          Living Environment     Lives With spouse; child(isai), adult  -CR     Row Name 03/03/22 1509          Home Main Entrance    Number of Stairs, Main Entrance four  -CR     Row Name 03/03/22 1509          Stairs Within Home, Primary    Stairs, Within Home, Primary 1 flight to bedroom  -CR     Stair Railings, Within Home, Primary railings safe and in good condition  -CR     Row Name 03/03/22 1509          Cognition    Orientation Status (Cognition) oriented x 4  -CR     Row Name 03/03/22 1509          Safety Issues, Functional Mobility    Impairments Affecting Function (Mobility) pain  -CR           User Key  (r) = Recorded By, (t) = Taken By, (c) = Cosigned By    Initials Name Provider Type    CR Reyes, Carmela, PT Physical Therapist               Mobility     Row Name 03/03/22 1510          Bed Mobility    Bed Mobility supine-sit-supine  -CR     Supine-Sit-Supine [place] (Bed Mobility) modified independence  -CR     Assistive Device (Bed Mobility) bed rails; head of bed elevated  -CR     Row Name 03/03/22 1510          Bed-Chair Transfer    Bed-Chair [place] (Transfers) modified independence  -CR     Row Name 03/03/22 1510          Sit-Stand Transfer    Sit-Stand [place] (Transfers) modified independence  -CR     Row Name 03/03/22 1510          Gait/Stairs (Locomotion)    [place] Level (Gait) contact guard; standby assist  -CR     Distance in Feet (Gait) 10 ft x 2  -CR     Deviations/Abnormal Patterns (Gait) gait speed decreased  -CR           User Key  (r) = Recorded By, (t) = Taken By, (c) = Cosigned By    Initials Name Provider Type    CR Reyes, Carmela, PT Physical Therapist               Obj/Interventions     Row Name 03/03/22 1511          Range of Motion Comprehensive    General Range of Motion no range of motion deficits identified  -CR     Row Name 03/03/22 1511          Strength Comprehensive (MMT)    General Manual Muscle Testing (MMT) Assessment no strength deficits identified  -CR     Row Name 03/03/22  1511          Balance    Balance Assessment sitting static balance; sitting dynamic balance; standing static balance; standing dynamic balance  -CR     Static Sitting Balance WNL; sitting, edge of bed  -CR     Dynamic Sitting Balance WFL; sitting, edge of bed; sitting in chair  -CR     Static Standing Balance WFL  -CR     Dynamic Standing Balance mild impairment  -CR     Row Name 03/03/22 1511          Sensory Assessment (Somatosensory)    Sensory Assessment (Somatosensory) sensation intact  -CR           User Key  (r) = Recorded By, (t) = Taken By, (c) = Cosigned By    Initials Name Provider Type    CR Reyes, Carmela, PT Physical Therapist               Goals/Plan     Row Name 03/03/22 1515          Gait Training Goal 1 (PT)    Activity/Assistive Device (Gait Training Goal 1, PT) gait (walking locomotion); increase endurance/gait distance  -CR     [place] Level (Gait Training Goal 1, PT) standby assist  -CR     Distance (Gait Training Goal 1, PT) 100 ft  -CR     Time Frame (Gait Training Goal 1, PT) 1 week  -CR     Row Name 03/03/22 1515          Stairs Goal 1 (PT)    Activity/Assistive Device (Stairs Goal 1, PT) stairs, all skills  -CR     [place] Level/Cues Needed (Stairs Goal 1, PT) standby assist  -CR     Number of Stairs (Stairs Goal 1, PT) 1 flight  -CR     Time Frame (Stairs Goal 1, PT) 1 week  -CR           User Key  (r) = Recorded By, (t) = Taken By, (c) = Cosigned By    Initials Name Provider Type    CR Reyes, Carmela, PT Physical Therapist               Clinical Impression     Row Name 03/03/22 1512          Pain    Additional Documentation Pain Scale: FACES Pre/Post-Treatment (Group)  -CR     Row Name 03/03/22 1512          Pain Scale: FACES Pre/Post-Treatment    Pain: FACES Scale, Pretreatment 6-->hurts even more  -CR     Posttreatment Pain Rating 10-->hurts worst  -CR     Pain Location - Orientation incisional  -CR     Pain Location abdomen  -CR     Row Name 03/03/22 1512          Plan of  Care Review    Plan of Care Reviewed With patient  -CR     Outcome Summary 60 y/o female s/p open hernia repair on 3/2. PMH Includes afib, OA, osteoporosis, tobacco abuse. Pt normally independent and still works as a  and [employer]. At time of eval, pt able to perform all bed mobility and transfer with modified independence due to pain. Pt able to ambulate in room , 10 ft x 2 , gait slow and pt reaching for furniture for support. More stable with rw use. Pt should be safe to dc home once medically stable.  -CR     Row Name 03/03/22 1512          Therapy Assessment/Plan (PT)    Patient/Family Therapy Goals Statement (PT) home  -CR     Rehab Potential (PT) good, to achieve stated therapy goals  -CR     Criteria for Skilled Interventions Met (PT) yes; skilled treatment is necessary  -CR     Predicted Duration of Therapy Intervention (PT) dc  -CR           User Key  (r) = Recorded By, (t) = Taken By, (c) = Cosigned By    Initials Name Provider Type    CR Reyes, Carmela, PT Physical Therapist               Outcome Measures     Row Name 03/03/22 1516          How much help from another person do you currently need...    Turning from your back to your side while in flat bed without using bedrails? 3  -CR     Moving from lying on back to sitting on the side of a flat bed without bedrails? 3  -CR     Moving to and from a bed to a chair (including a wheelchair)? 4  -CR     Standing up from a chair using your arms (e.g., wheelchair, bedside chair)? 4  -CR     Climbing 3-5 steps with a railing? 3  -CR     To walk in hospital room? 4  -CR     AM-PAC 6 Clicks Score (PT) 21  -CR     Row Name 03/03/22 1516          Functional Assessment    Outcome Measure Options AM-PAC 6 Clicks Basic Mobility (PT)  -CR           User Key  (r) = Recorded By, (t) = Taken By, (c) = Cosigned By    Initials Name Provider Type    CR Reyes, Carmela, PT Physical Therapist                             Physical Therapy Education                  Title: PT OT SLP Therapies (In Progress)     Topic: Physical Therapy (In Progress)     Point: Mobility training (Done)     Learning Progress Summary           Patient Acceptance, E, VU by CR at 3/3/2022 1516                   Point: Body mechanics (Not Started)     Learner Progress:  Not documented in this visit.                      User Key     Initials Effective Dates Name Provider Type Discipline    CR 06/16/21 -  Reyes, Carmela, PT Physical Therapist PT              PT Recommendation and Plan  Planned Therapy Interventions (PT): gait training, patient/family education, stair training  Plan of Care Reviewed With: patient  Outcome Summary: 62 y/o female s/p open hernia repair on 3/2. PMH Includes afib, OA, osteoporosis, tobacco abuse. Pt normally independent and still works as a  and [employer]. At time of eval, pt able to perform all bed mobility and transfer with modified independence due to pain. Pt able to ambulate in room , 10 ft x 2 , gait slow and pt reaching for furniture for support. More stable with rw use. Pt should be safe to dc home once medically stable.     Time Calculation:    PT Charges     Row Name 03/03/22 1517             Time Calculation    Start Time 1408  -CR      Stop Time 1438  -CR      Time Calculation (min) 30 min  -CR      PT Received On 03/03/22  -CR      PT - Next Appointment 03/04/22  -CR      PT Goal Re-Cert Due Date 03/17/22  -CR              Time Calculation- PT    Total Timed Code Minutes- PT 0 minute(s)  -CR            User Key  (r) = Recorded By, (t) = Taken By, (c) = Cosigned By    Initials Name Provider Type    CR Reyes, Carmela, PT Physical Therapist              Therapy Charges for Today     Code Description Service Date Service Provider Modifiers Qty    90801971701 HC PT EVAL LOW COMPLEXITY 4 3/3/2022 Reyes, Carmela, PT GP 1          PT G-Codes  Outcome Measure Options: AM-PAC 6 Clicks Basic Mobility (PT)  AM-PAC 6 Clicks Score (PT):  21    Carmela Reyes, PT  3/3/2022

## 2022-03-03 NOTE — PLAN OF CARE
Goal Outcome Evaluation:  Patient has not had no complaints this shift. Slight pain with movement. On IVF.  Problem: Adult Inpatient Plan of Care  Goal: Plan of Care Review  Outcome: Ongoing, Progressing  Goal: Patient-Specific Goal (Individualized)  Outcome: Ongoing, Progressing  Goal: Absence of Hospital-Acquired Illness or Injury  Outcome: Ongoing, Progressing  Intervention: Identify and Manage Fall Risk  Recent Flowsheet Documentation  Taken 3/3/2022 1720 by Wandy Lyons RN  Safety Promotion/Fall Prevention: safety round/check completed  Taken 3/3/2022 1500 by Wandy Lyons RN  Safety Promotion/Fall Prevention: safety round/check completed  Taken 3/3/2022 1300 by Wandy Lyons RN  Safety Promotion/Fall Prevention: safety round/check completed  Taken 3/3/2022 1100 by Wandy Lyons RN  Safety Promotion/Fall Prevention: safety round/check completed  Taken 3/3/2022 0900 by Wandy Lynos RN  Safety Promotion/Fall Prevention: safety round/check completed  Taken 3/3/2022 0746 by Wandy Lyons RN  Safety Promotion/Fall Prevention:   activity supervised   assistive device/personal items within reach   clutter free environment maintained   fall prevention program maintained   room organization consistent   safety round/check completed   nonskid shoes/slippers when out of bed  Intervention: Prevent Skin Injury  Recent Flowsheet Documentation  Taken 3/3/2022 0746 by Wandy Lyons RN  Skin Protection:   skin-to-skin areas padded   skin-to-device areas padded  Intervention: Prevent Infection  Recent Flowsheet Documentation  Taken 3/3/2022 0746 by Wandy Lyons RN  Infection Prevention:   visitors restricted/screened   single patient room provided   hand hygiene promoted   equipment surfaces disinfected  Goal: Optimal Comfort and Wellbeing  Outcome: Ongoing, Progressing  Intervention: Provide Person-Centered Care  Recent Flowsheet Documentation  Taken 3/3/2022 0746 by Wandy Lyons RN  Trust  Relationship/Rapport:   care explained   choices provided   questions answered   questions encouraged   thoughts/feelings acknowledged  Goal: Readiness for Transition of Care  Outcome: Ongoing, Progressing     Problem: Pain Acute  Goal: Optimal Pain Control  Outcome: Ongoing, Progressing  Intervention: Optimize Psychosocial Wellbeing  Recent Flowsheet Documentation  Taken 3/3/2022 0746 by Wandy Lyons RN  Supportive Measures:   active listening utilized   self-care encouraged     Problem: Surgical Site Infection  Goal: Improved Infection Symptoms  Outcome: Ongoing, Progressing  Intervention: Prevent and Manage Infection  Recent Flowsheet Documentation  Taken 3/3/2022 0746 by Wandy Lyons RN  Infection Management: aseptic technique maintained     Problem: Hypertension Comorbidity  Goal: Blood Pressure in Desired Range  Outcome: Ongoing, Progressing  Intervention: Maintain Hypertension-Management Strategies  Recent Flowsheet Documentation  Taken 3/3/2022 0746 by Wandy Lyons RN  Medication Review/Management: medications reviewed     Problem: Pain Chronic (Persistent) (Comorbidity Management)  Goal: Acceptable Pain Control and Functional Ability  Outcome: Ongoing, Progressing  Intervention: Manage Persistent Pain  Recent Flowsheet Documentation  Taken 3/3/2022 0746 by Wandy Lyons RN  Medication Review/Management: medications reviewed  Intervention: Optimize Psychosocial Wellbeing  Recent Flowsheet Documentation  Taken 3/3/2022 0746 by Wandy Lyons RN  Supportive Measures:   active listening utilized   self-care encouraged  Family/Support System Care:   support provided   self-care encouraged   presence promoted

## 2022-03-03 NOTE — CONSULTS
[facility] Medicine Services - Consult Note    Patient Name: Abi Rivera  : 1960  MRN: [id number]  Primary Care Physician:  Chaparrita Chun MD  Referring Physician: Luisito Velasco DO  Date of admission: 3/2/2022    Consults    Subjective      Reason for Consult/ Chief Complaint: Abdominal hernia    History of Present Illness: Abi Rivera is a 61 y.o. female with a past medical history of perforated duodenal ulcer with repair and subsequent incisional hernia, atrial fibrillation with rapid ventricular response, tobacco abuse, osteoarthritis of both ankles, essential hypertension, vitamin D deficiency and osteoporosis, and hyper thyroid.  The patient duodenal ulcer was repaired last year it was a result of large amount of NSAID use because of her ostial arthritis.  This resulted in an incisional hernia and obstruction which is why the patient was admitted.  Dr. Velasco repaired the incisional hernia with mesh on  no complications appeared to be reported she is on physical exam appears to be doing well.  There was obstruction but no gangrene.  They requested to consult and appreciate the opportunity.  We will restart her PPI for her GERD, restarted her losartan for her hypertension.  Because of the hyperthyroid and the fact that she is on methimazole I have ordered a TSH and a free T4 and we may need to reinitiate the methimazole.  Because she has had significant amount of atrial fibrillation and she just recently went and had an ablation moved her to a Medr room where she can be placed on telemetry just to make sure that no abnormal rhythms could be induced from the stress of operation.    ROS patient has appropriate abdominal pain.  Bowel surgery she denies any cough, congestion, palpitations, fever, chills, burning on urination, rash, altered mental status, swelling, and the rest of 15 essential review of systems have been reviewed and are  negative    Personal History     Past Medical History:   Diagnosis Date   • Adenomatous polyp of colon 6/20/2016   • Anemia    • Anemia    • Anxiety 6/16/2016   • Arthritis of foot 1/10/2019   • Atrial fibrillation (HCC) 6/16/2016   • Avascular necrosis of bone (HCC) 1/10/2019   • Chronic pain 1/22/2019   • Closed displaced fracture 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 9/19/2019    Added automatically from request for surgery [id number]   • Gastric ulcer    • Hypertension 6/16/2016   • Hyperthyroidism 11/2/2016   • Hypokalemia    • Hypomagnesemia    • Mood disorder (HCC) 6/16/2016   • Osteoporosis without current pathological fracture 6/4/2020   • Pulmonary hypertension ([facility]) 6/16/2016   • Sick sinus syndrome ([facility]) 1/25/2017   • Tricuspid valve insufficiency 6/16/2016       Past Surgical History:   Procedure Laterality Date   • APPENDECTOMY     • BILATERAL BREAST REDUCTION     • CARDIAC CATHETERIZATION N/A 1/19/2022    Procedure: Intracardiac echocardiogram;  Surgeon: Александр Sheth MD;  Location: [facility] CATH INVASIVE LOCATION;  Service: Cardiovascular;  Laterality: N/A;   • CARDIAC ELECTROPHYSIOLOGY PROCEDURE N/A 1/19/2022    Procedure: EP/Ablation Utong and Ivan aware;  Surgeon: Александр Sheth MD;  Location: [facility] CATH INVASIVE LOCATION;  Service: Cardiovascular;  Laterality: N/A;   • EXPLORATORY LAPAROTOMY N/A 3/19/2021    Procedure: LAPAROTOMY EXPLORATORY with repair of perforated gastric ulcer, oversew of perforated gastric ulcer, placement of kristie patch, biopsy of stomach;  Surgeon: Luisito Velasco DO;  Location: [facility] MAIN OR;  Service: General;  Laterality: N/A;   • HIP SURGERY      Left total hip   • ORIF FOOT FRACTURE Right 9/30/2019    Procedure: OPEN REDUCTION INTERNAL FIXATION OF FIFTH METATARSAL FRACTURE, LOOSE BODY EXCISION/REMOVAL FROM THE TALONAVICULAR JOINT OF THE RIGHT FOOT;  Surgeon: JOSE Fry DPM;  Location: [facility] MAIN OR;  Service: Podiatry   • ORIF  TIBIA/FIBULA FRACTURES Left    • US GUIDED CYST ASPIRATION BREAST N/A 7/26/2021       Family History: family history includes Diabetes in her father; Heart disease in her father; Hypertension in her father and mother; Thyroid disease in her mother. Otherwise pertinent FHx was reviewed and not pertinent to current issue.    Social History:  reports that she has been smoking cigarettes. She started smoking about 35 years ago. She has a 17.00 pack-year smoking history. She has never used smokeless tobacco. She reports current alcohol use. She reports that she does not use drugs.    Home Medications:   Abaloparatide, Glucosamine-Chondroit-Vit C-Mn, Magnesium Oxide, Potassium Gluconate, acetaminophen, aspirin, calcium carb-cholecalciferol, ferrous sulfate, fish oil, ibuprofen, losartan, methIMAzole, multivitamin, and omeprazole    Allergies:  No Known Allergies      Objective      Vitals:  Temp:  [96.8 °F (36 °C)-98.6 °F (37 °C)] 98.6 °F (37 °C)  Heart Rate:  [66-80] 76  Resp:  [8-18] 18  BP: ()/(54-84) 145/84  Flow (L/min):  [2] 2    Physical Exam   General no apparent distress patient is obese  Eyes pupils are equal round reactive to light ocular motion intact  Oropharynx membranes moist  Neck could not appreciate any thyroid megaly  Pulmonary lungs clear to auscultation bilateral decreased breath sounds in the bases no accessory muscle use  Cardiac regular rate and rhythm could not appreciate any murmurs there was no peripheral edema  Abdomen bandages were in place and there was binding around the abdomen so somewhat limited exam but the bandages were clean dry and intact  Extremities symmetric able to move all extremities  Lymphatics no edema  Psych patient was appropriate alert and oriented x3  Neuro cranial nerves II through XII intact    Result Review    Result Review:  I have personally reviewed the results from the time of this admission to 3/3/2022 04:20 EST and agree with these findings:[x]   Laboratory  []  Microbiology  [x]  Radiology  []  EKG/Telemetry   [x]  Cardiology/Vascular   []  Pathology  [x]  Old records  []  Other:  Most notable findings include:   January of this year patient went through an ablation reviewed the heart catheterization appears to have preserved function with 65% was done in January 19, 2022    Thyroid ultrasound showed small thyroid      Assessment/Plan        Active Hospital Problems:  Active Hospital Problems    Diagnosis    • **Incisional hernia with obstruction but no gangrene    • Tobacco abuse    • Sick sinus syndrome (HCC)    • Hyperthyroidism    • Hypertension    • Atrial fibrillation (HCC)      Plan:   Incisional hernia repair will be managed by the primary team general surgery    Medical problems being evaluated by hospitalist  1.  History of sick sinus syndrome and atrial fibrillation -patient recently had an ablation but because of her potential for abnormal rhythm I have moved her to the [facility] bed and placed her on telemetry.  She is followed by cardiology and may at some point need to be on some anticoagulation but will defer to them.  She just had major surgery so would not be appropriate at this time.  We will also make sure that we follow electrolytes  2.  Hypothyroid she is followed by an endocrinologist but checking a free T4 and a TSH and may need to reinitiate this medication we will try to investigate further she is on methimazole 10 mg twice daily  3.  Hypertension restarting her losartan  4.  History of peptic ulcer disease restarted her home PPI  5.  Tobacco abuse placed on nicotine patch and counseled the patient on the importance of cessation  6.  DVT prophylaxis will be determined by the surgery team    This patient has been examined wearing appropriate Personal Protective Equipment and discussed with . 03/03/22      Signature: Electronically signed by Luisito Whitman MD, 03/03/22, 4:27 AM EST.

## 2022-03-03 NOTE — CASE MANAGEMENT/SOCIAL WORK
Continued Stay Note   Zhang     Patient Name: Abi Rivera  MRN: [id number]  Today's Date: 3/3/2022    Admit Date: 3/2/2022     Discharge Plan     Row Name 03/03/22 1305       Plan    Plan Needs screened.    Plan Comments Attempted to meet with patient at bedside, unavailable. PT eval ordered. Needs CM assessment.              Phone communication or documentation only - no physical contact with patient or family.        Gerri Paniagua RN

## 2022-03-04 PROCEDURE — 25010000002 CEFAZOLIN PER 500 MG: Performed by: SURGERY

## 2022-03-04 PROCEDURE — 25010000002 HYDROMORPHONE PER 4 MG: Performed by: SURGERY

## 2022-03-04 PROCEDURE — 97530 THERAPEUTIC ACTIVITIES: CPT

## 2022-03-04 PROCEDURE — G0378 HOSPITAL OBSERVATION PER HR: HCPCS

## 2022-03-04 PROCEDURE — 99024 POSTOP FOLLOW-UP VISIT: CPT | Performed by: SURGERY

## 2022-03-04 PROCEDURE — 99232 SBSQ HOSP IP/OBS MODERATE 35: CPT | Performed by: HOSPITALIST

## 2022-03-04 PROCEDURE — 97116 GAIT TRAINING THERAPY: CPT

## 2022-03-04 PROCEDURE — 25010000002 ENOXAPARIN PER 10 MG: Performed by: SURGERY

## 2022-03-04 RX ORDER — POLYETHYLENE GLYCOL 3350 17 G/17G
17 POWDER, FOR SOLUTION ORAL DAILY
Status: DISCONTINUED | OUTPATIENT
Start: 2022-03-04 | End: 2022-03-07 | Stop reason: HOSPADM

## 2022-03-04 RX ADMIN — OXYCODONE 10 MG: 5 TABLET ORAL at 13:57

## 2022-03-04 RX ADMIN — NICOTINE 1 PATCH: 14 PATCH, EXTENDED RELEASE TRANSDERMAL at 09:04

## 2022-03-04 RX ADMIN — HYDROMORPHONE HYDROCHLORIDE 0.5 MG: 2 INJECTION, SOLUTION INTRAMUSCULAR; INTRAVENOUS; SUBCUTANEOUS at 09:04

## 2022-03-04 RX ADMIN — ENOXAPARIN SODIUM 40 MG: 40 INJECTION SUBCUTANEOUS at 15:43

## 2022-03-04 RX ADMIN — CEFAZOLIN SODIUM 1 G: 1 INJECTION, POWDER, FOR SOLUTION INTRAMUSCULAR; INTRAVENOUS at 03:05

## 2022-03-04 RX ADMIN — SODIUM CHLORIDE 100 ML/HR: 9 INJECTION, SOLUTION INTRAVENOUS at 06:28

## 2022-03-04 RX ADMIN — HYDROMORPHONE HYDROCHLORIDE 0.5 MG: 2 INJECTION, SOLUTION INTRAMUSCULAR; INTRAVENOUS; SUBCUTANEOUS at 04:37

## 2022-03-04 RX ADMIN — HYDROMORPHONE HYDROCHLORIDE 0.5 MG: 2 INJECTION, SOLUTION INTRAMUSCULAR; INTRAVENOUS; SUBCUTANEOUS at 20:42

## 2022-03-04 RX ADMIN — OXYCODONE 10 MG: 5 TABLET ORAL at 18:02

## 2022-03-04 RX ADMIN — LOSARTAN POTASSIUM 50 MG: 50 TABLET, FILM COATED ORAL at 09:04

## 2022-03-04 RX ADMIN — OXYCODONE 10 MG: 5 TABLET ORAL at 23:07

## 2022-03-04 RX ADMIN — HYDROCODONE BITARTRATE AND ACETAMINOPHEN 1 TABLET: 5; 325 TABLET ORAL at 10:37

## 2022-03-04 RX ADMIN — POLYETHYLENE GLYCOL 3350 17 G: 17 POWDER, FOR SOLUTION ORAL at 15:43

## 2022-03-04 RX ADMIN — PANTOPRAZOLE SODIUM 40 MG: 40 TABLET, DELAYED RELEASE ORAL at 06:28

## 2022-03-04 NOTE — THERAPY TREATMENT NOTE
Subjective: Pt agreeable to therapeutic plan of care.    Objective:     Bed mobility - Min-A  Transfers - CGA  Ambulation - 250 feet Supervision and with rolling walker    Pain: 4 VAS  Education: Provided education on importance of mobility and skilled verbal / tactile cueing throughout intervention.     Assessment: Abi Rivera POD 2 open hernia repair. Patient progressed to 250' ambulation with decreased gait speed. Improved gait tolerance with use of RW. History of ankle pain due to avascular necrosis per report. Pt will be appropriate for d/c once medically appropriate. Has RW at home. Will continue to follow and progress as tolerated.     Plan/Recommendations:   Pt would benefit from Home with family assist at discharge from facility and requires no DME at discharge.   Pt desires Home with family assist at discharge. Pt cooperative; agreeable to therapeutic recommendations and plan of care.     Post-Tx Position: Up in Chair and Call light and personal items within reach  PPE: gloves, surgical mask, eyewear protection

## 2022-03-04 NOTE — PLAN OF CARE
Goal Outcome Evaluation:  Plan of Care Reviewed With: patient           Outcome Summary: Patient remains alert and oriented.  Rested throughout day in bed and up to chair.  Tolerated well.  Ambulated in room to the bathroom, ambulated with PT out in angel and around nurses station. No distress, Will continue to monitor.        Problem: Adult Inpatient Plan of Care  Goal: Plan of Care Review  Outcome: Ongoing, Progressing  Flowsheets (Taken 3/4/2022 1523)  Plan of Care Reviewed With: patient  Outcome Summary: Patient remains alert and oriented.  Rested throughout day in bed and up to chair.  Tolerated well.  Ambulated in room to the bathroom, ambulated with PT out in angel and around nurses station. No distress, Will continue to monitor.  Goal: Patient-Specific Goal (Individualized)  Outcome: Ongoing, Progressing  Goal: Absence of Hospital-Acquired Illness or Injury  Outcome: Ongoing, Progressing  Intervention: Identify and Manage Fall Risk  Recent Flowsheet Documentation  Taken 3/4/2022 1500 by Sangeetha Posadas RN  Safety Promotion/Fall Prevention: safety round/check completed  Taken 3/4/2022 1253 by Sangeetha Posadas RN  Safety Promotion/Fall Prevention: safety round/check completed  Taken 3/4/2022 1150 by Sangeetha Posdaas RN  Safety Promotion/Fall Prevention: safety round/check completed  Taken 3/4/2022 1100 by Sangeetha Posadas RN  Safety Promotion/Fall Prevention: safety round/check completed  Taken 3/4/2022 0900 by Sangeetha Posadas RN  Safety Promotion/Fall Prevention: safety round/check completed  Taken 3/4/2022 0700 by Sangeetha Posadas RN  Safety Promotion/Fall Prevention: safety round/check completed  Intervention: Prevent Skin Injury  Recent Flowsheet Documentation  Taken 3/4/2022 0800 by Sangeetha Posadas, RN  Body Position: position changed independently  Goal: Optimal Comfort and Wellbeing  Outcome: Ongoing, Progressing  Intervention: Provide Person-Centered Care  Recent Flowsheet  Documentation  Taken 3/4/2022 0800 by Sangeetha Posadas, RN  Trust Relationship/Rapport:   emotional support provided   care explained   choices provided   empathic listening provided   questions answered   questions encouraged   reassurance provided   thoughts/feelings acknowledged  Goal: Readiness for Transition of Care  Outcome: Ongoing, Progressing     Problem: Pain Acute  Goal: Optimal Pain Control  Outcome: Ongoing, Progressing  Intervention: Optimize Psychosocial Wellbeing  Recent Flowsheet Documentation  Taken 3/4/2022 0800 by Sangeetha Posadas, RN  Diversional Activities:   television   smartphone

## 2022-03-04 NOTE — CASE MANAGEMENT/SOCIAL WORK
Continued Stay Note  DONTE Holcomb     Patient Name: Abi Rivera  MRN: [id number]  Today's Date: 3/4/2022    Admit Date: 3/2/2022     Discharge Plan     Row Name 03/04/22 1400       Plan    Plan Comments attempted to meet with pt  Physical therapy was working with her.  Pt was using a rw, but stated that she had one at home if needed.  pt was ambulating to the hallway.  attempted to call into pt room at 1400 but no answer.                    Expected Discharge Date and Time     Expected Discharge Date Expected Discharge Time    Mar 5, 2022         Met with patient in room wearing PPE: mask.      Maintained distance greater than six feet and spent less than 15 minutes in the room.          Clemencia Edwards RN

## 2022-03-04 NOTE — PLAN OF CARE
Goal Outcome Evaluation:  Plan of Care Reviewed With: patient        Progress: improving     Rested well tonight, required both IV and po pain medication for pain control. Ambulates without difficulty. Care plan is on going.

## 2022-03-04 NOTE — PROGRESS NOTES
"   LOS: 1 day   Patient Care Team:  Chaparrita Chun MD as PCP - General (Family Medicine)    Reason for follow-up: Postop    Subjective   Patient seen and examined.  Pain improving.    Objective   Dressings are clean dry and intact.  Drain tubes with appropriate color and output    Vital Signs  Vitals:    03/03/22 2341 03/04/22 0356 03/04/22 0902 03/04/22 1254   BP: 150/81 146/84 139/83 135/82   BP Location: Left arm Left arm Left arm Right arm   Patient Position: Lying Lying Lying Sitting   Pulse: 66 66 72 62   Resp: 16 14 13 12   Temp: 98.6 °F (37 °C) 99.9 °F (37.7 °C) 99.8 °F (37.7 °C) 99.1 °F (37.3 °C)   TempSrc: Oral Oral Oral Oral   SpO2: 100% 97% 97% 98%   Weight: 78.9 kg (173 lb 15.1 oz)      Height:             Results Review:       Lab Results (last 24 hours)     Procedure Component Value Units Date/Time    Tissue Pathology Exam [[id number]] Collected: 03/02/22 1049    Specimen: Tissue from Hernia, Sac Updated: 03/03/22 1424     Case Report --     Surgical Pathology Report                         Case: [id number]                                  Authorizing Provider:  Luisito Velasco DO        Collected:           03/02/2022 10:49 AM          Ordering Location:     [facility] MAIN  Received:            03/02/2022 01:28 PM                                 OR                                                                           Pathologist:           Corbin Lucio MD                                                            Specimen:    Hernia, Sac, hernia sac                                                                     Final Diagnosis --     Soft tissue, site not specified, excision:    Benign fibroadipose tissue consistent with hernia sac    No malignancy identified     [facility]        Gross Description --     Received in formalin designated \"Hernia sac\" are two unoriented fragments of yellowish pink fibrofatty and membranous tissue measuring 28 x 4 x 1.5 cm in aggregate. " Sectioning reveals thin membranous cut surfaces; no nodules or masses are identified. Representative sections of fibromembranous tissue are submitted in one cassette.     SY/[facility]              Imaging Results (Last 24 Hours)     ** No results found for the last 24 hours. **          Medication Review:     Assessment/Plan         Incisional hernia with obstruction but no gangrene    Hypertension    Atrial fibrillation (HCC)    Hyperthyroidism    Sick sinus syndrome (HCC)    Tobacco abuse    Impression: Postop day 2 repair of incisional hernia open with component release and mesh placement    Plan: Ambulate with help.  Encourage spirometry frequently.  But anticipates she would be here another 24 to 48 hours.    Continue antibiotics  Bowel regimen  DVT chemoprophylaxis          Wyatt Mon MD  03/04/22  14:12 EST

## 2022-03-04 NOTE — PLAN OF CARE
Goal Outcome Evaluation:                Abi Rivera POD 2 open hernia repair. Patient progressed to 250' ambulation with decreased gait speed. Improved gait tolerance with use of RW. History of ankle pain due to avascular necrosis per report. Pt will be appropriate for d/c once medically appropriate. Has RW at home. Will continue to follow and progress as tolerated.

## 2022-03-04 NOTE — CASE MANAGEMENT/SOCIAL WORK
Discharge Planning Assessment  [facility]     Patient Name: Abi Rivera  MRN: [id number]  Today's Date: 3/4/2022    Admit Date: 3/2/2022     Discharge Needs Assessment     Row Name 03/04/22 1612       Living Environment    Lives With spouse; child(isai), adult    Current Living Arrangements home/apartment/condo    Primary Care Provided by self    Provides Primary Care For no one    Able to Return to Prior Arrangements yes       Resource/Environmental Concerns    Resource/Environmental Concerns none    Transportation Concerns car, none       Transition Planning    Patient/Family Anticipates Transition to home with family    Patient/Family Anticipated Services at Transition none    Transportation Anticipated family or friend will provide       Discharge Needs Assessment    Equipment Currently Used at Home none    Concerns to be Addressed no discharge needs identified    Equipment Needed After Discharge none               Discharge Plan     Row Name 03/04/22 1611       Plan    Plan Dc plan: home    Plan Comments pt live with spouse and kids.  has a rw at home; i with adls; denies any concerns regarding her discharge at this time.  family to provide transportation home.    Row Name 03/04/22 1400       Plan    Plan Comments attempted to meet with pt  Physical therapy was working with her.  Pt was using a rw, but stated that she had one at home if needed.  pt was ambulating to the hallway.  attempted to call into pt room at 1400 but no answer.              Continued Care and Services - Admitted Since 3/2/2022    Coordination has not been started for this encounter.       Expected Discharge Date and Time     Expected Discharge Date Expected Discharge Time    Mar 5, 2022          Demographic Summary     Row Name 03/04/22 1612       General Information    Admission Type observation    Referral Source admission list    Reason for Consult discharge planning    Preferred Language English               Functional Status     Row  Name 03/04/22 1612       Functional Status    Usual Activity Tolerance good    Current Activity Tolerance moderate       Functional Status, IADL    Medications independent    Meal Preparation independent    Housekeeping independent    Laundry independent    Shopping independent              Met with patient in room wearing PPE: mask.      Maintained distance greater than six feet and spent less than 15 minutes in the room.          Clemencia Edwards, RN

## 2022-03-04 NOTE — PROGRESS NOTES
[facility] Medicine Services Daily Progress Note    Patient Name: Abi Rivera  : 1960  MRN: [id number]  Primary Care Physician:  Chaparrita Chun MD  Date of admission: 3/2/2022      Subjective      Chief Complaint: Abdominal hernia      Patient Reports   3/4/2022: Patient reports adequate postoperative pain control.  No nausea or vomiting.  No bowel movement yet.  She is tolerating p.o.    ROS   12 point review of systems was reviewed and was negative except expected postoperative pain.      Objective      Vitals:   Temp:  [98.6 °F (37 °C)-99.9 °F (37.7 °C)] 99.1 °F (37.3 °C)  Heart Rate:  [62-72] 62  Resp:  [12-16] 12  BP: (135-150)/(81-84) 135/82    Physical Exam   Vital signs and nurses notes reviewed.  Well-developed over-nourished female comfortable on room air in no acute distress sitting up in bed awake and alert; mucous membranes moist; sclerae anicteric; neck supple; lungs clear to auscultation bilaterally; CV regular rate and rhythm; abdomen with abdominal binder in place, 2 LIZBETH drains lower mid abdomen with serosanguineous drainage; hypoactive bowel sounds; clean dry surgical dressing not removed; extremities with no edema, cyanosis or calf tenderness; palpable pedal pulses bilaterally; neurologic exam grossly nonfocal; no Pritchard catheter.     Result Review    Result Review:  I have personally reviewed the results from the time of this admission to 3/4/2022 17:03 EST and agree with these findings:  [x]  Laboratory  []  Microbiology  []  Radiology  []  EKG/Telemetry   []  Cardiology/Vascular   []  Pathology  []  Old records  []  Other:  Most notable findings discussed in the assessment and plan.    Wounds (last 24 hours)     LDA Wound     Row Name 22 1150 22 0800 22 0500       Wound 22 1037 midline abdomen Incision    Wound - Properties Group Placement Date: 22  -AM Placement Time: 1037  -AM Orientation: midline  -AM Location:  abdomen  -AM Primary Wound Type: Incision  -AM    Dressing Appearance -- dry; intact  -KN dry; intact  -DA    Closure MARIYA  -KN MARIYA  -KN MARIYA  -DA    Dressing Care -- border dressing  abdominal binder in place.  -KN border dressing  -DA    Retired Wound - Properties Group Date first assessed: 03/02/22  -AM Time first assessed: 1037  -AM Location: abdomen  -AM Primary Wound Type: Incision  -AM    Row Name 03/04/22 0300 03/04/22 0100 03/03/22 2300       Wound 03/02/22 1037 midline abdomen Incision    Wound - Properties Group Placement Date: 03/02/22  -AM Placement Time: 1037  -AM Orientation: midline  -AM Location: abdomen  -AM Primary Wound Type: Incision  -AM    Dressing Appearance dry; intact  -DA dry; intact  -DA dry; intact  -DA    Closure MARIYA  -DA MARIYA  -DA MARIYA  -DA    Dressing Care border dressing  -DA border dressing  -DA border dressing  -DA    Retired Wound - Properties Group Date first assessed: 03/02/22  -AM Time first assessed: 1037  -AM Location: abdomen  -AM Primary Wound Type: Incision  -AM    Row Name 03/03/22 2100 03/03/22 1900 03/03/22 1720       Wound 03/02/22 1037 midline abdomen Incision    Wound - Properties Group Placement Date: 03/02/22  -AM Placement Time: 1037  -AM Orientation: midline  -AM Location: abdomen  -AM Primary Wound Type: Incision  -AM    Dressing Appearance dry; intact  -DA intact; dry  -DA --    Closure MARIYA  -DA MARIYA  -DA MARIYA  -HB    Dressing Care border dressing  -DA border dressing  -DA --    Retired Wound - Properties Group Date first assessed: 03/02/22  -AM Time first assessed: 1037  -AM Location: abdomen  -AM Primary Wound Type: Incision  -AM          User Key  (r) = Recorded By, (t) = Taken By, (c) = Cosigned By    Initials Name Provider Type    AM Diana Gastelum RN Registered Nurse    Sangeetha Ricketts RN Registered Nurse    Wandy William RN Registered Nurse    Zarina Rosenberg, MITCH Licensed Nurse                  Assessment/Plan      Brief Patient  Summary:  Abi Rivera is a 61 y.o. female who underwent repair of a large incisional hernia with obstruction but no gangrene with mesh on 3/2/2022.  There were no complications and the patient tolerated the procedure well.  Hospitalist group is following for medical management.  Patient has a history of high blood pressure, sick sinus syndrome, atrial fibrillation and hyperthyroidism.    Current inpatient medications include:  enoxaparin, 40 mg, Subcutaneous, Q24H  losartan, 50 mg, Oral, Q24H  nicotine, 1 patch, Transdermal, Q24H  pantoprazole, 40 mg, Oral, Q AM  polyethylene glycol, 17 g, Oral, Daily       sodium chloride, 100 mL/hr, Last Rate: 100 mL/hr (03/02/22 0922)  sodium chloride, 100 mL/hr, Last Rate: 100 mL/hr (03/04/22 0628)         Active Hospital Problems:  Active Hospital Problems    Diagnosis    • **Incisional hernia with obstruction but no gangrene    • Tobacco abuse    • Sick sinus syndrome (HCC)    • Hyperthyroidism    • Hypertension    • Atrial fibrillation (HCC)      Plan:   Essential hypertension, chronic and controlled  -Continue losartan  -Monitor blood pressure    Paroxysmal atrial fibrillation causing long sinus pauses at termination status post ablation  -No rate controlling meds or anticoagulation prior to admission  -Telemetry monitoring    Hyperthyroidism with current mild hypothyroidism  -TSH 5.15 and free T4 0.24  -Hold home methimazole  -Defer to outpatient provider for management    Tobacco abuse  -Encourage cessation  -Nicotine patch    Osteoporosis  -Hold abaloparatide injections    Status post open incisional hernia repair on 3/2/2022    DVT prophylaxis:  Medical and mechanical DVT prophylaxis orders are present.    CODE STATUS:    Code Status (Patient has no pulse and is not breathing): CPR (Attempt to Resuscitate)  Medical Interventions (Patient has pulse or is breathing): Full      Disposition:  I expect patient to be discharged in 1 to 2 days.    This patient has been  examined wearing appropriate Personal Protective Equipment and discussed with hospital infection control department. 03/04/22      Electronically signed by Serene Cope MD, 03/04/22, 17:03 EST.  [facility]ist [facility]

## 2022-03-05 PROCEDURE — 99232 SBSQ HOSP IP/OBS MODERATE 35: CPT | Performed by: HOSPITALIST

## 2022-03-05 PROCEDURE — 25010000002 ENOXAPARIN PER 10 MG: Performed by: SURGERY

## 2022-03-05 PROCEDURE — 99024 POSTOP FOLLOW-UP VISIT: CPT | Performed by: SURGERY

## 2022-03-05 PROCEDURE — G0378 HOSPITAL OBSERVATION PER HR: HCPCS

## 2022-03-05 PROCEDURE — 0 MORPHINE SULFATE 4 MG/ML SOLUTION: Performed by: SURGERY

## 2022-03-05 PROCEDURE — 25010000002 HYDROMORPHONE PER 4 MG: Performed by: SURGERY

## 2022-03-05 RX ORDER — ACETAMINOPHEN 500 MG
1000 TABLET ORAL EVERY 8 HOURS PRN
Status: DISCONTINUED | OUTPATIENT
Start: 2022-03-05 | End: 2022-03-07 | Stop reason: HOSPADM

## 2022-03-05 RX ADMIN — OXYCODONE 10 MG: 5 TABLET ORAL at 18:31

## 2022-03-05 RX ADMIN — ENOXAPARIN SODIUM 40 MG: 40 INJECTION SUBCUTANEOUS at 16:15

## 2022-03-05 RX ADMIN — LOSARTAN POTASSIUM 50 MG: 50 TABLET, FILM COATED ORAL at 09:45

## 2022-03-05 RX ADMIN — HYDROMORPHONE HYDROCHLORIDE 0.5 MG: 2 INJECTION, SOLUTION INTRAMUSCULAR; INTRAVENOUS; SUBCUTANEOUS at 13:10

## 2022-03-05 RX ADMIN — ACETAMINOPHEN 1000 MG: 500 TABLET, FILM COATED ORAL at 17:19

## 2022-03-05 RX ADMIN — HYDROMORPHONE HYDROCHLORIDE 0.5 MG: 2 INJECTION, SOLUTION INTRAMUSCULAR; INTRAVENOUS; SUBCUTANEOUS at 21:02

## 2022-03-05 RX ADMIN — MORPHINE SULFATE 4 MG: 4 INJECTION INTRAVENOUS at 16:15

## 2022-03-05 RX ADMIN — POLYETHYLENE GLYCOL 3350 17 G: 17 POWDER, FOR SOLUTION ORAL at 09:46

## 2022-03-05 RX ADMIN — OXYCODONE 10 MG: 5 TABLET ORAL at 10:07

## 2022-03-05 RX ADMIN — PANTOPRAZOLE SODIUM 40 MG: 40 TABLET, DELAYED RELEASE ORAL at 05:08

## 2022-03-05 RX ADMIN — NICOTINE 1 PATCH: 14 PATCH, EXTENDED RELEASE TRANSDERMAL at 09:45

## 2022-03-05 RX ADMIN — OXYCODONE 10 MG: 5 TABLET ORAL at 04:40

## 2022-03-05 NOTE — PLAN OF CARE
Goal Outcome Evaluation:  Plan of Care Reviewed With: patient        Progress: improving    Patient pleasant and doing well. Eating well. No bowel movement yet however, patient states she only goes 3-4 times a week. Pain managed. Will continue to monitor.  Problem: Adult Inpatient Plan of Care  Goal: Plan of Care Review  Recent Flowsheet Documentation  Taken 3/5/2022 1733 by Evangelina Brooks RN  Progress: improving  Plan of Care Reviewed With: patient     Problem: Adult Inpatient Plan of Care  Goal: Plan of Care Review  Recent Flowsheet Documentation  Taken 3/5/2022 1733 by Evangelina Brooks RN  Progress: improving  Plan of Care Reviewed With: patient  Goal: Absence of Hospital-Acquired Illness or Injury  Intervention: Identify and Manage Fall Risk  Recent Flowsheet Documentation  Taken 3/5/2022 0703 by Evangelina Brooks RN  Safety Promotion/Fall Prevention:   activity supervised   safety round/check completed   assistive device/personal items within reach  Intervention: Prevent Skin Injury  Recent Flowsheet Documentation  Taken 3/5/2022 0703 by Evangelina Brooks RN  Body Position: position changed independently  Skin Protection:   adhesive use limited   transparent dressing maintained  Intervention: Prevent and Manage VTE (venous thromboembolism) Risk  Recent Flowsheet Documentation  Taken 3/5/2022 0703 by Evangelina Brooks RN  VTE Prevention/Management:   bilateral   sequential compression devices on  Intervention: Prevent Infection  Recent Flowsheet Documentation  Taken 3/5/2022 0703 by Evangelina Brooks RN  Infection Prevention:   equipment surfaces disinfected   environmental surveillance performed   hand hygiene promoted   personal protective equipment utilized   rest/sleep promoted   single patient room provided  Goal: Optimal Comfort and Wellbeing  Intervention: Provide Person-Centered Care  Recent Flowsheet Documentation  Taken 3/5/2022 0703 by Evangelina Brooks RN  Trust Relationship/Rapport:   care explained   choices  provided   emotional support provided   empathic listening provided   questions answered   questions encouraged   reassurance provided   thoughts/feelings acknowledged

## 2022-03-05 NOTE — PLAN OF CARE
Goal Outcome Evaluation:      C/o moderate incisonal pain, IV and PO pain med given with some relief. Rubén ogden drain intact with moderate serous sanguinous output. Able to tolerate diet without nausea.  Call light within reach. Will continue to monitor.

## 2022-03-05 NOTE — PROGRESS NOTES
[facility] Medicine Services Daily Progress Note    Patient Name: Abi Rivera  : 1960  MRN: [id number]  Primary Care Physician:  Chaparrita Chun MD  Date of admission: 3/2/2022      Subjective      Chief Complaint: Abdominal hernia      Patient Reports   3/4/2022: Patient reports adequate postoperative pain control.  No nausea or vomiting.  No bowel movement yet.  She is tolerating p.o.  3/5/2022: The patient reports adequate pain control and tolerating p.o. but she has not had a bowel movement yet.  She says that she does not feel that she needs to have a bowel movement but she has not had one in 5 days.    ROS   12 point review of systems was reviewed and was negative except expected postoperative pain.      Objective      Vitals:   Temp:  [98.1 °F (36.7 °C)-99.1 °F (37.3 °C)] 98.6 °F (37 °C)  Heart Rate:  [69-74] 69  Resp:  [14-18] 14  BP: (136-155)/(69-91) 142/87    Physical Exam   Vital signs and nurses notes reviewed.  Well-developed over-nourished female comfortable on room air in no acute distress sitting up in bed awake and alert; mucous membranes moist; sclerae anicteric; neck supple; lungs clear to auscultation bilaterally; CV regular rate and rhythm; abdomen with abdominal binder in place, 2 LIZBETH drains lower mid abdomen with serosanguineous drainage; hypoactive bowel sounds; clean dry surgical dressing not removed; extremities with no edema, cyanosis or calf tenderness; palpable pedal pulses bilaterally; neurologic exam grossly nonfocal; no Pritchard catheter. Exam unchanged from 3/4/2022.    Result Review    Result Review:  I have personally reviewed the results from the time of this admission to 3/5/2022 18:28 EST and agree with these findings:  [x]  Laboratory  []  Microbiology  []  Radiology  []  EKG/Telemetry   []  Cardiology/Vascular   []  Pathology  []  Old records  []  Other:  Most notable findings discussed in the assessment and plan.    Wounds (last 24  hours)     LDA Wound     Row Name 03/04/22 2042             Wound 03/02/22 1037 midline abdomen Incision    Wound - Properties Group Placement Date: 03/02/22  -AM Placement Time: 1037  -AM Orientation: midline  -AM Location: abdomen  -AM Primary Wound Type: Incision  -AM      Dressing Appearance dried drainage  -LR      Closure MARIYA  -LR      Retired Wound - Properties Group Placement Date: 03/02/22  -AM Placement Time: 1037  -AM Orientation: midline  -AM Location: abdomen  -AM Primary Wound Type: Incision  -AM      Retired Wound - Properties Group Date first assessed: 03/02/22  -AM Time first assessed: 1037  -AM Location: abdomen  -AM Primary Wound Type: Incision  -AM            User Key  (r) = Recorded By, (t) = Taken By, (c) = Cosigned By    Initials Name Provider Type    AM Diana Gastelum, RN Registered Nurse    LR Karolina Haas RN Registered Nurse                  Assessment/Plan      Brief Patient Summary:  Abi Rivera is a 61 y.o. female who underwent repair of a large incisional hernia with obstruction but no gangrene with mesh on 3/2/2022.  There were no complications and the patient tolerated the procedure well.  Hospitalist group is following for medical management.  Patient has a history of high blood pressure, sick sinus syndrome, atrial fibrillation and hyperthyroidism.    Current inpatient medications include:  enoxaparin, 40 mg, Subcutaneous, Q24H  losartan, 50 mg, Oral, Q24H  nicotine, 1 patch, Transdermal, Q24H  pantoprazole, 40 mg, Oral, Q AM  polyethylene glycol, 17 g, Oral, Daily       sodium chloride, 100 mL/hr, Last Rate: 100 mL/hr (03/02/22 0922)  sodium chloride, 100 mL/hr, Last Rate: 100 mL/hr (03/04/22 0628)         Active Hospital Problems:  Active Hospital Problems    Diagnosis    • **Incisional hernia with obstruction but no gangrene    • Tobacco abuse    • Sick sinus syndrome (HCC)    • Hyperthyroidism    • Hypertension    • Atrial fibrillation (HCC)      Plan:   Essential  hypertension, chronic and controlled  -Continue losartan  -Monitor blood pressure    Paroxysmal atrial fibrillation causing long sinus pauses at termination status post ablation  -No rate controlling meds or anticoagulation prior to admission  -Telemetry monitoring    Hyperthyroidism with current mild hypothyroidism  -TSH 5.15 and free T4 0.24  -Hold home methimazole  -Defer to outpatient provider for management    Tobacco abuse  -Encourage cessation  -Nicotine patch    Osteoporosis  -Hold abaloparatide injections    Status post open incisional hernia repair on 3/2/2022     DVT prophylaxis:  Medical and mechanical DVT prophylaxis orders are present.    CODE STATUS:    Code Status (Patient has no pulse and is not breathing): CPR (Attempt to Resuscitate)  Medical Interventions (Patient has pulse or is breathing): Full      Disposition:  I expect patient to be discharged in 1 to 2 days.    This patient has been examined wearing appropriate Personal Protective Equipment and discussed with hospital infection control department. 03/05/22      Electronically signed by Serene Cope MD, 03/05/22, 18:28 EST.  [facility]ist [facility]

## 2022-03-05 NOTE — PROGRESS NOTES
LOS: 1 day   Patient Care Team:  Chaparrita Chun MD as PCP - General (Family Medicine)    Reason for follow-up: Postop    Subjective   Patient seen and examined.  Passing flatus no bowel movement tolerating diet.    Objective   Incision without erythema or drainage LIZBETH drain serosanguineous    Vital Signs  Vitals:    03/04/22 1900 03/05/22 0020 03/05/22 0418 03/05/22 1357   BP: 146/91 155/86 136/69 142/87   BP Location: Left leg Left arm Left arm Right arm   Patient Position: Lying Lying Lying Sitting   Pulse: 74 69 70 69   Resp: 18 15 18 14   Temp: 99.1 °F (37.3 °C) 98.1 °F (36.7 °C) 98.3 °F (36.8 °C) 98.6 °F (37 °C)   TempSrc: Oral Oral Oral Oral   SpO2: 96% 97% 95% 96%   Weight:       Height:             Results Review:       Lab Results (last 24 hours)     ** No results found for the last 24 hours. **           Imaging Results (Last 24 Hours)     ** No results found for the last 24 hours. **          Medication Review:     Assessment/Plan         Incisional hernia with obstruction but no gangrene    Hypertension    Atrial fibrillation (HCC)    Hyperthyroidism    Sick sinus syndrome (HCC)    Tobacco abuse    Impression: Postop day 3 repair of incisional hernia open with component release and mesh placement    Plan:     Continue LIZBETH drains at discharge.    Continue antibiotics for 7 days from date of surgery.  Home when bowels move    Wyatt Mon MD  03/05/22  14:25 EST

## 2022-03-06 ENCOUNTER — APPOINTMENT (OUTPATIENT)
Dept: GENERAL RADIOLOGY | Facility: HOSPITAL | Age: 62
End: 2022-03-06

## 2022-03-06 LAB
ANION GAP SERPL CALCULATED.3IONS-SCNC: 10 MMOL/L (ref 5–15)
BASOPHILS # BLD AUTO: 0.1 10*3/MM3 (ref 0–0.2)
BASOPHILS NFR BLD AUTO: 0.6 % (ref 0–1.5)
BUN SERPL-MCNC: 10 MG/DL (ref 6–20)
BUN/CREAT SERPL: 11.5 (ref 7–25)
CALCIUM SPEC-SCNC: 9.2 MG/DL (ref 8.6–10.5)
CHLORIDE SERPL-SCNC: 95 MMOL/L (ref 98–107)
CO2 SERPL-SCNC: 29 MMOL/L (ref 22–29)
CREAT SERPL-MCNC: 0.87 MG/DL (ref 0.57–1)
DEPRECATED RDW RBC AUTO: 45.9 FL (ref 37–54)
EGFRCR SERPLBLD CKD-EPI 2021: 76.9 ML/MIN/1.73
EOSINOPHIL # BLD AUTO: 0.2 10*3/MM3 (ref 0–0.4)
EOSINOPHIL NFR BLD AUTO: 2.4 % (ref 0.3–6.2)
ERYTHROCYTE [DISTWIDTH] IN BLOOD BY AUTOMATED COUNT: 12.9 % (ref 12.3–15.4)
GLUCOSE SERPL-MCNC: 99 MG/DL (ref 65–99)
HCT VFR BLD AUTO: 36.3 % (ref 34–46.6)
HGB BLD-MCNC: 12.5 G/DL (ref 12–15.9)
LYMPHOCYTES # BLD AUTO: 1.8 10*3/MM3 (ref 0.7–3.1)
LYMPHOCYTES NFR BLD AUTO: 18.1 % (ref 19.6–45.3)
MAGNESIUM SERPL-MCNC: 1.5 MG/DL (ref 1.6–2.6)
MCH RBC QN AUTO: 34.8 PG (ref 26.6–33)
MCHC RBC AUTO-ENTMCNC: 34.5 G/DL (ref 31.5–35.7)
MCV RBC AUTO: 101 FL (ref 79–97)
MONOCYTES # BLD AUTO: 0.8 10*3/MM3 (ref 0.1–0.9)
MONOCYTES NFR BLD AUTO: 8.5 % (ref 5–12)
NEUTROPHILS NFR BLD AUTO: 6.8 10*3/MM3 (ref 1.7–7)
NEUTROPHILS NFR BLD AUTO: 70.4 % (ref 42.7–76)
NRBC BLD AUTO-RTO: 0 /100 WBC (ref 0–0.2)
PLATELET # BLD AUTO: 278 10*3/MM3 (ref 140–450)
PMV BLD AUTO: 6.8 FL (ref 6–12)
POTASSIUM SERPL-SCNC: 3.9 MMOL/L (ref 3.5–5.2)
RBC # BLD AUTO: 3.6 10*6/MM3 (ref 3.77–5.28)
SODIUM SERPL-SCNC: 134 MMOL/L (ref 136–145)
WBC NRBC COR # BLD: 9.7 10*3/MM3 (ref 3.4–10.8)

## 2022-03-06 PROCEDURE — 25010000002 HYDROMORPHONE PER 4 MG: Performed by: SURGERY

## 2022-03-06 PROCEDURE — 83735 ASSAY OF MAGNESIUM: CPT | Performed by: HOSPITALIST

## 2022-03-06 PROCEDURE — 85025 COMPLETE CBC W/AUTO DIFF WBC: CPT | Performed by: SURGERY

## 2022-03-06 PROCEDURE — 80048 BASIC METABOLIC PNL TOTAL CA: CPT | Performed by: SURGERY

## 2022-03-06 PROCEDURE — 25010000002 ENOXAPARIN PER 10 MG: Performed by: SURGERY

## 2022-03-06 PROCEDURE — G0378 HOSPITAL OBSERVATION PER HR: HCPCS

## 2022-03-06 PROCEDURE — 99024 POSTOP FOLLOW-UP VISIT: CPT | Performed by: SURGERY

## 2022-03-06 PROCEDURE — 99232 SBSQ HOSP IP/OBS MODERATE 35: CPT | Performed by: HOSPITALIST

## 2022-03-06 PROCEDURE — 74018 RADEX ABDOMEN 1 VIEW: CPT

## 2022-03-06 RX ORDER — MAGNESIUM SULFATE HEPTAHYDRATE 40 MG/ML
2 INJECTION, SOLUTION INTRAVENOUS AS NEEDED
Status: DISCONTINUED | OUTPATIENT
Start: 2022-03-06 | End: 2022-03-07 | Stop reason: HOSPADM

## 2022-03-06 RX ORDER — POTASSIUM CHLORIDE 1.5 G/1.77G
40 POWDER, FOR SOLUTION ORAL AS NEEDED
Status: DISCONTINUED | OUTPATIENT
Start: 2022-03-06 | End: 2022-03-07 | Stop reason: HOSPADM

## 2022-03-06 RX ORDER — POTASSIUM CHLORIDE 20 MEQ/1
40 TABLET, EXTENDED RELEASE ORAL AS NEEDED
Status: DISCONTINUED | OUTPATIENT
Start: 2022-03-06 | End: 2022-03-07 | Stop reason: HOSPADM

## 2022-03-06 RX ORDER — MAGNESIUM SULFATE HEPTAHYDRATE 40 MG/ML
4 INJECTION, SOLUTION INTRAVENOUS AS NEEDED
Status: DISCONTINUED | OUTPATIENT
Start: 2022-03-06 | End: 2022-03-07 | Stop reason: HOSPADM

## 2022-03-06 RX ADMIN — OXYCODONE 10 MG: 5 TABLET ORAL at 07:18

## 2022-03-06 RX ADMIN — OXYCODONE 10 MG: 5 TABLET ORAL at 01:58

## 2022-03-06 RX ADMIN — LOSARTAN POTASSIUM 50 MG: 50 TABLET, FILM COATED ORAL at 07:09

## 2022-03-06 RX ADMIN — HYDROMORPHONE HYDROCHLORIDE 0.5 MG: 2 INJECTION, SOLUTION INTRAMUSCULAR; INTRAVENOUS; SUBCUTANEOUS at 11:01

## 2022-03-06 RX ADMIN — HYDROMORPHONE HYDROCHLORIDE 0.5 MG: 2 INJECTION, SOLUTION INTRAMUSCULAR; INTRAVENOUS; SUBCUTANEOUS at 00:11

## 2022-03-06 RX ADMIN — PANTOPRAZOLE SODIUM 40 MG: 40 TABLET, DELAYED RELEASE ORAL at 07:09

## 2022-03-06 RX ADMIN — OXYCODONE 10 MG: 5 TABLET ORAL at 13:16

## 2022-03-06 RX ADMIN — HYDROMORPHONE HYDROCHLORIDE 0.5 MG: 2 INJECTION, SOLUTION INTRAMUSCULAR; INTRAVENOUS; SUBCUTANEOUS at 15:23

## 2022-03-06 RX ADMIN — ENOXAPARIN SODIUM 40 MG: 40 INJECTION SUBCUTANEOUS at 15:23

## 2022-03-06 RX ADMIN — POLYETHYLENE GLYCOL 3350 17 G: 17 POWDER, FOR SOLUTION ORAL at 07:08

## 2022-03-06 NOTE — PLAN OF CARE
Goal Outcome Evaluation:  Plan of Care Reviewed With: patient      Pt up to chair today, no BM this shift, pt reports passing flatus. Abdominal xrays performed awaiting results. VSS, will continue to monitor

## 2022-03-06 NOTE — PLAN OF CARE
Goal Outcome Evaluation:     C/o moderate incisional pain through the night medicated with IV and PO pain medication with some relief. No bowel movement yet, but has passed flatus. Both LIZBETH drain dressing have been changed, abdominal binder in place. Heart monitor in place. Will continue to monitor.

## 2022-03-06 NOTE — PROGRESS NOTES
LOS: 1 day   Patient Care Team:  Chaparrita Chun MD as PCP - General (Family Medicine)    Reason for follow-up: Postop    Subjective   Patient seen and examined.  Still passing gas still no bowel movement.    Objective   Incision without erythema or drainage LIZBETH drain serosanguineous    Vital Signs  Vitals:    03/05/22 1357 03/05/22 2057 03/06/22 0421 03/06/22 0706   BP: 142/87 145/91 124/66 110/71   BP Location: Right arm Left arm Left arm Left arm   Patient Position: Sitting Lying Lying Lying   Pulse: 69 68 64 73   Resp: 14 16 10 14   Temp: 98.6 °F (37 °C) 98.6 °F (37 °C) 98.6 °F (37 °C) 98.4 °F (36.9 °C)   TempSrc: Oral Oral Oral Oral   SpO2: 96% 93% 96% 95%   Weight:       Height:             Results Review:       Lab Results (last 24 hours)     ** No results found for the last 24 hours. **           Imaging Results (Last 24 Hours)     ** No results found for the last 24 hours. **          Medication Review:     Assessment/Plan         Incisional hernia with obstruction but no gangrene    Hypertension    Atrial fibrillation (HCC)    Hyperthyroidism    Sick sinus syndrome (HCC)    Tobacco abuse    Impression: Postop day 4 repair of incisional hernia open with component release and mesh placement    Plan:     Continue LIZBETH drains at discharge.    Continue antibiotics for 7 days from date of surgery.  Home when bowels move    KUB today  I have ordered a CBC and BMP for today    Wyatt Mon MD  03/06/22  15:02 EST

## 2022-03-06 NOTE — PROGRESS NOTES
[facility] Emergency Medicine Services Daily Progress Note    Patient Name: Abi Rivera  : 1960  MRN: [id number]  Primary Care Physician:  Chaparrita Chun MD  Date of admission: 3/2/2022      Subjective      Chief Complaint: Abdominal hernia      Patient Reports   3/4/2022: Patient reports adequate postoperative pain control.  No nausea or vomiting.  No bowel movement yet.  She is tolerating p.o.  3/5/2022: The patient reports adequate pain control and tolerating p.o. but she has not had a bowel movement yet.  She says that she does not feel that she needs to have a bowel movement but she has not had one in 5 days.  3/6/2022: Patient reports some ongoing incisional soreness and mild nausea with anorexia.  She denies any vomiting.  She reports passing flatus but she has not had a bowel movement.  She continues to have serosanguineous drainage out the 2 LIZBETH drains.  Patient reports that she does not have bowel movements very often may be once or twice a week and her last one was on 2022 prior to admission.    ROS   12 point review of systems was reviewed and was negative except expected postoperative pain.      Objective      Vitals:   Temp:  [98.4 °F (36.9 °C)-98.6 °F (37 °C)] 98.4 °F (36.9 °C)  Heart Rate:  [64-73] 73  Resp:  [10-16] 14  BP: (110-145)/(66-91) 110/71    Physical Exam   Vital signs and nurses notes reviewed.  Well-developed over-nourished female comfortable on room air in no acute distress sitting up in bed awake and alert; mucous membranes moist; sclerae anicteric; neck supple; lungs clear to auscultation bilaterally; CV regular rate and rhythm; abdomen with abdominal binder in place, 2 LIZBETH drains lower mid abdomen with serosanguineous drainage; hypoactive bowel sounds; clean dry surgical dressing not removed; extremities with no edema, cyanosis or calf tenderness; palpable pedal pulses bilaterally; neurologic exam grossly nonfocal; no Pritchard catheter. Exam  unchanged from 3/5/2022.    Result Review    Result Review:  I have personally reviewed the results from the time of this admission to 3/6/2022 15:17 EST and agree with these findings:  [x]  Laboratory  []  Microbiology  []  Radiology  []  EKG/Telemetry   []  Cardiology/Vascular   []  Pathology  []  Old records  []  Other:  Most notable findings discussed in the assessment and plan.    Wounds (last 24 hours)     LDA Wound     Row Name               Wound 03/02/22 1037 midline abdomen Incision    Wound - Properties Group Placement Date: 03/02/22  -AM Placement Time: 1037  -AM Orientation: midline  -AM Location: abdomen  -AM Primary Wound Type: Incision  -AM      Retired Wound - Properties Group Placement Date: 03/02/22  -AM Placement Time: 1037  -AM Orientation: midline  -AM Location: abdomen  -AM Primary Wound Type: Incision  -AM      Retired Wound - Properties Group Date first assessed: 03/02/22  -AM Time first assessed: 1037  -AM Location: abdomen  -AM Primary Wound Type: Incision  -AM            User Key  (r) = Recorded By, (t) = Taken By, (c) = Cosigned By    Initials Name Provider Type    Diana Demarco, RN Registered Nurse                  Assessment/Plan      Brief Patient Summary:  Abi Rivera is a 61 y.o. female who underwent repair of a large incisional hernia with obstruction but no gangrene with mesh on 3/2/2022.  There were no complications and the patient tolerated the procedure well.  Hospitalist group is following for medical management.  Patient has a history of high blood pressure, sick sinus syndrome, atrial fibrillation and hyperthyroidism.    Current inpatient medications include:  enoxaparin, 40 mg, Subcutaneous, Q24H  losartan, 50 mg, Oral, Q24H  nicotine, 1 patch, Transdermal, Q24H  pantoprazole, 40 mg, Oral, Q AM  polyethylene glycol, 17 g, Oral, Daily       sodium chloride, 100 mL/hr, Last Rate: 100 mL/hr (03/02/22 0922)  sodium chloride, 100 mL/hr, Last Rate: 100 mL/hr (03/04/22  0628)         Active Hospital Problems:  Active Hospital Problems    Diagnosis    • **Incisional hernia with obstruction but no gangrene    • Tobacco abuse    • Sick sinus syndrome (HCC)    • Hyperthyroidism    • Hypertension    • Atrial fibrillation (HCC)      Plan:   Essential hypertension, chronic and controlled  -Continue losartan  -Monitor blood pressure    Paroxysmal atrial fibrillation causing long sinus pauses at termination status post ablation  -No rate controlling meds or anticoagulation prior to admission  -Telemetry monitoring    Hyperthyroidism with current mild hypothyroidism  -TSH 5.15 and free T4 0.24  -Hold home methimazole  -Defer to outpatient provider for further management    Tobacco abuse  -Encourage cessation  -Nicotine patch    Osteoporosis  -Hold abaloparatide injections    Incisional hernia with obstruction but no gangrene status post open repair with component release and mesh placement on 3/2/2022  -Waiting for return of bowel function before discharge  -Continue antibiotics for 7 days from the date of surgery    DVT prophylaxis:  Medical and mechanical DVT prophylaxis orders are present.    CODE STATUS:    Code Status (Patient has no pulse and is not breathing): CPR (Attempt to Resuscitate)  Medical Interventions (Patient has pulse or is breathing): Full      Disposition:  I expect patient to be discharged in 1 to 2 days.    This patient has been examined wearing appropriate Personal Protective Equipment and discussed with hospital infection control department. 03/06/22      Electronically signed by Serene Cope MD, 03/06/22, 15:17 EST.  [facility]ist [facility]

## 2022-03-07 ENCOUNTER — READMISSION MANAGEMENT (OUTPATIENT)
Dept: CALL CENTER | Facility: HOSPITAL | Age: 62
End: 2022-03-07

## 2022-03-07 VITALS
TEMPERATURE: 97.8 F | SYSTOLIC BLOOD PRESSURE: 118 MMHG | DIASTOLIC BLOOD PRESSURE: 77 MMHG | HEART RATE: 67 BPM | HEIGHT: 59 IN | WEIGHT: 173.94 LBS | OXYGEN SATURATION: 98 % | BODY MASS INDEX: 35.07 KG/M2 | RESPIRATION RATE: 12 BRPM

## 2022-03-07 LAB
ANION GAP SERPL CALCULATED.3IONS-SCNC: 13 MMOL/L (ref 5–15)
BUN SERPL-MCNC: 11 MG/DL (ref 6–20)
BUN/CREAT SERPL: 13.9 (ref 7–25)
CALCIUM SPEC-SCNC: 8.8 MG/DL (ref 8.6–10.5)
CHLORIDE SERPL-SCNC: 95 MMOL/L (ref 98–107)
CO2 SERPL-SCNC: 26 MMOL/L (ref 22–29)
CREAT SERPL-MCNC: 0.79 MG/DL (ref 0.57–1)
EGFRCR SERPLBLD CKD-EPI 2021: 86.3 ML/MIN/1.73
GLUCOSE SERPL-MCNC: 98 MG/DL (ref 65–99)
MAGNESIUM SERPL-MCNC: 1.5 MG/DL (ref 1.6–2.6)
POTASSIUM SERPL-SCNC: 3.8 MMOL/L (ref 3.5–5.2)
SODIUM SERPL-SCNC: 134 MMOL/L (ref 136–145)

## 2022-03-07 PROCEDURE — G0378 HOSPITAL OBSERVATION PER HR: HCPCS

## 2022-03-07 PROCEDURE — 83735 ASSAY OF MAGNESIUM: CPT | Performed by: HOSPITALIST

## 2022-03-07 PROCEDURE — 97116 GAIT TRAINING THERAPY: CPT

## 2022-03-07 PROCEDURE — 80048 BASIC METABOLIC PNL TOTAL CA: CPT | Performed by: HOSPITALIST

## 2022-03-07 PROCEDURE — 99239 HOSP IP/OBS DSCHRG MGMT >30: CPT | Performed by: INTERNAL MEDICINE

## 2022-03-07 RX ORDER — SULFAMETHOXAZOLE AND TRIMETHOPRIM 800; 160 MG/1; MG/1
1 TABLET ORAL 2 TIMES DAILY
Qty: 14 TABLET | Refills: 0 | Status: SHIPPED | OUTPATIENT
Start: 2022-03-07 | End: 2022-03-07 | Stop reason: SDUPTHER

## 2022-03-07 RX ORDER — LEVOTHYROXINE SODIUM 0.03 MG/1
25 TABLET ORAL
Status: DISCONTINUED | OUTPATIENT
Start: 2022-03-07 | End: 2022-03-07 | Stop reason: HOSPADM

## 2022-03-07 RX ORDER — HYDROCODONE BITARTRATE AND ACETAMINOPHEN 7.5; 325 MG/1; MG/1
1 TABLET ORAL EVERY 6 HOURS PRN
Qty: 30 TABLET | Refills: 0 | Status: SHIPPED | OUTPATIENT
Start: 2022-03-07 | End: 2022-04-06

## 2022-03-07 RX ORDER — ONDANSETRON 4 MG/1
4 TABLET, FILM COATED ORAL DAILY PRN
Qty: 30 TABLET | Refills: 1 | Status: SHIPPED | OUTPATIENT
Start: 2022-03-07 | End: 2022-03-07 | Stop reason: SDUPTHER

## 2022-03-07 RX ORDER — ONDANSETRON 4 MG/1
4 TABLET, FILM COATED ORAL DAILY PRN
Qty: 30 TABLET | Refills: 1 | Status: SHIPPED | OUTPATIENT
Start: 2022-03-07 | End: 2022-08-17 | Stop reason: SDUPTHER

## 2022-03-07 RX ORDER — SULFAMETHOXAZOLE AND TRIMETHOPRIM 800; 160 MG/1; MG/1
1 TABLET ORAL 2 TIMES DAILY
Qty: 14 TABLET | Refills: 0 | Status: SHIPPED | OUTPATIENT
Start: 2022-03-07 | End: 2022-04-06

## 2022-03-07 RX ADMIN — HYDROCODONE BITARTRATE AND ACETAMINOPHEN 1 TABLET: 5; 325 TABLET ORAL at 09:11

## 2022-03-07 RX ADMIN — POLYETHYLENE GLYCOL 3350 17 G: 17 POWDER, FOR SOLUTION ORAL at 08:29

## 2022-03-07 RX ADMIN — LOSARTAN POTASSIUM 50 MG: 50 TABLET, FILM COATED ORAL at 08:29

## 2022-03-07 RX ADMIN — HYDROCODONE BITARTRATE AND ACETAMINOPHEN 1 TABLET: 5; 325 TABLET ORAL at 01:20

## 2022-03-07 RX ADMIN — PANTOPRAZOLE SODIUM 40 MG: 40 TABLET, DELAYED RELEASE ORAL at 05:26

## 2022-03-07 RX ADMIN — NICOTINE 1 PATCH: 14 PATCH, EXTENDED RELEASE TRANSDERMAL at 08:29

## 2022-03-07 NOTE — THERAPY TREATMENT NOTE
Subjective: Pt agreeable to therapeutic plan of care.    Objective:     Bed mobility - SBA  Transfers - CGA  Ambulation -125 x 2 feet CGA with rolling walker  Stairs : up and down 4 steps with rail with CGA of 1    Pain: 2 VAS  Education: Provided education on importance of mobility and skilled verbal / tactile cueing throughout intervention.     Assessment: Abi Rivera presents with functional mobility impairments which indicate the need for skilled intervention. Tolerating session today without incident. Will continue to follow and progress as tolerated.     Plan/Recommendations:   Pt would benefit from Home with family assist at discharge from facility and requires no DME at discharge.   Pt desires Home with family assist at discharge. Pt cooperative; agreeable to therapeutic recommendations and plan of care.       Post-Tx Position: Up in Chair and Call light and personal items within reach  PPE: gloves, surgical mask, eyewear protection

## 2022-03-07 NOTE — CASE MANAGEMENT/SOCIAL WORK
Continued Stay Note  DONTE Holcomb     Patient Name: Abi Rivera  MRN: [id number]  Today's Date: 3/7/2022    Admit Date: 3/2/2022     Discharge Plan     Row Name 03/07/22 1136       Plan    Plan D/C Plan : Home once bowel function returns , POD 5 of a hernia repair               Discharge Codes    No documentation.               Expected Discharge Date and Time     Expected Discharge Date Expected Discharge Time    Mar 5, 2022             Nichole Sheikh RN

## 2022-03-07 NOTE — PLAN OF CARE
Goal Outcome Evaluation:      Patient is doing well. C/O abd pain and a headache, treated per MAR. Pt was up ad isidra in the room and up to the bathroom and had no problems. VSS, will continue to monitor.

## 2022-03-07 NOTE — PLAN OF CARE
Bed mobility - SBA  Transfers - CGA  Ambulation -125 x 2 feet CGA with rolling walker  Stairs : up and down 4 steps with rail with CGA of 1

## 2022-03-07 NOTE — NURSING NOTE
Pt reports that she sees Dr. Mack for her thyroid and reports she did not want to take the synthroid until she speaks to him.

## 2022-03-07 NOTE — DISCHARGE SUMMARY
[facility] Medicine Services  DISCHARGE SUMMARY    Patient Name: Abi Rivera  : 1960  MRN: [id number]    Date of Admission: 3/2/2022  Discharge Diagnosis: Ventral incisional hernia repair  Date of Discharge:  3/7/2022  Primary Care Physician: Chaparrita Chun MD      Presenting Problem:   Incisional hernia with obstruction but no gangrene [K43.0]    Active and Resolved Hospital Problems:  Active Hospital Problems    Diagnosis POA   • **Incisional hernia with obstruction but no gangrene [K43.0] Yes     Priority: High   • Tobacco abuse [Z72.0] Yes     Priority: High   • Sick sinus syndrome (HCC) [I49.5] Yes     Priority: High   • Atrial fibrillation (HCC) [I48.91] Yes     Priority: High   • Hyperthyroidism [E05.90] Yes     Priority: Medium   • Hypertension [I10] Yes     Priority: Medium   • Anxiety [F41.9] Yes     Priority: Medium      Resolved Hospital Problems   No resolved problems to display.         Hospital Course     Hospital Course:  Abi Rivera is a 61 y.o. female who underwent a repair of a large incisional hernia with an obstruction without gangrene on 3/2/2022.  The patient had no complications and was seen by Dr. Velasco her surgeon earlier today and felt to be ready for discharge.  The patient had a good postoperative course with her postoperative pain under good control without nausea or vomiting.  The patient was tolerating a regular diet.  The patient had not had a bowel movement, however she informed us that she does not have a bowel movement except once every 4 to 5 days.  Dr. Velasco told nursing that the patient was ready for discharge.  Hospitalist service is discharging patient for that provider.    The patient is a full code at the time of discharge.  The patient's medications are as listed in that section of this report.  The patient is on a regular diet.  The patient has some pathology pending at the time of discharge which should be  followed up on Thursday, 3/10/2022 at 1 PM when she visits Dr. Velasco in his office for the removal of her LIZBETH drains.  The patient should follow-up with her primary care provider in 1 week's time as well.  The patient cannot have tub baths or go swimming but can have a shower.  The patient should not lift more than 10 pounds until cleared by Dr. Velasco.  The patient is discharged in satisfactory condition with  follow-up.        DISCHARGE Follow Up Recommendations for labs and diagnostics:       Reasons For Change In Medications and Indications for New Medications:      Day of Discharge     Vital Signs:  Temp:  [97.4 °F (36.3 °C)-98.2 °F (36.8 °C)] 97.8 °F (36.6 °C)  Heart Rate:  [67-71] 67  Resp:  [12-17] 12  BP: (103-129)/(74-79) 118/77    Physical Exam:  Physical Exam  Vitals and nursing note reviewed.   Constitutional:       General: She is not in acute distress.     Appearance: Normal appearance. She is well-developed. She is not ill-appearing, toxic-appearing or diaphoretic.   HENT:      Head: Normocephalic and atraumatic.      Right Ear: Ear canal and external ear normal.      Left Ear: Ear canal and external ear normal.      Nose: Nose normal. No congestion or rhinorrhea.      Mouth/Throat:      Mouth: Mucous membranes are moist.      Pharynx: No oropharyngeal exudate.   Eyes:      General: No scleral icterus.        Right eye: No discharge.         Left eye: No discharge.      Extraocular Movements: Extraocular movements intact.      Conjunctiva/sclera: Conjunctivae normal.      Pupils: Pupils are equal, round, and reactive to light.   Neck:      Thyroid: No thyromegaly.      Vascular: No carotid bruit or JVD.      Trachea: No tracheal deviation.   Cardiovascular:      Rate and Rhythm: Normal rate and regular rhythm.      Pulses: Normal pulses.      Heart sounds: Normal heart sounds. No murmur heard.    No friction rub. No gallop.   Pulmonary:      Effort: Pulmonary effort is normal. No respiratory  distress.      Breath sounds: Normal breath sounds. No stridor. No wheezing, rhonchi or rales.   Chest:      Chest wall: No tenderness.   Abdominal:      General: Bowel sounds are normal. There is no distension.      Palpations: Abdomen is soft. There is no mass.      Tenderness: There is no abdominal tenderness. There is no guarding or rebound.      Hernia: No hernia is present.      Comments: Patient still has LIZBETH drains in.  The patient's incision is dressed and the dressing was not removed.  The dressing was clear, clean and dry.   Musculoskeletal:         General: No swelling, tenderness, deformity or signs of injury. Normal range of motion.      Cervical back: Normal range of motion and neck supple. No rigidity. No muscular tenderness.      Right lower leg: No edema.      Left lower leg: No edema.   Lymphadenopathy:      Cervical: No cervical adenopathy.   Skin:     General: Skin is warm and dry.      Coloration: Skin is not jaundiced or pale.      Findings: No bruising, erythema or rash.   Neurological:      General: No focal deficit present.      Mental Status: She is alert and oriented to person, place, and time. Mental status is at baseline.      Cranial Nerves: No cranial nerve deficit.      Sensory: No sensory deficit.      Motor: No weakness or abnormal muscle tone.      Coordination: Coordination normal.   Psychiatric:         Mood and Affect: Mood normal.         Behavior: Behavior normal.         Thought Content: Thought content normal.         Judgment: Judgment normal.            Pertinent  and/or Most Recent Results     LAB RESULTS:      Lab 03/06/22  1514 03/03/22  0455   WBC 9.70 13.30*   HEMOGLOBIN 12.5 11.5*   HEMATOCRIT 36.3 34.2   PLATELETS 278 251   NEUTROS ABS 6.80 11.00*   LYMPHS ABS 1.80 1.20   MONOS ABS 0.80 1.00*   EOS ABS 0.20 0.00   .0* 103.4*         Lab 03/07/22  0406 03/06/22  1514 03/03/22  0455   SODIUM 134* 134* 137   POTASSIUM 3.8 3.9 4.2   CHLORIDE 95* 95* 105   CO2  26.0 29.0 23.0   ANION GAP 13.0 10.0 9.0   BUN 11 10 13   CREATININE 0.79 0.87 1.07*   EGFR 86.3 76.9 60.0*   GLUCOSE 98 99 123*   CALCIUM 8.8 9.2 9.0   MAGNESIUM 1.5* 1.5* 1.7   PHOSPHORUS  --   --  2.5   TSH  --   --  5.150*         Lab 03/03/22  0455   TOTAL PROTEIN 5.9*   ALBUMIN 3.60   GLOBULIN 2.3   ALT (SGPT) 12   AST (SGOT) 24   BILIRUBIN 0.3   ALK PHOS 61                     Brief Urine Lab Results     None        Microbiology Results (last 10 days)     Procedure Component Value - Date/Time    COVID-19,APTIMA PANTHER(ANNETTE),[facility]/ [facility], NP/OP SWAB IN UTM/VTM/SALINE TRANSPORT MEDIA,24 HR TAT - Swab, Nasopharynx [[id number]]  (Normal) Collected: 02/28/22 1547    Lab Status: Final result Specimen: Swab from Nasopharynx Updated: 03/01/22 0017     COVID19 Not Detected    Narrative:      Fact sheet for providers: https://www.fda.gov/media/205807/download     Fact sheet for patients: https://www.fda.gov/media/835803/download    Test performed by RT PCR.          XR Abdomen KUB    Result Date: 3/6/2022  Impression: Postoperative findings with gaseous distention of small and large bowel loops with configuration suggesting postoperative ileus.  Electronically Signed By-Lance Sloan MD On:3/6/2022 4:55 PM This report was finalized on [id number] by  Lance Sloan MD.              Results for orders placed during the hospital encounter of 01/19/22    Adult Transesophageal Echo (ADAM) W/ Cont if Necessary Per Protocol (Cardiology Department)    Interpretation Summary  · Left ventricular wall thickness is consistent with mild concentric hypertrophy.  · Estimated right ventricular systolic pressure from tricuspid regurgitation is normal (<35 mmHg).  · There is a small (<1cm) pericardial effusion.    Procedure indication  Recurrent symptomatic paroxysmal atrial fibrillation    conscious sedation administered by anesthesia    consent obtained before procedure      Procedure note  after obtaining a valid consent patient was  sedated by Anesthesia and a ADAM probe was easily placed into esophagus with multiplane imaging with 2D, color and Doppler followed by bubble study with agitated saline without any complications    ADAM  Findings    Normal LV systolic function, trivial to small pericardial effusion, borderline to mild left atrial enlargement without any shunt or clot.  Mild MR and TR    Plan  Proceed with ablation      Procedure done  Transesophageal echocardiography      Electronically signed by Александр Sheth MD, 01/19/22, 10:56 AM EST.      Labs Pending at Discharge:      Procedures Performed  Procedure(s):  OPEN INCISIONAL HERNIA REPAIR, BILATERAL COMPONENT RELEASE, LYSIS OF ADHESIONS, PLACEMENT OF INTRAPERITONEAL MESH AND ONLAY MESH          Consults:   Consults     Date and Time Order Name Status Description    3/2/2022  3:36 PM Inpatient Hospitalist Consult              Discharge Details        Discharge Medications      New Medications      Instructions Start Date   HYDROcodone-acetaminophen 7.5-325 MG per tablet  Commonly known as: Norco   1 tablet, Oral, Every 6 Hours PRN      ondansetron 4 MG tablet  Commonly known as: Zofran   4 mg, Oral, Daily PRN      sulfamethoxazole-trimethoprim 800-160 MG per tablet  Commonly known as: Bactrim DS   1 tablet, Oral, 2 Times Daily         Continue These Medications      Instructions Start Date   Abaloparatide 3120 MCG/1.56ML solution pen-injector   80 mcg, Subcutaneous, Nightly      acetaminophen 500 MG tablet  Commonly known as: TYLENOL   500 mg, Oral, Every 6 Hours PRN      aspirin 81 MG EC tablet   81 mg, Oral, Daily      Calcium 600+D 600-800 MG-UNIT tablet  Generic drug: calcium carb-cholecalciferol   1 tablet, Oral, Daily, Last dose 2/23      ferrous sulfate 325 (65 FE) MG tablet   325 mg, Oral, Daily With Breakfast, dont take preop      fish oil 1200 MG capsule capsule   1,200 mg, Oral, Daily With Breakfast, Last dose 2/23      GLUCOSAMINE 1500 COMPLEX PO   1 tablet, Oral,  Daily, Last dose 2/23      ibuprofen 200 MG tablet  Commonly known as: ADVIL,MOTRIN   400 mg, Oral, As Needed, Last dose 2/23       losartan 50 MG tablet  Commonly known as: COZAAR   50 mg, Oral, Daily, Last dose 2/1 by 1015      MAGNESIUM OXIDE PO   250 mg, Oral, Daily, dont take preop      methIMAzole 10 MG tablet  Commonly known as: TAPAZOLE   10 mg, Oral, 2 Times Daily      Multi-Vitamin tablet tablet  Generic drug: multivitamin   1 tablet, Oral, Daily, Last dose 2/23      omeprazole 20 MG capsule  Commonly known as: priLOSEC   20 mg, Oral, Daily, Take preop      Potassium Gluconate 550 MG tablet   1 tablet, Oral, Daily, Take preop             No Known Allergies      Discharge Disposition:   Home or Self Care    Diet:  Hospital:  Diet Order   Procedures   • Diet Regular         Discharge Activity:   Activity Instructions     Activity as Tolerated      Bathing Restrictions      You may shower but do not sit with your incisions submerged underwater    Type of Restriction: Bathing    Bathing Restrictions: No Tub Bath    Bathing Restrictions      Type of Restriction: Bathing    Bathing Restrictions: No Tub Bath    Driving Restrictions      Type of Restriction: Driving    Driving Restrictions: No Driving While Taking Narcotics    Driving Restrictions      Type of Restriction: Driving    Driving Restrictions: No Driving While Taking Narcotics    Gradually Increase Activity Until at Pre-Hospitalization Level      Lifting Restrictions      Type of Restriction: Lifting    Lifting Restrictions: No Lifting Until Cleared By Provider    Lifting Restrictions      Type of Restriction: Lifting    Lifting Restrictions: No Lifting Until Cleared By Provider   Additional Activity Instructions:    As tolerated             CODE STATUS:  Code Status and Medical Interventions:   Ordered at: 03/02/22 1536     Code Status (Patient has no pulse and is not breathing):    CPR (Attempt to Resuscitate)     Medical Interventions (Patient has  pulse or is breathing):    Full         Future Appointments   Date Time Provider Department Center   3/10/2022  1:20 PM Luisito Velasco DO [facility]   3/15/2022  1:15 PM Abby Nguyen APRN [facility]   3/22/2022  3:15 PM Karen Mack MD [facility]       Additional Instructions for the Follow-ups that You Need to Schedule     Discharge Follow-up with PCP   As directed       Currently Documented PCP:    Chaparrita Chun MD    PCP Phone Number:    [phone]     Follow Up Details: follow up with pcp in one week         Discharge Follow-up with Specified Provider: Dr. Velasco   As directed      To: Dr. Velasco    Follow Up Details: See me this Thursday in the office         Discharge Follow-up with Specified Provider: See Dr. Velasco in his office on thursday 3/10/2022 at 1 pm.   As directed      To: See Dr. Velasco in his office on thursday 3/10/2022 at 1 pm.               Time spent on Discharge including face to face service:  35 minutes    This patient has been examined wearing appropriate Personal Protective Equipment and discussed with hospital infection control department. 03/07/22      Signature: Electronically signed by Leilani Reddy MD, 03/07/22, 3:10 PM EST.

## 2022-03-08 ENCOUNTER — TRANSITIONAL CARE MANAGEMENT TELEPHONE ENCOUNTER (OUTPATIENT)
Dept: CALL CENTER | Facility: HOSPITAL | Age: 62
End: 2022-03-08

## 2022-03-08 NOTE — SIGNIFICANT NOTE
Case Management Discharge Note      Final Note: (P) d/c home         Selected Continued Care - Discharged on 3/7/2022 Admission date: 3/2/2022 - Discharge disposition: Home or Self Care                          Final Discharge Disposition Code: (P) 01 - home or self-care

## 2022-03-08 NOTE — OUTREACH NOTE
Prep Survey    Flowsheet Row Responses   [facility]   Is LACE score < 7 ? No   Emergency Room discharge w/ pulse ox? No   Eligibility [facility]   [facility]   Date of Admission 03/02/22   Date of Discharge 03/07/22   Discharge Disposition Home or Self Care   Discharge diagnosis open incisional hernia repair, bilateral release, lysis of adhesions   Does the patient have one of the following disease processes/diagnoses(primary or secondary)? General Surgery   Does the patient have Home health ordered? No   Is there a DME ordered? No   Prep survey completed? Yes          BREANNE CABRALES - Registered Nurse

## 2022-03-08 NOTE — OUTREACH NOTE
Call Center TCM Note    Flowsheet Row Responses   [facility] facility patient discharged from? [facility]   Does the patient have one of the following disease processes/diagnoses(primary or secondary)? General Surgery   TCM attempt successful? No   Unsuccessful attempts Attempt 2          Danita Sheikh RN    3/8/2022, 11:14 EST

## 2022-03-08 NOTE — OUTREACH NOTE
Call Center TCM Note    Flowsheet Row Responses   [facility] facility patient discharged from? [facility]   Does the patient have one of the following disease processes/diagnoses(primary or secondary)? General Surgery   TCM attempt successful? No   Unsuccessful attempts Attempt 1          Danita Sheikh RN    3/8/2022, 09:52 EST

## 2022-03-09 ENCOUNTER — TRANSITIONAL CARE MANAGEMENT TELEPHONE ENCOUNTER (OUTPATIENT)
Dept: CALL CENTER | Facility: HOSPITAL | Age: 62
End: 2022-03-09

## 2022-03-09 NOTE — OUTREACH NOTE
Call Center TCM Note    Flowsheet Row Responses   [facility] patient discharged from? [facility]   Does the patient have one of the following disease processes/diagnoses(primary or secondary)? General Surgery   TCM attempt successful? Yes   Call start time 1432   Call end time 1436   Meds reviewed with patient/caregiver? Yes   Is the patient having any side effects they believe may be caused by any medication additions or changes? No   Does the patient have all medications related to this admission filled (includes all antibiotics, pain medications, etc.) Yes   Is the patient taking all medications as directed (includes completed medication regime)? Yes   Does the patient have a follow up appointment scheduled with their surgeon? Yes   Has the patient kept scheduled appointments due by today? N/A   Has home health visited the patient within 72 hours of discharge? N/A   Psychosocial issues? No   Did the patient receive a copy of their discharge instructions? Yes   Nursing interventions Reviewed instructions with patient   What is the patient's perception of their health status since discharge? Improving   Nursing interventions Nurse provided patient education   Is the patient /caregiver able to teach back basic post-op care? Drive as instructed by MD in discharge instructions, Take showers only when approved by MD-sponge bathe until then, No tub bath, swimming, or hot tub until instructed by MD, Keep incision areas clean,dry and protected, Do not remove steri-strips, Lifting as instructed by MD in discharge instructions   Is the patient/caregiver able to teach back signs and symptoms of incisional infection? Increased redness, swelling or pain at the incisonal site, Increased drainage or bleeding, Incisional warmth, Pus or odor from incision, Fever   Is the patient/caregiver able to teach back steps to recovery at home? Rest and rebuild strength, gradually increase activity, Set small, achievable goals for return to  baseline health, Make a list of questions for surgeon's appointment, Eat a well-balance diet, Practice good oral hygiene   If the patient is a current smoker, are they able to teach back resources for cessation? [id number]   Is the patient/caregiver able to teach back the hierarchy of who to call/visit for symptoms/problems? PCP, Specialist, Home health nurse, Urgent Care, ED, 911 Yes   TCM call completed? Yes          Sara Zhao LPN    3/9/2022, 14:38 EST

## 2022-03-10 ENCOUNTER — OFFICE VISIT (OUTPATIENT)
Dept: SURGERY | Facility: CLINIC | Age: 62
End: 2022-03-10

## 2022-03-10 VITALS
HEART RATE: 85 BPM | WEIGHT: 161 LBS | DIASTOLIC BLOOD PRESSURE: 86 MMHG | TEMPERATURE: 97.3 F | HEIGHT: 59 IN | SYSTOLIC BLOOD PRESSURE: 135 MMHG | OXYGEN SATURATION: 97 % | BODY MASS INDEX: 32.46 KG/M2

## 2022-03-10 DIAGNOSIS — K43.0 INCISIONAL HERNIA WITH OBSTRUCTION BUT NO GANGRENE: Primary | ICD-10-CM

## 2022-03-10 PROCEDURE — 99024 POSTOP FOLLOW-UP VISIT: CPT | Performed by: SURGERY

## 2022-03-10 NOTE — PROGRESS NOTES
SUBJECTIVE:    Abi is seen in the office today follow-up from her open incisional hernia repair with bilateral component release, lysis of adhesions and placement of intraperitoneal and onlay mesh on 3 2.  Still putting out more than 30 cc in 24 hours out each drain tube so I do not think it is wise to remove the tubes today.  We will asked that she see one of my partners next week and hopefully be ready to remove the tubes at that time.    OBJECTIVE:    Drainage from the tubes looks appropriate in color    ASSESSMENT:    Satisfactory postop progress    PLAN:    Recheck in the office 1 time back in town

## 2022-03-14 ENCOUNTER — OFFICE VISIT (OUTPATIENT)
Dept: SURGERY | Facility: CLINIC | Age: 62
End: 2022-03-14

## 2022-03-14 VITALS
BODY MASS INDEX: 32.7 KG/M2 | WEIGHT: 162.2 LBS | HEART RATE: 84 BPM | HEIGHT: 59 IN | SYSTOLIC BLOOD PRESSURE: 129 MMHG | TEMPERATURE: 98.6 F | DIASTOLIC BLOOD PRESSURE: 85 MMHG | OXYGEN SATURATION: 97 %

## 2022-03-14 DIAGNOSIS — K43.2 INCISIONAL HERNIA, WITHOUT OBSTRUCTION OR GANGRENE: Primary | ICD-10-CM

## 2022-03-14 DIAGNOSIS — K43.0 INCISIONAL HERNIA WITH OBSTRUCTION BUT NO GANGRENE: ICD-10-CM

## 2022-03-14 PROCEDURE — 99024 POSTOP FOLLOW-UP VISIT: CPT | Performed by: STUDENT IN AN ORGANIZED HEALTH CARE EDUCATION/TRAINING PROGRAM

## 2022-03-14 RX ORDER — LOSARTAN POTASSIUM 50 MG/1
TABLET ORAL
Qty: 90 TABLET | Refills: 0 | Status: SHIPPED | OUTPATIENT
Start: 2022-03-14 | End: 2022-05-09

## 2022-03-14 RX ORDER — OXYCODONE HYDROCHLORIDE AND ACETAMINOPHEN 5; 325 MG/1; MG/1
1 TABLET ORAL EVERY 4 HOURS PRN
Qty: 30 TABLET | Refills: 0 | Status: SHIPPED | OUTPATIENT
Start: 2022-03-14 | End: 2022-06-02

## 2022-03-14 NOTE — PROGRESS NOTES
"Chief Complaint  Follow-up and Post-op (Open Incisional Hernia 3/10/22)    Subjective          Abi Rivera presents to [facility]  History of Present Illness    61-year-old lady here for 2-week follow-up after her open ventral incisional hernia repair with mesh by Dr. Velasco.  She has subcutaneous drains which have put out about 10 cc a day for the past few days sleeves were removed, they were both serous.  Incision clean dry intact so I removed staples with Steri-Strips.  She is tolerating a diet, having daily bowel movements, still having some pain with movement but no severe pain.    Objective   Vital Signs:   /85 (Cuff Size: Adult)   Pulse 84   Temp 98.6 °F (37 °C) (Infrared)   Ht 149.9 cm (59\")   Wt 73.6 kg (162 lb 3.2 oz)   SpO2 97%   BMI 32.76 kg/m²     Physical Exam  Constitutional:       General: She is not in acute distress.     Appearance: Normal appearance. She is not ill-appearing.   HENT:      Head: Normocephalic and atraumatic.      Right Ear: External ear normal.      Left Ear: External ear normal.   Eyes:      Extraocular Movements: Extraocular movements intact.      Conjunctiva/sclera: Conjunctivae normal.   Cardiovascular:      Rate and Rhythm: Normal rate and regular rhythm.   Pulmonary:      Effort: Pulmonary effort is normal. No respiratory distress.   Abdominal:      General: There is no distension.      Palpations: Abdomen is soft.      Tenderness: There is no abdominal tenderness.      Comments: Incision clean dry intact, drain serosanguineous   Musculoskeletal:         General: No swelling or deformity.   Skin:     General: Skin is warm and dry.   Neurological:      Mental Status: She is alert and oriented to person, place, and time. Mental status is at baseline.        Result Review :                 Assessment and Plan    Diagnoses and all orders for this visit:    1. Incisional hernia, without obstruction or gangrene (Primary)  -     " oxyCODONE-acetaminophen (Percocet) 5-325 MG per tablet; Take 1 tablet by mouth Every 4 (Four) Hours As Needed for Severe Pain .  Dispense: 30 tablet; Refill: 0    2. Incisional hernia with obstruction but no gangrene      61-year-old lady here for 2-week follow-up after her open ventral incisional hernia repair with mesh by Dr. Velasco.  She has subcutaneous drains which have put out about 10 cc a day for the past few days sleeves were removed, they were both serous.  Incision clean dry intact so I removed staples with Steri-Strips.  She is tolerating a diet, having daily bowel movements, still having some pain with movement but no severe pain.  Will prescribe another round of narcotics to be used for severe pain.  Still no lifting over 10 pounds until she sees Dr. Velasco, can follow-up with Dr. Velasco when he returns in a few weeks.      Follow Up   No follow-ups on file.  Patient was given instructions and counseling regarding her condition or for health maintenance advice. Please see specific information pulled into the AVS if appropriate.

## 2022-03-16 ENCOUNTER — READMISSION MANAGEMENT (OUTPATIENT)
Dept: CALL CENTER | Facility: HOSPITAL | Age: 62
End: 2022-03-16

## 2022-03-16 NOTE — OUTREACH NOTE
General Surgery Week 2 Survey    Flowsheet Row Responses   [facility]? [facility]   Does the patient have one of the following disease processes/diagnoses(primary or secondary)? General Surgery   Week 2 attempt successful? No   Unsuccessful attempts Attempt 1          CARLOS MANUEL Regalado Registered Nurse

## 2022-03-22 ENCOUNTER — READMISSION MANAGEMENT (OUTPATIENT)
Dept: CALL CENTER | Facility: HOSPITAL | Age: 62
End: 2022-03-22

## 2022-03-22 NOTE — OUTREACH NOTE
General Surgery Week 2 Survey    Flowsheet Row Responses   [facility] facility patient discharged from? [facility]   Does the patient have one of the following disease processes/diagnoses(primary or secondary)? General Surgery   Week 2 attempt successful? No   Unsuccessful attempts Attempt 2          MISSY CAMEJO - Registered Nurse

## 2022-04-06 ENCOUNTER — OFFICE VISIT (OUTPATIENT)
Dept: SURGERY | Facility: CLINIC | Age: 62
End: 2022-04-06

## 2022-04-06 VITALS
HEART RATE: 76 BPM | HEIGHT: 59 IN | DIASTOLIC BLOOD PRESSURE: 91 MMHG | BODY MASS INDEX: 31.93 KG/M2 | OXYGEN SATURATION: 99 % | TEMPERATURE: 97.5 F | SYSTOLIC BLOOD PRESSURE: 125 MMHG | RESPIRATION RATE: 18 BRPM | WEIGHT: 158.4 LBS

## 2022-04-06 DIAGNOSIS — K43.0 INCISIONAL HERNIA WITH OBSTRUCTION BUT NO GANGRENE: Primary | ICD-10-CM

## 2022-04-06 PROCEDURE — 99024 POSTOP FOLLOW-UP VISIT: CPT | Performed by: SURGERY

## 2022-04-06 NOTE — PROGRESS NOTES
SUBJECTIVE:    Dr. Rivera seen in the office today follow-up from her open incisional hernia repair with bilateral component release.  She has dual [employer]ix mesh intraperitoneally and then polypropylene mesh on an onlay fashion where the component release was done bilaterally.  She is doing well.  No fevers or chills no nausea or vomiting.  Still has early satiety.    OBJECTIVE:    Incision is healing appropriately without infection    ASSESSMENT:    Satisfactory postop progress    PLAN:    Recheck in the office in 1 month

## 2022-05-05 ENCOUNTER — OFFICE VISIT (OUTPATIENT)
Dept: SURGERY | Facility: CLINIC | Age: 62
End: 2022-05-05

## 2022-05-05 VITALS
TEMPERATURE: 98 F | HEART RATE: 77 BPM | HEIGHT: 59 IN | BODY MASS INDEX: 30.44 KG/M2 | SYSTOLIC BLOOD PRESSURE: 110 MMHG | DIASTOLIC BLOOD PRESSURE: 73 MMHG | OXYGEN SATURATION: 98 % | WEIGHT: 151 LBS

## 2022-05-05 DIAGNOSIS — K43.0 INCISIONAL HERNIA WITH OBSTRUCTION BUT NO GANGRENE: Primary | ICD-10-CM

## 2022-05-05 PROCEDURE — 99024 POSTOP FOLLOW-UP VISIT: CPT | Performed by: SURGERY

## 2022-05-05 NOTE — PROGRESS NOTES
SUBJECTIVE:    Dr. Rivera is seen in the office today follow-up from her open incisional hernia repair with bilateral component release about 9 weeks ago.  She has 1 area that is still causing her significant pain.  It is located where the right lower stay sutures were would be    OBJECTIVE:    Incision itself is healing appropriately without infection.  He does have some tenderness as noted above right lower aspect of the mesh.  No evidence for hernia recurrence clinically.    ASSESSMENT:    Satisfactory postop progress    PLAN:    At this point I would like to see her back in a month.  I have suggested that she not do any lifting that causes discomfort.  Have also suggested that she consider using CBD cream to the area of discomfort.

## 2022-05-09 RX ORDER — LOSARTAN POTASSIUM 50 MG/1
TABLET ORAL
Qty: 90 TABLET | Refills: 0 | Status: SHIPPED | OUTPATIENT
Start: 2022-05-09 | End: 2022-09-06

## 2022-06-02 ENCOUNTER — OFFICE VISIT (OUTPATIENT)
Dept: SURGERY | Facility: CLINIC | Age: 62
End: 2022-06-02

## 2022-06-02 VITALS
WEIGHT: 149 LBS | HEART RATE: 70 BPM | OXYGEN SATURATION: 98 % | HEIGHT: 59 IN | BODY MASS INDEX: 30.04 KG/M2 | TEMPERATURE: 98.2 F

## 2022-06-02 DIAGNOSIS — K43.0 INCISIONAL HERNIA WITH OBSTRUCTION BUT NO GANGRENE: Primary | ICD-10-CM

## 2022-06-02 PROCEDURE — 99024 POSTOP FOLLOW-UP VISIT: CPT | Performed by: SURGERY

## 2022-06-02 RX ORDER — SULFAMETHOXAZOLE AND TRIMETHOPRIM 800; 160 MG/1; MG/1
1 TABLET ORAL 2 TIMES DAILY
Qty: 20 TABLET | Refills: 0 | Status: SHIPPED | OUTPATIENT
Start: 2022-06-02 | End: 2022-11-07

## 2022-06-02 NOTE — PROGRESS NOTES
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  Has an area in the right mid abdomen that is  and feels like an area of fat necrosis.  The skin overlying it is just a little bit red and warm.    OBJECTIVE:    Area in question is about 2 and half inches in diameter.    ASSESSMENT:    12 weeks postop with probable area of fat necrosis with some mild cellulitis overlying it    PLAN:    This point we will put her on a 10-day course of Bactrim prophylactically.  Would like to see her back in the office in 2 weeks

## 2022-06-06 RX ORDER — ABALOPARATIDE 2000 UG/ML
INJECTION, SOLUTION SUBCUTANEOUS
Qty: 1.56 ML | Refills: 0 | OUTPATIENT
Start: 2022-06-06

## 2022-06-16 ENCOUNTER — OFFICE VISIT (OUTPATIENT)
Dept: SURGERY | Facility: CLINIC | Age: 62
End: 2022-06-16

## 2022-06-16 VITALS
DIASTOLIC BLOOD PRESSURE: 78 MMHG | BODY MASS INDEX: 29.35 KG/M2 | HEART RATE: 66 BPM | TEMPERATURE: 98.2 F | HEIGHT: 59 IN | OXYGEN SATURATION: 96 % | WEIGHT: 145.6 LBS | SYSTOLIC BLOOD PRESSURE: 130 MMHG

## 2022-06-16 DIAGNOSIS — K43.0 INCISIONAL HERNIA WITH OBSTRUCTION BUT NO GANGRENE: Primary | ICD-10-CM

## 2022-06-16 PROCEDURE — 99024 POSTOP FOLLOW-UP VISIT: CPT | Performed by: SURGERY

## 2022-06-16 RX ORDER — PEN NEEDLE, DIABETIC 31 GX5/16"
NEEDLE, DISPOSABLE MISCELLANEOUS DAILY
COMMUNITY
Start: 2022-05-12

## 2022-06-16 NOTE — PROGRESS NOTES
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    OBJECTIVE:    Incision is healing well.  The area we were concerned about is markedly improved.  It is an area of fat necrosis.  I do not think there is any infection.    ASSESSMENT:    Good improvement    PLAN:    Recheck in the office as needed

## 2022-06-21 RX ORDER — ABALOPARATIDE 2000 UG/ML
INJECTION, SOLUTION SUBCUTANEOUS
Qty: 1.56 ML | Refills: 0 | Status: SHIPPED | OUTPATIENT
Start: 2022-06-21 | End: 2023-03-16

## 2022-07-07 ENCOUNTER — TELEPHONE (OUTPATIENT)
Dept: SURGERY | Facility: CLINIC | Age: 62
End: 2022-07-07

## 2022-07-07 NOTE — TELEPHONE ENCOUNTER
Pt called and said she was running a low grade fever and was in severe pain, I told her to go on to the ER, we really couldn't do much for her at the office.  Explained to her that she would probably need some type of imaging.  She understood. kb

## 2022-07-20 RX ORDER — ABALOPARATIDE 2000 UG/ML
INJECTION, SOLUTION SUBCUTANEOUS
Qty: 1.56 ML | Refills: 0 | OUTPATIENT
Start: 2022-07-20

## 2022-08-11 DIAGNOSIS — K43.0 INCISIONAL HERNIA WITH OBSTRUCTION BUT NO GANGRENE: Primary | ICD-10-CM

## 2022-08-15 RX ORDER — HYDROCODONE BITARTRATE AND ACETAMINOPHEN 5; 325 MG/1; MG/1
1 TABLET ORAL EVERY 6 HOURS PRN
Qty: 30 TABLET | Refills: 0 | OUTPATIENT
Start: 2022-08-15 | End: 2022-11-07

## 2022-08-16 ENCOUNTER — LAB (OUTPATIENT)
Dept: LAB | Facility: HOSPITAL | Age: 62
End: 2022-08-16

## 2022-08-16 ENCOUNTER — TRANSCRIBE ORDERS (OUTPATIENT)
Dept: ADMINISTRATIVE | Facility: HOSPITAL | Age: 62
End: 2022-08-16

## 2022-08-16 ENCOUNTER — OFFICE VISIT (OUTPATIENT)
Dept: WOUND CARE | Facility: HOSPITAL | Age: 62
End: 2022-08-16

## 2022-08-16 DIAGNOSIS — T81.31XA RUPTURE OF ABDOMINAL INCISION: ICD-10-CM

## 2022-08-16 DIAGNOSIS — T81.31XA RUPTURE OF ABDOMINAL INCISION: Primary | ICD-10-CM

## 2022-08-16 LAB
ALBUMIN SERPL-MCNC: 3.8 G/DL (ref 3.5–5.2)
ALBUMIN/GLOB SERPL: 1.5 G/DL
ALP SERPL-CCNC: 67 U/L (ref 39–117)
ALT SERPL W P-5'-P-CCNC: 14 U/L (ref 1–33)
ANION GAP SERPL CALCULATED.3IONS-SCNC: 14.8 MMOL/L (ref 5–15)
AST SERPL-CCNC: 18 U/L (ref 1–32)
BASOPHILS # BLD AUTO: 0.03 10*3/MM3 (ref 0–0.2)
BASOPHILS NFR BLD AUTO: 0.4 % (ref 0–1.5)
BILIRUB SERPL-MCNC: 0.3 MG/DL (ref 0–1.2)
BUN SERPL-MCNC: 32 MG/DL (ref 6–20)
BUN/CREAT SERPL: 35.2 (ref 7–25)
CALCIUM SPEC-SCNC: 10 MG/DL (ref 8.6–10.5)
CHLORIDE SERPL-SCNC: 95 MMOL/L (ref 98–107)
CO2 SERPL-SCNC: 25.2 MMOL/L (ref 22–29)
CREAT SERPL-MCNC: 0.91 MG/DL (ref 0.57–1)
CRP SERPL-MCNC: 3.5 MG/DL (ref 0–0.5)
DEPRECATED RDW RBC AUTO: 40 FL (ref 37–54)
EGFRCR SERPLBLD CKD-EPI 2021: 72.8 ML/MIN/1.73
EOSINOPHIL # BLD AUTO: 0.14 10*3/MM3 (ref 0–0.4)
EOSINOPHIL NFR BLD AUTO: 2 % (ref 0.3–6.2)
ERYTHROCYTE [DISTWIDTH] IN BLOOD BY AUTOMATED COUNT: 12.6 % (ref 12.3–15.4)
ERYTHROCYTE [SEDIMENTATION RATE] IN BLOOD: 14 MM/HR (ref 0–30)
GLOBULIN UR ELPH-MCNC: 2.6 GM/DL
GLUCOSE SERPL-MCNC: 70 MG/DL (ref 65–99)
HCT VFR BLD AUTO: 37.3 % (ref 34–46.6)
HGB BLD-MCNC: 12.6 G/DL (ref 12–15.9)
IMM GRANULOCYTES # BLD AUTO: 0.01 10*3/MM3 (ref 0–0.05)
IMM GRANULOCYTES NFR BLD AUTO: 0.1 % (ref 0–0.5)
LYMPHOCYTES # BLD AUTO: 2.07 10*3/MM3 (ref 0.7–3.1)
LYMPHOCYTES NFR BLD AUTO: 29.7 % (ref 19.6–45.3)
MCH RBC QN AUTO: 29.9 PG (ref 26.6–33)
MCHC RBC AUTO-ENTMCNC: 33.8 G/DL (ref 31.5–35.7)
MCV RBC AUTO: 88.4 FL (ref 79–97)
MONOCYTES # BLD AUTO: 0.49 10*3/MM3 (ref 0.1–0.9)
MONOCYTES NFR BLD AUTO: 7 % (ref 5–12)
NEUTROPHILS NFR BLD AUTO: 4.23 10*3/MM3 (ref 1.7–7)
NEUTROPHILS NFR BLD AUTO: 60.8 % (ref 42.7–76)
NRBC BLD AUTO-RTO: 0 /100 WBC (ref 0–0.2)
PLATELET # BLD AUTO: 272 10*3/MM3 (ref 140–450)
PMV BLD AUTO: 9.1 FL (ref 6–12)
POTASSIUM SERPL-SCNC: 3.5 MMOL/L (ref 3.5–5.2)
PREALB SERPL-MCNC: 20.9 MG/DL (ref 20–40)
PROT SERPL-MCNC: 6.4 G/DL (ref 6–8.5)
RBC # BLD AUTO: 4.22 10*6/MM3 (ref 3.77–5.28)
SODIUM SERPL-SCNC: 135 MMOL/L (ref 136–145)
WBC NRBC COR # BLD: 6.97 10*3/MM3 (ref 3.4–10.8)

## 2022-08-16 PROCEDURE — 85025 COMPLETE CBC W/AUTO DIFF WBC: CPT

## 2022-08-16 PROCEDURE — 36415 COLL VENOUS BLD VENIPUNCTURE: CPT

## 2022-08-16 PROCEDURE — 86140 C-REACTIVE PROTEIN: CPT

## 2022-08-16 PROCEDURE — 85652 RBC SED RATE AUTOMATED: CPT

## 2022-08-16 PROCEDURE — 80053 COMPREHEN METABOLIC PANEL: CPT

## 2022-08-16 PROCEDURE — 83036 HEMOGLOBIN GLYCOSYLATED A1C: CPT

## 2022-08-16 PROCEDURE — G0463 HOSPITAL OUTPT CLINIC VISIT: HCPCS

## 2022-08-16 PROCEDURE — 84134 ASSAY OF PREALBUMIN: CPT

## 2022-08-17 ENCOUNTER — OFFICE VISIT (OUTPATIENT)
Dept: SURGERY | Facility: CLINIC | Age: 62
End: 2022-08-17

## 2022-08-17 VITALS
HEIGHT: 59 IN | WEIGHT: 143.8 LBS | HEART RATE: 68 BPM | OXYGEN SATURATION: 95 % | DIASTOLIC BLOOD PRESSURE: 76 MMHG | BODY MASS INDEX: 28.99 KG/M2 | RESPIRATION RATE: 16 BRPM | TEMPERATURE: 99.1 F | SYSTOLIC BLOOD PRESSURE: 115 MMHG

## 2022-08-17 DIAGNOSIS — S31.109D OPEN WOUND OF ABDOMINAL WALL, SUBSEQUENT ENCOUNTER: Primary | ICD-10-CM

## 2022-08-17 PROBLEM — S31.109A OPEN ABDOMINAL WALL WOUND: Status: ACTIVE | Noted: 2022-08-17

## 2022-08-17 LAB — HBA1C MFR BLD: 5 % (ref 3.5–5.6)

## 2022-08-17 PROCEDURE — 99212 OFFICE O/P EST SF 10 MIN: CPT | Performed by: SURGERY

## 2022-08-17 RX ORDER — ONDANSETRON 4 MG/1
4 TABLET, FILM COATED ORAL DAILY PRN
Qty: 30 TABLET | Refills: 1 | Status: SHIPPED | OUTPATIENT
Start: 2022-08-17 | End: 2023-02-06

## 2022-08-17 NOTE — PROGRESS NOTES
Subjective   Abi Rivera is a 61 y.o. female.     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  She had a large incisional hernia that required bilateral component release placement of intraperitoneal mesh and onlay mesh.  She developed some fat necrosis in the subcu space on the right side of the abdominal wall and has developed some skin breakdown in 1 of these areas.  Has 2 different areas of opening the one measures about 1 x 3 cm the other about 1 x 1 cm.  They are very shallow.  No obvious infection.  Because of the kind of ulcer these are other painful.  She is going to wound care saw them for the first time today.  She is to see them weekly.    In the office today we have redressed the wounds with some topical antibiotic ointment and sterile dressing.    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 9/19/2019    Added automatically from request for surgery [id number]   • Gastric ulcer    • Hypertension 6/16/2016   • Hyperthyroidism 11/2/2016   • Hypokalemia    • Hypomagnesemia    • Mood disorder ([facility]) 6/16/2016   • Osteoporosis without current pathological fracture 6/4/2020   • Pulmonary hypertension ([facility]) 6/16/2016   • Sick sinus syndrome ([facility]) 1/25/2017   • Tricuspid valve insufficiency 6/16/2016       Past Surgical History:   Procedure Laterality Date   • APPENDECTOMY     • BILATERAL BREAST REDUCTION     • CARDIAC CATHETERIZATION N/A 1/19/2022    Procedure: Intracardiac echocardiogram;  Surgeon: Александр Sheth MD;  Location: [facility] INVASIVE LOCATION;  Service: Cardiovascular;  Laterality: N/A;   •  CARDIAC ELECTROPHYSIOLOGY PROCEDURE N/A 1/19/2022    Procedure: EP/Ablation Utong and Ivan aware;  Surgeon: Александр Sheht MD;  Location: [facility] CATH INVASIVE LOCATION;  Service: Cardiovascular;  Laterality: N/A;   • EXPLORATORY LAPAROTOMY N/A 3/19/2021    Procedure: LAPAROTOMY EXPLORATORY with repair of perforated gastric ulcer, oversew of perforated gastric ulcer, placement of kristie patch, biopsy of stomach;  Surgeon: Luisito Velasco DO;  Location: [facility] MAIN OR;  Service: General;  Laterality: N/A;   • HIP SURGERY      Left total hip   • ORIF FOOT FRACTURE Right 9/30/2019    Procedure: OPEN REDUCTION INTERNAL FIXATION OF FIFTH METATARSAL FRACTURE, LOOSE BODY EXCISION/REMOVAL FROM THE TALONAVICULAR JOINT OF THE RIGHT FOOT;  Surgeon: JOSE Fry DPM;  Location: [facility] MAIN OR;  Service: Podiatry   • ORIF TIBIA/FIBULA FRACTURES Left    • US GUIDED CYST ASPIRATION BREAST N/A 7/26/2021   • VENTRAL/INCISIONAL HERNIA REPAIR N/A 3/2/2022    Procedure: OPEN INCISIONAL HERNIA REPAIR, BILATERAL COMPONENT RELEASE, LYSIS OF ADHESIONS, PLACEMENT OF INTRAPERITONEAL MESH AND ONLAY MESH ;  Surgeon: Luisito Velasco DO;  Location: [facility] MAIN OR;  Service: General;  Laterality: N/A;       Outpatient Encounter Medications as of 8/17/2022   Medication Sig Dispense Refill   • acetaminophen (TYLENOL) 500 MG tablet Take 500 mg by mouth Every 6 (Six) Hours As Needed for Mild Pain .     • aspirin (aspirin) 81 MG EC tablet Take 1 tablet by mouth Daily.     • calcium carb-cholecalciferol 600-800 MG-UNIT tablet Take 1 tablet by mouth Daily. Last dose 2/23     • Easy Touch Pen Needles 31G X 8 MM misc Daily.     • ferrous sulfate 325 (65 FE) MG tablet Take 325 mg by mouth Daily With Breakfast. dont take preop     • Glucosamine-Chondroit-Vit C-Mn (GLUCOSAMINE 1500 COMPLEX PO) Take 1 tablet by mouth Daily. Last dose 2/23     • HYDROcodone-acetaminophen (NORCO) 5-325 MG per tablet Take 1 tablet by mouth Every 6 (Six) Hours As  Needed for Moderate Pain . 30 tablet 0   • ibuprofen (ADVIL,MOTRIN) 200 MG tablet Take 400 mg by mouth As Needed for Mild Pain . Last dose 2/23     • losartan (COZAAR) 50 MG tablet TAKE 1 TABLET BY MOUTH EVERY DAY 90 tablet 0   • MAGNESIUM OXIDE PO Take 250 mg by mouth Daily. dont take preop     • methIMAzole (TAPAZOLE) 10 MG tablet Take 1 tablet by mouth 2 (Two) Times a Day. 60 tablet 0   • Multiple Vitamin (MULTI-VITAMIN) tablet Take 1 tablet by mouth Daily. Last dose 2/23     • Omega-3 Fatty Acids (FISH OIL) 1200 MG capsule capsule Take 1,200 mg by mouth Daily With Breakfast. Last dose 2/23     • omeprazole (priLOSEC) 20 MG capsule Take 20 mg by mouth Daily. Take preop     • ondansetron (Zofran) 4 MG tablet Take 1 tablet by mouth Daily As Needed for Nausea or Vomiting. 30 tablet 1   • Potassium Gluconate 550 MG tablet Take 1 tablet by mouth Daily. Take preop     • sulfamethoxazole-trimethoprim (Bactrim DS) 800-160 MG per tablet Take 1 tablet by mouth 2 (Two) Times a Day. 20 tablet 0   • Tymlos 3120 MCG/1.56ML solution pen-injector inject 80 mcg subcutaneously once daily as directed 1.56 mL 0     No facility-administered encounter medications on file as of 8/17/2022.       No Known Allergies    Family History   Problem Relation Age of Onset   • Hypertension Mother    • Thyroid disease Mother    • Diabetes Father    • Heart disease Father    • Hypertension Father        Social History     Socioeconomic History   • Marital status:    Tobacco Use   • Smoking status: Current Every Day Smoker     Packs/day: 0.50     Years: 34.00     Pack years: 17.00     Types: Cigarettes     Start date: 1987   • Smokeless tobacco: Never Used   • Tobacco comment: cut down quit none dos   Vaping Use   • Vaping Use: Never used   Substance and Sexual Activity   • Alcohol use: Yes     Comment: rarely/socially    • Drug use: No   • Sexual activity: Defer       The following portions of the patient's history were reviewed and updated  as appropriate: allergies, current medications, past family history, past medical history, past social history, past surgical history and problem list.    Objective   Impression: Open wound right abdominal wall x2    Recommendation: She may try topical lidocaine couple times a day for the discomfort.  She is taking hydrocodone as needed but it makes her nauseous so we will call her in some Zofran.  She is to see me the week after Labor Day for  follow-up.  She is to continue going to wound care.    Assessment & Plan   There are no diagnoses linked to this encounter.               Luisito Velasco DO  8/17/2022  14:14 EDT

## 2022-08-22 ENCOUNTER — TELEPHONE (OUTPATIENT)
Dept: SURGERY | Facility: CLINIC | Age: 62
End: 2022-08-22

## 2022-08-22 NOTE — TELEPHONE ENCOUNTER
Pt called and stated she was almost out of her pain medication and would like a refill. Explained to pt that Dr. Velasco was out of the office until 9/6/22. She stated she had been to wound care but had not gotten any of her supplies yet.  I recommended for her to contact her PCP or go to Urgent Care.  She understood.  kb

## 2022-08-23 ENCOUNTER — HOSPITAL ENCOUNTER (EMERGENCY)
Facility: HOSPITAL | Age: 62
Discharge: HOME | End: 2022-08-23
Attending: EMERGENCY MEDICINE | Admitting: EMERGENCY MEDICINE

## 2022-08-23 ENCOUNTER — OFFICE VISIT (OUTPATIENT)
Dept: WOUND CARE | Facility: HOSPITAL | Age: 62
End: 2022-08-23

## 2022-08-23 VITALS
TEMPERATURE: 98.1 F | SYSTOLIC BLOOD PRESSURE: 121 MMHG | BODY MASS INDEX: 29.24 KG/M2 | DIASTOLIC BLOOD PRESSURE: 61 MMHG | HEIGHT: 59 IN | HEART RATE: 80 BPM | RESPIRATION RATE: 18 BRPM | OXYGEN SATURATION: 98 % | WEIGHT: 145.06 LBS

## 2022-08-23 DIAGNOSIS — S31.109A OPEN WOUND OF ABDOMINAL WALL, INITIAL ENCOUNTER: Primary | ICD-10-CM

## 2022-08-23 DIAGNOSIS — R10.33 PERIUMBILICAL ABDOMINAL PAIN: ICD-10-CM

## 2022-08-23 LAB
ANION GAP SERPL CALCULATED.3IONS-SCNC: 11 MMOL/L (ref 5–15)
BASOPHILS # BLD AUTO: 0 10*3/MM3 (ref 0–0.2)
BASOPHILS NFR BLD AUTO: 0.4 % (ref 0–1.5)
BUN SERPL-MCNC: 40 MG/DL (ref 6–20)
BUN/CREAT SERPL: 40 (ref 7–25)
CALCIUM SPEC-SCNC: 9.3 MG/DL (ref 8.6–10.5)
CHLORIDE SERPL-SCNC: 96 MMOL/L (ref 98–107)
CO2 SERPL-SCNC: 26 MMOL/L (ref 22–29)
CREAT SERPL-MCNC: 1 MG/DL (ref 0.57–1)
CRP SERPL-MCNC: 7.06 MG/DL (ref 0–0.5)
DEPRECATED RDW RBC AUTO: 42 FL (ref 37–54)
EGFRCR SERPLBLD CKD-EPI 2021: 65 ML/MIN/1.73
EOSINOPHIL # BLD AUTO: 0.2 10*3/MM3 (ref 0–0.4)
EOSINOPHIL NFR BLD AUTO: 2.4 % (ref 0.3–6.2)
ERYTHROCYTE [DISTWIDTH] IN BLOOD BY AUTOMATED COUNT: 13.6 % (ref 12.3–15.4)
GLUCOSE SERPL-MCNC: 158 MG/DL (ref 65–99)
HCT VFR BLD AUTO: 34.7 % (ref 34–46.6)
HGB BLD-MCNC: 11.5 G/DL (ref 12–15.9)
LYMPHOCYTES # BLD AUTO: 1.5 10*3/MM3 (ref 0.7–3.1)
LYMPHOCYTES NFR BLD AUTO: 22.3 % (ref 19.6–45.3)
MCH RBC QN AUTO: 29.5 PG (ref 26.6–33)
MCHC RBC AUTO-ENTMCNC: 33 G/DL (ref 31.5–35.7)
MCV RBC AUTO: 89.3 FL (ref 79–97)
MONOCYTES # BLD AUTO: 0.5 10*3/MM3 (ref 0.1–0.9)
MONOCYTES NFR BLD AUTO: 8.4 % (ref 5–12)
NEUTROPHILS NFR BLD AUTO: 4.4 10*3/MM3 (ref 1.7–7)
NEUTROPHILS NFR BLD AUTO: 66.5 % (ref 42.7–76)
NRBC BLD AUTO-RTO: 0 /100 WBC (ref 0–0.2)
PLATELET # BLD AUTO: 288 10*3/MM3 (ref 140–450)
PMV BLD AUTO: 6.3 FL (ref 6–12)
POTASSIUM SERPL-SCNC: 3.7 MMOL/L (ref 3.5–5.2)
RBC # BLD AUTO: 3.88 10*6/MM3 (ref 3.77–5.28)
SODIUM SERPL-SCNC: 133 MMOL/L (ref 136–145)
WBC NRBC COR # BLD: 6.5 10*3/MM3 (ref 3.4–10.8)

## 2022-08-23 PROCEDURE — 86140 C-REACTIVE PROTEIN: CPT | Performed by: EMERGENCY MEDICINE

## 2022-08-23 PROCEDURE — 36415 COLL VENOUS BLD VENIPUNCTURE: CPT

## 2022-08-23 PROCEDURE — 85025 COMPLETE CBC W/AUTO DIFF WBC: CPT | Performed by: EMERGENCY MEDICINE

## 2022-08-23 PROCEDURE — 80048 BASIC METABOLIC PNL TOTAL CA: CPT | Performed by: EMERGENCY MEDICINE

## 2022-08-23 PROCEDURE — 99283 EMERGENCY DEPT VISIT LOW MDM: CPT

## 2022-08-23 PROCEDURE — G0463 HOSPITAL OUTPT CLINIC VISIT: HCPCS

## 2022-08-23 RX ORDER — DOXYCYCLINE 100 MG/1
100 CAPSULE ORAL 2 TIMES DAILY
Qty: 14 CAPSULE | Refills: 0 | OUTPATIENT
Start: 2022-08-23 | End: 2022-11-07

## 2022-08-23 RX ORDER — HYDROCODONE BITARTRATE AND ACETAMINOPHEN 5; 325 MG/1; MG/1
1 TABLET ORAL EVERY 6 HOURS PRN
Qty: 12 TABLET | Refills: 0 | OUTPATIENT
Start: 2022-08-23 | End: 2022-11-07

## 2022-08-23 NOTE — ED PROVIDER NOTES
Subjective   History of Present Illness  61-year-old female presents from wound clinic.  She has had wound to abdominal wall.  She had history of abdominal wall hernia repair in March.  She was felt to develop fat necrosis recently by surgeon.  She has had some erythema some pain.  She has had she states fever of 101 about 10 days ago.  She has had some headaches and fatigue.  No cough or shortness of breath.  She states she may not be eating and drinking as well.  She is still urinating.  She has Norco for pain at home.  She describes the pain as moderate constant in nature more to the right side of her wound.  Review of Systems  She reports fever 10 days ago but none since.  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 9/19/2019    Added automatically from request for surgery [id number]   • Gastric ulcer    • Hypertension 6/16/2016   • Hyperthyroidism 11/2/2016   • Hypokalemia    • Hypomagnesemia    • Mood disorder ([facility]) 6/16/2016   • Osteoporosis without current pathological fracture 6/4/2020   • Pulmonary hypertension ([facility]) 6/16/2016   • Sick sinus syndrome ([facility]) 1/25/2017   • Tricuspid valve insufficiency 6/16/2016       No Known Allergies    Past Surgical History:   Procedure Laterality Date   • APPENDECTOMY     • BILATERAL BREAST REDUCTION     • CARDIAC CATHETERIZATION N/A 1/19/2022    Procedure: Intracardiac echocardiogram;  Surgeon: Александр Sheth MD;  Location: [facility] INVASIVE LOCATION;  Service: Cardiovascular;  Laterality: N/A;   • CARDIAC ELECTROPHYSIOLOGY PROCEDURE N/A 1/19/2022    Procedure: EP/Ablation Bebo quiroz;  Surgeon:  Александр Sheth MD;  Location: [facility] CATH INVASIVE LOCATION;  Service: Cardiovascular;  Laterality: N/A;   • EXPLORATORY LAPAROTOMY N/A 3/19/2021    Procedure: LAPAROTOMY EXPLORATORY with repair of perforated gastric ulcer, oversew of perforated gastric ulcer, placement of kristie patch, biopsy of stomach;  Surgeon: Luisito Velasco DO;  Location: [facility] MAIN OR;  Service: General;  Laterality: N/A;   • HIP SURGERY      Left total hip   • ORIF FOOT FRACTURE Right 9/30/2019    Procedure: OPEN REDUCTION INTERNAL FIXATION OF FIFTH METATARSAL FRACTURE, LOOSE BODY EXCISION/REMOVAL FROM THE TALONAVICULAR JOINT OF THE RIGHT FOOT;  Surgeon: JOSE Fry DPM;  Location: [facility] MAIN OR;  Service: Podiatry   • ORIF TIBIA/FIBULA FRACTURES Left    • US GUIDED CYST ASPIRATION BREAST N/A 7/26/2021   • VENTRAL/INCISIONAL HERNIA REPAIR N/A 3/2/2022    Procedure: OPEN INCISIONAL HERNIA REPAIR, BILATERAL COMPONENT RELEASE, LYSIS OF ADHESIONS, PLACEMENT OF INTRAPERITONEAL MESH AND ONLAY MESH ;  Surgeon: Luisito Velasco DO;  Location: [facility] MAIN OR;  Service: General;  Laterality: N/A;       Family History   Problem Relation Age of Onset   • Hypertension Mother    • Thyroid disease Mother    • Diabetes Father    • Heart disease Father    • Hypertension Father        Social History     Socioeconomic History   • Marital status:    Tobacco Use   • Smoking status: Current Every Day Smoker     Packs/day: 0.50     Years: 34.00     Pack years: 17.00     Types: Cigarettes     Start date: 1987   • Smokeless tobacco: Never Used   • Tobacco comment: cut down quit none dos   Vaping Use   • Vaping Use: Never used   Substance and Sexual Activity   • Alcohol use: Yes     Comment: rarely/socially    • Drug use: No   • Sexual activity: Defer     Prior to Admission medications    Medication Sig Start Date End Date Taking? Authorizing Provider   acetaminophen (TYLENOL) 500 MG tablet Take 500 mg by mouth Every 6 (Six) Hours As Needed  for Mild Pain .    Jabari Baxter MD   aspirin (aspirin) 81 MG EC tablet Take 1 tablet by mouth Daily. 2/1/22   Abby Nguyen APRN   calcium carb-cholecalciferol 600-800 MG-UNIT tablet Take 1 tablet by mouth Daily. Last dose 2/23    Jabari Baxter MD   doxycycline (MONODOX) 100 MG capsule Take 1 capsule by mouth 2 (Two) Times a Day. 8/23/22   Rhett Arcos MD   Easy Touch Pen Needles 31G X 8 MM misc Daily. 5/12/22   Jabari Baxter MD   ferrous sulfate 325 (65 FE) MG tablet Take 325 mg by mouth Daily With Breakfast. dont take preop    Jabari Baxter MD   Glucosamine-Chondroit-Vit C-Mn (GLUCOSAMINE 1500 COMPLEX PO) Take 1 tablet by mouth Daily. Last dose 2/23    Jabari Baxter MD   HYDROcodone-acetaminophen (NORCO) 5-325 MG per tablet Take 1 tablet by mouth Every 6 (Six) Hours As Needed for Moderate Pain . 8/15/22   Luisito Velasco DO   ibuprofen (ADVIL,MOTRIN) 200 MG tablet Take 400 mg by mouth As Needed for Mild Pain . Last dose 2/23    Jabari Baxter MD   lidocaine (XYLOCAINE) 2 % jelly Apply to affected area daily prn 8/17/22 8/17/23  Luisito Velasco DO   losartan (COZAAR) 50 MG tablet TAKE 1 TABLET BY MOUTH EVERY DAY 5/9/22   Александр Sheth MD   MAGNESIUM OXIDE PO Take 250 mg by mouth Daily. dont take preop 9/20/17   Jabari Baxter MD   methIMAzole (TAPAZOLE) 10 MG tablet Take 1 tablet by mouth 2 (Two) Times a Day. 1/25/22   Karen Mack MD   Multiple Vitamin (MULTI-VITAMIN) tablet Take 1 tablet by mouth Daily. Last dose 2/23 10/21/16   Jabari Baxter MD   mupirocin (BACTROBAN) 2 % ointment Apply 1 application topically to the appropriate area as directed 3 (Three) Times a Day. 8/23/22   Rhett Arcos MD   Omega-3 Fatty Acids (FISH OIL) 1200 MG capsule capsule Take 1,200 mg by mouth Daily With Breakfast. Last dose 2/23 10/21/16   Jabari Baxter MD   omeprazole (priLOSEC) 20 MG capsule Take 20 mg by mouth Daily. Take preop     Provider, MD Jabari   ondansetron (Zofran) 4 MG tablet Take 1 tablet by mouth Daily As Needed for Nausea or Vomiting. 8/17/22 8/17/23  Luisito Velasco DO   Potassium Gluconate 550 MG tablet Take 1 tablet by mouth Daily. Take preop    ProviderJabari MD   sulfamethoxazole-trimethoprim (Bactrim DS) 800-160 MG per tablet Take 1 tablet by mouth 2 (Two) Times a Day. 6/2/22   Luisito Velasco DO   Tymlos 3120 MCG/1.56ML solution pen-injector inject 80 mcg subcutaneously once daily as directed 6/21/22   Karen Mack MD           Objective   Physical Exam  61-year-old female awake alert.  Generally well-developed well-nourished.  Pupils equal round react light.  Neck supple chest clear equal breath sounds.  Cardiovascular regular rhythm abdomen is soft she has area of erythema around superficial.  Eschar.  The erythema has somewhat of a rectangular pattern.  There is no abscess appreciated.  Procedures           ED Course      Results for orders placed or performed during the hospital encounter of 08/23/22   Basic Metabolic Panel    Specimen: Blood   Result Value Ref Range    Glucose 158 (H) 65 - 99 mg/dL    BUN 40 (H) 6 - 20 mg/dL    Creatinine 1.00 0.57 - 1.00 mg/dL    Sodium 133 (L) 136 - 145 mmol/L    Potassium 3.7 3.5 - 5.2 mmol/L    Chloride 96 (L) 98 - 107 mmol/L    CO2 26.0 22.0 - 29.0 mmol/L    Calcium 9.3 8.6 - 10.5 mg/dL    BUN/Creatinine Ratio 40.0 (H) 7.0 - 25.0    Anion Gap 11.0 5.0 - 15.0 mmol/L    eGFR 65.0 >60.0 mL/min/1.73   C-reactive Protein    Specimen: Blood   Result Value Ref Range    C-Reactive Protein 7.06 (H) 0.00 - 0.50 mg/dL   CBC Auto Differential    Specimen: Blood   Result Value Ref Range    WBC 6.50 3.40 - 10.80 10*3/mm3    RBC 3.88 3.77 - 5.28 10*6/mm3    Hemoglobin 11.5 (L) 12.0 - 15.9 g/dL    Hematocrit 34.7 34.0 - 46.6 %    MCV 89.3 79.0 - 97.0 fL    MCH 29.5 26.6 - 33.0 pg    MCHC 33.0 31.5 - 35.7 g/dL    RDW 13.6 12.3 - 15.4 %    RDW-SD 42.0 37.0 - 54.0 fl    MPV 6.3 6.0  "- 12.0 fL    Platelets 288 140 - 450 10*3/mm3    Neutrophil % 66.5 42.7 - 76.0 %    Lymphocyte % 22.3 19.6 - 45.3 %    Monocyte % 8.4 5.0 - 12.0 %    Eosinophil % 2.4 0.3 - 6.2 %    Basophil % 0.4 0.0 - 1.5 %    Neutrophils, Absolute 4.40 1.70 - 7.00 10*3/mm3    Lymphocytes, Absolute 1.50 0.70 - 3.10 10*3/mm3    Monocytes, Absolute 0.50 0.10 - 0.90 10*3/mm3    Eosinophils, Absolute 0.20 0.00 - 0.40 10*3/mm3    Basophils, Absolute 0.00 0.00 - 0.20 10*3/mm3    nRBC 0.0 0.0 - 0.2 /100 WBC     No radiology results for the last day  Medications - No data to display  /60 (BP Location: Left arm)   Pulse 84   Temp 98.2 °F (36.8 °C) (Oral)   Resp 20   Ht 149.9 cm (59\")   Wt 65.8 kg (145 lb 1 oz)   LMP  (LMP Unknown)   SpO2 98%   BMI 29.30 kg/m²                                        MDM  Patient was seen by general surgeon on the 17th.  He noted to different areas of opening one 1 x 3 and one 1 x 1 cm that were shallow with no obvious infection.  It was noted that she was on hydrocodone and was given some Zofran.  Also topical lidocaine.  White count 6.5 with hemoglobin 11.5 normal differential.  Basic metabolic panel glucose 158 BUN 40 creatinine 1.0 CRP 7.6  Patient is noted to have mild increase of her CRP and BUN.  Findings were discussed with her.  Advised to drink more liquids.  Patient was discussed with Dr. Avendaño who reviewed pictures of patient's wound.  It was felt that she could go home with continued treatment which is what she desires.  She was placed on doxycycline and Bactroban.  Her inspect report was reviewed she was given short course of hydrocodone.  Encouraged to drink more fluids.  Return new or worsening symptoms  Final diagnoses:   Open wound of abdominal wall, initial encounter   Periumbilical abdominal pain       ED Disposition  ED Disposition     ED Disposition   Discharge    Condition   Stable    Comment   --             Luisito Velasco, DO  [facility] [place] [address] [place] " [address] [phone]    Schedule an appointment as soon as possible for a visit            Medication List      New Prescriptions    doxycycline 100 MG capsule  Commonly known as: MONODOX  Take 1 capsule by mouth 2 (Two) Times a Day.     mupirocin 2 % ointment  Commonly known as: BACTROBAN  Apply 1 application topically to the appropriate area as directed 3 (Three) Times a Day.        Changed    * HYDROcodone-acetaminophen 5-325 MG per tablet  Commonly known as: NORCO  Take 1 tablet by mouth Every 6 (Six) Hours As Needed for Moderate Pain .  What changed: Another medication with the same name was added. Make sure you understand how and when to take each.     * HYDROcodone-acetaminophen 5-325 MG per tablet  Commonly known as: Norco  Take 1 tablet by mouth Every 6 (Six) Hours As Needed for Moderate Pain .  What changed: You were already taking a medication with the same name, and this prescription was added. Make sure you understand how and when to take each.         * This list has 2 medication(s) that are the same as other medications prescribed for you. Read the directions carefully, and ask your doctor or other care provider to review them with you.               Where to Get Your Medications      These medications were sent to [id number] - [place], [place] - [address]  - [phone]  - [phone] FX  [address] [place] [address]    Phone: [phone]   · doxycycline 100 MG capsule  · HYDROcodone-acetaminophen 5-325 MG per tablet  · mupirocin 2 % ointment          Rhett Arcos MD  08/23/22 [id number]

## 2022-09-06 RX ORDER — LOSARTAN POTASSIUM 50 MG/1
TABLET ORAL
Qty: 90 TABLET | Refills: 0 | Status: SHIPPED | OUTPATIENT
Start: 2022-09-06 | End: 2022-12-05

## 2022-11-07 ENCOUNTER — HOSPITAL ENCOUNTER (EMERGENCY)
Facility: HOSPITAL | Age: 62
Discharge: HOME | End: 2022-11-07
Attending: EMERGENCY MEDICINE | Admitting: EMERGENCY MEDICINE

## 2022-11-07 ENCOUNTER — APPOINTMENT (OUTPATIENT)
Dept: CT IMAGING | Facility: HOSPITAL | Age: 62
End: 2022-11-07

## 2022-11-07 VITALS
HEIGHT: 59 IN | OXYGEN SATURATION: 99 % | BODY MASS INDEX: 25.33 KG/M2 | HEART RATE: 88 BPM | WEIGHT: 125.66 LBS | SYSTOLIC BLOOD PRESSURE: 147 MMHG | DIASTOLIC BLOOD PRESSURE: 65 MMHG | RESPIRATION RATE: 20 BRPM | TEMPERATURE: 98.6 F

## 2022-11-07 DIAGNOSIS — S31.109A OPEN WOUND OF ABDOMINAL WALL, INITIAL ENCOUNTER: Primary | ICD-10-CM

## 2022-11-07 DIAGNOSIS — R10.9 ABDOMINAL WALL PAIN: ICD-10-CM

## 2022-11-07 LAB
ALBUMIN SERPL-MCNC: 4 G/DL (ref 3.5–5.2)
ALBUMIN/GLOB SERPL: 1.2 G/DL
ALP SERPL-CCNC: 102 U/L (ref 39–117)
ALT SERPL W P-5'-P-CCNC: 10 U/L (ref 1–33)
ANION GAP SERPL CALCULATED.3IONS-SCNC: 13 MMOL/L (ref 5–15)
AST SERPL-CCNC: 13 U/L (ref 1–32)
BASOPHILS # BLD AUTO: 0.1 10*3/MM3 (ref 0–0.2)
BASOPHILS NFR BLD AUTO: 0.6 % (ref 0–1.5)
BILIRUB SERPL-MCNC: <0.2 MG/DL (ref 0–1.2)
BUN SERPL-MCNC: 29 MG/DL (ref 6–20)
BUN/CREAT SERPL: 23 (ref 7–25)
CALCIUM SPEC-SCNC: 10.5 MG/DL (ref 8.6–10.5)
CHLORIDE SERPL-SCNC: 99 MMOL/L (ref 98–107)
CO2 SERPL-SCNC: 25 MMOL/L (ref 22–29)
CREAT SERPL-MCNC: 1.26 MG/DL (ref 0.57–1)
CRP SERPL-MCNC: 2.79 MG/DL (ref 0–0.5)
DEPRECATED RDW RBC AUTO: 52.5 FL (ref 37–54)
EGFRCR SERPLBLD CKD-EPI 2021: 49.3 ML/MIN/1.73
EOSINOPHIL # BLD AUTO: 0.3 10*3/MM3 (ref 0–0.4)
EOSINOPHIL NFR BLD AUTO: 2.9 % (ref 0.3–6.2)
ERYTHROCYTE [DISTWIDTH] IN BLOOD BY AUTOMATED COUNT: 16.8 % (ref 12.3–15.4)
GLOBULIN UR ELPH-MCNC: 3.3 GM/DL
GLUCOSE SERPL-MCNC: 103 MG/DL (ref 65–99)
HCT VFR BLD AUTO: 39.1 % (ref 34–46.6)
HGB BLD-MCNC: 12.5 G/DL (ref 12–15.9)
LYMPHOCYTES # BLD AUTO: 2.3 10*3/MM3 (ref 0.7–3.1)
LYMPHOCYTES NFR BLD AUTO: 24 % (ref 19.6–45.3)
MCH RBC QN AUTO: 28.8 PG (ref 26.6–33)
MCHC RBC AUTO-ENTMCNC: 32 G/DL (ref 31.5–35.7)
MCV RBC AUTO: 90 FL (ref 79–97)
MONOCYTES # BLD AUTO: 0.7 10*3/MM3 (ref 0.1–0.9)
MONOCYTES NFR BLD AUTO: 7.3 % (ref 5–12)
NEUTROPHILS NFR BLD AUTO: 6.2 10*3/MM3 (ref 1.7–7)
NEUTROPHILS NFR BLD AUTO: 65.2 % (ref 42.7–76)
NRBC BLD AUTO-RTO: 0 /100 WBC (ref 0–0.2)
PLATELET # BLD AUTO: 346 10*3/MM3 (ref 140–450)
PMV BLD AUTO: 7 FL (ref 6–12)
POTASSIUM SERPL-SCNC: 4.1 MMOL/L (ref 3.5–5.2)
PROT SERPL-MCNC: 7.3 G/DL (ref 6–8.5)
RBC # BLD AUTO: 4.35 10*6/MM3 (ref 3.77–5.28)
SODIUM SERPL-SCNC: 137 MMOL/L (ref 136–145)
WBC NRBC COR # BLD: 9.6 10*3/MM3 (ref 3.4–10.8)

## 2022-11-07 PROCEDURE — 25010000002 MORPHINE PER 10 MG: Performed by: PHYSICIAN ASSISTANT

## 2022-11-07 PROCEDURE — 74176 CT ABD & PELVIS W/O CONTRAST: CPT

## 2022-11-07 PROCEDURE — 86140 C-REACTIVE PROTEIN: CPT | Performed by: PHYSICIAN ASSISTANT

## 2022-11-07 PROCEDURE — 87205 SMEAR GRAM STAIN: CPT | Performed by: PHYSICIAN ASSISTANT

## 2022-11-07 PROCEDURE — 87070 CULTURE OTHR SPECIMN AEROBIC: CPT | Performed by: PHYSICIAN ASSISTANT

## 2022-11-07 PROCEDURE — 99283 EMERGENCY DEPT VISIT LOW MDM: CPT

## 2022-11-07 PROCEDURE — 36415 COLL VENOUS BLD VENIPUNCTURE: CPT

## 2022-11-07 PROCEDURE — 85025 COMPLETE CBC W/AUTO DIFF WBC: CPT | Performed by: PHYSICIAN ASSISTANT

## 2022-11-07 PROCEDURE — 96374 THER/PROPH/DIAG INJ IV PUSH: CPT

## 2022-11-07 PROCEDURE — 25010000002 ONDANSETRON PER 1 MG: Performed by: PHYSICIAN ASSISTANT

## 2022-11-07 PROCEDURE — 87186 SC STD MICRODIL/AGAR DIL: CPT | Performed by: PHYSICIAN ASSISTANT

## 2022-11-07 PROCEDURE — 80053 COMPREHEN METABOLIC PANEL: CPT | Performed by: PHYSICIAN ASSISTANT

## 2022-11-07 PROCEDURE — 96375 TX/PRO/DX INJ NEW DRUG ADDON: CPT

## 2022-11-07 RX ORDER — HYDROCODONE BITARTRATE AND ACETAMINOPHEN 5; 325 MG/1; MG/1
1 TABLET ORAL EVERY 6 HOURS PRN
Qty: 12 TABLET | Refills: 0 | Status: SHIPPED | OUTPATIENT
Start: 2022-11-07 | End: 2023-02-06

## 2022-11-07 RX ORDER — ONDANSETRON 2 MG/ML
4 INJECTION INTRAMUSCULAR; INTRAVENOUS ONCE
Status: COMPLETED | OUTPATIENT
Start: 2022-11-07 | End: 2022-11-07

## 2022-11-07 RX ORDER — DOXYCYCLINE HYCLATE 100 MG/1
100 CAPSULE ORAL 2 TIMES DAILY
Qty: 14 CAPSULE | Refills: 0 | Status: SHIPPED | OUTPATIENT
Start: 2022-11-07 | End: 2022-11-10

## 2022-11-07 RX ORDER — SODIUM CHLORIDE 0.9 % (FLUSH) 0.9 %
10 SYRINGE (ML) INJECTION AS NEEDED
Status: DISCONTINUED | OUTPATIENT
Start: 2022-11-07 | End: 2022-11-08 | Stop reason: HOSPADM

## 2022-11-07 RX ADMIN — MORPHINE SULFATE 4 MG: 4 INJECTION INTRAVENOUS at 22:12

## 2022-11-07 RX ADMIN — ONDANSETRON 4 MG: 2 INJECTION INTRAMUSCULAR; INTRAVENOUS at 22:11

## 2022-11-07 NOTE — ED PROVIDER NOTES
Subjective   History of Present Illness  Patient seen in triage by provider for initial evaluation    Patient complaining of abdominal pain since March, worsening over the last few weeks. She had previous hx of perforated gastric ulcer with herniation requiring multiple surgeries and open abdominal wound. Patient states she is having worsening abd pain, wound is draining and bleeding. No fever. No n/v.    Patient was seen by provider in triage, orders placed and patient was placed back in the waiting room    2133  Patient was seen in a designated room in the ER.  Further history obtained. Per chart review patient had perforated gastric ulcer which was repaired with an onlay patch per Dr. Velasco.  In her postop recovery time she developed an incisional hernia with obstruction without gangrene.  She presented to the ER for pain and ultimately had open incisional hernia repair with lysis of adhesions and placement of intraperitoneal mesh.  Patient states she had this done in March 2022.  Patient states since then she has had a large wound to her abdomen.  She states that her surgeon retired and she has been unable to see him.  She states she was seen by wound care and general surgery at [facility] but states that the wound seems to be getting worse.  She reports today that the pain became more severe which prompted her ER visit.  She describes pain as a constant aching with intermittent sharp pain around the wound site.  She reports increased bleeding and drainage.  No nausea vomiting or diarrhea.  No fever or chills.  Patient states that she was told to see another surgeon in the [facility] but she states she was unable to see him because he did not accept her insurance.  She is not currently on any antibiotics.    History provided by:  Patient      Review of Systems   Constitutional: Negative for chills and fever.   HENT: Negative for sore throat and trouble swallowing.    Eyes: Negative.    Respiratory: Negative  for shortness of breath and wheezing.    Cardiovascular: Negative for chest pain.   Gastrointestinal: Positive for abdominal pain. Negative for diarrhea, nausea and vomiting.   Endocrine: Negative.    Genitourinary: Negative for dysuria.   Musculoskeletal: Negative for myalgias.   Skin: Positive for wound. Negative for rash.   Allergic/Immunologic: Negative.    Neurological: Negative for headaches.   Psychiatric/Behavioral: Negative for behavioral problems.   All other systems reviewed and are negative.      Past Medical History:   Diagnosis Date   • Adenomatous polyp of colon 6/20/2016   • Anemia    • Anemia    • Anxiety 6/16/2016   • Arthritis of foot 1/10/2019   • Atrial fibrillation (HCC) 6/16/2016   • Avascular necrosis of bone (HCC) 1/10/2019   • Chronic pain 1/22/2019   • Closed displaced fracture 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 9/19/2019    Added automatically from request for surgery [id number]   • Gastric ulcer    • Hypertension 6/16/2016   • Hyperthyroidism 11/2/2016   • Hypokalemia    • Hypomagnesemia    • Mood disorder ([facility]) 6/16/2016   • Osteoporosis without current pathological fracture 6/4/2020   • Pulmonary hypertension ([facility]) 6/16/2016   • Sick sinus syndrome ([facility]) 1/25/2017   • Tricuspid valve insufficiency 6/16/2016       No Known Allergies    Past Surgical History:   Procedure Laterality Date   • APPENDECTOMY     • BILATERAL BREAST REDUCTION     • CARDIAC CATHETERIZATION N/A 1/19/2022    Procedure: Intracardiac echocardiogram;  Surgeon: Александр Sheth MD;  Location: [facility] CATH INVASIVE LOCATION;  Service: Cardiovascular;  Laterality: N/A;   • CARDIAC ELECTROPHYSIOLOGY PROCEDURE N/A 1/19/2022    Procedure: EP/Ablation Bebo quiroz;  Surgeon: Александр Sheth MD;  Location: [facility] CATH INVASIVE LOCATION;  Service: Cardiovascular;  Laterality: N/A;   • EXPLORATORY LAPAROTOMY N/A 3/19/2021    Procedure: LAPAROTOMY EXPLORATORY with repair of perforated gastric ulcer,  oversew of perforated gastric ulcer, placement of kristie patch, biopsy of stomach;  Surgeon: Luisito Velasco DO;  Location: [facility] MAIN OR;  Service: General;  Laterality: N/A;   • HIP SURGERY      Left total hip   • ORIF FOOT FRACTURE Right 9/30/2019    Procedure: OPEN REDUCTION INTERNAL FIXATION OF FIFTH METATARSAL FRACTURE, LOOSE BODY EXCISION/REMOVAL FROM THE TALONAVICULAR JOINT OF THE RIGHT FOOT;  Surgeon: JOSE Fry DPM;  Location: [facility] MAIN OR;  Service: Podiatry   • ORIF TIBIA/FIBULA FRACTURES Left    • US GUIDED CYST ASPIRATION BREAST N/A 7/26/2021   • VENTRAL/INCISIONAL HERNIA REPAIR N/A 3/2/2022    Procedure: OPEN INCISIONAL HERNIA REPAIR, BILATERAL COMPONENT RELEASE, LYSIS OF ADHESIONS, PLACEMENT OF INTRAPERITONEAL MESH AND ONLAY MESH ;  Surgeon: Luisito Velasco DO;  Location: [facility] MAIN OR;  Service: General;  Laterality: N/A;       Family History   Problem Relation Age of Onset   • Hypertension Mother    • Thyroid disease Mother    • Diabetes Father    • Heart disease Father    • Hypertension Father        Social History     Socioeconomic History   • Marital status:    Tobacco Use   • Smoking status: Every Day     Packs/day: 0.50     Years: 34.00     Pack years: 17.00     Types: Cigarettes     Start date: 1987   • Smokeless tobacco: Never   • Tobacco comments:     cut down quit none dos   Vaping Use   • Vaping Use: Never used   Substance and Sexual Activity   • Alcohol use: Yes     Comment: rarely/socially    • Drug use: No   • Sexual activity: Defer           Objective   Physical Exam  Vitals and nursing note reviewed.   Constitutional:       Appearance: Normal appearance. She is well-developed. She is not ill-appearing or toxic-appearing.   HENT:      Head: Normocephalic and atraumatic.   Eyes:      Pupils: Pupils are equal, round, and reactive to light.   Cardiovascular:      Rate and Rhythm: Normal rate and regular rhythm.      Heart sounds: Normal heart sounds.   Pulmonary:     "  Effort: Pulmonary effort is normal. No respiratory distress.      Breath sounds: Normal breath sounds. No wheezing.   Abdominal:      General: Abdomen is flat. Bowel sounds are normal. There is no distension.      Palpations: Abdomen is soft.      Tenderness: There is abdominal tenderness in the right upper quadrant. There is no right CVA tenderness or left CVA tenderness.   Skin:     General: Skin is warm and dry.      Capillary Refill: Capillary refill takes less than 2 seconds.      Findings: No rash.      Comments: See photo below   Neurological:      General: No focal deficit present.      Mental Status: She is alert and oriented to person, place, and time.   Psychiatric:         Behavior: Behavior normal.             Procedures           ED Course    /65 (BP Location: Left arm, Patient Position: Sitting)   Pulse 88   Temp 98.6 °F (37 °C) (Oral)   Resp 20   Ht 149.9 cm (59\")   Wt 57 kg (125 lb 10.6 oz)   LMP  (LMP Unknown)   SpO2 99%   BMI 25.38 kg/m²   Labs Reviewed   COMPREHENSIVE METABOLIC PANEL - Abnormal; Notable for the following components:       Result Value    Glucose 103 (*)     BUN 29 (*)     Creatinine 1.26 (*)     eGFR 49.3 (*)     All other components within normal limits    Narrative:     GFR Normal >60  Chronic Kidney Disease <60  Kidney Failure <15     C-REACTIVE PROTEIN - Abnormal; Notable for the following components:    C-Reactive Protein 2.79 (*)     All other components within normal limits   CBC WITH AUTO DIFFERENTIAL - Abnormal; Notable for the following components:    RDW 16.8 (*)     All other components within normal limits   WOUND CULTURE   CBC AND DIFFERENTIAL    Narrative:     The following orders were created for panel order CBC & Differential.  Procedure                               Abnormality         Status                     ---------                               -----------         ------                     CBC Auto Differential[[id number]]        Abnormal  "           Final result                 Please view results for these tests on the individual orders.     Medications   sodium chloride 0.9 % flush 10 mL (has no administration in time range)   morphine injection 4 mg (4 mg Intravenous Given 11/7/22 2212)   ondansetron (ZOFRAN) injection 4 mg (4 mg Intravenous Given 11/7/22 2211)     CT Abdomen Pelvis Without Contrast    Result Date: 11/7/2022  1.     Patient has had interval hernia surgery since prior study. There is a defect in the subcutaneous soft tissues of the anterior abdominal wall on the right. 2.     Moderate stool burden within the colon which could relate to constipation. 3.     Degenerative change lumbar spine  Electronically Signed By-Alok White MD On:11/7/2022 10:41 PM This report was finalized on [id number] by  Alok White MD.                                           MDM  Number of Diagnoses or Management Options  Abdominal wall pain  Open wound of abdominal wall, initial encounter  Diagnosis management comments: MEDICAL DECISION  Epic Chart Review: Patient had perforated gastric ulcer a few years ago that was repaired by Dr. Velasco with onlay patch.  March 2022 patient was seen for incisional hernia with obstruction, had open repair of this.  She states that she has had issues with abdominal wound since this surgery.  Comorbidities: Atrial fibrillation, hypertension, chronic wound  Differentials: Wound infection, sepsis, abscess, fistula; this list is not all inclusive and does not constitute the entirety of considered causes  Radiology interpretation:  Images reviewed by me and interpreted by radiologist, as above  Lab interpretation:  Labs viewed by me significant for, as above    While in the ED IV was placed and labs were obtained appropriate PPE was worn during exam and throughout all encounters with the patient.  Patient had the above evaluation.  IV established, lab work obtained.  Patient given morphine and Zofran for pain and  nausea.  CBC no leukocytosis.  CMP glucose 100 2129 creatinine 1.26.  Wound culture pending.  CRP mildly elevated 2.79.  CT abdomen pelvis shows defect in the subcutaneous soft tissue of the anterior aurelia wall, moderate stool burden, no acute intra-abdominal findings or signs of abscess.  Findings discussed with patient at bedside.  Patient was also discussed with ER attending, Dr. Arcos.  Recommended pain control, antibiotics and patient to follow-up with plastic reconstructive surgery or general surgery for reevaluation.  Patient agreeable with plan for discharge.  Patient discharged with prescription for Norco and doxycycline.  Inspect reviewed.    Discharge plan and instructions were discussed with the patient who verbalized understanding and is in agreement with the plan, all questions were answered at this time.  Patient is aware of signs symptoms that would require immediate return to the emergency room.  Patient understands importance of following up with primary care provider for further evaluation and worsening concerns as well as blood pressure recheck in the next 4 weeks.    Patient was discharged in improved stable condition with an upright steady gait.  Patient and plan for discharge discussed with Dr. Arcos           Amount and/or Complexity of Data Reviewed  Clinical lab tests: reviewed and ordered  Tests in the radiology section of CPT®: reviewed and ordered    Patient Progress  Patient progress: stable      Final diagnoses:   Open wound of abdominal wall, initial encounter   Abdominal wall pain       ED Disposition  ED Disposition     ED Disposition   Discharge    Condition   Stable    Comment   --             Chaparrita Chun MD  [address] [place] [address] [address]  [place] [id number] [phone]    Schedule an appointment as soon as possible for a visit in 2 days  As needed, If symptoms worsen    [facility]  [address] [facility] [address]  [facility]  [address]  [phone]  Schedule an appointment as soon as possible for a visit in 2 days  As needed, If symptoms worsen    Wyatt Mon MD  [address] [facility] [address]  [place] [id number] [phone]    Schedule an appointment as soon as possible for a visit in 2 days  As needed, If symptoms worsen    Maycol Arteaga MD  [address] [facility] [address]  [place] [id number] [phone]    Schedule an appointment as soon as possible for a visit in 2 days  As needed, If symptoms worsen         Medication List      New Prescriptions    doxycycline 100 MG capsule  Commonly known as: VIBRAMYCIN  Take 1 capsule by mouth 2 (Two) Times a Day for 7 days.        Changed    HYDROcodone-acetaminophen 5-325 MG per tablet  Commonly known as: NORCO  Take 1 tablet by mouth Every 6 (Six) Hours As Needed for Moderate Pain.  What changed: Another medication with the same name was removed. Continue taking this medication, and follow the directions you see here.        Stop    doxycycline 100 MG capsule  Commonly known as: MONODOX           Where to Get Your Medications      These medications were sent to [id number] - [place], [place] - [address]  - [phone]  - [phone] FX  [address] [facility] [place] [address]    Phone: [phone]   · doxycycline 100 MG capsule  · HYDROcodone-acetaminophen 5-325 MG per tablet          Cassy Bledsoe PA  11/08/22 0044

## 2022-11-08 NOTE — DISCHARGE INSTRUCTIONS
Take Norco as needed for pain.  Take doxycycline twice a day for the next 7 days.    Keep the area clean, change dressing twice a day    Follow-up with primary care for recheck  Call your insurance to figure out why it is currently inactive.  Make an appointment with general surgery or plastic surgery for reevaluation.    Return to the ER for new or worsening symptoms

## 2022-11-08 NOTE — ED NOTES
Patient complains of more pain than normal in the abdominal wound from previous surgery. Patient states the wound in draining more than normal. Patient states that there is a suture or mesh popping out located on the right bottom of the wound. Patient just wants to know what to do from this point to getting the wound to heal.

## 2022-11-10 LAB
BACTERIA SPEC AEROBE CULT: ABNORMAL
GRAM STN SPEC: ABNORMAL
GRAM STN SPEC: ABNORMAL

## 2022-11-10 RX ORDER — CIPROFLOXACIN 500 MG/1
500 TABLET, FILM COATED ORAL 2 TIMES DAILY
Qty: 14 TABLET | Refills: 0 | Status: SHIPPED | OUTPATIENT
Start: 2022-11-10 | End: 2022-11-17

## 2022-11-10 NOTE — PHARMACY RECOMMENDATION
Patient wound culture resulted with  Citrobacter freundii . Susceptible to Fluoroquinolones (possible AmpC resistance could be induced by 3rd gen cephalosporins). Patient was given Rx for doxycycline. Therapy is insufficient coverage due to resistance. Discussed with Cassy YOO). New prescription for ciprofloxacin 500mg PO BID x 7 days e-scribed to the patient's preferred pharmacy: [facility]. Spoke with patient who agreed with treatment plan.    Microbiology Results (last 10 days)       Procedure Component Value - Date/Time    Wound Culture - Wound, Abdominal Wall [[id number]]  (Abnormal)  (Susceptibility) Collected: 11/07/22 2218    Lab Status: Final result Specimen: Wound from Abdominal Wall Updated: 11/10/22 0653     Wound Culture Light growth (2+) Citrobacter freundii     Gram Stain Many (4+) WBCs per low power field      No organisms seen    Susceptibility        Citrobacter freundii      DAVY      Cefepime Susceptible      Ceftazidime Susceptible      Ceftriaxone Susceptible      Gentamicin Resistant      Levofloxacin Susceptible      Piperacillin + Tazobactam Susceptible      Tetracycline Resistant      Tobramycin Susceptible      Trimethoprim + Sulfamethoxazole Resistant                       Susceptibility Comments       Citrobacter freundii    Cefazolin sensitivity will not be reported for Enterobacteriaceae in non-urine isolates. If cefazolin is preferred, please call the microbiology lab to request an E-test.  With the exception of urinary-sourced infections, aminoglycosides should not be used as monotherapy.                       Clemencia Forte RPH  11/10/2022 12:06 EST

## 2022-12-05 RX ORDER — LOSARTAN POTASSIUM 50 MG/1
TABLET ORAL
Qty: 90 TABLET | Refills: 0 | Status: SHIPPED | OUTPATIENT
Start: 2022-12-05 | End: 2023-02-16

## 2023-02-02 ENCOUNTER — HOSPITAL ENCOUNTER (EMERGENCY)
Facility: HOSPITAL | Age: 63
Discharge: HOME | End: 2023-02-02
Attending: EMERGENCY MEDICINE | Admitting: EMERGENCY MEDICINE
Payer: COMMERCIAL

## 2023-02-02 ENCOUNTER — APPOINTMENT (OUTPATIENT)
Dept: CT IMAGING | Facility: HOSPITAL | Age: 63
End: 2023-02-02
Payer: COMMERCIAL

## 2023-02-02 VITALS
DIASTOLIC BLOOD PRESSURE: 74 MMHG | HEART RATE: 75 BPM | SYSTOLIC BLOOD PRESSURE: 122 MMHG | RESPIRATION RATE: 16 BRPM | TEMPERATURE: 97.7 F | OXYGEN SATURATION: 100 % | WEIGHT: 118.61 LBS | HEIGHT: 60 IN | BODY MASS INDEX: 23.29 KG/M2

## 2023-02-02 DIAGNOSIS — S31.109A OPEN WOUND OF ABDOMINAL WALL, INITIAL ENCOUNTER: ICD-10-CM

## 2023-02-02 DIAGNOSIS — R10.84 GENERALIZED ABDOMINAL PAIN: Primary | ICD-10-CM

## 2023-02-02 LAB
ALBUMIN SERPL-MCNC: 4 G/DL (ref 3.5–5.2)
ALBUMIN/GLOB SERPL: 1.5 G/DL
ALP SERPL-CCNC: 93 U/L (ref 39–117)
ALT SERPL W P-5'-P-CCNC: 14 U/L (ref 1–33)
ANION GAP SERPL CALCULATED.3IONS-SCNC: 13 MMOL/L (ref 5–15)
AST SERPL-CCNC: 17 U/L (ref 1–32)
BASOPHILS # BLD AUTO: 0 10*3/MM3 (ref 0–0.2)
BASOPHILS NFR BLD AUTO: 0.3 % (ref 0–1.5)
BILIRUB SERPL-MCNC: 0.2 MG/DL (ref 0–1.2)
BUN SERPL-MCNC: 56 MG/DL (ref 8–23)
BUN/CREAT SERPL: 58.3 (ref 7–25)
CALCIUM SPEC-SCNC: 10 MG/DL (ref 8.6–10.5)
CHLORIDE SERPL-SCNC: 98 MMOL/L (ref 98–107)
CO2 SERPL-SCNC: 26 MMOL/L (ref 22–29)
CREAT SERPL-MCNC: 0.96 MG/DL (ref 0.57–1)
DEPRECATED RDW RBC AUTO: 42.4 FL (ref 37–54)
EGFRCR SERPLBLD CKD-EPI 2021: 67.9 ML/MIN/1.73
EOSINOPHIL # BLD AUTO: 0.2 10*3/MM3 (ref 0–0.4)
EOSINOPHIL NFR BLD AUTO: 2 % (ref 0.3–6.2)
ERYTHROCYTE [DISTWIDTH] IN BLOOD BY AUTOMATED COUNT: 14.1 % (ref 12.3–15.4)
GLOBULIN UR ELPH-MCNC: 2.7 GM/DL
GLUCOSE SERPL-MCNC: 193 MG/DL (ref 65–99)
HCT VFR BLD AUTO: 40.1 % (ref 34–46.6)
HGB BLD-MCNC: 13.8 G/DL (ref 12–15.9)
LYMPHOCYTES # BLD AUTO: 2.2 10*3/MM3 (ref 0.7–3.1)
LYMPHOCYTES NFR BLD AUTO: 23.4 % (ref 19.6–45.3)
MCH RBC QN AUTO: 30.1 PG (ref 26.6–33)
MCHC RBC AUTO-ENTMCNC: 34.5 G/DL (ref 31.5–35.7)
MCV RBC AUTO: 87.2 FL (ref 79–97)
MONOCYTES # BLD AUTO: 0.8 10*3/MM3 (ref 0.1–0.9)
MONOCYTES NFR BLD AUTO: 7.9 % (ref 5–12)
NEUTROPHILS NFR BLD AUTO: 6.4 10*3/MM3 (ref 1.7–7)
NEUTROPHILS NFR BLD AUTO: 66.4 % (ref 42.7–76)
NRBC BLD AUTO-RTO: 0.1 /100 WBC (ref 0–0.2)
PLATELET # BLD AUTO: 249 10*3/MM3 (ref 140–450)
PMV BLD AUTO: 7.7 FL (ref 6–12)
POTASSIUM SERPL-SCNC: 3.4 MMOL/L (ref 3.5–5.2)
PROT SERPL-MCNC: 6.7 G/DL (ref 6–8.5)
RBC # BLD AUTO: 4.6 10*6/MM3 (ref 3.77–5.28)
SODIUM SERPL-SCNC: 137 MMOL/L (ref 136–145)
WBC NRBC COR # BLD: 9.6 10*3/MM3 (ref 3.4–10.8)

## 2023-02-02 PROCEDURE — 85025 COMPLETE CBC W/AUTO DIFF WBC: CPT | Performed by: NURSE PRACTITIONER

## 2023-02-02 PROCEDURE — 80053 COMPREHEN METABOLIC PANEL: CPT | Performed by: NURSE PRACTITIONER

## 2023-02-02 PROCEDURE — 74177 CT ABD & PELVIS W/CONTRAST: CPT

## 2023-02-02 PROCEDURE — 99283 EMERGENCY DEPT VISIT LOW MDM: CPT

## 2023-02-02 PROCEDURE — 0 IOPAMIDOL PER 1 ML: Performed by: EMERGENCY MEDICINE

## 2023-02-02 RX ORDER — HYDROCODONE BITARTRATE AND ACETAMINOPHEN 5; 325 MG/1; MG/1
1 TABLET ORAL EVERY 6 HOURS PRN
Qty: 8 TABLET | Refills: 0 | Status: SHIPPED | OUTPATIENT
Start: 2023-02-02 | End: 2023-02-06

## 2023-02-02 RX ORDER — SODIUM CHLORIDE 0.9 % (FLUSH) 0.9 %
10 SYRINGE (ML) INJECTION AS NEEDED
Status: DISCONTINUED | OUTPATIENT
Start: 2023-02-02 | End: 2023-02-02 | Stop reason: HOSPADM

## 2023-02-02 RX ADMIN — IOPAMIDOL 80 ML: 755 INJECTION, SOLUTION INTRAVENOUS at 19:15

## 2023-02-03 NOTE — DISCHARGE INSTRUCTIONS
Keep wound clean and covered.  Continue current home medications.  Keep follow-up appointment with your surgeon as scheduled on Monday.  Return for new or worsening symptoms.

## 2023-02-03 NOTE — ED PROVIDER NOTES
Subjective   History of Present Illness  Patient is a 62-year-old white female who presents today with complaints of pain to chronic abdominal wound.  She states a year ago she underwent hernia repair with mesh placement.  She states she developed a chronic ulceration of the right abdominal wall.  She states she been seeing wound care for this.  She states today she noticed what appeared to be the mesh exposed.  She states she is had some intermittent episodes of deep diffuse abdominal pain.  She denies any drainage from the wounds.  She denies any fever chills nausea vomiting diarrhea constipation.  She states she scheduled to see her general surgeon in 3 days for evaluation but when she states when she called them to let them know that she had exposed mesh she was instructed to come to the ED for evaluation.        Review of Systems   Constitutional: Negative for fever.   Gastrointestinal: Positive for abdominal pain.   Skin: Positive for wound.       Past Medical History:   Diagnosis Date   • Adenomatous polyp of colon 6/20/2016   • Anemia    • Anemia    • Anxiety 6/16/2016   • Arthritis of foot 1/10/2019   • Atrial fibrillation (HCC) 6/16/2016   • Avascular necrosis of bone ([facility]) 1/10/2019   • Chronic pain 1/22/2019   • Closed displaced fracture 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 9/19/2019    Added automatically from request for surgery [id number]   • Gastric ulcer    • Hypertension 6/16/2016   • Hyperthyroidism 11/2/2016   • Hypokalemia    • Hypomagnesemia    • Mood disorder ([facility]) 6/16/2016   • Osteoporosis without current pathological fracture 6/4/2020   • Pulmonary hypertension ([facility]) 6/16/2016   • Sick sinus syndrome ([facility]) 1/25/2017   • Tricuspid valve insufficiency 6/16/2016       No Known Allergies    Past Surgical History:   Procedure Laterality Date   • APPENDECTOMY     • BILATERAL BREAST REDUCTION     • CARDIAC CATHETERIZATION N/A 1/19/2022    Procedure: Intracardiac echocardiogram;   Surgeon: Александр Sheth MD;  Location: [facility] CATH INVASIVE LOCATION;  Service: Cardiovascular;  Laterality: N/A;   • CARDIAC ELECTROPHYSIOLOGY PROCEDURE N/A 1/19/2022    Procedure: EP/Ablation Utong and Ivan aware;  Surgeon: Александр Sheth MD;  Location: [facility] CATH INVASIVE LOCATION;  Service: Cardiovascular;  Laterality: N/A;   • EXPLORATORY LAPAROTOMY N/A 3/19/2021    Procedure: LAPAROTOMY EXPLORATORY with repair of perforated gastric ulcer, oversew of perforated gastric ulcer, placement of kristie patch, biopsy of stomach;  Surgeon: Luisito Velasco DO;  Location: [facility] MAIN OR;  Service: General;  Laterality: N/A;   • HIP SURGERY      Left total hip   • ORIF FOOT FRACTURE Right 9/30/2019    Procedure: OPEN REDUCTION INTERNAL FIXATION OF FIFTH METATARSAL FRACTURE, LOOSE BODY EXCISION/REMOVAL FROM THE TALONAVICULAR JOINT OF THE RIGHT FOOT;  Surgeon: JOSE Fry DPM;  Location: [facility] MAIN OR;  Service: Podiatry   • ORIF TIBIA/FIBULA FRACTURES Left    • US GUIDED CYST ASPIRATION BREAST N/A 7/26/2021   • VENTRAL/INCISIONAL HERNIA REPAIR N/A 3/2/2022    Procedure: OPEN INCISIONAL HERNIA REPAIR, BILATERAL COMPONENT RELEASE, LYSIS OF ADHESIONS, PLACEMENT OF INTRAPERITONEAL MESH AND ONLAY MESH ;  Surgeon: Luisito Velasco DO;  Location: [facility] MAIN OR;  Service: General;  Laterality: N/A;       Family History   Problem Relation Age of Onset   • Hypertension Mother    • Thyroid disease Mother    • Diabetes Father    • Heart disease Father    • Hypertension Father        Social History     Socioeconomic History   • Marital status:    Tobacco Use   • Smoking status: Every Day     Packs/day: 0.50     Years: 34.00     Pack years: 17.00     Types: Cigarettes     Start date: 1987   • Smokeless tobacco: Never   • Tobacco comments:     cut down quit none dos   Vaping Use   • Vaping Use: Never used   Substance and Sexual Activity   • Alcohol use: Yes     Comment: rarely/socially    • Drug use: No   •  Sexual activity: Defer           Objective   Physical Exam  Vital signs and triage nurse note reviewed.  Constitutional: Awake, alert; well-developed and well-nourished. No acute distress is noted.  HEENT: Normocephalic, atraumatic; pupils are PERRL with intact EOM; oropharynx is pink and moist without exudate or erythema.  No drooling or pooling of oral secretions.  Neck: Supple, full range of motion without pain; no cervical lymphadenopathy. Normal phonation.  Cardiovascular: Regular rate and rhythm, normal S1-S2.  No murmur noted.  Pulmonary: Respiratory effort regular nonlabored, breath sounds clear to auscultation all fields.  Abdomen: Soft, mildly tender over the abdomen diffusely, nondistended with normoactive bowel sounds; no rebound or guarding.  Large ulceration noted over the right mid abdomen.  There is no surrounding erythema.  No crepitus or streaking.  There is no active drainage or bleeding.  There does appear to be a small area of exposed mesh.   Musculoskeletal: Independent range of motion of all extremities with no palpable tenderness or edema.  Neuro: Alert oriented x3, speech is clear and appropriate, GCS 15.    Skin: Flesh tone, warm, dry, intact; no erythematous or petechial rash or lesion.      Procedures           ED Course      Labs Reviewed   COMPREHENSIVE METABOLIC PANEL - Abnormal; Notable for the following components:       Result Value    Glucose 193 (*)     BUN 56 (*)     Potassium 3.4 (*)     BUN/Creatinine Ratio 58.3 (*)     All other components within normal limits    Narrative:     GFR Normal >60  Chronic Kidney Disease <60  Kidney Failure <15     CBC WITH AUTO DIFFERENTIAL - Normal   CBC AND DIFFERENTIAL    Narrative:     The following orders were created for panel order CBC & Differential.  Procedure                               Abnormality         Status                     ---------                               -----------         ------                     CBC Auto  Differential[[id number]]        Normal              Final result                 Please view results for these tests on the individual orders.     CT Abdomen Pelvis With Contrast    Result Date: 2/2/2023  Impression: 1.Changes from ventral hernia repair. There is an adjacent subcutaneous defect along the right anterior abdominal wall similar to prior CT. 2.No acute process identified within intra-abdominal/pelvic cavity. Electronically Signed: Corbin Lux  2/2/2023 7:33 PM EST  Workstation ID: [id number]    Medications   sodium chloride 0.9 % flush 10 mL (has no administration in time range)   iopamidol (ISOVUE-370) 76 % injection 100 mL (80 mL Intravenous Given 2/2/23 1915)                                          Medical Decision Making  Patient presents with concern over exposed mesh within abdominal wall wound.  Ulceration has been present for the last year or so she states she just noticed the mesh today.  She denies fever or drainage.  Differentials include exposed mesh, infection, obstruction    She had above exam evaluation.  She had IV established.  She had labs and CT obtained.    Lab interpretation: CBC is within normal limits and rate is normal white blood cell count of 9.6.  Metabolic panel significant for glucose 193 otherwise grossly unremarkable.  CT of the abdomen pelvis reviewed by me, interpreted by myself and corroborated by radiologist reading shows ventral hernia repair changes noted with an adjacent subcu defect along the right anterior abdominal wall that is similar prior to CT.  No acute process identified.    On reexamination patient resting quietly and in no distress.  She has no new complaints.  She is well-appearing, afebrile and hemodynamically stable.    Findings were discussed with her.  She is instructed to keep her wound clean and covered and keep follow-up appointment with her surgeon in a few days as scheduled.  She was given warning signs for prompt return and voiced  understanding.    Generalized abdominal pain: complicated acute illness or injury  Open wound of abdominal wall, initial encounter: chronic illness or injury  Amount and/or Complexity of Data Reviewed  Labs: ordered.  Radiology: ordered.      Risk  Prescription drug management.          Final diagnoses:   Generalized abdominal pain   Open wound of abdominal wall, initial encounter       ED Disposition  ED Disposition     ED Disposition   Discharge    Condition   Stable    Comment   --             Maycol Arteaga MD  [address] [place] [address] [phone]               Medication List      Changed    * HYDROcodone-acetaminophen 5-325 MG per tablet  Commonly known as: NORCO  Take 1 tablet by mouth Every 6 (Six) Hours As Needed for Moderate Pain.  What changed: Another medication with the same name was added. Make sure you understand how and when to take each.     * HYDROcodone-acetaminophen 5-325 MG per tablet  Commonly known as: NORCO  Take 1 tablet by mouth Every 6 (Six) Hours As Needed for Moderate Pain.  What changed: You were already taking a medication with the same name, and this prescription was added. Make sure you understand how and when to take each.         * This list has 2 medication(s) that are the same as other medications prescribed for you. Read the directions carefully, and ask your doctor or other care provider to review them with you.               Where to Get Your Medications      These medications were sent to [id number] - [place], [place] - [address] - [phone]  - [phone] FX  [address] [place] [address]    Phone: [phone]   · HYDROcodone-acetaminophen 5-325 MG per tablet          Clementina Gasca APRN  02/02/23 1958

## 2023-02-06 ENCOUNTER — PREP FOR SURGERY (OUTPATIENT)
Dept: OTHER | Facility: HOSPITAL | Age: 63
End: 2023-02-06
Payer: COMMERCIAL

## 2023-02-06 ENCOUNTER — OFFICE VISIT (OUTPATIENT)
Dept: SURGERY | Facility: CLINIC | Age: 63
End: 2023-02-06
Payer: COMMERCIAL

## 2023-02-06 VITALS
OXYGEN SATURATION: 98 % | HEART RATE: 88 BPM | BODY MASS INDEX: 23.44 KG/M2 | WEIGHT: 119.4 LBS | SYSTOLIC BLOOD PRESSURE: 137 MMHG | TEMPERATURE: 98.4 F | DIASTOLIC BLOOD PRESSURE: 84 MMHG | HEIGHT: 60 IN

## 2023-02-06 DIAGNOSIS — K43.0 INCISIONAL HERNIA WITH OBSTRUCTION BUT NO GANGRENE: Primary | ICD-10-CM

## 2023-02-06 DIAGNOSIS — T85.79XD INFECTED PROSTHETIC MESH OF ABDOMINAL WALL, SUBSEQUENT ENCOUNTER: ICD-10-CM

## 2023-02-06 DIAGNOSIS — T85.79XS INFECTED PROSTHETIC MESH OF ABDOMINAL WALL, SEQUELA: Primary | ICD-10-CM

## 2023-02-06 PROBLEM — T85.79XA INFECTED PROSTHETIC MESH OF ABDOMINAL WALL (HCC): Status: ACTIVE | Noted: 2023-02-06

## 2023-02-06 PROCEDURE — 99212 OFFICE O/P EST SF 10 MIN: CPT | Performed by: STUDENT IN AN ORGANIZED HEALTH CARE EDUCATION/TRAINING PROGRAM

## 2023-02-06 RX ORDER — NAPROXEN 250 MG/1
250 TABLET ORAL 2 TIMES DAILY PRN
COMMUNITY

## 2023-02-08 ENCOUNTER — LAB (OUTPATIENT)
Dept: LAB | Facility: HOSPITAL | Age: 63
End: 2023-02-08
Payer: COMMERCIAL

## 2023-02-08 ENCOUNTER — HOSPITAL ENCOUNTER (OUTPATIENT)
Dept: CARDIOLOGY | Facility: HOSPITAL | Age: 63
Discharge: HOME | End: 2023-02-08
Payer: COMMERCIAL

## 2023-02-08 LAB
ANION GAP SERPL CALCULATED.3IONS-SCNC: 11.9 MMOL/L (ref 5–15)
BUN SERPL-MCNC: 38 MG/DL (ref 8–23)
BUN/CREAT SERPL: 46.9 (ref 7–25)
CALCIUM SPEC-SCNC: 10.7 MG/DL (ref 8.6–10.5)
CHLORIDE SERPL-SCNC: 104 MMOL/L (ref 98–107)
CO2 SERPL-SCNC: 25.1 MMOL/L (ref 22–29)
CREAT SERPL-MCNC: 0.81 MG/DL (ref 0.57–1)
DEPRECATED RDW RBC AUTO: 37.6 FL (ref 37–54)
EGFRCR SERPLBLD CKD-EPI 2021: 83.2 ML/MIN/1.73
ERYTHROCYTE [DISTWIDTH] IN BLOOD BY AUTOMATED COUNT: 12.4 % (ref 12.3–15.4)
GLUCOSE SERPL-MCNC: 118 MG/DL (ref 65–99)
HCT VFR BLD AUTO: 40.1 % (ref 34–46.6)
HGB BLD-MCNC: 13.5 G/DL (ref 12–15.9)
MCH RBC QN AUTO: 28.3 PG (ref 26.6–33)
MCHC RBC AUTO-ENTMCNC: 33.7 G/DL (ref 31.5–35.7)
MCV RBC AUTO: 84.1 FL (ref 79–97)
PLATELET # BLD AUTO: 278 10*3/MM3 (ref 140–450)
PMV BLD AUTO: 9.5 FL (ref 6–12)
POTASSIUM SERPL-SCNC: 4.5 MMOL/L (ref 3.5–5.2)
RBC # BLD AUTO: 4.77 10*6/MM3 (ref 3.77–5.28)
SODIUM SERPL-SCNC: 141 MMOL/L (ref 136–145)
WBC NRBC COR # BLD: 8.15 10*3/MM3 (ref 3.4–10.8)

## 2023-02-08 PROCEDURE — 85027 COMPLETE CBC AUTOMATED: CPT

## 2023-02-08 PROCEDURE — 93010 ELECTROCARDIOGRAM REPORT: CPT | Performed by: INTERNAL MEDICINE

## 2023-02-08 PROCEDURE — 80048 BASIC METABOLIC PNL TOTAL CA: CPT

## 2023-02-08 PROCEDURE — 93005 ELECTROCARDIOGRAM TRACING: CPT | Performed by: STUDENT IN AN ORGANIZED HEALTH CARE EDUCATION/TRAINING PROGRAM

## 2023-02-11 LAB — QT INTERVAL: 339 MS

## 2023-02-14 ENCOUNTER — ANESTHESIA (OUTPATIENT)
Dept: PERIOP | Facility: HOSPITAL | Age: 63
End: 2023-02-14
Payer: COMMERCIAL

## 2023-02-14 ENCOUNTER — HOSPITAL ENCOUNTER (OUTPATIENT)
Facility: HOSPITAL | Age: 63
Setting detail: HOSPITAL OUTPATIENT SURGERY
Discharge: HOME | End: 2023-02-14
Attending: STUDENT IN AN ORGANIZED HEALTH CARE EDUCATION/TRAINING PROGRAM | Admitting: STUDENT IN AN ORGANIZED HEALTH CARE EDUCATION/TRAINING PROGRAM
Payer: COMMERCIAL

## 2023-02-14 ENCOUNTER — ANESTHESIA EVENT (OUTPATIENT)
Dept: PERIOP | Facility: HOSPITAL | Age: 63
End: 2023-02-14
Payer: COMMERCIAL

## 2023-02-14 VITALS
TEMPERATURE: 97.4 F | OXYGEN SATURATION: 97 % | BODY MASS INDEX: 23.59 KG/M2 | HEIGHT: 59 IN | DIASTOLIC BLOOD PRESSURE: 58 MMHG | SYSTOLIC BLOOD PRESSURE: 117 MMHG | HEART RATE: 96 BPM | RESPIRATION RATE: 14 BRPM | WEIGHT: 117 LBS

## 2023-02-14 DIAGNOSIS — T85.79XS INFECTED PROSTHETIC MESH OF ABDOMINAL WALL, SEQUELA: ICD-10-CM

## 2023-02-14 DIAGNOSIS — T85.79XD INFECTED PROSTHETIC MESH OF ABDOMINAL WALL, SUBSEQUENT ENCOUNTER: Primary | ICD-10-CM

## 2023-02-14 PROCEDURE — 11008 RMV PRSTC MTRL/MESH ABD WALL: CPT | Performed by: STUDENT IN AN ORGANIZED HEALTH CARE EDUCATION/TRAINING PROGRAM

## 2023-02-14 PROCEDURE — 25010000002 PROPOFOL 200 MG/20ML EMULSION: Performed by: NURSE ANESTHETIST, CERTIFIED REGISTERED

## 2023-02-14 PROCEDURE — 25010000002 ONDANSETRON PER 1 MG: Performed by: NURSE ANESTHETIST, CERTIFIED REGISTERED

## 2023-02-14 PROCEDURE — 25010000002 DEXAMETHASONE PER 1 MG: Performed by: NURSE ANESTHETIST, CERTIFIED REGISTERED

## 2023-02-14 PROCEDURE — 25010000002 CEFAZOLIN PER 500 MG: Performed by: STUDENT IN AN ORGANIZED HEALTH CARE EDUCATION/TRAINING PROGRAM

## 2023-02-14 PROCEDURE — 88304 TISSUE EXAM BY PATHOLOGIST: CPT | Performed by: STUDENT IN AN ORGANIZED HEALTH CARE EDUCATION/TRAINING PROGRAM

## 2023-02-14 PROCEDURE — 87176 TISSUE HOMOGENIZATION CULTR: CPT | Performed by: STUDENT IN AN ORGANIZED HEALTH CARE EDUCATION/TRAINING PROGRAM

## 2023-02-14 PROCEDURE — 25010000002 HYDROMORPHONE PER 4 MG: Performed by: NURSE ANESTHETIST, CERTIFIED REGISTERED

## 2023-02-14 PROCEDURE — 87070 CULTURE OTHR SPECIMN AEROBIC: CPT | Performed by: STUDENT IN AN ORGANIZED HEALTH CARE EDUCATION/TRAINING PROGRAM

## 2023-02-14 PROCEDURE — 25010000002 HYDROMORPHONE 1 MG/ML SOLUTION: Performed by: NURSE ANESTHETIST, CERTIFIED REGISTERED

## 2023-02-14 PROCEDURE — 11005 DBRDMT SKIN ABDOMINAL WALL: CPT | Performed by: NURSE PRACTITIONER

## 2023-02-14 PROCEDURE — 25010000002 FENTANYL CITRATE (PF) 100 MCG/2ML SOLUTION: Performed by: NURSE ANESTHETIST, CERTIFIED REGISTERED

## 2023-02-14 PROCEDURE — 87205 SMEAR GRAM STAIN: CPT | Performed by: STUDENT IN AN ORGANIZED HEALTH CARE EDUCATION/TRAINING PROGRAM

## 2023-02-14 PROCEDURE — 11008 RMV PRSTC MTRL/MESH ABD WALL: CPT | Performed by: NURSE PRACTITIONER

## 2023-02-14 PROCEDURE — 11005 DBRDMT SKIN ABDOMINAL WALL: CPT | Performed by: STUDENT IN AN ORGANIZED HEALTH CARE EDUCATION/TRAINING PROGRAM

## 2023-02-14 RX ORDER — LIDOCAINE HYDROCHLORIDE 20 MG/ML
INJECTION, SOLUTION EPIDURAL; INFILTRATION; INTRACAUDAL; PERINEURAL AS NEEDED
Status: DISCONTINUED | OUTPATIENT
Start: 2023-02-14 | End: 2023-02-14 | Stop reason: SURG

## 2023-02-14 RX ORDER — HYDROCODONE BITARTRATE AND ACETAMINOPHEN 7.5; 325 MG/1; MG/1
1 TABLET ORAL ONCE AS NEEDED
Status: DISCONTINUED | OUTPATIENT
Start: 2023-02-14 | End: 2023-02-14 | Stop reason: HOSPADM

## 2023-02-14 RX ORDER — EPHEDRINE SULFATE 5 MG/ML
INJECTION INTRAVENOUS AS NEEDED
Status: DISCONTINUED | OUTPATIENT
Start: 2023-02-14 | End: 2023-02-14 | Stop reason: SURG

## 2023-02-14 RX ORDER — ONDANSETRON 2 MG/ML
INJECTION INTRAMUSCULAR; INTRAVENOUS AS NEEDED
Status: DISCONTINUED | OUTPATIENT
Start: 2023-02-14 | End: 2023-02-14 | Stop reason: SURG

## 2023-02-14 RX ORDER — PHENYLEPHRINE HCL IN 0.9% NACL 1 MG/10 ML
SYRINGE (ML) INTRAVENOUS AS NEEDED
Status: DISCONTINUED | OUTPATIENT
Start: 2023-02-14 | End: 2023-02-14 | Stop reason: SURG

## 2023-02-14 RX ORDER — HYDROMORPHONE HCL 110MG/55ML
PATIENT CONTROLLED ANALGESIA SYRINGE INTRAVENOUS AS NEEDED
Status: DISCONTINUED | OUTPATIENT
Start: 2023-02-14 | End: 2023-02-14 | Stop reason: SURG

## 2023-02-14 RX ORDER — DEXAMETHASONE SODIUM PHOSPHATE 4 MG/ML
INJECTION, SOLUTION INTRA-ARTICULAR; INTRALESIONAL; INTRAMUSCULAR; INTRAVENOUS; SOFT TISSUE AS NEEDED
Status: DISCONTINUED | OUTPATIENT
Start: 2023-02-14 | End: 2023-02-14 | Stop reason: SURG

## 2023-02-14 RX ORDER — HYDROCODONE BITARTRATE AND ACETAMINOPHEN 5; 325 MG/1; MG/1
1 TABLET ORAL EVERY 6 HOURS PRN
Qty: 20 TABLET | Refills: 0 | Status: SHIPPED | OUTPATIENT
Start: 2023-02-14 | End: 2023-02-23 | Stop reason: SDUPTHER

## 2023-02-14 RX ORDER — HYDROCODONE BITARTRATE AND ACETAMINOPHEN 10; 325 MG/1; MG/1
1 TABLET ORAL EVERY 4 HOURS PRN
Status: DISCONTINUED | OUTPATIENT
Start: 2023-02-14 | End: 2023-02-14 | Stop reason: HOSPADM

## 2023-02-14 RX ORDER — FENTANYL CITRATE 50 UG/ML
INJECTION, SOLUTION INTRAMUSCULAR; INTRAVENOUS AS NEEDED
Status: DISCONTINUED | OUTPATIENT
Start: 2023-02-14 | End: 2023-02-14 | Stop reason: SURG

## 2023-02-14 RX ORDER — SODIUM CHLORIDE, SODIUM LACTATE, POTASSIUM CHLORIDE, CALCIUM CHLORIDE 600; 310; 30; 20 MG/100ML; MG/100ML; MG/100ML; MG/100ML
INJECTION, SOLUTION INTRAVENOUS CONTINUOUS PRN
Status: DISCONTINUED | OUTPATIENT
Start: 2023-02-14 | End: 2023-02-14 | Stop reason: SURG

## 2023-02-14 RX ORDER — PROPOFOL 10 MG/ML
INJECTION, EMULSION INTRAVENOUS AS NEEDED
Status: DISCONTINUED | OUTPATIENT
Start: 2023-02-14 | End: 2023-02-14 | Stop reason: SURG

## 2023-02-14 RX ORDER — ONDANSETRON 4 MG/1
4 TABLET, FILM COATED ORAL EVERY 8 HOURS PRN
Qty: 30 TABLET | Refills: 1 | Status: SHIPPED | OUTPATIENT
Start: 2023-02-14 | End: 2024-02-14

## 2023-02-14 RX ADMIN — HYDROMORPHONE HYDROCHLORIDE 0.5 MG: 1 INJECTION, SOLUTION INTRAMUSCULAR; INTRAVENOUS; SUBCUTANEOUS at 11:49

## 2023-02-14 RX ADMIN — Medication 100 MCG: at 10:16

## 2023-02-14 RX ADMIN — Medication 100 MCG: at 10:48

## 2023-02-14 RX ADMIN — HYDROMORPHONE HYDROCHLORIDE 0.25 MG: 2 INJECTION, SOLUTION INTRAMUSCULAR; INTRAVENOUS; SUBCUTANEOUS at 10:47

## 2023-02-14 RX ADMIN — HYDROMORPHONE HYDROCHLORIDE 0.5 MG: 1 INJECTION, SOLUTION INTRAMUSCULAR; INTRAVENOUS; SUBCUTANEOUS at 11:31

## 2023-02-14 RX ADMIN — ONDANSETRON 8 MG: 2 INJECTION INTRAMUSCULAR; INTRAVENOUS at 10:12

## 2023-02-14 RX ADMIN — SODIUM CHLORIDE, SODIUM LACTATE, POTASSIUM CHLORIDE, AND CALCIUM CHLORIDE: .6; .31; .03; .02 INJECTION, SOLUTION INTRAVENOUS at 10:12

## 2023-02-14 RX ADMIN — DEXAMETHASONE SODIUM PHOSPHATE 4 MG: 4 INJECTION, SOLUTION INTRAMUSCULAR; INTRAVENOUS at 10:13

## 2023-02-14 RX ADMIN — EPHEDRINE SULFATE 10 MG: 5 INJECTION INTRAVENOUS at 10:20

## 2023-02-14 RX ADMIN — CEFAZOLIN 2 G: 2 INJECTION, POWDER, FOR SOLUTION INTRAMUSCULAR; INTRAVENOUS at 10:11

## 2023-02-14 RX ADMIN — HYDROMORPHONE HYDROCHLORIDE 0.25 MG: 2 INJECTION, SOLUTION INTRAMUSCULAR; INTRAVENOUS; SUBCUTANEOUS at 10:32

## 2023-02-14 RX ADMIN — HYDROCODONE BITARTRATE AND ACETAMINOPHEN 1 TABLET: 10; 325 TABLET ORAL at 11:52

## 2023-02-14 RX ADMIN — EPHEDRINE SULFATE 10 MG: 5 INJECTION INTRAVENOUS at 10:48

## 2023-02-14 RX ADMIN — PROPOFOL 200 MG: 10 INJECTION, EMULSION INTRAVENOUS at 10:11

## 2023-02-14 RX ADMIN — EPHEDRINE SULFATE 10 MG: 5 INJECTION INTRAVENOUS at 10:25

## 2023-02-14 RX ADMIN — HYDROMORPHONE HYDROCHLORIDE 0.5 MG: 1 INJECTION, SOLUTION INTRAMUSCULAR; INTRAVENOUS; SUBCUTANEOUS at 12:15

## 2023-02-14 RX ADMIN — HYDROMORPHONE HYDROCHLORIDE 0.25 MG: 2 INJECTION, SOLUTION INTRAMUSCULAR; INTRAVENOUS; SUBCUTANEOUS at 10:53

## 2023-02-14 RX ADMIN — HYDROMORPHONE HYDROCHLORIDE 0.25 MG: 2 INJECTION, SOLUTION INTRAMUSCULAR; INTRAVENOUS; SUBCUTANEOUS at 10:38

## 2023-02-14 RX ADMIN — LIDOCAINE HYDROCHLORIDE 60 MG: 20 INJECTION, SOLUTION EPIDURAL; INFILTRATION; INTRACAUDAL; PERINEURAL at 10:13

## 2023-02-14 RX ADMIN — FENTANYL CITRATE 100 MCG: 50 INJECTION, SOLUTION INTRAMUSCULAR; INTRAVENOUS at 10:27

## 2023-02-14 NOTE — OP NOTE
Operative Report:    Patient Name:  Abi Rivera  YOB: 1960    Date of Surgery:  2/14/2023     Indications:    62-year-old lady here to see me for her right lower quadrant abdominal wound.  She underwent exploratory laparotomy with repair of duodenal ulcer, subsequent ventral incisional hernia repair with intra-abdominal mesh placement, bilateral component release anteriorly with onlay permanent mesh by Dr. Velasco last year.  She developed subcutaneous abscess which resulted in superficial skin breakdown.  She has been seeing wound care for the past year and now has exposed mesh in her right lower quadrant.  We discussed that this is likely chronically infected mesh and foreign body reaction is making wound not closed.  Discussed options and have agreed that we need to remove this mesh in order for the wound to heal.  Discussed risk benefits and alternatives include infection, bleeding, injury to surrounding structures and patient elected to proceed.    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 Sharp excisional DEBRIDEMENT WOUND abdominal wall inculding muscle and fascia 15cm x 15cm  2. complete removal of infected mesh from previous hernia repair    Staff:  Surgeon(s):  Maycol Arteaga MD    Circulator: Evangelina Bro RN  Scrub Person: Lenore Ulloa  Assistant: Stefany Ryder APRN  was responsible for performing the following activities: Retraction, Suction, Irrigation and Placing Dressing and their skilled assistance was necessary for the success of this case.        Anesthesia: General    Estimated Blood Loss: minimal    Implants:    Implant Name Type Inv. Item Serial No.  Lot No. LRB No. Used Action   infected mesh removed from right upper abdomen     . N/A 1 Explanted        Specimen:          Specimens     ID Source Type Tests Collected By Collected At Frozen?    1 Abdominal Wall Tissue · AFB CULTURE (Canceled)   Maycol Arteaga MD 2/14/23 1036     Description: infected abdominal mesh for culture    A Abdominal Wall Tissue · TISSUE PATHOLOGY EXAM   Maycol Arteaga MD 2/14/23 1037     Description: infected abdominal mesh              Findings: Exposed prolene mesh on top of patient's muscle, final wound measuring 15 cm x 15 cm x 4 cm deep    Complications: None    Description of Procedure:   After risk benefits and alternatives were discussed patient informed sent was obtained.  She transported the operating room placed supine the operating table underwent general anesthesia.  Her abdomen was prepped and draped in sterile fashion a surgical timeout was completed.  I was able to see the exposed Prolene mesh at the base of the patient's wound.  I grasped with a Dilia clamp.  Using combination of electrocautery and sharp debridement with a pair of Metzenbaum scissors I was able to debride chronically infected subcutaneous tissue and debride the mesh, fascia and muscle until the mesh was completely excised.  I also removed all exposed Prolene and Ethibond sutures to decrease burden of foreign bodies in the wound.  I was able to stay outside of the abdominal cavity and peritoneum.  I irrigated out the wound and use electrocautery to obtain hemostasis.  I debrided back the edges of the wound with electrocautery slight only healthy tissue was remaining.  Final area of debridement measured 15 cm x 15 cm by 4 cm deep.  Wound was dressed with normal saline soaked Kerlix and covered with an ABD and tape.  Patient was awoken general anesthesia and transported recovery unit without incident.      Maycol Arteaga MD     Date: 2/14/2023  Time: 11:23 EST    This note was created using Dragon Voice Recognition software.

## 2023-02-14 NOTE — ANESTHESIA PREPROCEDURE EVALUATION
Anesthesia Evaluation     Patient summary reviewed and Nursing notes reviewed   NPO Solid Status: > 8 hours  NPO Liquid Status: > 8 hours           Airway   Mallampati: II  TM distance: >3 FB  Neck ROM: full  No difficulty expected  Dental - normal exam     Pulmonary - normal exam    breath sounds clear to auscultation  (+) pneumonia ,   Cardiovascular - normal exam  Exercise tolerance: unable to assess    ECG reviewed  Rhythm: regular  Rate: normal    (+) hypertension, dysrhythmias Atrial Fib,     ROS comment: Interpretation Summary       · Left ventricular wall thickness is consistent with mild concentric hypertrophy.  · Estimated right ventricular systolic pressure from tricuspid regurgitation is normal (<35 mmHg).  · There is a small (<1cm) pericardial effusion.    S/p Afib Ablation    Neuro/Psych  (+) syncope, numbness, psychiatric history,    GI/Hepatic/Renal/Endo    (+)  GERD, PUD,  renal disease CRI, thyroid problem hypothyroidism    Musculoskeletal     Abdominal  - normal exam   Substance History - negative use     OB/GYN negative ob/gyn ROS         Other   arthritis,                        Anesthesia Plan    ASA 3     general     (TAP Block)  intravenous induction     Anesthetic plan, risks, benefits, and alternatives have been provided, discussed and informed consent has been obtained with: patient.    Plan discussed with CRNA and CAA.        CODE STATUS:

## 2023-02-14 NOTE — ANESTHESIA POSTPROCEDURE EVALUATION
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        Post Anesthesia Care and Evaluation    Patient location during evaluation: PACU  Patient participation: complete - patient participated  Level of consciousness: awake and alert  Pain management: satisfactory to patient    Airway patency: patent  Anesthetic complications: No anesthetic complications  PONV Status: none  Cardiovascular status: acceptable  Respiratory status: acceptable  Hydration status: acceptable

## 2023-02-14 NOTE — PROGRESS NOTES
"Chief Complaint  Follow-up (Protruding mesh from hernia repair)    Subjective        Abi Rivera presents to [facility] GENERAL SURGERY  History of Present Illness    62-year-old lady here to see me for her right lower quadrant abdominal wound.  She underwent exploratory laparotomy with repair of duodenal ulcer, subsequent ventral incisional hernia repair with intra-abdominal mesh placement, bilateral component release anteriorly with onlay permanent mesh by Dr. Velasco last year.  She developed subcutaneous abscess which resulted in superficial skin breakdown.  She has been seeing wound care for the past year and now has exposed mesh in her right lower quadrant.    Objective   Vital Signs:  /84 (Cuff Size: Adult)   Pulse 88   Temp 98.4 °F (36.9 °C) (Infrared)   Ht 152.4 cm (60\")   Wt 54.2 kg (119 lb 6.4 oz)   SpO2 98%   BMI 23.32 kg/m²   Estimated body mass index is 23.32 kg/m² as calculated from the following:    Height as of this encounter: 152.4 cm (60\").    Weight as of this encounter: 54.2 kg (119 lb 6.4 oz).       BMI is within normal parameters. No other follow-up for BMI required.      Physical Exam  Constitutional:       General: She is not in acute distress.     Appearance: Normal appearance. She is not ill-appearing.   HENT:      Head: Normocephalic and atraumatic.      Right Ear: External ear normal.      Left Ear: External ear normal.   Eyes:      Extraocular Movements: Extraocular movements intact.      Conjunctiva/sclera: Conjunctivae normal.   Cardiovascular:      Rate and Rhythm: Normal rate and regular rhythm.   Pulmonary:      Effort: Pulmonary effort is normal. No respiratory distress.   Abdominal:      General: There is no distension.      Palpations: Abdomen is soft.      Comments: Right lower quadrant wound about 8 cm with exposed Prolene mesh at the base of the wound   Musculoskeletal:         General: No swelling or deformity.   Skin:     General: Skin is " warm and dry.   Neurological:      Mental Status: She is alert and oriented to person, place, and time. Mental status is at baseline.        Result Review :                   Assessment and Plan   Diagnoses and all orders for this visit:    1. Incisional hernia with obstruction but no gangrene (Primary)    2. Infected prosthetic mesh of abdominal wall, subsequent encounter      62-year-old lady here to see me for her right lower quadrant abdominal wound.  She underwent exploratory laparotomy with repair of duodenal ulcer, subsequent ventral incisional hernia repair with intra-abdominal mesh placement, bilateral component release anteriorly with onlay permanent mesh by Dr. Velasco last year.  She developed subcutaneous abscess which resulted in superficial skin breakdown.  She has been seeing wound care for the past year and now has exposed mesh in her right lower quadrant.  We discussed that this is likely chronically infected mesh and foreign body reaction is making wound not closed.  Discussed options and have agreed that we need to remove this mesh in order for the wound to heal.  Discussed risk benefits and alternatives include infection, bleeding, injury to surrounding structures and patient elected to proceed.           Follow Up   No follow-ups on file.  Patient was given instructions and counseling regarding her condition or for health maintenance advice. Please see specific information pulled into the AVS if appropriate.

## 2023-02-14 NOTE — ANESTHESIA PROCEDURE NOTES
Airway  Urgency: elective    Date/Time: 2/14/2023 10:11 AM  Airway not difficult    General Information and Staff    Patient location during procedure: OR    Indications and Patient Condition  Indications for airway management: airway protection    Preoxygenated: yes  MILS maintained throughout  Mask difficulty assessment: 0 - not attempted    Final Airway Details  Final airway type: supraglottic airway      Successful airway: LMA  Size 4     Cormack-Lehane Classification: grade I - full view of glottis  Number of attempts at approach: 1  Assessment: lips, teeth, and gum same as pre-op

## 2023-02-15 LAB
LAB AP CASE REPORT: NORMAL
PATH REPORT.FINAL DX SPEC: NORMAL
PATH REPORT.GROSS SPEC: NORMAL

## 2023-02-16 ENCOUNTER — OFFICE VISIT (OUTPATIENT)
Dept: SURGERY | Facility: CLINIC | Age: 63
End: 2023-02-16
Payer: COMMERCIAL

## 2023-02-16 VITALS
HEIGHT: 59 IN | HEART RATE: 82 BPM | OXYGEN SATURATION: 97 % | BODY MASS INDEX: 24.35 KG/M2 | SYSTOLIC BLOOD PRESSURE: 106 MMHG | DIASTOLIC BLOOD PRESSURE: 69 MMHG | TEMPERATURE: 98.7 F | WEIGHT: 120.8 LBS

## 2023-02-16 DIAGNOSIS — T85.79XD INFECTED PROSTHETIC MESH OF ABDOMINAL WALL, SUBSEQUENT ENCOUNTER: Primary | ICD-10-CM

## 2023-02-16 PROCEDURE — 99213 OFFICE O/P EST LOW 20 MIN: CPT | Performed by: STUDENT IN AN ORGANIZED HEALTH CARE EDUCATION/TRAINING PROGRAM

## 2023-02-16 NOTE — PROGRESS NOTES
"Chief Complaint  Post-op (PO Infected mesh removal 2/14/23)    Subjective        Abi Rivera presents to [facility] GENERAL SURGERY  History of Present Illness    62-year-old lady here for follow-up few days after her wound debridement down to the level of muscle and removal of infected mesh.  She is doing well, tolerating dressing changes.  We discussed starting wound VAC versus continue wet-to-dry dressings and patient would like to try wound VAC.  We will order outpatient wound VAC and have her follow-up in 2 weeks or when wound VAC arrives, which ever is sooner.  We will see if this wound contracts and granulate quickly if not she may ultimately require a split-thickness skin graft.  We discussed smoking cessation she is going to try to quit.    Objective   Vital Signs:  /69 (Cuff Size: Adult)   Pulse 82   Temp 98.7 °F (37.1 °C) (Infrared)   Ht 149.9 cm (59\")   Wt 54.8 kg (120 lb 12.8 oz)   SpO2 97%   BMI 24.40 kg/m²   Estimated body mass index is 24.4 kg/m² as calculated from the following:    Height as of this encounter: 149.9 cm (59\").    Weight as of this encounter: 54.8 kg (120 lb 12.8 oz).       BMI is within normal parameters. No other follow-up for BMI required.      Physical Exam  Constitutional:       General: She is not in acute distress.     Appearance: Normal appearance. She is not ill-appearing.   HENT:      Head: Normocephalic and atraumatic.      Right Ear: External ear normal.      Left Ear: External ear normal.   Eyes:      Extraocular Movements: Extraocular movements intact.      Conjunctiva/sclera: Conjunctivae normal.   Cardiovascular:      Rate and Rhythm: Normal rate and regular rhythm.   Pulmonary:      Effort: Pulmonary effort is normal. No respiratory distress.   Abdominal:      General: There is no distension.      Palpations: Abdomen is soft.      Comments: Open wound to the right upper quadrant with exposed internal oblique, good granulation tissue " without evidence of   Musculoskeletal:         General: No swelling or deformity.   Skin:     General: Skin is warm and dry.   Neurological:      Mental Status: She is alert and oriented to person, place, and time. Mental status is at baseline.        Result Review :                   Assessment and Plan   Diagnoses and all orders for this visit:    1. Infected prosthetic mesh of abdominal wall, subsequent encounter (Primary)        62-year-old lady here for follow-up few days after her wound debridement down to the level of muscle and removal of infected mesh.  She is doing well, tolerating dressing changes.  We discussed starting wound VAC versus continue wet-to-dry dressings and patient would like to try wound VAC.  We will order outpatient wound VAC and have her follow-up in 2 weeks or when wound VAC arrives, which ever is sooner.  We will see if this wound contracts and granulate quickly if not she may ultimately require a split-thickness skin graft.  We discussed smoking cessation she is going to try to quit.  Wound measures 15 cm x 15 cm and is down to the level of muscle, no exposed bone or undermining.               Follow Up   No follow-ups on file.  Patient was given instructions and counseling regarding her condition or for health maintenance advice. Please see specific information pulled into the AVS if appropriate.

## 2023-02-17 LAB
BACTERIA SPEC AEROBE CULT: NORMAL
GRAM STN SPEC: NORMAL
GRAM STN SPEC: NORMAL

## 2023-02-23 DIAGNOSIS — R10.11 RIGHT UPPER QUADRANT ABDOMINAL PAIN: Primary | ICD-10-CM

## 2023-02-23 RX ORDER — HYDROCODONE BITARTRATE AND ACETAMINOPHEN 5; 325 MG/1; MG/1
1 TABLET ORAL EVERY 6 HOURS PRN
Qty: 20 TABLET | Refills: 0 | Status: SHIPPED | OUTPATIENT
Start: 2023-02-23

## 2023-03-07 RX ORDER — LOSARTAN POTASSIUM 50 MG/1
TABLET ORAL
Qty: 90 TABLET | Refills: 0 | OUTPATIENT
Start: 2023-03-07

## 2023-03-10 ENCOUNTER — TELEPHONE (OUTPATIENT)
Dept: SURGERY | Facility: CLINIC | Age: 63
End: 2023-03-10
Payer: COMMERCIAL

## 2023-03-10 NOTE — TELEPHONE ENCOUNTER
Gloria from  (wound vac) called and said she had been trying to reach pt about delivery of her wound vac.  She stated she had left several messages and pt had not got back in touch with her.  She stated they would be in the area today and could deliver it today, or could drop it by the office.  I LM on pt VM to call our office asap so we could get that set up.  kb

## 2023-03-16 ENCOUNTER — OFFICE VISIT (OUTPATIENT)
Dept: SURGERY | Facility: CLINIC | Age: 63
End: 2023-03-16
Payer: COMMERCIAL

## 2023-03-16 VITALS
BODY MASS INDEX: 22.46 KG/M2 | OXYGEN SATURATION: 97 % | SYSTOLIC BLOOD PRESSURE: 125 MMHG | HEART RATE: 72 BPM | DIASTOLIC BLOOD PRESSURE: 71 MMHG | HEIGHT: 59 IN | WEIGHT: 111.4 LBS | TEMPERATURE: 98.2 F

## 2023-03-16 DIAGNOSIS — S31.109D OPEN WOUND OF ABDOMINAL WALL, SUBSEQUENT ENCOUNTER: Primary | ICD-10-CM

## 2023-03-16 RX ORDER — ONDANSETRON 4 MG/1
4 TABLET, FILM COATED ORAL EVERY 8 HOURS PRN
Qty: 30 TABLET | Refills: 1 | Status: SHIPPED | OUTPATIENT
Start: 2023-03-16 | End: 2024-03-15

## 2023-03-16 RX ORDER — HYDROCODONE BITARTRATE AND ACETAMINOPHEN 5; 325 MG/1; MG/1
1 TABLET ORAL EVERY 6 HOURS PRN
Qty: 30 TABLET | Refills: 0 | Status: SHIPPED | OUTPATIENT
Start: 2023-03-16

## 2023-03-16 NOTE — PROGRESS NOTES
"Chief Complaint  Post-op (Wound vac placement)    Subjective        Abi Rivera presents to [facility] GENERAL SURGERY  History of Present Illness    62-year-old lady here for follow-up after her wound debridement down to the level of muscle and removal of infected mesh.  She is doing well, tolerating dressing changes.  We discussed starting wound VAC versus continue wet-to-dry dressings and patient would like to try wound VAC.  I applied wound VAC today, wound with good granulation tissue substantially smaller now approximately 10 cm x 10 cm still down to the level of muscle with no exposed bone with no undermining. We will see if this wound contracts and granulate quickly if not she may ultimately require a split-thickness skin graft.  We discussed smoking cessation she is going to try to quit.  Follow-up in 2 weeks to assess progress.    Objective   Vital Signs:  /71 (BP Location: Left arm, Patient Position: Sitting, Cuff Size: Adult)   Pulse 72   Temp 98.2 °F (36.8 °C) (Infrared)   Ht 149.9 cm (59\")   Wt 50.5 kg (111 lb 6.4 oz)   SpO2 97%   BMI 22.50 kg/m²   Estimated body mass index is 22.5 kg/m² as calculated from the following:    Height as of this encounter: 149.9 cm (59\").    Weight as of this encounter: 50.5 kg (111 lb 6.4 oz).       BMI is within normal parameters. No other follow-up for BMI required.      Physical Exam  Constitutional:       General: She is not in acute distress.     Appearance: Normal appearance. She is not ill-appearing.   HENT:      Head: Normocephalic and atraumatic.      Right Ear: External ear normal.      Left Ear: External ear normal.   Eyes:      Extraocular Movements: Extraocular movements intact.      Conjunctiva/sclera: Conjunctivae normal.   Cardiovascular:      Rate and Rhythm: Normal rate and regular rhythm.   Pulmonary:      Effort: Pulmonary effort is normal. No respiratory distress.   Abdominal:      General: There is no distension.     "  Palpations: Abdomen is soft.      Tenderness: There is no abdominal tenderness.      Comments: 10 cm x 10 cm right upper quadrant wound with good granulation tissue, exposed muscle no bone, no undermining   Musculoskeletal:         General: No swelling or deformity.   Skin:     General: Skin is warm and dry.   Neurological:      Mental Status: She is alert and oriented to person, place, and time. Mental status is at baseline.        Result Review :                   Assessment and Plan   Diagnoses and all orders for this visit:    1. Open wound of abdominal wall, subsequent encounter (Primary)  -     HYDROcodone-acetaminophen (Norco) 5-325 MG per tablet; Take 1 tablet by mouth Every 6 (Six) Hours As Needed for Severe Pain.  Dispense: 30 tablet; Refill: 0      62-year-old lady here for follow-up after her wound debridement down to the level of muscle and removal of infected mesh.  She is doing well, tolerating dressing changes.  We discussed starting wound VAC versus continue wet-to-dry dressings and patient would like to try wound VAC.  I applied wound VAC today, wound with good granulation tissue substantially smaller now approximately 10 cm x 10 cm still down to the level of muscle with no exposed bone with no undermining. We will see if this wound contracts and granulate quickly if not she may ultimately require a split-thickness skin graft.  We discussed smoking cessation she is going to try to quit.  Follow-up in 2 weeks to assess progress.         Follow Up   No follow-ups on file.  Patient was given instructions and counseling regarding her condition or for health maintenance advice. Please see specific information pulled into the AVS if appropriate.

## 2023-04-03 ENCOUNTER — OFFICE VISIT (OUTPATIENT)
Dept: SURGERY | Facility: CLINIC | Age: 63
End: 2023-04-03
Payer: COMMERCIAL

## 2023-04-03 VITALS
DIASTOLIC BLOOD PRESSURE: 84 MMHG | HEIGHT: 59 IN | TEMPERATURE: 97.3 F | HEART RATE: 78 BPM | BODY MASS INDEX: 21.73 KG/M2 | WEIGHT: 107.8 LBS | SYSTOLIC BLOOD PRESSURE: 137 MMHG | OXYGEN SATURATION: 98 %

## 2023-04-03 DIAGNOSIS — T85.79XD INFECTED PROSTHETIC MESH OF ABDOMINAL WALL, SUBSEQUENT ENCOUNTER: Primary | ICD-10-CM

## 2023-04-03 PROCEDURE — 99213 OFFICE O/P EST LOW 20 MIN: CPT | Performed by: STUDENT IN AN ORGANIZED HEALTH CARE EDUCATION/TRAINING PROGRAM

## 2023-04-03 NOTE — PROGRESS NOTES
"Chief Complaint  Follow-up (2 wk wound vac)    Subjective        Abi Rivera presents to [facility] GENERAL SURGERY  History of Present Illness    62-year-old lady here for follow-up after her wound debridement down to the level of muscle and removal of infected mesh.  She is doing well, tolerating dressing changes.  She tried to do wound VAC but that was too painful, I switched back to wet-to-dry dressing changes.  Wound is still at least 5 cm x 5 cm.  Discussed options for closure and she would like to discuss with the plastic surgeon possible skin grafting versus flap closure.  Continue wet-to-dry dressing changes in the interim, follow-up with me in 1 month if she is not established with plastic surgery by then.      Objective   Vital Signs:  /84 (Cuff Size: Adult)   Pulse 78   Temp 97.3 °F (36.3 °C) (Infrared)   Ht 149.9 cm (59\")   Wt 48.9 kg (107 lb 12.8 oz)   SpO2 98%   BMI 21.77 kg/m²   Estimated body mass index is 21.77 kg/m² as calculated from the following:    Height as of this encounter: 149.9 cm (59\").    Weight as of this encounter: 48.9 kg (107 lb 12.8 oz).       BMI is within normal parameters. No other follow-up for BMI required.      Physical Exam  Constitutional:       General: She is not in acute distress.     Appearance: Normal appearance. She is not ill-appearing.   HENT:      Head: Normocephalic and atraumatic.      Right Ear: External ear normal.      Left Ear: External ear normal.   Eyes:      Extraocular Movements: Extraocular movements intact.      Conjunctiva/sclera: Conjunctivae normal.   Cardiovascular:      Rate and Rhythm: Normal rate and regular rhythm.   Pulmonary:      Effort: Pulmonary effort is normal. No respiratory distress.   Abdominal:      General: There is no distension.      Palpations: Abdomen is soft.      Tenderness: There is no abdominal tenderness.   Musculoskeletal:         General: No swelling or deformity.   Skin:     General: Skin " is warm and dry.   Neurological:      Mental Status: She is alert and oriented to person, place, and time. Mental status is at baseline.        Result Review :                   Assessment and Plan   Diagnoses and all orders for this visit:    1. Infected prosthetic mesh of abdominal wall, subsequent encounter (Primary)      62-year-old lady here for follow-up after her wound debridement down to the level of muscle and removal of infected mesh.  She is doing well, tolerating dressing changes.  She tried to do wound VAC but that was too painful, I switched back to wet-to-dry dressing changes.  Wound is still at least 5 cm x 5 cm.  Discussed options for closure and she would like to discuss with the plastic surgeon possible skin grafting versus flap closure.  Continue wet-to-dry dressing changes in the interim, follow-up with me in 1 month if she is not established with plastic surgery by then.           Follow Up   No follow-ups on file.  Patient was given instructions and counseling regarding her condition or for health maintenance advice. Please see specific information pulled into the AVS if appropriate.

## 2023-09-18 NOTE — PROGRESS NOTES
Subjective     Chief Complaint   Patient presents with    Neck Pain    Back Pain     New patient          Previous Treatment: Robaxin, Norco [id number]    HPI: Abi Rivera is a 62 y.o. female with osteoporosis, atrial fibrillation, and hyperthyroidism who presents with complaints of neck and low back pain.  Symptoms have been going on for several months and are progressively worsening.  She has pain up and down her neck and into her right shoulder.  She has no upper extremity pain, numbness, or tingling.  She also reports severe pain in her lower back and bilateral musculature.  This occasionally radiates down her anterior thighs.  She has no lower extremity numbness and tingling, bowel/bladder dysfunction, or saddle anesthesia.  She does describe symptoms consistent with neurogenic claudication including a positive shopping cart sign.  She reports substantial weight loss recently and muscle wasting due to protracted illness.  She is not getting adequate relief with her current medications.        PMH:  Past Medical History:   Diagnosis Date    Adenomatous polyp of colon 06/20/2016    Anemia     Arthritis of foot 01/10/2019    Atrial fibrillation 06/16/2016    Avascular necrosis of bone 01/10/2019    Chronic pain 01/22/2019    Closed displaced fracture 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 09/19/2019    Gastric ulcer     GERD (gastroesophageal reflux disease)     Hypertension 06/16/2016    Hyperthyroidism 11/02/2016    Hypokalemia     Hypomagnesemia     Osteoporosis without current pathological fracture 06/04/2020    Pulmonary hypertension 06/16/2016    Sick sinus syndrome 01/25/2017    Tricuspid valve insufficiency 06/16/2016         Current Outpatient Medications:     acetaminophen (TYLENOL) 500 MG tablet, Take 1 tablet by mouth Every 6 (Six) Hours As Needed for Mild Pain., Disp: , Rfl:     aspirin (aspirin) 81 MG EC tablet, Take 1 tablet by mouth Daily., Disp: , Rfl:     calcium carb-cholecalciferol  600-800 MG-UNIT tablet, Take 1 tablet by mouth Daily. Last dose 2/23, Disp: , Rfl:     ferrous sulfate 324 (65 Fe) MG tablet delayed-release EC tablet, Take 1 tablet by mouth Daily With Breakfast., Disp: , Rfl:     ibuprofen (ADVIL,MOTRIN) 200 MG tablet, Take 2 tablets by mouth As Needed for Mild Pain., Disp: , Rfl:     Multiple Vitamin (MULTI-VITAMIN) tablet, Take 1 tablet by mouth Daily., Disp: , Rfl:     naproxen (NAPROSYN) 250 MG tablet, Take 1 tablet by mouth 2 (Two) Times a Day As Needed., Disp: , Rfl:     Omega-3 Fatty Acids (FISH OIL) 1200 MG capsule capsule, Take 1 capsule by mouth Daily With Breakfast. Last dose 2/23, Disp: , Rfl:     omeprazole (priLOSEC) 20 MG capsule, Take 1 capsule by mouth 2 (Two) Times a Day. Take preop, Disp: , Rfl:     Potassium Gluconate 550 MG tablet, Take 550 mg by mouth Daily. Take preop, Disp: , Rfl:     Vitamin A 3 MG (42128 UT) capsule, Take 1 capsule by mouth Daily., Disp: , Rfl:     vitamin D3 125 MCG (5000 UT) capsule capsule, Take 1 capsule by mouth Daily., Disp: , Rfl:     gabapentin (NEURONTIN) 300 MG capsule, Take 1 capsule by mouth 3 (Three) Times a Day., Disp: 42 capsule, Rfl: 1    methIMAzole (TAPAZOLE) 10 MG tablet, Take 1 tablet by mouth 2 (Two) Times a Day. (Patient not taking: Reported on 9/20/2023), Disp: 60 tablet, Rfl: 0    ondansetron (Zofran) 4 MG tablet, Take 1 tablet by mouth Every 8 (Eight) Hours As Needed for Nausea or Vomiting. (Patient not taking: Reported on 6/20/2023), Disp: 30 tablet, Rfl: 1    tiZANidine (ZANAFLEX) 2 MG tablet, Take 1 tablet by mouth Every 8 (Eight) Hours As Needed for Muscle Spasms., Disp: 42 tablet, Rfl: 0     No Known Allergies     Past Surgical History:   Procedure Laterality Date    APPENDECTOMY      BILATERAL BREAST REDUCTION      CARDIAC CATHETERIZATION N/A 1/19/2022    Procedure: Intracardiac echocardiogram;  Surgeon: Александр Sheth MD;  Location: [facility] INVASIVE LOCATION;  Service: Cardiovascular;  Laterality:  N/A;    CARDIAC ELECTROPHYSIOLOGY PROCEDURE N/A 1/19/2022    Procedure: EP/Ablation Utong and Ivan aware;  Surgeon: Александр Sheth MD;  Location: [facility] INVASIVE LOCATION;  Service: Cardiovascular;  Laterality: N/A;    EXPLORATORY LAPAROTOMY N/A 3/19/2021    Procedure: LAPAROTOMY EXPLORATORY with repair of perforated gastric ulcer, oversew of perforated gastric ulcer, placement of kristie patch, biopsy of stomach;  Surgeon: Luisito Velasco DO;  Location: [facility] MAIN OR;  Service: General;  Laterality: N/A;    HIP SURGERY      Left total hip    ORIF FOOT FRACTURE Right 9/30/2019    Procedure: OPEN REDUCTION INTERNAL FIXATION OF FIFTH METATARSAL FRACTURE, LOOSE BODY EXCISION/REMOVAL FROM THE TALONAVICULAR JOINT OF THE RIGHT FOOT;  Surgeon: JOSE Fry DPM;  Location: [facility];  Service: Podiatry    ORIF TIBIA/FIBULA FRACTURES Left     US GUIDED CYST ASPIRATION BREAST N/A 7/26/2021    VENTRAL/INCISIONAL HERNIA REPAIR N/A 3/2/2022    Procedure: OPEN INCISIONAL HERNIA REPAIR, BILATERAL COMPONENT RELEASE, LYSIS OF ADHESIONS, PLACEMENT OF INTRAPERITONEAL MESH AND ONLAY MESH ;  Surgeon: Luisito Velasco DO;  Location: [facility] MAIN OR;  Service: General;  Laterality: N/A;    WOUND DEBRIDEMENT N/A 2/14/2023    Procedure: Sharp excisional DEBRIDEMENT WOUND abdominal wall inculding muscle and fascia 15cm x 15cm, complete removal of infected mesh;  Surgeon: Maycol Arteaga MD;  Location: [facility] MAIN OR;  Service: General;  Laterality: N/A;        Family History   Problem Relation Age of Onset    Hypertension Mother     Thyroid disease Mother     Diabetes Father     Heart disease Father     Hypertension Father          Social Hx:  Social History     Tobacco Use   Smoking Status Every Day    Packs/day: 0.50    Years: 34.00    Pack years: 17.00    Types: Cigarettes    Start date: 1987    Passive exposure: Never   Smokeless Tobacco Never   Tobacco Comments    cut down quit none dos    2/6/23 Smoking cut to  "1-2 cigs per day      Alcohol Use: Not on file      Social History     Substance and Sexual Activity   Drug Use No          Review of Systems   Constitutional:  Positive for activity change.   HENT: Negative.     Eyes: Negative.    Respiratory: Negative.     Cardiovascular: Negative.    Gastrointestinal: Negative.    Endocrine: Negative.    Genitourinary: Negative.    Musculoskeletal:  Positive for back pain, myalgias and neck pain.   Skin: Negative.    Allergic/Immunologic: Negative.    Neurological:  Positive for weakness (Bilateral legs) and numbness (Left leg, right foot, Bilateral hands).   Hematological: Negative.    Psychiatric/Behavioral:  Positive for sleep disturbance.        Objective     /75 (BP Location: Right arm, Patient Position: Sitting, Cuff Size: Adult)   Pulse 74   Resp 18   Ht 149.9 cm (59\")   Wt 48.7 kg (107 lb 6.4 oz)   LMP  (LMP Unknown)   BMI 21.69 kg/m²    Body mass index is 21.69 kg/m².      Physical Exam  Vitals reviewed.   Constitutional:       General: She is not in acute distress.     Appearance: Normal appearance. She is well-developed and well-groomed.   HENT:      Head: Normocephalic and atraumatic.   Eyes:      Extraocular Movements: Extraocular movements intact.      Pupils: Pupils are equal, round, and reactive to light.   Cardiovascular:      Rate and Rhythm: Normal rate and regular rhythm.      Pulses: Normal pulses.   Pulmonary:      Effort: Pulmonary effort is normal. No respiratory distress.   Musculoskeletal:         General: No swelling or tenderness. Normal range of motion.      Right shoulder: Decreased range of motion.      Cervical back: Normal range of motion. Pain with movement present.      Lumbar back: Spasms present.      Comments: Positive empty can test, right shoulder   Skin:     General: Skin is warm and dry.      Findings: No bruising or rash.   Neurological:      General: No focal deficit present.      Mental Status: She is alert and oriented to " person, place, and time.      Sensory: Sensation is intact.      Motor: Motor function is intact.      Deep Tendon Reflexes:      Reflex Scores:       Patellar reflexes are 2+ on the right side and 2+ on the left side.       Achilles reflexes are 2+ on the right side and 2+ on the left side.     Comments:           Neurological Exam  Mental Status  Alert. Oriented to person, place, and time.    Cranial Nerves  CN III, IV, VI: Extraocular movements intact bilaterally. Pupils equal round and reactive to light bilaterally.    Motor  Normal muscle bulk throughout. No fasciculations present. Normal muscle tone. Strength is 5/5 in all four extremities except as noted.                                             Right                     Left  Deltoid                                   5                          5   Biceps                                   5                          5  Brachioradialis                      5                          5   Triceps                                  5                          5   Iliopsoas                               5                          5   Quadriceps                           5                          5   Gastrocnemius                     5                           5   Anterior tibialis                      5                          5    Sensory  Normal sensation.Light touch is normal in upper and lower extremities. Pinprick is normal in upper and lower extremities.     Reflexes  Deep tendon reflexes are 2+ and symmetric except as noted.                                            Right                      Left  Patellar                                2+                         2+  Achilles                                2+                         2+    Right pathological reflexes: Reina's absent. Ankle clonus absent.  Left pathological reflexes: Reina's absent. Ankle clonus absent.    Gait  Casual gait is normal including stance, stride, and arm  swing.        Results Review  I personally reviewed and interpreted the images from the following studies:    DEXA BONE DENSITY AXIAL  5/26/2020 2:53 PM     In the lumbar spine, measured from L1-L4, the T score is -2.2.      The T score in the proximal femur is -3.2.     The T score in the radius 33% is -3.7        IMPRESSION:  Osteoporosis           Assessment & Plan     MDM: Abi Rivera is a 62 y.o. female who presents with worsening neck and low back pain.  She does not appear to have any radiculopathy or cervical myelopathy and does not have any focal neurologic deficits.  Her physical exam suggests musculoskeletal neck pain as well as a possible right rotator cuff arthropathy.  Low back pain sounds like chronic degenerative disease and facet arthropathy, however compression fracture and/or sagittal imbalance are also possible, especially given her osteoporosis.  Patient notes she has not been on her osteoporosis medication due to insurance issues.  Patient has no spinal imaging for review.    I am ordering flexion-extension x-rays of the cervical and lumbar spine.  I am also ordering a updated DEXA scan and referring patient to endocrinology for management of her osteoporosis as well as potential secondary parathyroid issues given her history of thyroid dysfunction and bone density problems.  I am also referring patient to pain management to establish care and discuss treatment options such as facet blocks and/or KARLI.  Patient would like to try different medications in the meantime as her current medications are not helping.  She is somewhat limited in the more typical medications given her history of gastric ulcers with perforation.  In changing her muscle relaxer to tizanidine and we will trial her on low-dose gabapentin to see if this can provide some relief of her pain.  Discussed common side effects of these medications, the most common of which is increased somnolence.  I will see patient again in 4  to 6 weeks.  She is agreeable to this plan.           Diagnosis Plan   1. Chronic bilateral low back pain without sciatica  tiZANidine (ZANAFLEX) 2 MG tablet    gabapentin (NEURONTIN) 300 MG capsule    XR Spine Lumbar Complete With Flex & Ext    Ambulatory Referral to Pain Management      2. Neck pain, chronic  tiZANidine (ZANAFLEX) 2 MG tablet    gabapentin (NEURONTIN) 300 MG capsule    XR spine cervical ap and lat w flex and ext    Ambulatory Referral to Pain Management      3. Osteoporosis, unspecified osteoporosis type, unspecified pathological fracture presence  DEXA Bone Density Axial    Ambulatory Referral to Endocrinology          Return in about 6 weeks (around 11/1/2023).      Abi Rivera  reports that she has been smoking cigarettes. She started smoking about 36 years ago. She has a 17.00 pack-year smoking history. She has never been exposed to tobacco smoke. She has never used smokeless tobacco.. I have educated her on the risk of diseases from using tobacco products such as cancer, COPD, and heart disease.          BMI is within normal parameters. No other follow-up for BMI required.         This patient was examined wearing appropriate personal protective equipment.            Crescencio Christine PA-C    09/20/23  16:26 EDT      Part of this note may be an electronic transcription/translation of spoken language to printed text using the Dragon Dictation System.

## 2023-09-20 ENCOUNTER — OFFICE VISIT (OUTPATIENT)
Dept: NEUROSURGERY | Facility: CLINIC | Age: 63
End: 2023-09-20
Payer: COMMERCIAL

## 2023-09-20 VITALS
HEART RATE: 74 BPM | RESPIRATION RATE: 18 BRPM | HEIGHT: 59 IN | BODY MASS INDEX: 21.65 KG/M2 | SYSTOLIC BLOOD PRESSURE: 129 MMHG | WEIGHT: 107.4 LBS | DIASTOLIC BLOOD PRESSURE: 75 MMHG

## 2023-09-20 DIAGNOSIS — G89.29 NECK PAIN, CHRONIC: ICD-10-CM

## 2023-09-20 DIAGNOSIS — M54.50 CHRONIC BILATERAL LOW BACK PAIN WITHOUT SCIATICA: Primary | ICD-10-CM

## 2023-09-20 DIAGNOSIS — G89.29 CHRONIC BILATERAL LOW BACK PAIN WITHOUT SCIATICA: Primary | ICD-10-CM

## 2023-09-20 DIAGNOSIS — M54.2 NECK PAIN, CHRONIC: ICD-10-CM

## 2023-09-20 DIAGNOSIS — M81.0 OSTEOPOROSIS, UNSPECIFIED OSTEOPOROSIS TYPE, UNSPECIFIED PATHOLOGICAL FRACTURE PRESENCE: ICD-10-CM

## 2023-09-20 RX ORDER — GABAPENTIN 300 MG/1
300 CAPSULE ORAL 3 TIMES DAILY
Qty: 42 CAPSULE | Refills: 1 | Status: SHIPPED | OUTPATIENT
Start: 2023-09-20

## 2023-09-20 RX ORDER — TIZANIDINE 2 MG/1
2 TABLET ORAL EVERY 8 HOURS PRN
Qty: 42 TABLET | Refills: 0 | Status: SHIPPED | OUTPATIENT
Start: 2023-09-20

## 2023-09-20 RX ORDER — FERROUS SULFATE TAB EC 324 MG (65 MG FE EQUIVALENT) 324 (65 FE) MG
324 TABLET DELAYED RESPONSE ORAL
COMMUNITY

## 2023-09-21 ENCOUNTER — PATIENT ROUNDING (BHMG ONLY) (OUTPATIENT)
Dept: NEUROSURGERY | Facility: CLINIC | Age: 63
End: 2023-09-21
Payer: COMMERCIAL

## 2023-10-09 ENCOUNTER — TELEPHONE (OUTPATIENT)
Dept: NEUROSURGERY | Facility: CLINIC | Age: 63
End: 2023-10-09
Payer: COMMERCIAL

## 2023-10-09 DIAGNOSIS — G89.29 CHRONIC BILATERAL LOW BACK PAIN WITHOUT SCIATICA: ICD-10-CM

## 2023-10-09 DIAGNOSIS — G89.29 NECK PAIN, CHRONIC: ICD-10-CM

## 2023-10-09 DIAGNOSIS — M54.50 CHRONIC BILATERAL LOW BACK PAIN WITHOUT SCIATICA: ICD-10-CM

## 2023-10-09 DIAGNOSIS — M54.2 NECK PAIN, CHRONIC: ICD-10-CM

## 2023-10-09 NOTE — TELEPHONE ENCOUNTER
Caller: Abi Rivera    Relationship: Self    Best call back number: [phone]    Requested Prescriptions:   Requested Prescriptions     Pending Prescriptions Disp Refills    tiZANidine (ZANAFLEX) 2 MG tablet 42 tablet 0     Sig: Take 1 tablet by mouth Every 8 (Eight) Hours As Needed for Muscle Spasms.        Pharmacy where request should be sent:  [id number] [address] - [phone] [facility] [phone]  [phone]     Last office visit with prescribing clinician: 9/20/2023   Last telemedicine visit with prescribing clinician: Visit date not found   Next office visit with prescribing clinician: 11/1/2023     Additional details provided by patient: PATIENT CALLED, PATIENT STATES SHE HAS NO PILLS LEFT.    Does the patient have less than a 3 day supply:  [x] Yes  [] No    Would you like a call back once the refill request has been completed: [x] Yes [] No    If the office needs to give you a call back, can they leave a voicemail: [x] Yes [] No

## 2023-10-12 RX ORDER — TIZANIDINE 2 MG/1
2 TABLET ORAL EVERY 8 HOURS PRN
Qty: 42 TABLET | Refills: 0 | Status: SHIPPED | OUTPATIENT
Start: 2023-10-12

## 2023-10-16 ENCOUNTER — HOSPITAL ENCOUNTER (OUTPATIENT)
Dept: GENERAL RADIOLOGY | Facility: HOSPITAL | Age: 63
Discharge: HOME | End: 2023-10-16
Payer: COMMERCIAL

## 2023-10-16 ENCOUNTER — HOSPITAL ENCOUNTER (OUTPATIENT)
Dept: BONE DENSITY | Facility: HOSPITAL | Age: 63
Discharge: HOME | End: 2023-10-16
Payer: COMMERCIAL

## 2023-10-16 ENCOUNTER — DOCUMENTATION (OUTPATIENT)
Dept: PHYSICAL THERAPY | Facility: CLINIC | Age: 63
End: 2023-10-16
Payer: COMMERCIAL

## 2023-10-16 DIAGNOSIS — M81.0 OSTEOPOROSIS, UNSPECIFIED OSTEOPOROSIS TYPE, UNSPECIFIED PATHOLOGICAL FRACTURE PRESENCE: ICD-10-CM

## 2023-10-16 DIAGNOSIS — G89.29 NECK PAIN, CHRONIC: ICD-10-CM

## 2023-10-16 DIAGNOSIS — M54.2 NECK PAIN, CHRONIC: ICD-10-CM

## 2023-10-16 DIAGNOSIS — M54.50 CHRONIC BILATERAL LOW BACK PAIN WITHOUT SCIATICA: ICD-10-CM

## 2023-10-16 DIAGNOSIS — G89.29 CHRONIC BILATERAL LOW BACK PAIN WITHOUT SCIATICA: ICD-10-CM

## 2023-10-16 PROCEDURE — 72114 X-RAY EXAM L-S SPINE BENDING: CPT

## 2023-10-16 PROCEDURE — 72050 X-RAY EXAM NECK SPINE 4/5VWS: CPT

## 2023-10-16 PROCEDURE — 77080 DXA BONE DENSITY AXIAL: CPT

## 2023-10-16 NOTE — PROGRESS NOTES
Discharge Summary  Discharge Summary from Physical Therapy Report    Abi Rivera  [birth year]    Dates  PT visit: 7/6/23 to 7/17/23  Number of Visits: 2    Discharge Status of Patient: Pt came for eval and 1 visit only, voiced financial restriction. Pt cancelled last visit and did not call to reschedule appt. D/C from skilled PT at this time.     Goals: Not Met    Discharge Plan: Continue with current home exercise program as instructed  Patient to return to referring/providing physician  Pt needs PCP (info provided to pt, and encouraged pt to call and set up appt)      Date of Discharge 10/16/23      Cassy Morejon, PT DPT  Physical Therapist

## 2023-10-16 NOTE — PROGRESS NOTES
"CHIEF COMPLAINT  Lower back pain, neck pain      Subjective   Abi Rivera is a 62 y.o. female who was referred by Crescencio Christine PA to our pain management clinic for consultation, evaluation and treatment of lower back and neck pain.  Symptoms started several months ago without any significant injuries. Her pain significantly worsened after going to [place] and riding in [employer].  Neck pain has been present for long time. Patient was sent to physical therapy and went for 2 visits and stopped due to financial restrictions.  Last PT was done on 7/17/2023. She is a .     Lower back pain is 7/10 on VAS, at maximum is 9/10. Pain is sharp, stabbing, dull, achy in nature. Pain is referred b/l anterior thigh, b/l calf. The pain is constant. The pain is improved by muscle relaxants. The pain is worse with sitting, standing, getting out of bed. Axial back pain is her main complaint.     Neck pain is 3/10 on VAS, at maximum is 4/10. Pain is sharp in nature. Pain is not referred. The pain is constant. The pain is improved by reset. The pain is worse with turning her head, sleeping on side.    PHQ-9- 9    SOAPP- 8 \"I have taken couple of marijuana edibles I bought in [place]\"  [place] back disability scale - 57    PMH:    Osteoporosis, anxiety, pulmonary hypertension, right foot fracture history s/p ORIF, hypertension, history of gastric ulcer with perforation, left total hip arthroplasty, sick sinus syndrome    Current Medications:   Naproxen, ibuprofen, Tylenol  Tizanidine 2 mg qhs PRN  Gabapentin 300 mg qhs       Past Medications:    Past Modalities:  TENS:       no          Physical Therapy Within The Last 6 Months     Yes - [facility] - 4 sessions - 7/2023   Psychotherapy     no  Massage Therapy      no    Patient Complains Of:  Uro-Fecal Incontinence no  Weight Gain/Loss  no  Fever/Chills   no  Weakness   no      PEG Assessment   What number best describes your pain on average in the past week?7  What " number best describes how, during the past week, pain has interfered with your enjoyment of life?7  What number best describes how, during the past week, pain has interfered with your general activity?  7    PHQ-9 Depression Screening  Little interest or pleasure in doing things? 1-->several days   Feeling down, depressed, or hopeless? 1-->several days   Trouble falling or staying asleep, or sleeping too much? 1-->several days   Feeling tired or having little energy? 1-->several days   Poor appetite or overeating? 1-->several days   Feeling bad about yourself - or that you are a failure or have let yourself or your family down? 3-->nearly every day   Trouble concentrating on things, such as reading the newspaper or watching television? 0-->not at all   Moving or speaking so slowly that other people could have noticed? Or the opposite - being so fidgety or restless that you have been moving around a lot more than usual? 1-->several days   Thoughts that you would be better off dead, or of hurting yourself in some way? 0-->not at all   PHQ-9 Total Score 9   If you checked off any problems, how difficult have these problems made it for you to do your work, take care of things at home, or get along with other people? not difficult at all         Current Outpatient Medications:     acetaminophen (TYLENOL) 500 MG tablet, Take 1 tablet by mouth Every 6 (Six) Hours As Needed for Mild Pain., Disp: , Rfl:     aspirin (aspirin) 81 MG EC tablet, Take 1 tablet by mouth Daily., Disp: , Rfl:     calcium carb-cholecalciferol 600-800 MG-UNIT tablet, Take 1 tablet by mouth Daily. Last dose 2/23, Disp: , Rfl:     ferrous sulfate 324 (65 Fe) MG tablet delayed-release EC tablet, Take 1 tablet by mouth Daily With Breakfast., Disp: , Rfl:     gabapentin (NEURONTIN) 300 MG capsule, Take 1 capsule by mouth 3 (Three) Times a Day., Disp: 42 capsule, Rfl: 1    ibuprofen (ADVIL,MOTRIN) 200 MG tablet, Take 2 tablets by mouth As Needed for Mild  Pain., Disp: , Rfl:     methIMAzole (TAPAZOLE) 10 MG tablet, Take 1 tablet by mouth 2 (Two) Times a Day., Disp: 60 tablet, Rfl: 0    Multiple Vitamin (MULTI-VITAMIN) tablet, Take 1 tablet by mouth Daily., Disp: , Rfl:     naproxen (NAPROSYN) 250 MG tablet, Take 1 tablet by mouth 2 (Two) Times a Day As Needed., Disp: , Rfl:     Omega-3 Fatty Acids (FISH OIL) 1200 MG capsule capsule, Take 1 capsule by mouth Daily With Breakfast. Last dose 2/23, Disp: , Rfl:     omeprazole (priLOSEC) 20 MG capsule, Take 1 capsule by mouth 2 (Two) Times a Day. Take preop, Disp: , Rfl:     ondansetron (Zofran) 4 MG tablet, Take 1 tablet by mouth Every 8 (Eight) Hours As Needed for Nausea or Vomiting., Disp: 30 tablet, Rfl: 1    Potassium Gluconate 550 MG tablet, Take 550 mg by mouth Daily. Take preop, Disp: , Rfl:     tiZANidine (ZANAFLEX) 2 MG tablet, Take 1 tablet by mouth Every 8 (Eight) Hours As Needed for Muscle Spasms., Disp: 42 tablet, Rfl: 0    Vitamin A 3 MG (23513 UT) capsule, Take 1 capsule by mouth Daily., Disp: , Rfl:     vitamin D3 125 MCG (5000 UT) capsule capsule, Take 1 capsule by mouth Daily., Disp: , Rfl:     The following portions of the patient's history were reviewed and updated as appropriate: allergies, current medications, past family history, past medical history, past social history, past surgical history, and problem list.      REVIEW OF PERTINENT MEDICAL DATA    Past Medical History:   Diagnosis Date    Adenomatous polyp of colon 06/20/2016    Anemia     hx    Arthritis of foot 01/10/2019    Atrial fibrillation 06/16/2016    resolved with ablation    Avascular necrosis of bone 01/10/2019    right ankle and foot    Back pain     Chronic pain 01/22/2019    Closed displaced fracture 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 09/19/2019    Added automatically from request for surgery [id number]    Gastric ulcer     GERD (gastroesophageal reflux disease)     Hypertension 06/16/2016    Hyperthyroidism  11/02/2016    Hypokalemia     Hypomagnesemia     resolved    Neck pain     Osteoporosis without current pathological fracture 06/04/2020    Pulmonary hypertension 06/16/2016    no sx    Shoulder pain, left     Sick sinus syndrome 01/25/2017    resolved    Tricuspid valve insufficiency 06/16/2016     Past Surgical History:   Procedure Laterality Date    APPENDECTOMY      BILATERAL BREAST REDUCTION      CARDIAC CATHETERIZATION N/A 1/19/2022    Procedure: Intracardiac echocardiogram;  Surgeon: Александр Sheth MD;  Location: [facility] CATH INVASIVE LOCATION;  Service: Cardiovascular;  Laterality: N/A;    CARDIAC ELECTROPHYSIOLOGY PROCEDURE N/A 1/19/2022    Procedure: EP/Ablation Utong and Ivan aware;  Surgeon: Александр Sheth MD;  Location: [facility] CATH INVASIVE LOCATION;  Service: Cardiovascular;  Laterality: N/A;    EXPLORATORY LAPAROTOMY N/A 3/19/2021    Procedure: LAPAROTOMY EXPLORATORY with repair of perforated gastric ulcer, oversew of perforated gastric ulcer, placement of kristie patch, biopsy of stomach;  Surgeon: Luisito Velasco DO;  Location: [facility] MAIN OR;  Service: General;  Laterality: N/A;    HIP SURGERY      Left total hip    ORIF FOOT FRACTURE Right 9/30/2019    Procedure: OPEN REDUCTION INTERNAL FIXATION OF FIFTH METATARSAL FRACTURE, LOOSE BODY EXCISION/REMOVAL FROM THE TALONAVICULAR JOINT OF THE RIGHT FOOT;  Surgeon: JOSE Fry DPM;  Location: [facility] MAIN OR;  Service: Podiatry    ORIF TIBIA/FIBULA FRACTURES Left     US GUIDED CYST ASPIRATION BREAST N/A 7/26/2021    VENTRAL/INCISIONAL HERNIA REPAIR N/A 3/2/2022    Procedure: OPEN INCISIONAL HERNIA REPAIR, BILATERAL COMPONENT RELEASE, LYSIS OF ADHESIONS, PLACEMENT OF INTRAPERITONEAL MESH AND ONLAY MESH ;  Surgeon: Luisito Velasco DO;  Location: [facility] MAIN OR;  Service: General;  Laterality: N/A;    WOUND DEBRIDEMENT N/A 2/14/2023    Procedure: Sharp excisional DEBRIDEMENT WOUND abdominal wall inculding muscle and fascia 15cm x 15cm,  complete removal of infected mesh;  Surgeon: Maycol Arteaga MD;  Location: [facility] MAIN OR;  Service: General;  Laterality: N/A;     Family History   Problem Relation Age of Onset    Hypertension Mother     Thyroid disease Mother     Diabetes Father     Heart disease Father     Hypertension Father      Social History     Socioeconomic History    Marital status:    Tobacco Use    Smoking status: Every Day     Packs/day: 0.50     Years: 34.00     Additional pack years: 0.00     Total pack years: 17.00     Types: Cigarettes     Start date: 1987     Passive exposure: Never    Smokeless tobacco: Never    Tobacco comments:     cut down quit none dos     2/6/23 Smoking cut to 1-2 cigs per day   Vaping Use    Vaping Use: Never used   Substance and Sexual Activity    Alcohol use: Yes     Comment: rarely/socially     Drug use: No    Sexual activity: Defer     Comment: CBD gummies 3 weeks ago         Review of Systems   Musculoskeletal:  Positive for arthralgias, back pain and neck pain.         Vitals:    10/18/23 1503   BP: 138/78   Pulse: 97   Resp: 16   SpO2: 97%   PainSc:   7         Objective   Physical Exam  Neck:     Musculoskeletal:         General: Tenderness present.        Legs:    Neurological:      Comments: Motor strength 5/5 b/l LE  Sensory intact b/l LE               Imaging Reviewed:  Lumbar xray - 10/23  1.Slight wedge deformity L1 which has been suggested.  2.Facet arthropathy lumbar spine.  3.Grade 1 anterolisthesis of L4 on L5.    Cervical xray - 10/23:   Bilateral facet arthropathy.   There is vascular calcification within the right aspect of   the neck.    Assessment:    1. Lumbar spondylosis    2. Cervical spondylosis         Plan:   1. New patient visit, urine drug screen per office policy. UDS today to monitor for compliance with medication use. The UDS is medically necessary to monitor for compliance due to the potential side effects and complications of misuse of opioids. UDS done today  will review on next visit.     2. We discussed trying a course of formal physical therapy.  Physical therapy can help strengthen and stretch the muscles around the joints. Continue to be as active as possible.  Patient has done 4 sessions of physical therapy without significant pain relief.  We will start referring physician supervised home exercises twice daily.  3.  Cervical and lumbar x-rays reviewed with patient.  Patient has cervical facet arthropathy and facet tenderness and facet loading positive at C4-C7.  We will hold off on MBB for now as her neck pain is tolerable.  4.  Lumbar x-ray reviewed with patient.  Her main complaint is axial lower back pain consistent with lumbar spondylosis. Patient has mainly AXIAL lower back pain. Patient informed they would likely benefit from a medial branch block on bilateral from L4 to Sa diagnostic only local only. No steroids due to hx of osteoporosis.  X-ray shows facet arthritis. facet tenderness is positive from L3 and Sa. Patient informed the procedure is both therapeutic and diagnostic in nature.  The procedure was described in detail and the risks, benefits and alternatives were discussed with the patient (including but not limited to: bleeding, infection, nerve damage, worsening of pain, CSF leak, inability to perform injection, paralysis, seizures, and death) who agreed to proceed.  Discussed RFA at 70-80 degree for  sec if patient has good relief from 2 diagnostic MBB.    5.  She was previously treated for osteoporosis, but unfortunately insurance decided to not cover the medication.  She has been now referred to endocrinology for osteoporotic treatment and will be scheduled soon.    RTC for injection and then 3 week follow up.     Robert Taylor DO  Pain Management   [facility]     Back Exercises  The following exercises strengthen the muscles that help to support the back. They also help to keep the lower back flexible. Doing these exercises can help  to prevent back pain or lessen existing pain.  If you have back pain or discomfort, try doing these exercises 2-3 times each day or as told by your health care provider. When the pain goes away, do them once each day, but increase the number of times that you repeat the steps for each exercise (do more repetitions). If you do not have back pain or discomfort, do these exercises once each day or as told by your health care provider.    Exercises  Single Knee to Chest  Repeat these steps 3-5 times for each leg:  Lie on your back on a firm bed or the floor with your legs extended.  Bring one knee to your chest. Your other leg should stay extended and in contact with the floor.  Hold your knee in place by grabbing your knee or thigh.  Pull on your knee until you feel a gentle stretch in your lower back.  Hold the stretch for 10-30 seconds.  Slowly release and straighten your leg.     Pelvic Tilt  Repeat these steps 5-10 times:  Lie on your back on a firm bed or the floor with your legs extended.  Bend your knees so they are pointing toward the ceiling and your feet are flat on the floor.  Tighten your lower abdominal muscles to press your lower back against the floor. This motion will tilt your pelvis so your tailbone points up toward the ceiling instead of pointing to your feet or the floor.  With gentle tension and even breathing, hold this position for 5-10 seconds.     Cat-Cow    Repeat these steps until your lower back becomes more flexible:  Get into a hands-and-knees position on a firm surface. Keep your hands under your shoulders, and keep your knees under your hips. You may place padding under your knees for comfort.  Let your head hang down, and point your tailbone toward the floor so your lower back becomes rounded like the back of a cat.  Hold this position for 5 seconds.  Slowly lift your head and point your tailbone up toward the ceiling so your back forms a sagging arch like the back of a cow.  Hold this  position for 5 seconds.     Press-Ups    Repeat these steps 5-10 times:  Lie on your abdomen (face-down) on the floor.  Place your palms near your head, about shoulder-width apart.  While you keep your back as relaxed as possible and keep your hips on the floor, slowly straighten your arms to raise the top half of your body and lift your shoulders. Do not use your back muscles to raise your upper torso. You may adjust the placement of your hands to make yourself more comfortable.  Hold this position for 5 seconds while you keep your back relaxed.  Slowly return to lying flat on the floor.     Bridges    Repeat these steps 10 times:  Lie on your back on a firm surface.  Bend your knees so they are pointing toward the ceiling and your feet are flat on the floor.  Tighten your buttocks muscles and lift your buttocks off of the floor until your waist is at almost the same height as your knees. You should feel the muscles working in your buttocks and the back of your thighs. If you do not feel these muscles, slide your feet 1-2 inches farther away from your buttocks.  Hold this position for 3-5 seconds.  Slowly lower your hips to the starting position, and allow your buttocks muscles to relax completely.     If this exercise is too easy, try doing it with your arms crossed over your chest.  Abdominal Crunches  Repeat these steps 5-10 times:  Lie on your back on a firm bed or the floor with your legs extended.  Bend your knees so they are pointing toward the ceiling and your feet are flat on the floor.  Cross your arms over your chest.  Tip your chin slightly toward your chest without bending your neck.  Tighten your abdominal muscles and slowly raise your trunk (torso) high enough to lift your shoulder blades a tiny bit off of the floor. Avoid raising your torso higher than that, because it can put too much stress on your low back and it does not help to strengthen your abdominal muscles.  Slowly return to your starting  position.     Back Lifts  Repeat these steps 5-10 times:  Lie on your abdomen (face-down) with your arms at your sides, and rest your forehead on the floor.  Tighten the muscles in your legs and your buttocks.  Slowly lift your chest off of the floor while you keep your hips pressed to the floor. Keep the back of your head in line with the curve in your back. Your eyes should be looking at the floor.  Hold this position for 3-5 seconds.  Slowly return to your starting position.      INSPECT REPORT    As part of the patient's treatment plan, I may be prescribing controlled substances. The patient has been made aware of appropriate use of such medications, including potential risk of somnolence, limited ability to drive and/or work safely, and the potential for dependence or overdose. It has also been made clear that these medications are for use by this patient only, without concomitant use of alcohol or other substances unless prescribed.     Patient has completed prescribing agreement detailing terms of continued prescribing of controlled substances, including monitoring INSPECT reports, urine drug screening, and pill counts if necessary. The patient is aware that inappropriate use will results in cessation of prescribing such medications.    INSPECT report has been reviewed and scanned into the patient's chart.      EMR Dragon/Transcription Disclaimer:   Much of this encounter note is an electronic transcription/translation of spoken language to printed text. The electronic translation of spoken language may permit erroneous, or at times, nonsensical words or phrases to be inadvertently transcribed; Although I have reviewed the note for such errors, some may still exist.

## 2023-10-18 ENCOUNTER — OFFICE VISIT (OUTPATIENT)
Dept: PAIN MEDICINE | Facility: CLINIC | Age: 63
End: 2023-10-18
Payer: COMMERCIAL

## 2023-10-18 VITALS
HEART RATE: 97 BPM | RESPIRATION RATE: 16 BRPM | SYSTOLIC BLOOD PRESSURE: 138 MMHG | OXYGEN SATURATION: 97 % | DIASTOLIC BLOOD PRESSURE: 78 MMHG

## 2023-10-18 DIAGNOSIS — M47.812 CERVICAL SPONDYLOSIS: ICD-10-CM

## 2023-10-18 DIAGNOSIS — M47.816 LUMBAR SPONDYLOSIS: Primary | ICD-10-CM

## 2023-11-01 DIAGNOSIS — M54.2 NECK PAIN, CHRONIC: ICD-10-CM

## 2023-11-01 DIAGNOSIS — G89.29 CHRONIC BILATERAL LOW BACK PAIN WITHOUT SCIATICA: ICD-10-CM

## 2023-11-01 DIAGNOSIS — M54.50 CHRONIC BILATERAL LOW BACK PAIN WITHOUT SCIATICA: ICD-10-CM

## 2023-11-01 DIAGNOSIS — G89.29 NECK PAIN, CHRONIC: ICD-10-CM

## 2023-11-01 NOTE — TELEPHONE ENCOUNTER
Patient came into the office today about as she was scheduled to follow-up with Crescencio. Her appointment today was cancelled the office just was not able to reach her to r/s her visit. She is wanting to know if Crescencio can give her another refill on her Tizanidine? She did see Dr. Taylor with pain mgmt on 10/18/2023 and he ordered injections.I told her we don't typically manage medicaitons, we referred her to pain mgmt for that along with injections. She will call Dr. Taylor for a refill as well but wants to know if we can send in a week or so to get her until her injection?

## 2023-11-02 RX ORDER — TIZANIDINE 2 MG/1
2 TABLET ORAL EVERY 8 HOURS PRN
Qty: 42 TABLET | Refills: 0 | Status: SHIPPED | OUTPATIENT
Start: 2023-11-02

## 2023-11-07 ENCOUNTER — HOSPITAL ENCOUNTER (OUTPATIENT)
Dept: PAIN MEDICINE | Facility: HOSPITAL | Age: 63
Discharge: HOME | End: 2023-11-07
Payer: COMMERCIAL

## 2023-11-07 VITALS
SYSTOLIC BLOOD PRESSURE: 160 MMHG | BODY MASS INDEX: 21.57 KG/M2 | HEART RATE: 76 BPM | WEIGHT: 107 LBS | RESPIRATION RATE: 16 BRPM | DIASTOLIC BLOOD PRESSURE: 75 MMHG | HEIGHT: 59 IN | OXYGEN SATURATION: 98 % | TEMPERATURE: 97.3 F

## 2023-11-07 DIAGNOSIS — M47.816 LUMBAR SPONDYLOSIS: Primary | ICD-10-CM

## 2023-11-07 DIAGNOSIS — R52 PAIN: ICD-10-CM

## 2023-11-07 PROCEDURE — 64493 INJ PARAVERT F JNT L/S 1 LEV: CPT | Performed by: STUDENT IN AN ORGANIZED HEALTH CARE EDUCATION/TRAINING PROGRAM

## 2023-11-07 PROCEDURE — 77003 FLUOROGUIDE FOR SPINE INJECT: CPT

## 2023-11-07 PROCEDURE — 25010000002 BUPIVACAINE (PF) 0.25 % SOLUTION: Performed by: STUDENT IN AN ORGANIZED HEALTH CARE EDUCATION/TRAINING PROGRAM

## 2023-11-07 PROCEDURE — 64494 INJ PARAVERT F JNT L/S 2 LEV: CPT | Performed by: STUDENT IN AN ORGANIZED HEALTH CARE EDUCATION/TRAINING PROGRAM

## 2023-11-07 RX ORDER — BUPIVACAINE HYDROCHLORIDE 2.5 MG/ML
10 INJECTION, SOLUTION EPIDURAL; INFILTRATION; INTRACAUDAL ONCE
Status: COMPLETED | OUTPATIENT
Start: 2023-11-07 | End: 2023-11-07

## 2023-11-07 RX ORDER — LIDOCAINE HYDROCHLORIDE 10 MG/ML
5 INJECTION, SOLUTION EPIDURAL; INFILTRATION; INTRACAUDAL; PERINEURAL ONCE
Status: COMPLETED | OUTPATIENT
Start: 2023-11-07 | End: 2023-11-07

## 2023-11-07 RX ADMIN — BUPIVACAINE HYDROCHLORIDE 10 ML: 2.5 INJECTION, SOLUTION EPIDURAL; INFILTRATION; INTRACAUDAL; PERINEURAL at 15:33

## 2023-11-07 RX ADMIN — LIDOCAINE HYDROCHLORIDE 5 ML: 10 INJECTION, SOLUTION EPIDURAL; INFILTRATION; INTRACAUDAL; PERINEURAL at 15:31

## 2023-11-07 NOTE — H&P
H and P reviewed from previous visit and no changes to patient's clinical presentation. Will proceed with procedure as planned. Patient denies history of DM and being on blood thinners.    Robert Taylor DO  Pain Management   [facility]

## 2023-11-07 NOTE — PROCEDURES
PROCEDURE:  Bilateral L 4, 5 and SA MBB local only diagnostic     INDICATION: Patient complains of pain in the lower back, bilateral.  Pain increases on extension of the spine, facet tenderness present.       PROCEDURE DETAILS:   After obtaining written informed consent patient was taken to the procedure room. Pre-procedure blood pressure and pulse were stable and recorded in patients clinic chart. The patient was placed in a prone position and the lower back was prepped with chloraprep and draped in the usual sterile fashion.  The skin was infiltrated with 1% lidocaine at the junction of transverse process with the vertebral body at the left L 4 level. A 22-gauge, 3.5-inch needle was inserted into the lumbar medial branch under radiographic guidance. Both AP and lateral views were obtained.   Following placement of the needle and negative aspiration, 1.2 cc  of bupivacaine 0.25% was injected  The identical procedure was performed on left L5 and SA medial branch was repeated on right side at L4, L5, Sa.  A total of 9 cc of 0.25% bupivacaine was injected. During the procedure there was no evidence of CSF, paresthesias or heme.    After the procedure the needles were removed. Skin was cleaned and a sterile dressing was applied.    Following the procedure the patient's vital signs were stable. The patient was discharged home in good condition after being given discharge instructions.    COMPLICATIONS None    Robert Taylor DO  Pain Management   [facility]

## 2023-11-08 ENCOUNTER — TELEPHONE (OUTPATIENT)
Dept: PAIN MEDICINE | Facility: HOSPITAL | Age: 63
End: 2023-11-08
Payer: COMMERCIAL

## 2023-11-08 NOTE — TELEPHONE ENCOUNTER
Patient states she did good yesterday for about 4 hours.  She had 90% relief of pain during that time.   Call with any uestions or concerns

## 2023-11-17 ENCOUNTER — HOSPITAL ENCOUNTER (EMERGENCY)
Facility: HOSPITAL | Age: 63
Discharge: HOME | End: 2023-11-17
Attending: EMERGENCY MEDICINE
Payer: COMMERCIAL

## 2023-11-17 ENCOUNTER — APPOINTMENT (OUTPATIENT)
Dept: CT IMAGING | Facility: HOSPITAL | Age: 63
End: 2023-11-17
Payer: COMMERCIAL

## 2023-11-17 VITALS
DIASTOLIC BLOOD PRESSURE: 71 MMHG | TEMPERATURE: 98.2 F | BODY MASS INDEX: 22.22 KG/M2 | SYSTOLIC BLOOD PRESSURE: 114 MMHG | HEART RATE: 78 BPM | RESPIRATION RATE: 20 BRPM | OXYGEN SATURATION: 100 % | WEIGHT: 103 LBS | HEIGHT: 57 IN

## 2023-11-17 DIAGNOSIS — R42 DIZZINESS: ICD-10-CM

## 2023-11-17 DIAGNOSIS — R51.9 NONINTRACTABLE HEADACHE, UNSPECIFIED CHRONICITY PATTERN, UNSPECIFIED HEADACHE TYPE: ICD-10-CM

## 2023-11-17 DIAGNOSIS — E05.90 HYPERTHYROIDISM: Primary | ICD-10-CM

## 2023-11-17 LAB
ALBUMIN SERPL-MCNC: 4 G/DL (ref 3.5–5.2)
ALBUMIN/GLOB SERPL: 1.5 G/DL
ALP SERPL-CCNC: 110 U/L (ref 39–117)
ALT SERPL W P-5'-P-CCNC: 21 U/L (ref 1–33)
AMPHET+METHAMPHET UR QL: NEGATIVE
ANION GAP SERPL CALCULATED.3IONS-SCNC: 11 MMOL/L (ref 5–15)
AST SERPL-CCNC: 20 U/L (ref 1–32)
BACTERIA UR QL AUTO: NORMAL /HPF
BARBITURATES UR QL SCN: NEGATIVE
BASOPHILS # BLD AUTO: 0 10*3/MM3 (ref 0–0.2)
BASOPHILS NFR BLD AUTO: 0.5 % (ref 0–1.5)
BENZODIAZ UR QL SCN: NEGATIVE
BILIRUB SERPL-MCNC: 0.3 MG/DL (ref 0–1.2)
BILIRUB UR QL STRIP: NEGATIVE
BUN SERPL-MCNC: 30 MG/DL (ref 8–23)
BUN/CREAT SERPL: 35.3 (ref 7–25)
CALCIUM SPEC-SCNC: 10.6 MG/DL (ref 8.6–10.5)
CANNABINOIDS SERPL QL: POSITIVE
CHLORIDE SERPL-SCNC: 104 MMOL/L (ref 98–107)
CLARITY UR: CLEAR
CO2 SERPL-SCNC: 27 MMOL/L (ref 22–29)
COCAINE UR QL: NEGATIVE
COLOR UR: YELLOW
CREAT SERPL-MCNC: 0.85 MG/DL (ref 0.57–1)
DEPRECATED RDW RBC AUTO: 42.9 FL (ref 37–54)
EGFRCR SERPLBLD CKD-EPI 2021: 78.5 ML/MIN/1.73
EOSINOPHIL # BLD AUTO: 0.3 10*3/MM3 (ref 0–0.4)
EOSINOPHIL NFR BLD AUTO: 4.4 % (ref 0.3–6.2)
ERYTHROCYTE [DISTWIDTH] IN BLOOD BY AUTOMATED COUNT: 14.1 % (ref 12.3–15.4)
GLOBULIN UR ELPH-MCNC: 2.7 GM/DL
GLUCOSE SERPL-MCNC: 116 MG/DL (ref 65–99)
GLUCOSE UR STRIP-MCNC: NEGATIVE MG/DL
HCT VFR BLD AUTO: 42.9 % (ref 34–46.6)
HGB BLD-MCNC: 14 G/DL (ref 12–15.9)
HGB UR QL STRIP.AUTO: NEGATIVE
HYALINE CASTS UR QL AUTO: NORMAL /LPF
KETONES UR QL STRIP: NEGATIVE
LEUKOCYTE ESTERASE UR QL STRIP.AUTO: ABNORMAL
LYMPHOCYTES # BLD AUTO: 2.3 10*3/MM3 (ref 0.7–3.1)
LYMPHOCYTES NFR BLD AUTO: 29.3 % (ref 19.6–45.3)
MCH RBC QN AUTO: 27.1 PG (ref 26.6–33)
MCHC RBC AUTO-ENTMCNC: 32.6 G/DL (ref 31.5–35.7)
MCV RBC AUTO: 83.2 FL (ref 79–97)
METHADONE UR QL SCN: NEGATIVE
MONOCYTES # BLD AUTO: 0.7 10*3/MM3 (ref 0.1–0.9)
MONOCYTES NFR BLD AUTO: 8.5 % (ref 5–12)
NEUTROPHILS NFR BLD AUTO: 4.5 10*3/MM3 (ref 1.7–7)
NEUTROPHILS NFR BLD AUTO: 57.3 % (ref 42.7–76)
NITRITE UR QL STRIP: NEGATIVE
NRBC BLD AUTO-RTO: 0.1 /100 WBC (ref 0–0.2)
OPIATES UR QL: POSITIVE
OXYCODONE UR QL SCN: NEGATIVE
PH UR STRIP.AUTO: 6 [PH] (ref 5–8)
PLATELET # BLD AUTO: 200 10*3/MM3 (ref 140–450)
PMV BLD AUTO: 7.8 FL (ref 6–12)
POTASSIUM SERPL-SCNC: 3.6 MMOL/L (ref 3.5–5.2)
PROT SERPL-MCNC: 6.7 G/DL (ref 6–8.5)
PROT UR QL STRIP: NEGATIVE
RBC # BLD AUTO: 5.16 10*6/MM3 (ref 3.77–5.28)
RBC # UR STRIP: NORMAL /HPF
REF LAB TEST METHOD: NORMAL
SODIUM SERPL-SCNC: 142 MMOL/L (ref 136–145)
SP GR UR STRIP: 1.01 (ref 1–1.03)
SQUAMOUS #/AREA URNS HPF: NORMAL /HPF
TROPONIN T SERPL HS-MCNC: 15 NG/L
TSH SERPL DL<=0.05 MIU/L-ACNC: <0.005 UIU/ML (ref 0.27–4.2)
UROBILINOGEN UR QL STRIP: ABNORMAL
WBC # UR STRIP: NORMAL /HPF
WBC NRBC COR # BLD AUTO: 7.8 10*3/MM3 (ref 3.4–10.8)

## 2023-11-17 PROCEDURE — 99284 EMERGENCY DEPT VISIT MOD MDM: CPT

## 2023-11-17 PROCEDURE — 80307 DRUG TEST PRSMV CHEM ANLYZR: CPT | Performed by: NURSE PRACTITIONER

## 2023-11-17 PROCEDURE — 84484 ASSAY OF TROPONIN QUANT: CPT | Performed by: NURSE PRACTITIONER

## 2023-11-17 PROCEDURE — 80053 COMPREHEN METABOLIC PANEL: CPT | Performed by: NURSE PRACTITIONER

## 2023-11-17 PROCEDURE — 85025 COMPLETE CBC W/AUTO DIFF WBC: CPT | Performed by: NURSE PRACTITIONER

## 2023-11-17 PROCEDURE — 93005 ELECTROCARDIOGRAM TRACING: CPT | Performed by: EMERGENCY MEDICINE

## 2023-11-17 PROCEDURE — 82948 REAGENT STRIP/BLOOD GLUCOSE: CPT

## 2023-11-17 PROCEDURE — 70450 CT HEAD/BRAIN W/O DYE: CPT

## 2023-11-17 PROCEDURE — 84443 ASSAY THYROID STIM HORMONE: CPT | Performed by: NURSE PRACTITIONER

## 2023-11-17 PROCEDURE — 81001 URINALYSIS AUTO W/SCOPE: CPT | Performed by: NURSE PRACTITIONER

## 2023-11-17 RX ORDER — SODIUM CHLORIDE 0.9 % (FLUSH) 0.9 %
10 SYRINGE (ML) INJECTION AS NEEDED
Status: DISCONTINUED | OUTPATIENT
Start: 2023-11-17 | End: 2023-11-17 | Stop reason: HOSPADM

## 2023-11-17 NOTE — ED PROVIDER NOTES
"Subjective     Provider in Triage Note  Due to significant overcrowding in the emergency department patient was initially seen and evaluated in triage.  Provider in triage recommended patient placement in the treatment area to initiate therapy and movement to an ER bed as soon as possible.    Patient is a 62-year-old white female who presents today with complaints of headache and \"feeling off.\".  She states her symptoms started Monday after she took a CBD gummy.  She states she felt disoriented dizzy and confused at times.  She states she woke up Tuesday with her symptoms a bit better but continuing and even into today she still states she does not feel right.  She reports a diffuse headache.  No unilateral weakness or deficit.  No chest pain or shortness of breath.  She does complain of \"pain all over.\"      History of Present Illness    Review of Systems   Constitutional:  Negative for fever.   HENT:  Negative for congestion.    Respiratory:  Negative for cough and shortness of breath.    Cardiovascular:  Negative for chest pain.   Gastrointestinal:  Negative for abdominal pain.   Musculoskeletal:  Negative for back pain.   Neurological:  Positive for dizziness and headaches. Negative for weakness and numbness.   Psychiatric/Behavioral:  Positive for confusion.        Past Medical History:   Diagnosis Date    Adenomatous polyp of colon 06/20/2016    Anemia     hx    Arthritis of foot 01/10/2019    Atrial fibrillation 06/16/2016    resolved with ablation    Avascular necrosis of bone 01/10/2019    right ankle and foot    Back pain     Chronic pain 01/22/2019    Closed displaced fracture 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 09/19/2019    Added automatically from request for surgery [id number]    Gastric ulcer     GERD (gastroesophageal reflux disease)     Hypertension 06/16/2016    Hyperthyroidism 11/02/2016    Hypokalemia     Hypomagnesemia     resolved    Neck pain     Osteoporosis without current " pathological fracture 06/04/2020    Pulmonary hypertension 06/16/2016    no sx    Shoulder pain, left     Sick sinus syndrome 01/25/2017    resolved    Tricuspid valve insufficiency 06/16/2016       No Known Allergies    Past Surgical History:   Procedure Laterality Date    APPENDECTOMY      BILATERAL BREAST REDUCTION      CARDIAC CATHETERIZATION N/A 1/19/2022    Procedure: Intracardiac echocardiogram;  Surgeon: Александр Sheth MD;  Location: [facility] CATH INVASIVE LOCATION;  Service: Cardiovascular;  Laterality: N/A;    CARDIAC ELECTROPHYSIOLOGY PROCEDURE N/A 1/19/2022    Procedure: EP/Ablation Utong and Ivan aware;  Surgeon: Александр Sheth MD;  Location: [facility] CATH INVASIVE LOCATION;  Service: Cardiovascular;  Laterality: N/A;    EXPLORATORY LAPAROTOMY N/A 3/19/2021    Procedure: LAPAROTOMY EXPLORATORY with repair of perforated gastric ulcer, oversew of perforated gastric ulcer, placement of kristie patch, biopsy of stomach;  Surgeon: Luisito Velasco DO;  Location: [facility] MAIN OR;  Service: General;  Laterality: N/A;    HIP SURGERY      Left total hip    ORIF FOOT FRACTURE Right 9/30/2019    Procedure: OPEN REDUCTION INTERNAL FIXATION OF FIFTH METATARSAL FRACTURE, LOOSE BODY EXCISION/REMOVAL FROM THE TALONAVICULAR JOINT OF THE RIGHT FOOT;  Surgeon: JOSE Fry DPM;  Location: [facility] MAIN OR;  Service: Podiatry    ORIF TIBIA/FIBULA FRACTURES Left     US GUIDED CYST ASPIRATION BREAST N/A 7/26/2021    VENTRAL/INCISIONAL HERNIA REPAIR N/A 3/2/2022    Procedure: OPEN INCISIONAL HERNIA REPAIR, BILATERAL COMPONENT RELEASE, LYSIS OF ADHESIONS, PLACEMENT OF INTRAPERITONEAL MESH AND ONLAY MESH ;  Surgeon: Luisito Velasco DO;  Location: [facility] MAIN OR;  Service: General;  Laterality: N/A;    WOUND DEBRIDEMENT N/A 2/14/2023    Procedure: Sharp excisional DEBRIDEMENT WOUND abdominal wall inculding muscle and fascia 15cm x 15cm, complete removal of infected mesh;  Surgeon: Maycol Arteaga MD;  Location: [facility]  "MAIN OR;  Service: General;  Laterality: N/A;       Family History   Problem Relation Age of Onset    Hypertension Mother     Thyroid disease Mother     Diabetes Father     Heart disease Father     Hypertension Father        Social History     Socioeconomic History    Marital status:    Tobacco Use    Smoking status: Every Day     Packs/day: 0.50     Years: 34.00     Additional pack years: 0.00     Total pack years: 17.00     Types: Cigarettes     Start date: 1987     Passive exposure: Never    Smokeless tobacco: Never    Tobacco comments:     cut down quit none dos     2/6/23 Smoking cut to 1-2 cigs per day   Vaping Use    Vaping Use: Never used   Substance and Sexual Activity    Alcohol use: Yes     Comment: rarely/socially     Drug use: No    Sexual activity: Not Currently     Comment: CBD gummies 3 weeks ago       /71   Pulse 78   Temp 98.2 °F (36.8 °C)   Resp 20   Ht 144.8 cm (57\")   Wt 46.7 kg (103 lb)   LMP  (LMP Unknown)   SpO2 100%   BMI 22.29 kg/m²       Objective   Physical Exam  Vitals and nursing note reviewed.   Constitutional:       Appearance: Normal appearance.   HENT:      Head: Normocephalic and atraumatic.      Mouth/Throat:      Mouth: Mucous membranes are moist.   Cardiovascular:      Rate and Rhythm: Normal rate and regular rhythm.      Heart sounds: Normal heart sounds.   Pulmonary:      Effort: Pulmonary effort is normal. No respiratory distress.      Breath sounds: Normal breath sounds.   Abdominal:      Palpations: Abdomen is soft.      Tenderness: There is no abdominal tenderness.   Musculoskeletal:      Right lower leg: No edema.      Left lower leg: No edema.   Skin:     General: Skin is warm and dry.   Neurological:      General: No focal deficit present.      Mental Status: She is alert and oriented to person, place, and time.         Procedures           ED Course      My interpretation of EKG shows sinus rhythm, rate of 75, no ST elevation     Results for orders " placed or performed during the hospital encounter of 11/17/23   Comprehensive Metabolic Panel    Specimen: Blood   Result Value Ref Range    Glucose 116 (H) 65 - 99 mg/dL    BUN 30 (H) 8 - 23 mg/dL    Creatinine 0.85 0.57 - 1.00 mg/dL    Sodium 142 136 - 145 mmol/L    Potassium 3.6 3.5 - 5.2 mmol/L    Chloride 104 98 - 107 mmol/L    CO2 27.0 22.0 - 29.0 mmol/L    Calcium 10.6 (H) 8.6 - 10.5 mg/dL    Total Protein 6.7 6.0 - 8.5 g/dL    Albumin 4.0 3.5 - 5.2 g/dL    ALT (SGPT) 21 1 - 33 U/L    AST (SGOT) 20 1 - 32 U/L    Alkaline Phosphatase 110 39 - 117 U/L    Total Bilirubin 0.3 0.0 - 1.2 mg/dL    Globulin 2.7 gm/dL    A/G Ratio 1.5 g/dL    BUN/Creatinine Ratio 35.3 (H) 7.0 - 25.0    Anion Gap 11.0 5.0 - 15.0 mmol/L    eGFR 78.5 >60.0 mL/min/1.73   Urinalysis With Microscopic If Indicated (No Culture) - Urine, Clean Catch    Specimen: Urine, Clean Catch   Result Value Ref Range    Color, UA Yellow Yellow, Straw    Appearance, UA Clear Clear    pH, UA 6.0 5.0 - 8.0    Specific Gravity, UA 1.008 1.005 - 1.030    Glucose, UA Negative Negative    Ketones, UA Negative Negative    Bilirubin, UA Negative Negative    Blood, UA Negative Negative    Protein, UA Negative Negative    Leuk Esterase, UA Moderate (2+) (A) Negative    Nitrite, UA Negative Negative    Urobilinogen, UA 0.2 E.U./dL 0.2 - 1.0 E.U./dL   Single High Sensitivity Troponin T    Specimen: Blood   Result Value Ref Range    HS Troponin T 15 (H) <14 ng/L   Urine Drug Screen - Urine, Clean Catch    Specimen: Urine, Clean Catch   Result Value Ref Range    Amphet/Methamphet, Screen Negative Negative    Barbiturates Screen, Urine Negative Negative    Benzodiazepine Screen, Urine Negative Negative    Cocaine Screen, Urine Negative Negative    Opiate Screen Positive (A) Negative    THC, Screen, Urine Positive (A) Negative    Methadone Screen, Urine Negative Negative    Oxycodone Screen, Urine Negative Negative   TSH    Specimen: Blood   Result Value Ref Range    TSH  <0.005 (L) 0.270 - 4.200 uIU/mL   CBC Auto Differential    Specimen: Blood   Result Value Ref Range    WBC 7.80 3.40 - 10.80 10*3/mm3    RBC 5.16 3.77 - 5.28 10*6/mm3    Hemoglobin 14.0 12.0 - 15.9 g/dL    Hematocrit 42.9 34.0 - 46.6 %    MCV 83.2 79.0 - 97.0 fL    MCH 27.1 26.6 - 33.0 pg    MCHC 32.6 31.5 - 35.7 g/dL    RDW 14.1 12.3 - 15.4 %    RDW-SD 42.9 37.0 - 54.0 fl    MPV 7.8 6.0 - 12.0 fL    Platelets 200 140 - 450 10*3/mm3    Neutrophil % 57.3 42.7 - 76.0 %    Lymphocyte % 29.3 19.6 - 45.3 %    Monocyte % 8.5 5.0 - 12.0 %    Eosinophil % 4.4 0.3 - 6.2 %    Basophil % 0.5 0.0 - 1.5 %    Neutrophils, Absolute 4.50 1.70 - 7.00 10*3/mm3    Lymphocytes, Absolute 2.30 0.70 - 3.10 10*3/mm3    Monocytes, Absolute 0.70 0.10 - 0.90 10*3/mm3    Eosinophils, Absolute 0.30 0.00 - 0.40 10*3/mm3    Basophils, Absolute 0.00 0.00 - 0.20 10*3/mm3    nRBC 0.1 0.0 - 0.2 /100 WBC   Urinalysis, Microscopic Only - Urine, Clean Catch    Specimen: Urine, Clean Catch   Result Value Ref Range    RBC, UA None Seen None Seen, 0-2 /HPF    WBC, UA 0-2 None Seen, 0-2 /HPF    Bacteria, UA None Seen None Seen /HPF    Squamous Epithelial Cells, UA 0-2 None Seen, 0-2 /HPF    Hyaline Casts, UA None Seen None Seen /LPF    Methodology Manual Light Microscopy    ECG 12 Lead Other; dizziness   Result Value Ref Range    QT Interval 389 ms    QTC Interval 436 ms     CT Head Without Contrast    Result Date: 11/17/2023  Impression: No acute intracranial process demonstrated Electronically Signed: Primo Javier  11/17/2023 6:35 PM EST  Workstation ID: [id number]                                   Medical Decision Making  Amount and/or Complexity of Data Reviewed  Labs: ordered.  Radiology: ordered.  ECG/medicine tests: ordered.    Risk  Prescription drug management.      Patient had the above evaluation.  Results were discussed with the patient.  CT head shows no acute intracranial abnormality.  White blood cell count is normal.  CMP is fairly  unremarkable.  Urinalysis shows no UTI.  EKG shows no acute ischemia.  Troponin is essentially normal at 15.  Patient is having no chest pain or shortness of breath.  TSH is very low at less than 0.005.  Patient states she is aware that she is hyperthyroid and was seeing an endocrinologist for this.  She states they were working her up for the hyperthyroidism that she stopped following up with them.  Patient remained well-appearing in the emergency room.  She was encouraged to get back in with the endocrinologist.  She is stable for discharge.      Final diagnoses:   Hyperthyroidism   Dizziness   Nonintractable headache, unspecified chronicity pattern, unspecified headache type       ED Disposition  ED Disposition       ED Disposition   Discharge    Condition   Stable    Comment   --               Karen Mack MD  [address] [facility] [place] [id number] [phone]    Call in 2 days           Medication List      No changes were made to your prescriptions during this visit.            Ruiz Singh MD  11/17/23 1957

## 2023-11-18 LAB
QT INTERVAL: 389 MS
QTC INTERVAL: 436 MS

## 2023-11-18 NOTE — ED NOTES
"Chief Complaint   Patient presents with    Vomiting     Vomiting, balance was off on Monday, s/p taking a CBD gummy, woke up on Tuesday feeling  little better but still feels bad.  Pt reports she has pain all over.    Pt states she took a CBD gummy and then started vomiting, room was spinning, she was \"confused\", and she was unbalanced. Pt states next day, she was somewhat better. Pt states she is still experiencing nausea, some \"lagging\", forming thoughts and foggy.  "

## 2023-11-20 ENCOUNTER — TELEPHONE (OUTPATIENT)
Dept: PAIN MEDICINE | Facility: CLINIC | Age: 63
End: 2023-11-20
Payer: COMMERCIAL

## 2023-11-20 NOTE — TELEPHONE ENCOUNTER
P2P was done with insurnace.     Patient had 90% pain relief for 12 hours after procedure and pain returned back to 7/10. This is first diagnostic block. Will proceed with second diagnostic block with b/l L4-Sa local only.    Robert Taylor DO  Pain Management   [facility]

## 2023-12-14 ENCOUNTER — OFFICE VISIT (OUTPATIENT)
Dept: ORTHOPEDIC SURGERY | Facility: CLINIC | Age: 63
End: 2023-12-14
Payer: COMMERCIAL

## 2023-12-14 ENCOUNTER — PATIENT ROUNDING (BHMG ONLY) (OUTPATIENT)
Dept: ORTHOPEDIC SURGERY | Facility: CLINIC | Age: 63
End: 2023-12-14
Payer: COMMERCIAL

## 2023-12-14 VITALS — HEART RATE: 74 BPM | WEIGHT: 103 LBS | HEIGHT: 57 IN | OXYGEN SATURATION: 97 % | BODY MASS INDEX: 22.22 KG/M2

## 2023-12-14 DIAGNOSIS — M25.511 CHRONIC RIGHT SHOULDER PAIN: Primary | ICD-10-CM

## 2023-12-14 DIAGNOSIS — M75.41 SUBACROMIAL IMPINGEMENT, RIGHT: ICD-10-CM

## 2023-12-14 DIAGNOSIS — M19.011 PRIMARY OSTEOARTHRITIS OF RIGHT SHOULDER: ICD-10-CM

## 2023-12-14 DIAGNOSIS — G89.29 CHRONIC RIGHT SHOULDER PAIN: Primary | ICD-10-CM

## 2023-12-14 RX ADMIN — LIDOCAINE HYDROCHLORIDE 4 ML: 10 INJECTION, SOLUTION EPIDURAL; INFILTRATION; INTRACAUDAL; PERINEURAL at 17:57

## 2023-12-14 RX ADMIN — TRIAMCINOLONE ACETONIDE 40 MG: 40 INJECTION, SUSPENSION INTRA-ARTICULAR; INTRAMUSCULAR at 17:57

## 2023-12-14 NOTE — PROGRESS NOTES
NEW VISIT    Patient: Abi Rivera  ?  YOB: 1960    MRN: [id number]  ?  Chief Complaint   Patient presents with    Right Shoulder - Initial Evaluation      ?  HPI: Patient is 62-year-old female presents today for right shoulder pain.  She denies acute injury.  She states symptoms have been ongoing for the last 6 months, acutely worsening over the last 2 months.  Primarily lateral shoulder pain that is dull/achy with sharp episodes.  Pain is worse with overhead, pushing/pulling or heavy lifting.  Developing stiffness and decreased range of motion secondary to pain.  Denies any numbness or tingling.  Denies any prior shoulder injuries or surgeries.  Has been attempting to modify activity, and oral over-the-counter medication.    Allergies: No Known Allergies    Past Medical History:   Diagnosis Date    Adenomatous polyp of colon 06/20/2016    Anemia     hx    Arthritis of foot 01/10/2019    Atrial fibrillation 06/16/2016    resolved with ablation    Avascular necrosis of bone 01/10/2019    right ankle and foot    Back pain     Chronic pain 01/22/2019    Closed displaced fracture 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 09/19/2019    Added automatically from request for surgery [id number]    Gastric ulcer     GERD (gastroesophageal reflux disease)     Hypertension 06/16/2016    Hyperthyroidism 11/02/2016    Hypokalemia     Hypomagnesemia     resolved    Neck pain     Osteoporosis without current pathological fracture 06/04/2020    Pulmonary hypertension 06/16/2016    no sx    Shoulder pain, left     Sick sinus syndrome 01/25/2017    resolved    Tricuspid valve insufficiency 06/16/2016     Past Surgical History:   Procedure Laterality Date    APPENDECTOMY      BILATERAL BREAST REDUCTION      CARDIAC CATHETERIZATION N/A 1/19/2022    Procedure: Intracardiac echocardiogram;  Surgeon: Александр Sheth MD;  Location: [facility] INVASIVE LOCATION;  Service: Cardiovascular;  Laterality: N/A;     CARDIAC ELECTROPHYSIOLOGY PROCEDURE N/A 1/19/2022    Procedure: EP/Ablation Utong and Ivan aware;  Surgeon: Александр Sheth MD;  Location: [facility] CATH INVASIVE LOCATION;  Service: Cardiovascular;  Laterality: N/A;    EXPLORATORY LAPAROTOMY N/A 3/19/2021    Procedure: LAPAROTOMY EXPLORATORY with repair of perforated gastric ulcer, oversew of perforated gastric ulcer, placement of kristie patch, biopsy of stomach;  Surgeon: Luisito Velasco DO;  Location: [facility] MAIN OR;  Service: General;  Laterality: N/A;    HIP SURGERY      Left total hip    ORIF FOOT FRACTURE Right 9/30/2019    Procedure: OPEN REDUCTION INTERNAL FIXATION OF FIFTH METATARSAL FRACTURE, LOOSE BODY EXCISION/REMOVAL FROM THE TALONAVICULAR JOINT OF THE RIGHT FOOT;  Surgeon: JOSE Fry DPM;  Location: [facility] OR;  Service: Podiatry    ORIF TIBIA/FIBULA FRACTURES Left     US GUIDED CYST ASPIRATION BREAST N/A 7/26/2021    VENTRAL/INCISIONAL HERNIA REPAIR N/A 3/2/2022    Procedure: OPEN INCISIONAL HERNIA REPAIR, BILATERAL COMPONENT RELEASE, LYSIS OF ADHESIONS, PLACEMENT OF INTRAPERITONEAL MESH AND ONLAY MESH ;  Surgeon: Luisito Velasoc DO;  Location: [facility] OR;  Service: General;  Laterality: N/A;    WOUND DEBRIDEMENT N/A 2/14/2023    Procedure: Sharp excisional DEBRIDEMENT WOUND abdominal wall inculding muscle and fascia 15cm x 15cm, complete removal of infected mesh;  Surgeon: Maycol Arteaga MD;  Location: [facility] OR;  Service: General;  Laterality: N/A;     Social History     Occupational History    Not on file   Tobacco Use    Smoking status: Every Day     Packs/day: 0.50     Years: 34.00     Additional pack years: 0.00     Total pack years: 17.00     Types: Cigarettes     Start date: 1987     Passive exposure: Never    Smokeless tobacco: Never    Tobacco comments:     cut down quit none dos     2/6/23 Smoking cut to 1-2 cigs per day   Vaping Use    Vaping Use: Never used   Substance and Sexual Activity    Alcohol use: Yes     " Comment: rarely/socially     Drug use: No    Sexual activity: Not Currently     Comment: CBD gummies 3 weeks ago      Social History     Social History Narrative    Not on file     Family History   Problem Relation Age of Onset    Hypertension Mother     Thyroid disease Mother     Diabetes Father     Heart disease Father     Hypertension Father        Review of Systems  Constitutional: Negative.  Negative for fever.   Musculoskeletal: Positive for joint pain  Skin: Negative.  Negative for rash and wound.    Neurological: Negative for numbness.     Vitals:    12/14/23 1459   Pulse: 74   SpO2: 97%   Weight: 46.7 kg (103 lb)   Height: 144.8 cm (57\")        Physical Exam  Constitutional: Patient is oriented to person, place, and time. Appears well-developed and well-nourished.   Head: Normocephalic and atraumatic.   Pulmonary/Chest: Effort normal.   Musculoskeletal:   See detailed exam below   Neurological: Alert and oriented to person, place, and time. No sensory deficit. Coordination normal.   Skin: Skin is warm and dry. Capillary refill takes less than 2 seconds. No rash noted. No erythema.     The right shoulder is without obvious signs of acute bony deformity, swelling, erythema or ecchymosis. There is no tenderness along the joint line. There is no tenderness at the AC joint. There is no tenderness at the SC joint. Active range of motion is limited, with painful arc.  Passive range of motion is limited,  with painful arc. Impingement signs are positive with Neer, Lee, and crossover tests. Instability tests are negative with anterior and posterior drawer, and sulcus test. Speeds and Yergason's tests are negative. Loudoun's test is positive for pain. Malachi's test is positive for pain.  Rotator cuff strength is 4/5 and painful. Scapular function is normal.  The neck and opposite shoulder are otherwise normal and stable.       Diagnostics:  xrays obtained today     XR Shoulder 2+ View Right    Result Date: " 12/15/2023  Impression: No acute osseous abnormality. Mild degenerative osteoarthritis. Osseous demineralization. Electronically Signed: Alistair Torres MD  12/15/2023 5:37 PM EST  Workstation ID: [id number]      Assessment:  Diagnoses and all orders for this visit:    1. Chronic right shoulder pain (Primary)  -     XR Shoulder 2+ View Right  -     Ambulatory Referral to Physical Therapy Evaluate and treat; Stretching, ROM, Strengthening    2. Primary osteoarthritis of right shoulder  -     Ambulatory Referral to Physical Therapy Evaluate and treat; Stretching, ROM, Strengthening    3. Subacromial impingement, right  -     Ambulatory Referral to Physical Therapy Evaluate and treat; Stretching, ROM, Strengthening    Other orders  -     Large Joint Arthrocentesis: R subacromial bursa      Plan    Above diagnosis and plan discussed with the patient office today.  X-rays obtained in office remarkable for mild to moderate degenerative change at the glenohumeral and AC joint.  On exam patient does have significant impingement symptoms.  No significant rotator cuff weakness.  We discussed different conservative options and elected to proceed with a subacromial corticosteroid injection, and formal physical therapy for rotator cuff strengthening, shoulder stability, and scapular stability.  Also discussed activity modification and oral medications.  Will plan on 4-month follow-up to assess improvement with both corticosteroid injection and formal physical therapy.  Subacromial corticosteroid injection discussed and given as noted below without complication.   Physical Therapy discussed and referral placed as noted above.  Activity modifications discussed and recommended.  Focus on shoulder range of motion.  Avoid repetitive overhead activity or heavy lifting as able  Rest, ice, compression, and elevation (RICE) therapy  Alternate OTC Ibuprofen and Tylenol as needed/if clinically indicated  Follow up in 4 month(s) or sooner as  needed.    Large Joint Arthrocentesis: R subacromial bursa  Date/Time: 12/14/2023 5:57 PM  Consent given by: patient  Site marked: site marked  Timeout: Immediately prior to procedure a time out was called to verify the correct patient, procedure, equipment, support staff and site/side marked as required   Supporting Documentation  Indications: pain   Procedure Details  Location: shoulder - R subacromial bursa  Preparation: Patient was prepped and draped in the usual sterile fashion  Needle size: 25 G  Approach: posterior  Medications administered: 4 mL lidocaine PF 1% 1 %; 40 mg triamcinolone acetonide 40 MG/ML  Patient tolerance: patient tolerated the procedure well with no immediate complications      Date of encounter: 12/14/2023   Richardson Busby DO    Electronically signed by Richardson Busby DO, 12/14/23, 2:54 PM EST.    Disclaimer: Please note that areas of this note were completed with computer voice recognition software.  Quite often unanticipated grammatical, syntax, homophones, and other interpretive errors are inadvertently transcribed by the computer software. Please excuse any errors that have escaped final proofreading.

## 2023-12-15 NOTE — PROGRESS NOTES
A My-Chart message has been sent to the patient for PATIENT ROUNDING with [facility]

## 2023-12-15 NOTE — PROGRESS NOTES
My name is Emmy Zurita and I am the practice manager here at [facility]    I wanted to ask you about your visit.    If you could, can you tell me what went well?    We are always looking for ways to make our patient experience even better. Do you have and recommendations on ways we may improve?    Overall were you satisfied with your first visit to our practice?    I appreciate you taking the time to answer a few questions today.    If you by chance get a survey from [employer], I encourage you to fill it out as it is vital information that the practice needs to improve or keep doing and of course we Strive for 5's!     Thank you, and have a great day.    Emmy

## 2023-12-18 RX ORDER — TRIAMCINOLONE ACETONIDE 40 MG/ML
40 INJECTION, SUSPENSION INTRA-ARTICULAR; INTRAMUSCULAR
Status: COMPLETED | OUTPATIENT
Start: 2023-12-14 | End: 2023-12-14

## 2023-12-18 RX ORDER — LIDOCAINE HYDROCHLORIDE 10 MG/ML
4 INJECTION, SOLUTION EPIDURAL; INFILTRATION; INTRACAUDAL; PERINEURAL
Status: COMPLETED | OUTPATIENT
Start: 2023-12-14 | End: 2023-12-14

## 2024-04-22 ENCOUNTER — OFFICE VISIT (OUTPATIENT)
Dept: ORTHOPEDIC SURGERY | Facility: CLINIC | Age: 64
End: 2024-04-22
Payer: COMMERCIAL

## 2024-04-22 VITALS — BODY MASS INDEX: 22.22 KG/M2 | WEIGHT: 103 LBS | HEART RATE: 73 BPM | OXYGEN SATURATION: 99 % | HEIGHT: 57 IN

## 2024-04-22 DIAGNOSIS — M75.41 SUBACROMIAL IMPINGEMENT, RIGHT: ICD-10-CM

## 2024-04-22 DIAGNOSIS — M25.511 CHRONIC RIGHT SHOULDER PAIN: Primary | ICD-10-CM

## 2024-04-22 DIAGNOSIS — M19.011 PRIMARY OSTEOARTHRITIS OF RIGHT SHOULDER: ICD-10-CM

## 2024-04-22 DIAGNOSIS — G89.29 CHRONIC RIGHT SHOULDER PAIN: Primary | ICD-10-CM

## 2024-04-22 PROCEDURE — 99213 OFFICE O/P EST LOW 20 MIN: CPT | Performed by: STUDENT IN AN ORGANIZED HEALTH CARE EDUCATION/TRAINING PROGRAM

## 2024-04-22 PROCEDURE — 20610 DRAIN/INJ JOINT/BURSA W/O US: CPT | Performed by: STUDENT IN AN ORGANIZED HEALTH CARE EDUCATION/TRAINING PROGRAM

## 2024-04-22 RX ADMIN — LIDOCAINE HYDROCHLORIDE 4 ML: 10 INJECTION, SOLUTION EPIDURAL; INFILTRATION; INTRACAUDAL; PERINEURAL at 17:59

## 2024-04-22 RX ADMIN — TRIAMCINOLONE ACETONIDE 40 MG: 40 INJECTION, SUSPENSION INTRA-ARTICULAR; INTRAMUSCULAR at 17:59

## 2024-04-22 NOTE — PROGRESS NOTES
FOLLOW UP VISIT    Patient: Abi Rivera  ?  YOB: 1960    MRN: [id number]  ?  Chief Complaint   Patient presents with    Right Shoulder - Follow-up      ?  HPI: Patient is returning for follow-up of right shoulder pain.  She most recently had subacromial corticosteroid injection on 12/14/2023 for which she had substantial near complete relief for 3 months.  Since that time she has had gradual return of her pain, and states that it has returned about 80% of its preinjection level.  At this time as started to limit some activities including overhead, or lifting utilizing right upper extremity.  She denies acute injury.  Minimal home exercise program at this time.  Has continued to take as needed over-the-counter Tylenol/ibuprofen as needed for symptoms.    Allergies: No Known Allergies    Past Medical History:   Diagnosis Date    Adenomatous polyp of colon 06/20/2016    Anemia     hx    Arthritis of foot 01/10/2019    Atrial fibrillation 06/16/2016    resolved with ablation    Avascular necrosis of bone 01/10/2019    right ankle and foot    Back pain     Chronic pain 01/22/2019    Closed displaced fracture 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 09/19/2019    Added automatically from request for surgery [id number]    Gastric ulcer     GERD (gastroesophageal reflux disease)     Hypertension 06/16/2016    Hyperthyroidism 11/02/2016    Hypokalemia     Hypomagnesemia     resolved    Neck pain     Osteoporosis without current pathological fracture 06/04/2020    Pulmonary hypertension 06/16/2016    no sx    Shoulder pain, left     Sick sinus syndrome 01/25/2017    resolved    Tricuspid valve insufficiency 06/16/2016     Past Surgical History:   Procedure Laterality Date    APPENDECTOMY      BILATERAL BREAST REDUCTION      CARDIAC CATHETERIZATION N/A 1/19/2022    Procedure: Intracardiac echocardiogram;  Surgeon: Александр Sheth MD;  Location: [facility] INVASIVE LOCATION;  Service:  Cardiovascular;  Laterality: N/A;    CARDIAC ELECTROPHYSIOLOGY PROCEDURE N/A 1/19/2022    Procedure: EP/Ablation [place] and Ivan aware;  Surgeon: Александр Sheth MD;  Location: [facility] INVASIVE LOCATION;  Service: Cardiovascular;  Laterality: N/A;    EXPLORATORY LAPAROTOMY N/A 3/19/2021    Procedure: LAPAROTOMY EXPLORATORY with repair of perforated gastric ulcer, oversew of perforated gastric ulcer, placement of kristie patch, biopsy of stomach;  Surgeon: Luisito Velasco DO;  Location: [facility] MAIN OR;  Service: General;  Laterality: N/A;    HIP SURGERY      Left total hip    ORIF FOOT FRACTURE Right 9/30/2019    Procedure: OPEN REDUCTION INTERNAL FIXATION OF FIFTH METATARSAL FRACTURE, LOOSE BODY EXCISION/REMOVAL FROM THE TALONAVICULAR JOINT OF THE RIGHT FOOT;  Surgeon: JOSE Fry DPLYNDSEY;  Location: [facility] OR;  Service: Podiatry    ORIF TIBIA/FIBULA FRACTURES Left     US GUIDED CYST ASPIRATION BREAST N/A 7/26/2021    VENTRAL/INCISIONAL HERNIA REPAIR N/A 3/2/2022    Procedure: OPEN INCISIONAL HERNIA REPAIR, BILATERAL COMPONENT RELEASE, LYSIS OF ADHESIONS, PLACEMENT OF INTRAPERITONEAL MESH AND ONLAY MESH ;  Surgeon: Luisito Velasco DO;  Location: [facility] MAIN OR;  Service: General;  Laterality: N/A;    WOUND DEBRIDEMENT N/A 2/14/2023    Procedure: Sharp excisional DEBRIDEMENT WOUND abdominal wall inculding muscle and fascia 15cm x 15cm, complete removal of infected mesh;  Surgeon: Maycol Arteaga MD;  Location: [facility] MAIN OR;  Service: General;  Laterality: N/A;     Social History     Occupational History    Not on file   Tobacco Use    Smoking status: Every Day     Current packs/day: 0.50     Average packs/day: 0.5 packs/day for 37.3 years (18.7 ttl pk-yrs)     Types: Cigarettes     Start date: 1987     Passive exposure: Never    Smokeless tobacco: Never    Tobacco comments:     cut down quit none dos     2/6/23 Smoking cut to 1-2 cigs per day   Vaping Use    Vaping status: Never Used   Substance  "and Sexual Activity    Alcohol use: Yes     Comment: rarely/socially     Drug use: No    Sexual activity: Not Currently     Comment: CBD gummies 3 weeks ago      Social History     Social History Narrative    Not on file     Family History   Problem Relation Age of Onset    Hypertension Mother     Thyroid disease Mother     Diabetes Father     Heart disease Father     Hypertension Father        Review of Systems  Constitutional: Negative.  Negative for fever.   Musculoskeletal: Positive for joint pain  Skin: Negative.  Negative for rash and wound.    Neurological: Negative for numbness.     Vitals:    04/22/24 1418   Pulse: 73   SpO2: 99%   Weight: 46.7 kg (103 lb)   Height: 144.8 cm (57\")        Physical Exam  Constitutional: Patient is oriented to person, place, and time. Appears well-developed and well-nourished.   Head: Normocephalic and atraumatic.   Pulmonary/Chest: Effort normal.   Musculoskeletal:   See detailed exam below   Neurological: Alert and oriented to person, place, and time. No sensory deficit. Coordination normal.   Skin: Skin is warm and dry. Capillary refill takes less than 2 seconds. No rash noted. No erythema.     The right shoulder is without obvious signs of acute bony deformity, swelling, erythema or ecchymosis. There is no tenderness along the joint line. There is no tenderness at the AC joint. There is no tenderness at the SC joint. Active range of motion is limited, with painful arc.  Passive range of motion is limited,  with painful arc. Impingement signs are positive with Neer, Lee, and crossover tests. Instability tests are negative with anterior and posterior drawer, and sulcus test. Speeds and Yergason's tests are negative. Smith's test is positive for pain. Malachi's test is positive for pain.  Rotator cuff strength is 4/5 and painful. Scapular function is normal.  The neck and opposite shoulder are otherwise normal and stable.       Diagnostics:  no diagnostic testing performed " this visit     XR Shoulder 2+ View Right    Result Date: 12/15/2023  Impression: No acute osseous abnormality. Mild degenerative osteoarthritis. Osseous demineralization. Electronically Signed: Alistair Torres MD  12/15/2023 5:37 PM EST  Workstation ID: [id number]      Assessment:  Diagnoses and all orders for this visit:    1. Chronic right shoulder pain (Primary)  -     Ambulatory Referral to Physical Therapy Evaluate and treat; Stretching, ROM, Strengthening  -     Large Joint Arthrocentesis: R subacromial bursa    2. Subacromial impingement, right  -     Ambulatory Referral to Physical Therapy Evaluate and treat; Stretching, ROM, Strengthening  -     Large Joint Arthrocentesis: R subacromial bursa    3. Primary osteoarthritis of right shoulder  -     Ambulatory Referral to Physical Therapy Evaluate and treat; Stretching, ROM, Strengthening        Plan    Patient returning for follow-up of right shoulder pain.  Had significant benefit from most recent subacromial corticosteroid injection which gave her near complete relief for over 3 months.  After discussion of risks and benefit, she elected to proceed with repeat subacromial corticosteroid injection which was given as noted below without complication.  We also had a long discussion about home exercise program versus physical therapy as adjunct to injection to help improve shoulder mechanics/stability and potentially prolong the duration of affect of her subacromial injection.  After discussion she was agreeable to formal physical therapy and a referral was placed as noted above.  Encouraged her to continue other conservative management including RICE treatment, activity modification and as needed over-the-counter medications.  We will plan on follow-up in 3 to 4 months after course of formal physical therapy to monitor progress.  If continued symptoms at that time we will consider further imaging to evaluate rotator cuff.  Follow up in 4 month(s) or sooner as  needed.    Large Joint Arthrocentesis: R subacromial bursa  Date/Time: 4/22/2024 5:59 PM  Consent given by: patient  Site marked: site marked  Timeout: Immediately prior to procedure a time out was called to verify the correct patient, procedure, equipment, support staff and site/side marked as required   Supporting Documentation  Indications: pain   Procedure Details  Location: shoulder - R subacromial bursa  Preparation: Patient was prepped and draped in the usual sterile fashion  Needle size: 25 G  Approach: posterior  Medications administered: 4 mL lidocaine PF 1% 1 %; 40 mg triamcinolone acetonide 40 MG/ML  Patient tolerance: patient tolerated the procedure well with no immediate complications        Date of encounter: 4/22/2024  Ricahrdson Busby DO      Disclaimer: Please note that areas of this note were completed with computer voice recognition software.  Quite often unanticipated grammatical, syntax, homophones, and other interpretive errors are inadvertently transcribed by the computer software. Please excuse any errors that have escaped final proofreading.

## 2024-04-24 RX ORDER — LIDOCAINE HYDROCHLORIDE 10 MG/ML
4 INJECTION, SOLUTION EPIDURAL; INFILTRATION; INTRACAUDAL; PERINEURAL
Status: COMPLETED | OUTPATIENT
Start: 2024-04-22 | End: 2024-04-22

## 2024-04-24 RX ORDER — TRIAMCINOLONE ACETONIDE 40 MG/ML
40 INJECTION, SUSPENSION INTRA-ARTICULAR; INTRAMUSCULAR
Status: COMPLETED | OUTPATIENT
Start: 2024-04-22 | End: 2024-04-22

## 2024-12-17 ENCOUNTER — APPOINTMENT (OUTPATIENT)
Dept: GENERAL RADIOLOGY | Facility: HOSPITAL | Age: 64
End: 2024-12-17
Payer: COMMERCIAL

## 2024-12-17 ENCOUNTER — HOSPITAL ENCOUNTER (EMERGENCY)
Facility: HOSPITAL | Age: 64
Discharge: HOME | End: 2024-12-17
Admitting: EMERGENCY MEDICINE
Payer: COMMERCIAL

## 2024-12-17 VITALS
SYSTOLIC BLOOD PRESSURE: 180 MMHG | TEMPERATURE: 98.7 F | WEIGHT: 105 LBS | HEART RATE: 93 BPM | BODY MASS INDEX: 22.65 KG/M2 | RESPIRATION RATE: 16 BRPM | HEIGHT: 57 IN | DIASTOLIC BLOOD PRESSURE: 70 MMHG | OXYGEN SATURATION: 93 %

## 2024-12-17 DIAGNOSIS — M25.562 LEFT KNEE PAIN, UNSPECIFIED CHRONICITY: Primary | ICD-10-CM

## 2024-12-17 PROCEDURE — 99283 EMERGENCY DEPT VISIT LOW MDM: CPT

## 2024-12-17 PROCEDURE — 73562 X-RAY EXAM OF KNEE 3: CPT

## 2024-12-17 RX ORDER — METHYLPREDNISOLONE 4 MG/1
TABLET ORAL
Qty: 21 TABLET | Refills: 0 | Status: SHIPPED | OUTPATIENT
Start: 2024-12-17

## 2024-12-17 RX ORDER — HYDROCODONE BITARTRATE AND ACETAMINOPHEN 5; 325 MG/1; MG/1
1 TABLET ORAL ONCE
Status: COMPLETED | OUTPATIENT
Start: 2024-12-17 | End: 2024-12-17

## 2024-12-17 RX ADMIN — HYDROCODONE BITARTRATE AND ACETAMINOPHEN 1 TABLET: 5; 325 TABLET ORAL at 15:52

## 2024-12-17 NOTE — DISCHARGE INSTRUCTIONS
Rest.  Wear Ace wrap for comfort.  May use ice 20 minutes at a time several times a day.  Use walker or personal wheelchair as needed.  Activities as tolerated.  Take medications as directed.  Follow-up with Ortho, call tomorrow to schedule an appointment.  Follow-up with primary care as needed.  Return to the ER for any new or worsening symptoms.

## 2024-12-17 NOTE — ED PROVIDER NOTES
Subjective   History of Present Illness  Chief complaint: Left knee pain for the past several days.      Context: Patient is a 63-year-old female with a history of tension, A-fib, avascular necrosis, hypothyroidism who presents to the ER with complaint of left knee pain for the past several days.  Patient denies any new injury or trauma, states the pain is severe enough that she is unable to bear weight.  Patient denies any chest pain, shortness of air or fever.  Denies any numbness and tingling at the distal lower extremity.    PCP:Chaparrita Chun    LMP: Postmenopausal          Review of Systems   Constitutional:  Negative for fever.       Past Medical History:   Diagnosis Date    Adenomatous polyp of colon 06/20/2016    Anemia     hx    Arthritis of foot 01/10/2019    Atrial fibrillation 06/16/2016    resolved with ablation    Avascular necrosis of bone 01/10/2019    right ankle and foot    Back pain     Chronic pain 01/22/2019    Closed displaced fracture 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 09/19/2019    Added automatically from request for surgery [id number]    Gastric ulcer     GERD (gastroesophageal reflux disease)     Hypertension 06/16/2016    Hyperthyroidism 11/02/2016    Hypokalemia     Hypomagnesemia     resolved    Neck pain     Osteoporosis without current pathological fracture 06/04/2020    Pulmonary hypertension 06/16/2016    no sx    Shoulder pain, left     Sick sinus syndrome 01/25/2017    resolved    Tricuspid valve insufficiency 06/16/2016       No Known Allergies    Past Surgical History:   Procedure Laterality Date    APPENDECTOMY      BILATERAL BREAST REDUCTION      CARDIAC CATHETERIZATION N/A 1/19/2022    Procedure: Intracardiac echocardiogram;  Surgeon: Александр Sheth MD;  Location: [facility] INVASIVE LOCATION;  Service: Cardiovascular;  Laterality: N/A;    CARDIAC ELECTROPHYSIOLOGY PROCEDURE N/A 1/19/2022    Procedure: EP/Ablation Bebo quiroz;  Surgeon:  Александр Sheth MD;  Location: [facility] CATH INVASIVE LOCATION;  Service: Cardiovascular;  Laterality: N/A;    EXPLORATORY LAPAROTOMY N/A 3/19/2021    Procedure: LAPAROTOMY EXPLORATORY with repair of perforated gastric ulcer, oversew of perforated gastric ulcer, placement of kristie patch, biopsy of stomach;  Surgeon: Luisito Velasco DO;  Location: [facility] MAIN OR;  Service: General;  Laterality: N/A;    HIP SURGERY      Left total hip    ORIF FOOT FRACTURE Right 9/30/2019    Procedure: OPEN REDUCTION INTERNAL FIXATION OF FIFTH METATARSAL FRACTURE, LOOSE BODY EXCISION/REMOVAL FROM THE TALONAVICULAR JOINT OF THE RIGHT FOOT;  Surgeon: JOSE Fry DPLYNDSYE;  Location: [facility] MAIN OR;  Service: Podiatry    ORIF TIBIA/FIBULA FRACTURES Left     US GUIDED CYST ASPIRATION BREAST N/A 7/26/2021    VENTRAL/INCISIONAL HERNIA REPAIR N/A 3/2/2022    Procedure: OPEN INCISIONAL HERNIA REPAIR, BILATERAL COMPONENT RELEASE, LYSIS OF ADHESIONS, PLACEMENT OF INTRAPERITONEAL MESH AND ONLAY MESH ;  Surgeon: Luisito Velasco DO;  Location: [facility] MAIN OR;  Service: General;  Laterality: N/A;    WOUND DEBRIDEMENT N/A 2/14/2023    Procedure: Sharp excisional DEBRIDEMENT WOUND abdominal wall inculding muscle and fascia 15cm x 15cm, complete removal of infected mesh;  Surgeon: Maycol Arteaga MD;  Location: [facility] MAIN OR;  Service: General;  Laterality: N/A;       Family History   Problem Relation Age of Onset    Hypertension Mother     Thyroid disease Mother     Diabetes Father     Heart disease Father     Hypertension Father        Social History     Socioeconomic History    Marital status:    Tobacco Use    Smoking status: Every Day     Current packs/day: 0.50     Average packs/day: 0.5 packs/day for 38.0 years (19.0 ttl pk-yrs)     Types: Cigarettes     Start date: 1987     Passive exposure: Never    Smokeless tobacco: Never    Tobacco comments:     cut down quit none dos     2/6/23 Smoking cut to 1-2 cigs per day   Vaping Use  "   Vaping status: Never Used   Substance and Sexual Activity    Alcohol use: Yes     Comment: rarely/socially     Drug use: No    Sexual activity: Not Currently     Comment: CBD gummies 3 weeks ago           Objective   Physical Exam  Vitals and nursing note reviewed.   Constitutional:       Appearance: Normal appearance.   HENT:      Head: Normocephalic.      Nose: Nose normal.      Mouth/Throat:      Mouth: Mucous membranes are moist.   Eyes:      Extraocular Movements: Extraocular movements intact.   Cardiovascular:      Rate and Rhythm: Normal rate and regular rhythm.      Pulses: Normal pulses.      Heart sounds: Normal heart sounds.   Pulmonary:      Effort: Pulmonary effort is normal.      Breath sounds: Normal breath sounds.   Abdominal:      Tenderness: There is no abdominal tenderness.   Musculoskeletal:         General: Tenderness present. No deformity.      Cervical back: Normal range of motion.      Left knee: No erythema or ecchymosis. Decreased range of motion. Tenderness present.      Instability Tests: Anterior drawer test negative. Posterior drawer test negative. Medial Tisha test negative and lateral Tisha test negative.      Comments: Tenderness noted to left knee decreased range of motion, pain with flexion at approximately 70 degrees.  Negative anterior posterior drawer.  Negative Tisha.  Distal pulses present, and neurovascularly intact.   Skin:     General: Skin is warm and dry.      Capillary Refill: Capillary refill takes less than 2 seconds.   Neurological:      General: No focal deficit present.      Mental Status: She is alert and oriented to person, place, and time.   Psychiatric:         Mood and Affect: Mood normal.         Behavior: Behavior normal.         Procedures           ED Course           /70 (BP Location: Left arm, Patient Position: Sitting)   Pulse 93   Temp 98.7 °F (37.1 °C) (Oral)   Resp 16   Ht 144.8 cm (57\")   Wt 47.6 kg (105 lb)   LMP  (LMP " Unknown)   SpO2 93%   BMI 22.72 kg/m²   Labs Reviewed - No data to display  Medications   HYDROcodone-acetaminophen (NORCO) 5-325 MG per tablet 1 tablet (1 tablet Oral Given 12/17/24 1552)     XR Knee 3 View Left    Result Date: 12/17/2024  Impression: Chronic findings as noted. No acute process. Electronically Signed: Greer Puente MD  12/17/2024 3:35 PM EST  Workstation ID: [id number]                                                 Medical Decision Making  Chart review:    Radiology interpretation:  X-rays left knee reviewed and interpreted by Ruiz Singh MD: Chronic findings as noted. No acute process. Further interpretation by radiologist as above    Labs were considered but were not emergently warranted at this time.    EKG was considered but was not emergently warranted at this time.    Scans were obtained to evaluate for fracture, dislocation, osteoarthritis, hardware changes. Patient is a 63-year-old female with a history of hypertension, A-fib, avascular necrosis, and hypotension, who presents to the ER with above complaint.  On initial examination patient was resting comfortably in the bed, no signs and symptoms of distress and nontoxic in appearance with family at bedside.  Patient denies any new injury or trauma, but states she is unable to intermittently bear weight due to the pain.  Patient reports she has been dealing with this for the past several days and it is not gotten any better.  PO [place] given.  On reexamination patient was resting comfortably in the bed, nontoxic in appearance with no signs and symptoms of distress.  Discussed findings with patient and decision to discharge.  Medrol Dosepak for inflammation.  Applied Ace wrap to left knee, and advised patient to use a walker as needed.  Advised patient to follow-up with Ortho, call tomorrow to schedule an appointment.  Follow-up with primary care as needed.  And to return to the ER for any new or worsening symptoms.  Patient verbalized  understanding of all discharge instructions.      Appropriate PPE worn during exam.       i discussed findings with patient who voices understanding of discharge instructions, signs and symptoms requiring return to ED; discharged improved and in stable condition with follow up for re-evaluation.  This document is intended for medical expert use only. Reading of this document by patients and/or patient's family without participating medical staff guidance may result in misinterpretation and unintended morbidity.  Any interpretation of such data is the responsibility of the patient and/or family member responsible for the patient in concert with their primary or specialist providers, not to be left for sources of online searches such as [employer], [employer] or similar queries. Relying on these approaches to knowledge may result in misinterpretation, misguided goals of care and even death should patients or family members try recommendations outside of the realm of professional medical care in a supervised inpatient environment.         Problems Addressed:  Left knee pain, unspecified chronicity: complicated acute illness or injury    Amount and/or Complexity of Data Reviewed  Radiology: ordered.    Risk  Prescription drug management.        Final diagnoses:   Left knee pain, unspecified chronicity       ED Disposition  ED Disposition       ED Disposition   Discharge    Condition   Stable    Comment   --               Louis Pierre II, MD  [address] [facility] [place] [id number] [phone]    Call   Call tomorrow to schedule an appointment.    Chaparrita Chun MD  [address] [place] [address] [address] [place] [address] [phone]      As needed         Medication List        New Prescriptions      methylPREDNISolone 4 MG dose pack  Commonly known as: MEDROL  Take as directed on package instructions.               Where to Get Your Medications        These medications were sent to [id number] - [place], [place] -  [address] - [phone]  - [phone] FX  [address], [place] [address]      Phone: [phone]   methylPREDNISolone 4 MG dose pack            Sharla Villatoro, APRN  12/17/24 3937

## 2025-01-07 NOTE — DISCHARGE INSTRUCTIONS
Follow-up with endocrinology as directed.  Return to the emergency room for any new or worsening symptoms or if you have any other questions or concerns.   Detail Level: Zone

## 2025-05-14 ENCOUNTER — TRANSCRIBE ORDERS (OUTPATIENT)
Dept: ADMINISTRATIVE | Facility: HOSPITAL | Age: 65
End: 2025-05-14
Payer: COMMERCIAL

## 2025-05-14 DIAGNOSIS — M79.662 PAIN OF LEFT LOWER LEG: Primary | ICD-10-CM

## (undated) DEVICE — OCCLUSIVE GAUZE STRIP OVERWRAP,3% BISMUTH TRIBROMOPHENATE IN PETROLATUM BLEND: Brand: XEROFORM

## (undated) DEVICE — GLOW 'N TELL 30CM TAPE (50 STRIPS): Brand: VASCUTAPE RADIOPAQUE TAPE

## (undated) DEVICE — KT SURG TURNOVER 050

## (undated) DEVICE — SPNG LAP PREWSH SFTPK 18X18IN STRL PK/5

## (undated) DEVICE — PAD CAST SPECIST 4IN STRL

## (undated) DEVICE — SUT ETHIB 1 CT1 30IN  X425H

## (undated) DEVICE — SLV SCD CALF HEMOFORCE DVT THERP REPROC MD

## (undated) DEVICE — TUBING, SUCTION, 1/4" X 12', STRAIGHT: Brand: MEDLINE

## (undated) DEVICE — GLV SURG TRIUMPH GREEN W/ALOE PF LTX 8 STRL

## (undated) DEVICE — PK EXTREM 50

## (undated) DEVICE — DRN WND EVAC BULB 100CC

## (undated) DEVICE — GLV SURG BIOGEL SENSR LTX PF SZ7.5

## (undated) DEVICE — UNDRGLV SURG BIOGEL PIMICROINDICATOR SYNTH SZ7.5PF STRL PR/2

## (undated) DEVICE — CATH ABL ACHIEVE MP 3.3F20MM 165CM

## (undated) DEVICE — Device: Brand: WEBSTER CS

## (undated) DEVICE — ELECTRD DEFIB M/FUNC PROPADZ RADIOL 2PK

## (undated) DEVICE — SUT ETHLN 2/0 PS 18IN 585H

## (undated) DEVICE — PK MINOR LAPAROTOMY 50

## (undated) DEVICE — CABL CATH ABLAT ACHIEVE 196CM 1P/U

## (undated) DEVICE — SUT VIC 3/0 CTI 36IN J944H

## (undated) DEVICE — Device: Brand: CARTO 3

## (undated) DEVICE — Device: Brand: NRG TRANSSEPTAL NEEDLE

## (undated) DEVICE — Device: Brand: REFERENCE PATCH CARTO 3

## (undated) DEVICE — TP SXN YANKR BULB STRL

## (undated) DEVICE — Device: Brand: PENTARAY NAV

## (undated) DEVICE — TBG PENCL TELESCP MEGADYNE SMOKE EVAC 15FT

## (undated) DEVICE — SUT PROLN 2/0 SH 36IN 8523H

## (undated) DEVICE — Device: Brand: RFP-100A CONNECTOR CABLE

## (undated) DEVICE — BINDER: Brand: DEROYAL

## (undated) DEVICE — 450 ML BOTTLE OF 0.05% CHLORHEXIDINE GLUCONATE IN 99.95% STERILE WATER FOR IRRIGATION, USP AND APPLICATOR.: Brand: IRRISEPT ANTIMICROBIAL WOUND LAVAGE

## (undated) DEVICE — SUT VIC 2/0 CT2 27IN J269H

## (undated) DEVICE — SOL IRRIG NACL 1000ML

## (undated) DEVICE — ESWAB LQ AMIES  REG. NYLON FLOCKED  APPLICATOR: Brand: ESWAB™

## (undated) DEVICE — PK PROC TURNOVER

## (undated) DEVICE — CUFF TOURNI 1BLADDER 1PRT 18IN STRL

## (undated) DEVICE — PK MAJ LAPAROTOMY 50

## (undated) DEVICE — PK TRY HEART CATH 50

## (undated) DEVICE — SOL IRRIG H2O 1000ML STRL

## (undated) DEVICE — CABL CONN CATH EP UMB 48IN

## (undated) DEVICE — TBG IV DRIP CHAMBER MACRO SGL 72IN

## (undated) DEVICE — SUT VIC 3/0 SH 27IN J416H

## (undated) DEVICE — BLAKE SILICONE DRAIN, 15 FR ROUND, HUBLESS: Brand: BLAKE

## (undated) DEVICE — DRSNG TELFA PAD NONADH STR 1S 3X8IN

## (undated) DEVICE — GLV SURG BIOGEL LTX PF 7

## (undated) DEVICE — SYR LUERLOK 30CC

## (undated) DEVICE — SYR LL TP 10ML STRL

## (undated) DEVICE — SOL IRRIG NACL 9PCT 1000ML BTL

## (undated) DEVICE — BANDAGE,GAUZE,BULKEE II,4.5"X4.1YD,STRL: Brand: MEDLINE

## (undated) DEVICE — CATH ABL ARCTIC FRNT ADV 10.5F3.5X28MM

## (undated) DEVICE — COVER,MAYO STAND,STERILE: Brand: MEDLINE

## (undated) DEVICE — TBG PRESS/MONITOR FIX M/F LL A/ 24IN STRL

## (undated) DEVICE — CVR HNDL LT SURG ACCSSRY BLU STRL

## (undated) DEVICE — NDL HYPO PRECISIONGLIDE REG 25G 1 1/2

## (undated) DEVICE — IMPLANTABLE DEVICE
Type: IMPLANTABLE DEVICE | Site: FOOT | Status: NON-FUNCTIONAL
Brand: ORTHOLOC
Removed: 2019-09-30

## (undated) DEVICE — DRAPE,U/ SHT,SPLIT,PLAS,STERIL: Brand: MEDLINE

## (undated) DEVICE — PENCL EVAC ULTRAVAC SMOKE W/BLD

## (undated) DEVICE — GLV SURG BIOGEL M LTX PF 7 1/2

## (undated) DEVICE — SUT SILK 2/0 SH 30IN K833H

## (undated) DEVICE — GW XCHG AMPLTZ XSTIF PTFE CRV .035IN 3X180CM

## (undated) DEVICE — PINNACLE INTRODUCER SHEATH: Brand: PINNACLE

## (undated) DEVICE — KT PK ANGIOPLASTY ACC 9 MERIT

## (undated) DEVICE — SUT ETHLN 3/0 FS1 30IN 669H

## (undated) DEVICE — CUSTOM PACK: Brand: UNBRANDED

## (undated) DEVICE — DRSNG WND BORDR/ADHS NONADHR/GZ LF 4X4IN STRL

## (undated) DEVICE — PROVE COVER: Brand: UNBRANDED

## (undated) DEVICE — Device: Brand: SOUNDSTAR

## (undated) DEVICE — PREF.GUIDING SHEATH W/MULT.CRV: Brand: PREFACE

## (undated) DEVICE — UNDYED BRAIDED (POLYGLACTIN 910), SYNTHETIC ABSORBABLE SUTURE: Brand: COATED VICRYL

## (undated) DEVICE — PAD E/S GRND SGL/FOIL 9FT/CORD DISP

## (undated) DEVICE — SPNG GZ AVANT 6PLY 4X4IN STRL PK/2

## (undated) DEVICE — WET SKIN PREP TRAY: Brand: MEDLINE INDUSTRIES, INC.

## (undated) DEVICE — SUT SILK 3/0 SH CR8 30IN C017D

## (undated) DEVICE — BNDG ESMARK 4IN 12FT LF STRL BLU

## (undated) DEVICE — ST INTRO PERFORMER W/GW J/TP .038IN 14FR

## (undated) DEVICE — SOLUTION,WATER,IRRIGATION,1000ML,STERILE: Brand: MEDLINE

## (undated) DEVICE — SPLNT 1 STEP 4INX30IN

## (undated) DEVICE — ST ACC MICROPUNCTURE STFF/CANN PLAT/TP 4F 21G 40CM

## (undated) DEVICE — INTENDED FOR TISSUE SEPARATION, AND OTHER PROCEDURES THAT REQUIRE A SHARP SURGICAL BLADE TO PUNCTURE OR CUT.: Brand: BARD-PARKER ® CARBON RIB-BACK BLADES

## (undated) DEVICE — CABL CONN CATH EP COAXL UMB 72IN

## (undated) DEVICE — SUT ETHIBOND EXCEL POLYSTR 1 CTX 30IN

## (undated) DEVICE — SHEATH FLXCATH STEER 12FR

## (undated) DEVICE — ABDOMINAL BINDER: Brand: DEROYAL

## (undated) DEVICE — SUT VIC 2/0 CT1 36IN

## (undated) DEVICE — BNDG ELAS ELITE V/CLOSE 4IN 5YD LF STRL

## (undated) DEVICE — GAUZE,SPONGE,FLUFF,6"X6.75",STRL,5/TRAY: Brand: MEDLINE

## (undated) DEVICE — BLAKE SILICONE DRAIN, 19 FR ROUND, HUBLESS WITH 1/4" BENDABLE TROCAR: Brand: BLAKE